# Patient Record
Sex: FEMALE | Race: WHITE | Employment: UNEMPLOYED | URBAN - METROPOLITAN AREA
[De-identification: names, ages, dates, MRNs, and addresses within clinical notes are randomized per-mention and may not be internally consistent; named-entity substitution may affect disease eponyms.]

---

## 2017-01-01 ENCOUNTER — HOSPITAL ENCOUNTER (OUTPATIENT)
Dept: LAB | Age: 57
Discharge: HOME | End: 2017-11-21

## 2017-01-01 ENCOUNTER — HOSPITAL ENCOUNTER (OUTPATIENT)
Dept: LAB | Age: 57
Discharge: HOME | End: 2017-12-28
Payer: COMMERCIAL

## 2017-01-01 ENCOUNTER — HOME CARE VISIT (OUTPATIENT)
Dept: SCHEDULING | Facility: HOME HEALTH | Age: 57
End: 2017-01-01
Payer: COMMERCIAL

## 2017-01-01 ENCOUNTER — HOSPITAL ENCOUNTER (OUTPATIENT)
Dept: LAB | Age: 57
Discharge: HOME | End: 2017-10-20
Payer: COMMERCIAL

## 2017-01-01 ENCOUNTER — HOSPITAL ENCOUNTER (OUTPATIENT)
Dept: LAB | Age: 57
Discharge: HOME | End: 2017-10-03
Payer: COMMERCIAL

## 2017-01-01 ENCOUNTER — HOSPITAL ENCOUNTER (OUTPATIENT)
Dept: INFUSION THERAPY | Age: 57
End: 2017-01-01

## 2017-01-01 ENCOUNTER — HOSPITAL ENCOUNTER (OUTPATIENT)
Dept: INFUSION THERAPY | Age: 57
Discharge: HOME | End: 2017-12-08
Payer: COMMERCIAL

## 2017-01-01 ENCOUNTER — TELEPHONE (OUTPATIENT)
Dept: ONCOLOGY | Age: 57
End: 2017-01-01

## 2017-01-01 ENCOUNTER — HOSPITAL ENCOUNTER (OUTPATIENT)
Dept: CARDIAC CATH/INVASIVE PROCEDURES | Age: 57
Discharge: OTHER INSTITUTION | End: 2017-11-06
Attending: INTERNAL MEDICINE | Admitting: INTERNAL MEDICINE
Payer: COMMERCIAL

## 2017-01-01 ENCOUNTER — TELEPHONE (OUTPATIENT)
Dept: HOME HEALTH SERVICES | Facility: HOME HEALTH | Age: 57
End: 2017-01-01

## 2017-01-01 ENCOUNTER — APPOINTMENT (OUTPATIENT)
Dept: INFUSION THERAPY | Age: 57
End: 2017-01-01

## 2017-01-01 ENCOUNTER — HOSPITAL ENCOUNTER (OUTPATIENT)
Dept: CT IMAGING | Age: 57
Discharge: HOME | End: 2017-12-27
Attending: INTERNAL MEDICINE
Payer: COMMERCIAL

## 2017-01-01 ENCOUNTER — HOSPITAL ENCOUNTER (OUTPATIENT)
Dept: LAB | Age: 57
Discharge: HOME | End: 2017-11-13
Attending: INTERNAL MEDICINE
Payer: COMMERCIAL

## 2017-01-01 ENCOUNTER — HOSPITAL ENCOUNTER (OUTPATIENT)
Dept: LAB | Age: 57
Discharge: HOME | End: 2017-12-11
Payer: COMMERCIAL

## 2017-01-01 ENCOUNTER — APPOINTMENT (OUTPATIENT)
Dept: GENERAL RADIOLOGY | Age: 57
DRG: 872 | End: 2017-01-01
Attending: EMERGENCY MEDICINE
Payer: COMMERCIAL

## 2017-01-01 ENCOUNTER — HOSPITAL ENCOUNTER (OUTPATIENT)
Dept: LAB | Age: 57
Discharge: HOME | End: 2017-11-20
Payer: COMMERCIAL

## 2017-01-01 ENCOUNTER — HOSPITAL ENCOUNTER (OUTPATIENT)
Dept: INFUSION THERAPY | Age: 57
End: 2017-01-01
Payer: COMMERCIAL

## 2017-01-01 ENCOUNTER — HOSPITAL ENCOUNTER (OUTPATIENT)
Dept: INFUSION THERAPY | Age: 57
Discharge: HOME | End: 2017-10-27
Payer: COMMERCIAL

## 2017-01-01 ENCOUNTER — HOME CARE VISIT (OUTPATIENT)
Dept: HOME HEALTH SERVICES | Facility: HOME HEALTH | Age: 57
End: 2017-01-01
Payer: COMMERCIAL

## 2017-01-01 ENCOUNTER — HOSPITAL ENCOUNTER (OUTPATIENT)
Dept: INFUSION THERAPY | Age: 57
Discharge: HOME | End: 2017-09-20
Payer: COMMERCIAL

## 2017-01-01 ENCOUNTER — HOSPITAL ENCOUNTER (OUTPATIENT)
Dept: INFUSION THERAPY | Age: 57
Discharge: HOME | End: 2017-10-06
Payer: COMMERCIAL

## 2017-01-01 ENCOUNTER — HOSPITAL ENCOUNTER (OUTPATIENT)
Dept: MRI IMAGING | Age: 57
Discharge: HOME | End: 2017-12-18
Attending: RADIOLOGY
Payer: COMMERCIAL

## 2017-01-01 ENCOUNTER — HOSPITAL ENCOUNTER (OUTPATIENT)
Dept: INFUSION THERAPY | Age: 57
Discharge: HOME | End: 2017-10-08
Payer: COMMERCIAL

## 2017-01-01 ENCOUNTER — HOSPITAL ENCOUNTER (OUTPATIENT)
Dept: INFUSION THERAPY | Age: 57
Discharge: HOME | End: 2017-10-20
Payer: COMMERCIAL

## 2017-01-01 ENCOUNTER — HOSPITAL ENCOUNTER (OUTPATIENT)
Dept: CT IMAGING | Age: 57
Discharge: HOME | End: 2017-10-27
Attending: NURSE PRACTITIONER
Payer: COMMERCIAL

## 2017-01-01 ENCOUNTER — HOME HEALTH ADMISSION (OUTPATIENT)
Dept: HOME HEALTH SERVICES | Facility: HOME HEALTH | Age: 57
End: 2017-01-01
Payer: COMMERCIAL

## 2017-01-01 ENCOUNTER — HOSPITAL ENCOUNTER (OUTPATIENT)
Dept: LAB | Age: 57
Discharge: HOME | End: 2017-12-04
Attending: INTERNAL MEDICINE
Payer: COMMERCIAL

## 2017-01-01 ENCOUNTER — HOSPITAL ENCOUNTER (OUTPATIENT)
Dept: RADIATION ONCOLOGY | Age: 57
Discharge: HOME | End: 2017-12-22
Payer: COMMERCIAL

## 2017-01-01 ENCOUNTER — HOSPITAL ENCOUNTER (OUTPATIENT)
Dept: LAB | Age: 57
Discharge: HOME | End: 2017-10-27
Payer: COMMERCIAL

## 2017-01-01 ENCOUNTER — HOSPITAL ENCOUNTER (INPATIENT)
Age: 57
LOS: 7 days | Discharge: HOME HEALTH | DRG: 872 | End: 2017-11-08
Attending: EMERGENCY MEDICINE | Admitting: FAMILY MEDICINE
Payer: COMMERCIAL

## 2017-01-01 ENCOUNTER — APPOINTMENT (OUTPATIENT)
Dept: INFUSION THERAPY | Age: 57
End: 2017-01-01
Payer: COMMERCIAL

## 2017-01-01 ENCOUNTER — HOSPITAL ENCOUNTER (OUTPATIENT)
Dept: INFUSION THERAPY | Age: 57
Discharge: HOME | End: 2017-09-22
Payer: COMMERCIAL

## 2017-01-01 ENCOUNTER — HOSPITAL ENCOUNTER (OUTPATIENT)
Dept: LAB | Age: 57
Discharge: HOME | End: 2017-11-27
Attending: INTERNAL MEDICINE
Payer: COMMERCIAL

## 2017-01-01 VITALS
SYSTOLIC BLOOD PRESSURE: 132 MMHG | RESPIRATION RATE: 18 BRPM | TEMPERATURE: 97.4 F | HEART RATE: 98 BPM | OXYGEN SATURATION: 95 % | DIASTOLIC BLOOD PRESSURE: 98 MMHG

## 2017-01-01 VITALS
TEMPERATURE: 98 F | DIASTOLIC BLOOD PRESSURE: 68 MMHG | DIASTOLIC BLOOD PRESSURE: 70 MMHG | HEART RATE: 70 BPM | SYSTOLIC BLOOD PRESSURE: 118 MMHG | HEART RATE: 80 BPM | DIASTOLIC BLOOD PRESSURE: 70 MMHG | SYSTOLIC BLOOD PRESSURE: 112 MMHG | OXYGEN SATURATION: 98 % | OXYGEN SATURATION: 99 % | RESPIRATION RATE: 17 BRPM | TEMPERATURE: 98 F | OXYGEN SATURATION: 98 % | OXYGEN SATURATION: 98 % | TEMPERATURE: 98 F | SYSTOLIC BLOOD PRESSURE: 114 MMHG | TEMPERATURE: 98 F | DIASTOLIC BLOOD PRESSURE: 68 MMHG | HEART RATE: 70 BPM | RESPIRATION RATE: 17 BRPM | HEART RATE: 70 BPM | RESPIRATION RATE: 16 BRPM | RESPIRATION RATE: 17 BRPM | SYSTOLIC BLOOD PRESSURE: 118 MMHG

## 2017-01-01 VITALS
RESPIRATION RATE: 16 BRPM | OXYGEN SATURATION: 98 % | HEART RATE: 92 BPM | SYSTOLIC BLOOD PRESSURE: 140 MMHG | TEMPERATURE: 97 F | DIASTOLIC BLOOD PRESSURE: 76 MMHG

## 2017-01-01 VITALS — BODY MASS INDEX: 26.43 KG/M2 | WEIGHT: 154 LBS

## 2017-01-01 VITALS
SYSTOLIC BLOOD PRESSURE: 149 MMHG | HEART RATE: 72 BPM | RESPIRATION RATE: 16 BRPM | TEMPERATURE: 98.9 F | WEIGHT: 151 LBS | DIASTOLIC BLOOD PRESSURE: 86 MMHG | BODY MASS INDEX: 25.92 KG/M2 | OXYGEN SATURATION: 91 %

## 2017-01-01 VITALS
RESPIRATION RATE: 18 BRPM | DIASTOLIC BLOOD PRESSURE: 98 MMHG | TEMPERATURE: 96.9 F | OXYGEN SATURATION: 93 % | SYSTOLIC BLOOD PRESSURE: 132 MMHG | HEART RATE: 87 BPM

## 2017-01-01 VITALS
RESPIRATION RATE: 17 BRPM | TEMPERATURE: 98 F | SYSTOLIC BLOOD PRESSURE: 130 MMHG | HEART RATE: 100 BPM | OXYGEN SATURATION: 98 % | DIASTOLIC BLOOD PRESSURE: 70 MMHG

## 2017-01-01 VITALS
SYSTOLIC BLOOD PRESSURE: 142 MMHG | HEART RATE: 111 BPM | TEMPERATURE: 98.1 F | DIASTOLIC BLOOD PRESSURE: 90 MMHG | RESPIRATION RATE: 18 BRPM | WEIGHT: 153 LBS | BODY MASS INDEX: 26.26 KG/M2

## 2017-01-01 VITALS
RESPIRATION RATE: 18 BRPM | SYSTOLIC BLOOD PRESSURE: 140 MMHG | BODY MASS INDEX: 27.01 KG/M2 | OXYGEN SATURATION: 96 % | DIASTOLIC BLOOD PRESSURE: 75 MMHG | WEIGHT: 158.2 LBS | HEART RATE: 98 BPM | TEMPERATURE: 98.4 F | HEIGHT: 64 IN

## 2017-01-01 VITALS
HEART RATE: 84 BPM | TEMPERATURE: 98.5 F | BODY MASS INDEX: 26.57 KG/M2 | SYSTOLIC BLOOD PRESSURE: 138 MMHG | DIASTOLIC BLOOD PRESSURE: 68 MMHG | WEIGHT: 154.8 LBS | RESPIRATION RATE: 18 BRPM

## 2017-01-01 VITALS
SYSTOLIC BLOOD PRESSURE: 146 MMHG | RESPIRATION RATE: 20 BRPM | OXYGEN SATURATION: 98 % | HEART RATE: 80 BPM | DIASTOLIC BLOOD PRESSURE: 78 MMHG

## 2017-01-01 VITALS — HEIGHT: 64 IN | BODY MASS INDEX: 26.29 KG/M2 | WEIGHT: 154 LBS

## 2017-01-01 VITALS
BODY MASS INDEX: 25.75 KG/M2 | TEMPERATURE: 98.2 F | SYSTOLIC BLOOD PRESSURE: 147 MMHG | RESPIRATION RATE: 16 BRPM | WEIGHT: 150 LBS | DIASTOLIC BLOOD PRESSURE: 83 MMHG | HEART RATE: 89 BPM | OXYGEN SATURATION: 96 %

## 2017-01-01 VITALS
SYSTOLIC BLOOD PRESSURE: 138 MMHG | TEMPERATURE: 96.6 F | RESPIRATION RATE: 18 BRPM | DIASTOLIC BLOOD PRESSURE: 84 MMHG | HEART RATE: 93 BPM | OXYGEN SATURATION: 99 %

## 2017-01-01 VITALS
SYSTOLIC BLOOD PRESSURE: 154 MMHG | BODY MASS INDEX: 26.09 KG/M2 | HEART RATE: 80 BPM | TEMPERATURE: 98.7 F | DIASTOLIC BLOOD PRESSURE: 75 MMHG | WEIGHT: 152 LBS | OXYGEN SATURATION: 97 % | RESPIRATION RATE: 16 BRPM

## 2017-01-01 VITALS
SYSTOLIC BLOOD PRESSURE: 150 MMHG | HEART RATE: 102 BPM | OXYGEN SATURATION: 98 % | TEMPERATURE: 98.1 F | DIASTOLIC BLOOD PRESSURE: 93 MMHG | RESPIRATION RATE: 18 BRPM

## 2017-01-01 VITALS
TEMPERATURE: 95.1 F | RESPIRATION RATE: 18 BRPM | DIASTOLIC BLOOD PRESSURE: 92 MMHG | OXYGEN SATURATION: 98 % | HEART RATE: 84 BPM | SYSTOLIC BLOOD PRESSURE: 138 MMHG

## 2017-01-01 VITALS
HEART RATE: 89 BPM | OXYGEN SATURATION: 97 % | SYSTOLIC BLOOD PRESSURE: 130 MMHG | DIASTOLIC BLOOD PRESSURE: 100 MMHG | RESPIRATION RATE: 14 BRPM | TEMPERATURE: 98 F

## 2017-01-01 VITALS
BODY MASS INDEX: 26.09 KG/M2 | OXYGEN SATURATION: 95 % | WEIGHT: 152 LBS | RESPIRATION RATE: 18 BRPM | TEMPERATURE: 98.6 F | HEART RATE: 110 BPM | DIASTOLIC BLOOD PRESSURE: 104 MMHG | SYSTOLIC BLOOD PRESSURE: 145 MMHG

## 2017-01-01 DIAGNOSIS — C79.31 COLON CANCER METASTASIZED TO BRAIN (HCC): ICD-10-CM

## 2017-01-01 DIAGNOSIS — C18.6 MALIGNANT NEOPLASM OF DESCENDING COLON (HCC): ICD-10-CM

## 2017-01-01 DIAGNOSIS — C78.00 MALIGNANT NEOPLASM METASTATIC TO LUNG, UNSPECIFIED LATERALITY (HCC): ICD-10-CM

## 2017-01-01 DIAGNOSIS — C18.9 COLON CANCER METASTASIZED TO BRAIN (HCC): ICD-10-CM

## 2017-01-01 DIAGNOSIS — C79.31 METASTASIS TO BRAIN (HCC): ICD-10-CM

## 2017-01-01 DIAGNOSIS — K63.89 MASS OF COLON: ICD-10-CM

## 2017-01-01 DIAGNOSIS — A41.9 SEPSIS, DUE TO UNSPECIFIED ORGANISM: Primary | ICD-10-CM

## 2017-01-01 LAB
ALBUMIN SERPL-MCNC: 1.7 G/DL (ref 3.5–5)
ALBUMIN SERPL-MCNC: 1.9 G/DL (ref 3.5–5)
ALBUMIN SERPL-MCNC: 2.2 G/DL (ref 3.5–5)
ALBUMIN SERPL-MCNC: 2.4 G/DL (ref 3.5–5)
ALBUMIN SERPL-MCNC: 2.5 G/DL (ref 3.5–5)
ALBUMIN SERPL-MCNC: 3 G/DL (ref 3.5–5)
ALBUMIN SERPL-MCNC: 3.1 G/DL (ref 3.5–5)
ALBUMIN/GLOB SERPL: 0.5 {RATIO} (ref 1.2–3.5)
ALBUMIN/GLOB SERPL: 0.6 {RATIO} (ref 1.2–3.5)
ALBUMIN/GLOB SERPL: 0.6 {RATIO} (ref 1.2–3.5)
ALBUMIN/GLOB SERPL: 0.8 {RATIO}
ALBUMIN/GLOB SERPL: 0.8 {RATIO} (ref 1.2–3.5)
ALP SERPL-CCNC: 109 U/L (ref 50–136)
ALP SERPL-CCNC: 120 U/L (ref 50–136)
ALP SERPL-CCNC: 128 U/L (ref 50–136)
ALP SERPL-CCNC: 129 U/L (ref 50–136)
ALP SERPL-CCNC: 133 U/L (ref 50–136)
ALP SERPL-CCNC: 134 U/L (ref 50–136)
ALP SERPL-CCNC: 141 U/L (ref 50–136)
ALP SERPL-CCNC: 141 U/L (ref 50–136)
ALP SERPL-CCNC: 95 U/L (ref 50–136)
ALT SERPL-CCNC: 12 U/L (ref 12–65)
ALT SERPL-CCNC: 12 U/L (ref 12–65)
ALT SERPL-CCNC: 45 U/L (ref 12–65)
ALT SERPL-CCNC: 50 U/L (ref 12–65)
ALT SERPL-CCNC: 51 U/L (ref 12–65)
ALT SERPL-CCNC: 54 U/L (ref 12–65)
ALT SERPL-CCNC: 56 U/L (ref 12–65)
ALT SERPL-CCNC: 70 U/L (ref 12–65)
ALT SERPL-CCNC: 80 U/L (ref 12–65)
ANION GAP SERPL CALC-SCNC: 10 MMOL/L (ref 7–16)
ANION GAP SERPL CALC-SCNC: 10 MMOL/L (ref 7–16)
ANION GAP SERPL CALC-SCNC: 11 MMOL/L (ref 7–16)
ANION GAP SERPL CALC-SCNC: 14 MMOL/L (ref 7–16)
ANION GAP SERPL CALC-SCNC: 5 MMOL/L (ref 7–16)
ANION GAP SERPL CALC-SCNC: 6 MMOL/L (ref 7–16)
ANION GAP SERPL CALC-SCNC: 7 MMOL/L (ref 7–16)
ANION GAP SERPL CALC-SCNC: 8 MMOL/L
ANION GAP SERPL CALC-SCNC: 8 MMOL/L (ref 7–16)
ANION GAP SERPL CALC-SCNC: 9 MMOL/L (ref 7–16)
ANION GAP SERPL CALC-SCNC: 9 MMOL/L (ref 7–16)
APPEARANCE UR: ABNORMAL
AST SERPL-CCNC: 13 U/L (ref 15–37)
AST SERPL-CCNC: 13 U/L (ref 15–37)
AST SERPL-CCNC: 15 U/L (ref 15–37)
AST SERPL-CCNC: 17 U/L (ref 15–37)
AST SERPL-CCNC: 18 U/L (ref 15–37)
AST SERPL-CCNC: 29 U/L (ref 15–37)
AST SERPL-CCNC: 36 U/L (ref 15–37)
AST SERPL-CCNC: 54 U/L (ref 15–37)
AST SERPL-CCNC: 59 U/L (ref 15–37)
ATRIAL RATE: 117 BPM
BACTERIA SPEC CULT: ABNORMAL
BACTERIA SPEC CULT: NORMAL
BACTERIA URNS QL MICRO: ABNORMAL /HPF
BASOPHILS # BLD: 0 K/UL (ref 0–0.2)
BASOPHILS NFR BLD: 0 % (ref 0–2)
BILIRUB DIRECT SERPL-MCNC: 0.1 MG/DL
BILIRUB SERPL-MCNC: 0.3 MG/DL (ref 0.2–1.1)
BILIRUB SERPL-MCNC: 0.4 MG/DL (ref 0.2–1.1)
BILIRUB SERPL-MCNC: 0.5 MG/DL (ref 0.2–1.1)
BILIRUB SERPL-MCNC: 0.5 MG/DL (ref 0.2–1.1)
BILIRUB SERPL-MCNC: 0.8 MG/DL (ref 0.2–1.1)
BILIRUB SERPL-MCNC: 0.9 MG/DL (ref 0.2–1.1)
BILIRUB SERPL-MCNC: 0.9 MG/DL (ref 0.2–1.1)
BILIRUB SERPL-MCNC: 1.1 MG/DL (ref 0.2–1.1)
BILIRUB SERPL-MCNC: 1.2 MG/DL (ref 0.2–1.1)
BILIRUB UR QL: ABNORMAL
BUN SERPL-MCNC: 10 MG/DL (ref 6–23)
BUN SERPL-MCNC: 10 MG/DL (ref 6–23)
BUN SERPL-MCNC: 12 MG/DL (ref 6–23)
BUN SERPL-MCNC: 14 MG/DL (ref 6–23)
BUN SERPL-MCNC: 15 MG/DL (ref 6–23)
BUN SERPL-MCNC: 4 MG/DL (ref 6–23)
BUN SERPL-MCNC: 4 MG/DL (ref 6–23)
BUN SERPL-MCNC: 5 MG/DL (ref 6–23)
BUN SERPL-MCNC: 5 MG/DL (ref 6–23)
BUN SERPL-MCNC: 6 MG/DL (ref 6–23)
BUN SERPL-MCNC: 7 MG/DL (ref 6–23)
BUN SERPL-MCNC: 7 MG/DL (ref 6–23)
BUN SERPL-MCNC: 9 MG/DL (ref 6–23)
CALCIUM SERPL-MCNC: 7.5 MG/DL (ref 8.3–10.4)
CALCIUM SERPL-MCNC: 7.6 MG/DL (ref 8.3–10.4)
CALCIUM SERPL-MCNC: 7.8 MG/DL (ref 8.3–10.4)
CALCIUM SERPL-MCNC: 8.4 MG/DL (ref 8.3–10.4)
CALCIUM SERPL-MCNC: 8.5 MG/DL (ref 8.3–10.4)
CALCIUM SERPL-MCNC: 8.6 MG/DL (ref 8.3–10.4)
CALCIUM SERPL-MCNC: 8.7 MG/DL (ref 8.3–10.4)
CALCIUM SERPL-MCNC: 8.7 MG/DL (ref 8.3–10.4)
CALCIUM SERPL-MCNC: 8.8 MG/DL (ref 8.3–10.4)
CALCIUM SERPL-MCNC: 8.9 MG/DL (ref 8.3–10.4)
CALCIUM SERPL-MCNC: 9 MG/DL (ref 8.3–10.4)
CALCULATED P AXIS, ECG09: 47 DEGREES
CALCULATED R AXIS, ECG10: 58 DEGREES
CALCULATED T AXIS, ECG11: 41 DEGREES
CASTS URNS QL MICRO: ABNORMAL /LPF
CHLORIDE SERPL-SCNC: 101 MMOL/L (ref 98–107)
CHLORIDE SERPL-SCNC: 102 MMOL/L (ref 98–107)
CHLORIDE SERPL-SCNC: 104 MMOL/L (ref 98–107)
CHLORIDE SERPL-SCNC: 105 MMOL/L (ref 98–107)
CHLORIDE SERPL-SCNC: 105 MMOL/L (ref 98–107)
CHLORIDE SERPL-SCNC: 106 MMOL/L (ref 98–107)
CHLORIDE SERPL-SCNC: 106 MMOL/L (ref 98–107)
CHLORIDE SERPL-SCNC: 107 MMOL/L (ref 98–107)
CHLORIDE SERPL-SCNC: 108 MMOL/L (ref 98–107)
CHLORIDE SERPL-SCNC: 109 MMOL/L (ref 98–107)
CHLORIDE SERPL-SCNC: 94 MMOL/L (ref 98–107)
CK SERPL-CCNC: 40 U/L (ref 21–215)
CO2 SERPL-SCNC: 22 MMOL/L (ref 21–32)
CO2 SERPL-SCNC: 24 MMOL/L (ref 21–32)
CO2 SERPL-SCNC: 25 MMOL/L (ref 21–32)
CO2 SERPL-SCNC: 26 MMOL/L (ref 21–32)
CO2 SERPL-SCNC: 26 MMOL/L (ref 21–32)
CO2 SERPL-SCNC: 27 MMOL/L (ref 21–32)
CO2 SERPL-SCNC: 28 MMOL/L (ref 21–32)
CO2 SERPL-SCNC: 29 MMOL/L (ref 21–32)
COLOR UR: ABNORMAL
CREAT SERPL-MCNC: 0.35 MG/DL (ref 0.6–1)
CREAT SERPL-MCNC: 0.38 MG/DL (ref 0.6–1)
CREAT SERPL-MCNC: 0.5 MG/DL (ref 0.6–1)
CREAT SERPL-MCNC: 0.5 MG/DL (ref 0.6–1)
CREAT SERPL-MCNC: 0.54 MG/DL (ref 0.6–1)
CREAT SERPL-MCNC: 0.59 MG/DL (ref 0.6–1)
CREAT SERPL-MCNC: 0.61 MG/DL (ref 0.6–1)
CREAT SERPL-MCNC: 0.61 MG/DL (ref 0.6–1)
CREAT SERPL-MCNC: 0.63 MG/DL (ref 0.6–1)
CREAT SERPL-MCNC: 0.65 MG/DL (ref 0.6–1)
CREAT SERPL-MCNC: 0.65 MG/DL (ref 0.6–1)
CREAT SERPL-MCNC: 0.68 MG/DL (ref 0.6–1)
CREAT SERPL-MCNC: 0.84 MG/DL (ref 0.6–1)
DIAGNOSIS, 93000: NORMAL
DIFFERENTIAL METHOD BLD: ABNORMAL
DIFFERENTIAL METHOD BLD: NORMAL
EOSINOPHIL # BLD: 0 K/UL (ref 0–0.8)
EOSINOPHIL # BLD: 0.1 K/UL (ref 0–0.8)
EOSINOPHIL # BLD: 0.2 K/UL (ref 0–0.8)
EOSINOPHIL NFR BLD: 0 % (ref 0.5–7.8)
EOSINOPHIL NFR BLD: 1 % (ref 0.5–7.8)
EOSINOPHIL NFR BLD: 2 % (ref 0.5–7.8)
EOSINOPHIL NFR BLD: 3 % (ref 0.5–7.8)
EPI CELLS #/AREA URNS HPF: ABNORMAL /HPF
ERYTHROCYTE [DISTWIDTH] IN BLOOD BY AUTOMATED COUNT: 12.1 % (ref 11.9–14.6)
ERYTHROCYTE [DISTWIDTH] IN BLOOD BY AUTOMATED COUNT: 13.1 % (ref 11.9–14.6)
ERYTHROCYTE [DISTWIDTH] IN BLOOD BY AUTOMATED COUNT: 13.2 % (ref 11.9–14.6)
ERYTHROCYTE [DISTWIDTH] IN BLOOD BY AUTOMATED COUNT: 13.3 % (ref 11.9–14.6)
ERYTHROCYTE [DISTWIDTH] IN BLOOD BY AUTOMATED COUNT: 13.7 % (ref 11.9–14.6)
ERYTHROCYTE [DISTWIDTH] IN BLOOD BY AUTOMATED COUNT: 14.8 % (ref 11.9–14.6)
ERYTHROCYTE [DISTWIDTH] IN BLOOD BY AUTOMATED COUNT: 15.3 % (ref 11.9–14.6)
ERYTHROCYTE [DISTWIDTH] IN BLOOD BY AUTOMATED COUNT: 16.1 % (ref 11.9–14.6)
ERYTHROCYTE [DISTWIDTH] IN BLOOD BY AUTOMATED COUNT: 16.8 % (ref 11.9–14.6)
ERYTHROCYTE [DISTWIDTH] IN BLOOD BY AUTOMATED COUNT: 17.7 % (ref 11.9–14.6)
ERYTHROCYTE [DISTWIDTH] IN BLOOD BY AUTOMATED COUNT: 17.9 % (ref 11.9–14.6)
ERYTHROCYTE [DISTWIDTH] IN BLOOD BY AUTOMATED COUNT: 17.9 % (ref 11.9–14.6)
ERYTHROCYTE [DISTWIDTH] IN BLOOD BY AUTOMATED COUNT: 18.5 % (ref 11.9–14.6)
ERYTHROCYTE [DISTWIDTH] IN BLOOD BY AUTOMATED COUNT: 19.3 % (ref 11.9–14.6)
FAX TO INFO,FAXT: NORMAL
FAX TO NUMBER,FAXN: NORMAL
FLUAV AG NPH QL IA: NEGATIVE
FLUBV AG NPH QL IA: NEGATIVE
GLOBULIN SER CALC-MCNC: 3.4 G/DL (ref 2.3–3.5)
GLOBULIN SER CALC-MCNC: 3.7 G/DL (ref 2.3–3.5)
GLOBULIN SER CALC-MCNC: 3.7 G/DL (ref 2.3–3.5)
GLOBULIN SER CALC-MCNC: 3.8 G/DL
GLOBULIN SER CALC-MCNC: 3.8 G/DL (ref 2.3–3.5)
GLOBULIN SER CALC-MCNC: 3.9 G/DL (ref 2.3–3.5)
GLOBULIN SER CALC-MCNC: 4 G/DL (ref 2.3–3.5)
GLOBULIN SER CALC-MCNC: 4.2 G/DL (ref 2.3–3.5)
GLOBULIN SER CALC-MCNC: 4.3 G/DL (ref 2.3–3.5)
GLUCOSE SERPL-MCNC: 103 MG/DL (ref 65–100)
GLUCOSE SERPL-MCNC: 104 MG/DL (ref 65–100)
GLUCOSE SERPL-MCNC: 104 MG/DL (ref 65–100)
GLUCOSE SERPL-MCNC: 119 MG/DL (ref 65–100)
GLUCOSE SERPL-MCNC: 122 MG/DL (ref 65–100)
GLUCOSE SERPL-MCNC: 129 MG/DL (ref 65–100)
GLUCOSE SERPL-MCNC: 147 MG/DL (ref 65–100)
GLUCOSE SERPL-MCNC: 198 MG/DL (ref 65–100)
GLUCOSE SERPL-MCNC: 81 MG/DL (ref 65–100)
GLUCOSE SERPL-MCNC: 85 MG/DL (ref 65–100)
GLUCOSE SERPL-MCNC: 93 MG/DL (ref 65–100)
GLUCOSE SERPL-MCNC: 97 MG/DL (ref 65–100)
GLUCOSE SERPL-MCNC: 98 MG/DL (ref 65–100)
GLUCOSE UR STRIP.AUTO-MCNC: 100 MG/DL
GRAM STN SPEC: ABNORMAL
HCT VFR BLD AUTO: 25.8 % (ref 35.8–46.3)
HCT VFR BLD AUTO: 26 % (ref 35.8–46.3)
HCT VFR BLD AUTO: 26.1 % (ref 35.8–46.3)
HCT VFR BLD AUTO: 27.1 % (ref 35.8–46.3)
HCT VFR BLD AUTO: 29.6 % (ref 35.8–46.3)
HCT VFR BLD AUTO: 32.1 % (ref 35.8–46.3)
HCT VFR BLD AUTO: 36.4 % (ref 35.8–46.3)
HCT VFR BLD AUTO: 36.6 % (ref 35.8–46.3)
HCT VFR BLD AUTO: 37.3 % (ref 35.8–46.3)
HCT VFR BLD AUTO: 37.8 % (ref 35.8–46.3)
HCT VFR BLD AUTO: 38 % (ref 35.8–46.3)
HCT VFR BLD AUTO: 38.8 % (ref 35.8–46.3)
HCT VFR BLD AUTO: 39.4 % (ref 35.8–46.3)
HCT VFR BLD AUTO: 39.7 % (ref 35.8–46.3)
HCT VFR BLD AUTO: 41 % (ref 35.8–46.3)
HCT VFR BLD AUTO: 41.6 % (ref 35.8–46.3)
HGB BLD-MCNC: 11.1 G/DL (ref 11.7–15.4)
HGB BLD-MCNC: 11.7 G/DL (ref 11.7–15.4)
HGB BLD-MCNC: 11.7 G/DL (ref 11.7–15.4)
HGB BLD-MCNC: 12.4 G/DL (ref 11.7–15.4)
HGB BLD-MCNC: 12.6 G/DL (ref 11.7–15.4)
HGB BLD-MCNC: 13.2 G/DL (ref 11.7–15.4)
HGB BLD-MCNC: 13.2 G/DL (ref 11.7–15.4)
HGB BLD-MCNC: 13.4 G/DL (ref 11.7–15.4)
HGB BLD-MCNC: 13.5 G/DL (ref 11.7–15.4)
HGB BLD-MCNC: 13.8 G/DL (ref 11.7–15.4)
HGB BLD-MCNC: 13.8 G/DL (ref 11.7–15.4)
HGB BLD-MCNC: 8.6 G/DL (ref 11.7–15.4)
HGB BLD-MCNC: 8.8 G/DL (ref 11.7–15.4)
HGB BLD-MCNC: 8.9 G/DL (ref 11.7–15.4)
HGB BLD-MCNC: 9.1 G/DL (ref 11.7–15.4)
HGB BLD-MCNC: 9.9 G/DL (ref 11.7–15.4)
HGB UR QL STRIP: ABNORMAL
IMM GRANULOCYTES # BLD: 0 K/UL (ref 0–0.5)
IMM GRANULOCYTES # BLD: 0.1 K/UL (ref 0–0.5)
IMM GRANULOCYTES # BLD: 0.1 K/UL (ref 0–0.5)
IMM GRANULOCYTES NFR BLD AUTO: 0 % (ref 0–5)
IMM GRANULOCYTES NFR BLD AUTO: 0 % (ref 0–5)
IMM GRANULOCYTES NFR BLD: 0 % (ref 0–5)
IMM GRANULOCYTES NFR BLD: 1 % (ref 0–5)
KETONES UR QL STRIP.AUTO: ABNORMAL MG/DL
LACTATE BLD-SCNC: 2.2 MMOL/L (ref 0.5–1.9)
LACTATE SERPL-SCNC: <0.4 MMOL/L (ref 0.4–2)
LEUKOCYTE ESTERASE UR QL STRIP.AUTO: ABNORMAL
LIPASE SERPL-CCNC: 58 U/L (ref 73–393)
LYMPHOCYTES # BLD: 0.8 K/UL (ref 0.5–4.6)
LYMPHOCYTES # BLD: 0.8 K/UL (ref 0.5–4.6)
LYMPHOCYTES # BLD: 1 K/UL (ref 0.5–4.6)
LYMPHOCYTES # BLD: 1.1 K/UL (ref 0.5–4.6)
LYMPHOCYTES # BLD: 1.2 K/UL (ref 0.5–4.6)
LYMPHOCYTES # BLD: 1.2 K/UL (ref 0.5–4.6)
LYMPHOCYTES # BLD: 1.4 K/UL (ref 0.5–4.6)
LYMPHOCYTES # BLD: 1.8 K/UL (ref 0.5–4.6)
LYMPHOCYTES # BLD: 1.9 K/UL (ref 0.5–4.6)
LYMPHOCYTES # BLD: 2.1 K/UL (ref 0.5–4.6)
LYMPHOCYTES # BLD: 3.1 K/UL (ref 0.5–4.6)
LYMPHOCYTES NFR BLD MANUAL: 28 % (ref 16–44)
LYMPHOCYTES NFR BLD: 16 % (ref 13–44)
LYMPHOCYTES NFR BLD: 19 % (ref 13–44)
LYMPHOCYTES NFR BLD: 19 % (ref 13–44)
LYMPHOCYTES NFR BLD: 25 % (ref 13–44)
LYMPHOCYTES NFR BLD: 25 % (ref 13–44)
LYMPHOCYTES NFR BLD: 31 % (ref 13–44)
LYMPHOCYTES NFR BLD: 32 % (ref 13–44)
LYMPHOCYTES NFR BLD: 50 % (ref 13–44)
LYMPHOCYTES NFR BLD: 8 % (ref 13–44)
LYMPHOCYTES NFR BLD: 9 % (ref 13–44)
MAGNESIUM SERPL-MCNC: 1.8 MG/DL (ref 1.8–2.4)
MCH RBC QN AUTO: 31.3 PG (ref 26.1–32.9)
MCH RBC QN AUTO: 31.3 PG (ref 26.1–32.9)
MCH RBC QN AUTO: 31.4 PG (ref 26.1–32.9)
MCH RBC QN AUTO: 31.7 PG (ref 26.1–32.9)
MCH RBC QN AUTO: 31.7 PG (ref 26.1–32.9)
MCH RBC QN AUTO: 31.9 PG (ref 26.1–32.9)
MCH RBC QN AUTO: 32.1 PG (ref 26.1–32.9)
MCH RBC QN AUTO: 32.2 PG (ref 26.1–32.9)
MCH RBC QN AUTO: 32.4 PG (ref 26.1–32.9)
MCH RBC QN AUTO: 32.5 PG (ref 26.1–32.9)
MCH RBC QN AUTO: 32.6 PG (ref 26.1–32.9)
MCH RBC QN AUTO: 32.7 PG (ref 26.1–32.9)
MCHC RBC AUTO-ENTMCNC: 31.2 G/DL (ref 31.4–35)
MCHC RBC AUTO-ENTMCNC: 32 G/DL (ref 31.4–35)
MCHC RBC AUTO-ENTMCNC: 32.1 G/DL (ref 31.4–35)
MCHC RBC AUTO-ENTMCNC: 32.2 G/DL (ref 31.4–35)
MCHC RBC AUTO-ENTMCNC: 32.2 G/DL (ref 31.4–35)
MCHC RBC AUTO-ENTMCNC: 33.3 G/DL (ref 31.4–35)
MCHC RBC AUTO-ENTMCNC: 33.3 G/DL (ref 31.4–35)
MCHC RBC AUTO-ENTMCNC: 33.4 G/DL (ref 31.4–35)
MCHC RBC AUTO-ENTMCNC: 33.8 G/DL (ref 31.4–35)
MCHC RBC AUTO-ENTMCNC: 34.6 G/DL (ref 31.4–35)
MCHC RBC AUTO-ENTMCNC: 34.7 G/DL (ref 31.4–35)
MCHC RBC AUTO-ENTMCNC: 35 G/DL (ref 31.4–35)
MCHC RBC AUTO-ENTMCNC: 35.6 G/DL (ref 31.4–35)
MCHC RBC AUTO-ENTMCNC: 36.2 G/DL (ref 31.4–35)
MCV RBC AUTO: 100.3 FL (ref 79.6–97.8)
MCV RBC AUTO: 100.8 FL (ref 79.6–97.8)
MCV RBC AUTO: 101.1 FL (ref 79.6–97.8)
MCV RBC AUTO: 89.6 FL (ref 79.6–97.8)
MCV RBC AUTO: 90.3 FL (ref 79.6–97.8)
MCV RBC AUTO: 92.7 FL (ref 79.6–97.8)
MCV RBC AUTO: 93.4 FL (ref 79.6–97.8)
MCV RBC AUTO: 93.8 FL (ref 79.6–97.8)
MCV RBC AUTO: 93.9 FL (ref 79.6–97.8)
MCV RBC AUTO: 94.9 FL (ref 79.6–97.8)
MCV RBC AUTO: 95.2 FL (ref 79.6–97.8)
MCV RBC AUTO: 96.3 FL (ref 79.6–97.8)
MCV RBC AUTO: 97.4 FL (ref 79.6–97.8)
MCV RBC AUTO: 99.8 FL (ref 79.6–97.8)
METAMYELOCYTES NFR BLD MANUAL: 2 %
MONOCYTES # BLD: 0.5 K/UL (ref 0.1–1.3)
MONOCYTES # BLD: 0.6 K/UL (ref 0.1–1.3)
MONOCYTES # BLD: 0.8 K/UL (ref 0.1–1.3)
MONOCYTES # BLD: 1 K/UL (ref 0.1–1.3)
MONOCYTES NFR BLD MANUAL: 9 % (ref 3–9)
MONOCYTES NFR BLD: 10 % (ref 4–12)
MONOCYTES NFR BLD: 22 % (ref 4–12)
MONOCYTES NFR BLD: 6 % (ref 4–12)
MONOCYTES NFR BLD: 7 % (ref 4–12)
MONOCYTES NFR BLD: 7 % (ref 4–12)
MONOCYTES NFR BLD: 8 % (ref 4–12)
MONOCYTES NFR BLD: 8 % (ref 4–12)
MONOCYTES NFR BLD: 9 % (ref 4–12)
MUCOUS THREADS URNS QL MICRO: ABNORMAL /LPF
MYELOCYTES NFR BLD MANUAL: 3 %
NEUTS BAND NFR BLD MANUAL: 5 % (ref 0–6)
NEUTS SEG # BLD: 0.6 K/UL (ref 1.7–8.2)
NEUTS SEG # BLD: 11.2 K/UL (ref 1.7–8.2)
NEUTS SEG # BLD: 3.2 K/UL (ref 1.7–8.2)
NEUTS SEG # BLD: 3.5 K/UL (ref 1.7–8.2)
NEUTS SEG # BLD: 3.6 K/UL (ref 1.7–8.2)
NEUTS SEG # BLD: 3.9 K/UL (ref 1.7–8.2)
NEUTS SEG # BLD: 4.4 K/UL (ref 1.7–8.2)
NEUTS SEG # BLD: 5 K/UL (ref 1.7–8.2)
NEUTS SEG # BLD: 5.3 K/UL (ref 1.7–8.2)
NEUTS SEG # BLD: 7 K/UL (ref 1.7–8.2)
NEUTS SEG # BLD: 7.3 K/UL (ref 1.7–8.2)
NEUTS SEG NFR BLD MANUAL: 53 % (ref 47–75)
NEUTS SEG NFR BLD: 27 % (ref 43–78)
NEUTS SEG NFR BLD: 58 % (ref 43–78)
NEUTS SEG NFR BLD: 59 % (ref 43–78)
NEUTS SEG NFR BLD: 65 % (ref 43–78)
NEUTS SEG NFR BLD: 66 % (ref 43–78)
NEUTS SEG NFR BLD: 71 % (ref 43–78)
NEUTS SEG NFR BLD: 71 % (ref 43–78)
NEUTS SEG NFR BLD: 75 % (ref 43–78)
NEUTS SEG NFR BLD: 83 % (ref 43–78)
NEUTS SEG NFR BLD: 86 % (ref 43–78)
NITRITE UR QL STRIP.AUTO: NEGATIVE
NRBC # BLD: 0 K/UL (ref 0–0.2)
NRBC # BLD: 0 K/UL (ref 0–0.2)
NRBC # BLD: 0.01 K/UL (ref 0–0.2)
NRBC # BLD: 0.05 K/UL (ref 0–0.2)
NRBC # BLD: 0.1 K/UL (ref 0–0.2)
NRBC BLD-RTO: 0 PER 100 WBC (ref 0–2)
P-R INTERVAL, ECG05: 118 MS
PH UR STRIP: 5.5 [PH] (ref 5–9)
PLATELET # BLD AUTO: 109 K/UL (ref 150–450)
PLATELET # BLD AUTO: 121 K/UL (ref 150–450)
PLATELET # BLD AUTO: 149 K/UL (ref 150–450)
PLATELET # BLD AUTO: 151 K/UL (ref 150–450)
PLATELET # BLD AUTO: 189 K/UL (ref 150–450)
PLATELET # BLD AUTO: 213 K/UL (ref 150–450)
PLATELET # BLD AUTO: 295 K/UL (ref 150–450)
PLATELET # BLD AUTO: 308 K/UL (ref 150–450)
PLATELET # BLD AUTO: 327 K/UL (ref 150–450)
PLATELET # BLD AUTO: 335 K/UL (ref 150–450)
PLATELET # BLD AUTO: 351 K/UL (ref 150–450)
PLATELET # BLD AUTO: 476 K/UL (ref 150–450)
PLATELET # BLD AUTO: 89 K/UL (ref 150–450)
PLATELET # BLD AUTO: 92 K/UL (ref 150–450)
PLATELET COMMENTS,PCOM: ADEQUATE
PMV BLD AUTO: 10.4 FL (ref 10.8–14.1)
PMV BLD AUTO: 10.5 FL (ref 10.8–14.1)
PMV BLD AUTO: 10.6 FL (ref 10.8–14.1)
PMV BLD AUTO: 10.7 FL (ref 10.8–14.1)
PMV BLD AUTO: 10.8 FL (ref 10.8–14.1)
PMV BLD AUTO: 11 FL (ref 10.8–14.1)
PMV BLD AUTO: 11.2 FL (ref 10.8–14.1)
PMV BLD AUTO: 11.3 FL (ref 10.8–14.1)
PMV BLD AUTO: 11.7 FL (ref 10.8–14.1)
PMV BLD AUTO: 8.6 FL (ref 10.8–14.1)
PMV BLD AUTO: 9 FL (ref 10.8–14.1)
PMV BLD AUTO: 9 FL (ref 10.8–14.1)
PMV BLD AUTO: 9.5 FL (ref 10.8–14.1)
PMV BLD AUTO: 9.8 FL (ref 10.8–14.1)
POTASSIUM SERPL-SCNC: 3 MMOL/L (ref 3.5–5.1)
POTASSIUM SERPL-SCNC: 3.1 MMOL/L (ref 3.5–5.1)
POTASSIUM SERPL-SCNC: 3.2 MMOL/L (ref 3.5–5.1)
POTASSIUM SERPL-SCNC: 3.3 MMOL/L (ref 3.5–5.1)
POTASSIUM SERPL-SCNC: 3.4 MMOL/L (ref 3.5–5.1)
POTASSIUM SERPL-SCNC: 3.4 MMOL/L (ref 3.5–5.1)
POTASSIUM SERPL-SCNC: 3.5 MMOL/L (ref 3.5–5.1)
POTASSIUM SERPL-SCNC: 3.7 MMOL/L (ref 3.5–5.1)
POTASSIUM SERPL-SCNC: 3.8 MMOL/L (ref 3.5–5.1)
POTASSIUM SERPL-SCNC: 3.9 MMOL/L (ref 3.5–5.1)
POTASSIUM SERPL-SCNC: 3.9 MMOL/L (ref 3.5–5.1)
PROCALCITONIN SERPL-MCNC: 8.5 NG/ML
PROT SERPL-MCNC: 5.1 G/DL (ref 6.3–8.2)
PROT SERPL-MCNC: 5.8 G/DL (ref 6.3–8.2)
PROT SERPL-MCNC: 6.5 G/DL (ref 6.3–8.2)
PROT SERPL-MCNC: 6.5 G/DL (ref 6.3–8.2)
PROT SERPL-MCNC: 6.6 G/DL (ref 6.3–8.2)
PROT SERPL-MCNC: 6.8 G/DL (ref 6.3–8.2)
PROT SERPL-MCNC: 6.9 G/DL (ref 6.3–8.2)
PROT UR STRIP-MCNC: ABNORMAL MG/DL
PROT UR-MCNC: 10 MG/DL
PROT UR-MCNC: 10 MG/DL
PROT UR-MCNC: 12 MG/DL
PROT UR-MCNC: 17 MG/DL
PROT UR-MCNC: 9 MG/DL
Q-T INTERVAL, ECG07: 288 MS
QRS DURATION, ECG06: 72 MS
QTC CALCULATION (BEZET), ECG08: 401 MS
RBC # BLD AUTO: 2.7 M/UL (ref 4.05–5.25)
RBC # BLD AUTO: 2.71 M/UL (ref 4.05–5.25)
RBC # BLD AUTO: 3.12 M/UL (ref 4.05–5.25)
RBC # BLD AUTO: 3.42 M/UL (ref 4.05–5.25)
RBC # BLD AUTO: 3.6 M/UL (ref 4.05–5.25)
RBC # BLD AUTO: 3.63 M/UL (ref 4.05–5.25)
RBC # BLD AUTO: 3.96 M/UL (ref 4.05–5.25)
RBC # BLD AUTO: 4.03 M/UL (ref 4.05–5.25)
RBC # BLD AUTO: 4.07 M/UL (ref 4.05–5.25)
RBC # BLD AUTO: 4.11 M/UL (ref 4.05–5.25)
RBC # BLD AUTO: 4.13 M/UL (ref 4.05–5.25)
RBC # BLD AUTO: 4.25 M/UL (ref 4.05–5.25)
RBC # BLD AUTO: 4.27 M/UL (ref 4.05–5.25)
RBC # BLD AUTO: 4.33 M/UL (ref 4.05–5.25)
RBC #/AREA URNS HPF: ABNORMAL /HPF
RBC MORPH BLD: ABNORMAL
RBC MORPH BLD: ABNORMAL
SERVICE CMNT-IMP: ABNORMAL
SERVICE CMNT-IMP: NORMAL
SODIUM SERPL-SCNC: 132 MMOL/L (ref 136–145)
SODIUM SERPL-SCNC: 135 MMOL/L (ref 136–145)
SODIUM SERPL-SCNC: 135 MMOL/L (ref 136–145)
SODIUM SERPL-SCNC: 137 MMOL/L (ref 136–145)
SODIUM SERPL-SCNC: 138 MMOL/L (ref 136–145)
SODIUM SERPL-SCNC: 139 MMOL/L (ref 136–145)
SODIUM SERPL-SCNC: 140 MMOL/L (ref 136–145)
SODIUM SERPL-SCNC: 140 MMOL/L (ref 136–145)
SODIUM SERPL-SCNC: 141 MMOL/L (ref 136–145)
SODIUM SERPL-SCNC: 141 MMOL/L (ref 136–145)
SODIUM SERPL-SCNC: 144 MMOL/L (ref 136–145)
SP GR UR REFRACTOMETRY: 1.03 (ref 1–1.02)
UROBILINOGEN UR QL STRIP.AUTO: 1 EU/DL (ref 0.2–1)
VANCOMYCIN TROUGH SERPL-MCNC: 5.8 UG/ML (ref 5–20)
VENTRICULAR RATE, ECG03: 117 BPM
WBC # BLD AUTO: 11.1 K/UL (ref 4.3–11.1)
WBC # BLD AUTO: 13 K/UL (ref 4.3–11.1)
WBC # BLD AUTO: 2.2 K/UL (ref 4.3–11.1)
WBC # BLD AUTO: 4.4 K/UL (ref 4.3–11.1)
WBC # BLD AUTO: 5.4 K/UL (ref 4.3–11.1)
WBC # BLD AUTO: 6.1 K/UL (ref 4.3–11.1)
WBC # BLD AUTO: 6.2 K/UL (ref 4.3–11.1)
WBC # BLD AUTO: 6.3 K/UL (ref 4.3–11.1)
WBC # BLD AUTO: 6.7 K/UL (ref 4.3–11.1)
WBC # BLD AUTO: 6.7 K/UL (ref 4.3–11.1)
WBC # BLD AUTO: 6.9 K/UL (ref 4.3–11.1)
WBC # BLD AUTO: 7.1 K/UL (ref 4.3–11.1)
WBC # BLD AUTO: 7.4 K/UL (ref 4.3–11.1)
WBC # BLD AUTO: 8.7 K/UL (ref 4.3–11.1)
WBC MORPH BLD: ABNORMAL
WBC URNS QL MICRO: ABNORMAL /HPF

## 2017-01-01 PROCEDURE — 87186 SC STD MICRODIL/AGAR DIL: CPT | Performed by: SURGERY

## 2017-01-01 PROCEDURE — 74011250636 HC RX REV CODE- 250/636: Performed by: INTERNAL MEDICINE

## 2017-01-01 PROCEDURE — 74011000258 HC RX REV CODE- 258: Performed by: INTERNAL MEDICINE

## 2017-01-01 PROCEDURE — 74011250636 HC RX REV CODE- 250/636: Performed by: NURSE PRACTITIONER

## 2017-01-01 PROCEDURE — 99157 MOD SED OTHER PHYS/QHP EA: CPT

## 2017-01-01 PROCEDURE — 36569 INSJ PICC 5 YR+ W/O IMAGING: CPT | Performed by: INTERNAL MEDICINE

## 2017-01-01 PROCEDURE — 96368 THER/DIAG CONCURRENT INF: CPT

## 2017-01-01 PROCEDURE — 96523 IRRIG DRUG DELIVERY DEVICE: CPT

## 2017-01-01 PROCEDURE — 74011250637 HC RX REV CODE- 250/637: Performed by: FAMILY MEDICINE

## 2017-01-01 PROCEDURE — 80048 BASIC METABOLIC PNL TOTAL CA: CPT | Performed by: INTERNAL MEDICINE

## 2017-01-01 PROCEDURE — 74011250636 HC RX REV CODE- 250/636

## 2017-01-01 PROCEDURE — 85025 COMPLETE CBC W/AUTO DIFF WBC: CPT | Performed by: INTERNAL MEDICINE

## 2017-01-01 PROCEDURE — 93306 TTE W/DOPPLER COMPLETE: CPT

## 2017-01-01 PROCEDURE — 74011000250 HC RX REV CODE- 250: Performed by: NURSE PRACTITIONER

## 2017-01-01 PROCEDURE — 96375 TX/PRO/DX INJ NEW DRUG ADDON: CPT

## 2017-01-01 PROCEDURE — 74011250636 HC RX REV CODE- 250/636: Performed by: FAMILY MEDICINE

## 2017-01-01 PROCEDURE — G0299 HHS/HOSPICE OF RN EA 15 MIN: HCPCS

## 2017-01-01 PROCEDURE — 99285 EMERGENCY DEPT VISIT HI MDM: CPT | Performed by: EMERGENCY MEDICINE

## 2017-01-01 PROCEDURE — 87186 SC STD MICRODIL/AGAR DIL: CPT | Performed by: EMERGENCY MEDICINE

## 2017-01-01 PROCEDURE — 74011250636 HC RX REV CODE- 250/636: Performed by: EMERGENCY MEDICINE

## 2017-01-01 PROCEDURE — 74177 CT ABD & PELVIS W/CONTRAST: CPT

## 2017-01-01 PROCEDURE — 77030003560 HC NDL HUBR BARD -A

## 2017-01-01 PROCEDURE — 36415 COLL VENOUS BLD VENIPUNCTURE: CPT | Performed by: INTERNAL MEDICINE

## 2017-01-01 PROCEDURE — 85025 COMPLETE CBC W/AUTO DIFF WBC: CPT

## 2017-01-01 PROCEDURE — 85025 COMPLETE CBC W/AUTO DIFF WBC: CPT | Performed by: FAMILY MEDICINE

## 2017-01-01 PROCEDURE — 84145 PROCALCITONIN (PCT): CPT | Performed by: FAMILY MEDICINE

## 2017-01-01 PROCEDURE — 96415 CHEMO IV INFUSION ADDL HR: CPT

## 2017-01-01 PROCEDURE — 77010033678 HC OXYGEN DAILY

## 2017-01-01 PROCEDURE — 74011250637 HC RX REV CODE- 250/637: Performed by: INTERNAL MEDICINE

## 2017-01-01 PROCEDURE — 76937 US GUIDE VASCULAR ACCESS: CPT

## 2017-01-01 PROCEDURE — 74011250637 HC RX REV CODE- 250/637: Performed by: PHYSICIAN ASSISTANT

## 2017-01-01 PROCEDURE — 94760 N-INVAS EAR/PLS OXIMETRY 1: CPT

## 2017-01-01 PROCEDURE — 400013 HH SOC

## 2017-01-01 PROCEDURE — 96413 CHEMO IV INFUSION 1 HR: CPT

## 2017-01-01 PROCEDURE — 93005 ELECTROCARDIOGRAM TRACING: CPT | Performed by: EMERGENCY MEDICINE

## 2017-01-01 PROCEDURE — 83605 ASSAY OF LACTIC ACID: CPT | Performed by: FAMILY MEDICINE

## 2017-01-01 PROCEDURE — 65270000029 HC RM PRIVATE

## 2017-01-01 PROCEDURE — 80053 COMPREHEN METABOLIC PANEL: CPT | Performed by: INTERNAL MEDICINE

## 2017-01-01 PROCEDURE — 97165 OT EVAL LOW COMPLEX 30 MIN: CPT

## 2017-01-01 PROCEDURE — 96417 CHEMO IV INFUS EACH ADDL SEQ: CPT

## 2017-01-01 PROCEDURE — 96361 HYDRATE IV INFUSION ADD-ON: CPT

## 2017-01-01 PROCEDURE — 74011636320 HC RX REV CODE- 636/320: Performed by: NURSE PRACTITIONER

## 2017-01-01 PROCEDURE — 85027 COMPLETE CBC AUTOMATED: CPT | Performed by: INTERNAL MEDICINE

## 2017-01-01 PROCEDURE — 80053 COMPREHEN METABOLIC PANEL: CPT | Performed by: EMERGENCY MEDICINE

## 2017-01-01 PROCEDURE — 65660000000 HC RM CCU STEPDOWN

## 2017-01-01 PROCEDURE — 99153 MOD SED SAME PHYS/QHP EA: CPT

## 2017-01-01 PROCEDURE — 97530 THERAPEUTIC ACTIVITIES: CPT

## 2017-01-01 PROCEDURE — 84156 ASSAY OF PROTEIN URINE: CPT | Performed by: INTERNAL MEDICINE

## 2017-01-01 PROCEDURE — 87040 BLOOD CULTURE FOR BACTERIA: CPT | Performed by: INTERNAL MEDICINE

## 2017-01-01 PROCEDURE — 96411 CHEMO IV PUSH ADDL DRUG: CPT

## 2017-01-01 PROCEDURE — 74011250636 HC RX REV CODE- 250/636: Performed by: RADIOLOGY

## 2017-01-01 PROCEDURE — 87077 CULTURE AEROBIC IDENTIFY: CPT | Performed by: SURGERY

## 2017-01-01 PROCEDURE — 74011000258 HC RX REV CODE- 258: Performed by: EMERGENCY MEDICINE

## 2017-01-01 PROCEDURE — 96374 THER/PROPH/DIAG INJ IV PUSH: CPT

## 2017-01-01 PROCEDURE — 97161 PT EVAL LOW COMPLEX 20 MIN: CPT

## 2017-01-01 PROCEDURE — 87205 SMEAR GRAM STAIN: CPT | Performed by: EMERGENCY MEDICINE

## 2017-01-01 PROCEDURE — 85018 HEMOGLOBIN: CPT | Performed by: INTERNAL MEDICINE

## 2017-01-01 PROCEDURE — 87040 BLOOD CULTURE FOR BACTERIA: CPT | Performed by: EMERGENCY MEDICINE

## 2017-01-01 PROCEDURE — 80076 HEPATIC FUNCTION PANEL: CPT | Performed by: INTERNAL MEDICINE

## 2017-01-01 PROCEDURE — 70553 MRI BRAIN STEM W/O & W/DYE: CPT

## 2017-01-01 PROCEDURE — 85025 COMPLETE CBC W/AUTO DIFF WBC: CPT | Performed by: EMERGENCY MEDICINE

## 2017-01-01 PROCEDURE — 36415 COLL VENOUS BLD VENIPUNCTURE: CPT | Performed by: FAMILY MEDICINE

## 2017-01-01 PROCEDURE — 83735 ASSAY OF MAGNESIUM: CPT | Performed by: FAMILY MEDICINE

## 2017-01-01 PROCEDURE — 74011636320 HC RX REV CODE- 636/320: Performed by: INTERNAL MEDICINE

## 2017-01-01 PROCEDURE — 77030019605

## 2017-01-01 PROCEDURE — 99223 1ST HOSP IP/OBS HIGH 75: CPT | Performed by: INTERNAL MEDICINE

## 2017-01-01 PROCEDURE — 99152 MOD SED SAME PHYS/QHP 5/>YRS: CPT

## 2017-01-01 PROCEDURE — 74011000258 HC RX REV CODE- 258: Performed by: NURSE PRACTITIONER

## 2017-01-01 PROCEDURE — 87804 INFLUENZA ASSAY W/OPTIC: CPT | Performed by: EMERGENCY MEDICINE

## 2017-01-01 PROCEDURE — 87077 CULTURE AEROBIC IDENTIFY: CPT | Performed by: EMERGENCY MEDICINE

## 2017-01-01 PROCEDURE — 83690 ASSAY OF LIPASE: CPT | Performed by: FAMILY MEDICINE

## 2017-01-01 PROCEDURE — 96374 THER/PROPH/DIAG INJ IV PUSH: CPT | Performed by: EMERGENCY MEDICINE

## 2017-01-01 PROCEDURE — 99211 OFF/OP EST MAY X REQ PHY/QHP: CPT

## 2017-01-01 PROCEDURE — 74011000250 HC RX REV CODE- 250: Performed by: INTERNAL MEDICINE

## 2017-01-01 PROCEDURE — 80202 ASSAY OF VANCOMYCIN: CPT | Performed by: INTERNAL MEDICINE

## 2017-01-01 PROCEDURE — 93312 ECHO TRANSESOPHAGEAL: CPT

## 2017-01-01 PROCEDURE — 82550 ASSAY OF CK (CPK): CPT | Performed by: INTERNAL MEDICINE

## 2017-01-01 PROCEDURE — A9577 INJ MULTIHANCE: HCPCS | Performed by: RADIOLOGY

## 2017-01-01 PROCEDURE — 80053 COMPREHEN METABOLIC PANEL: CPT | Performed by: FAMILY MEDICINE

## 2017-01-01 PROCEDURE — 74011000258 HC RX REV CODE- 258: Performed by: FAMILY MEDICINE

## 2017-01-01 PROCEDURE — 81003 URINALYSIS AUTO W/O SCOPE: CPT | Performed by: EMERGENCY MEDICINE

## 2017-01-01 PROCEDURE — 71010 XR CHEST PORT: CPT

## 2017-01-01 PROCEDURE — 77030018719 HC DRSG PTCH ANTIMIC J&J -A

## 2017-01-01 PROCEDURE — 74011250637 HC RX REV CODE- 250/637: Performed by: EMERGENCY MEDICINE

## 2017-01-01 PROCEDURE — 83605 ASSAY OF LACTIC ACID: CPT

## 2017-01-01 PROCEDURE — 87070 CULTURE OTHR SPECIMN AEROBIC: CPT | Performed by: SURGERY

## 2017-01-01 PROCEDURE — C1751 CATH, INF, PER/CENT/MIDLINE: HCPCS

## 2017-01-01 PROCEDURE — 77030018786 HC NDL GD F/USND BARD -B

## 2017-01-01 PROCEDURE — 87641 MR-STAPH DNA AMP PROBE: CPT | Performed by: EMERGENCY MEDICINE

## 2017-01-01 RX ORDER — HEPARIN 100 UNIT/ML
600 SYRINGE INTRAVENOUS EVERY 8 HOURS
Status: DISCONTINUED | OUTPATIENT
Start: 2017-01-01 | End: 2017-01-01 | Stop reason: HOSPADM

## 2017-01-01 RX ORDER — ONDANSETRON 2 MG/ML
8 INJECTION INTRAMUSCULAR; INTRAVENOUS ONCE
Status: COMPLETED | OUTPATIENT
Start: 2017-01-01 | End: 2017-01-01

## 2017-01-01 RX ORDER — SODIUM CHLORIDE 0.9 % (FLUSH) 0.9 %
20 SYRINGE (ML) INJECTION AS NEEDED
Status: DISCONTINUED | OUTPATIENT
Start: 2017-01-01 | End: 2017-01-01 | Stop reason: HOSPADM

## 2017-01-01 RX ORDER — FLUOROURACIL 50 MG/ML
400 INJECTION, SOLUTION INTRAVENOUS ONCE
Status: COMPLETED | OUTPATIENT
Start: 2017-01-01 | End: 2017-01-01

## 2017-01-01 RX ORDER — SODIUM CHLORIDE 0.9 % (FLUSH) 0.9 %
10 SYRINGE (ML) INJECTION
Status: COMPLETED | OUTPATIENT
Start: 2017-01-01 | End: 2017-01-01

## 2017-01-01 RX ORDER — SODIUM CHLORIDE 0.9 % (FLUSH) 0.9 %
5-10 SYRINGE (ML) INJECTION AS NEEDED
Status: DISCONTINUED | OUTPATIENT
Start: 2017-01-01 | End: 2017-01-01 | Stop reason: HOSPADM

## 2017-01-01 RX ORDER — SODIUM CHLORIDE 0.9 % (FLUSH) 0.9 %
5 SYRINGE (ML) INJECTION EVERY 8 HOURS
Status: DISCONTINUED | OUTPATIENT
Start: 2017-01-01 | End: 2017-01-01 | Stop reason: HOSPADM

## 2017-01-01 RX ORDER — SODIUM CHLORIDE 0.9 % (FLUSH) 0.9 %
10 SYRINGE (ML) INJECTION AS NEEDED
Status: ACTIVE | OUTPATIENT
Start: 2017-01-01 | End: 2017-01-01

## 2017-01-01 RX ORDER — PANTOPRAZOLE SODIUM 40 MG/1
40 TABLET, DELAYED RELEASE ORAL
Status: DISCONTINUED | OUTPATIENT
Start: 2017-01-01 | End: 2017-01-01

## 2017-01-01 RX ORDER — VANCOMYCIN/0.9 % SOD CHLORIDE 1.5G/250ML
1500 PLASTIC BAG, INJECTION (ML) INTRAVENOUS EVERY 8 HOURS
Status: DISCONTINUED | OUTPATIENT
Start: 2017-01-01 | End: 2017-01-01

## 2017-01-01 RX ORDER — ACETAMINOPHEN 500 MG
1000 TABLET ORAL ONCE
Status: COMPLETED | OUTPATIENT
Start: 2017-01-01 | End: 2017-01-01

## 2017-01-01 RX ORDER — ACETAMINOPHEN 325 MG/1
650 TABLET ORAL
Status: DISCONTINUED | OUTPATIENT
Start: 2017-01-01 | End: 2017-01-01 | Stop reason: HOSPADM

## 2017-01-01 RX ORDER — POTASSIUM CHLORIDE 20MEQ/15ML
40 LIQUID (ML) ORAL 2 TIMES DAILY
Status: DISCONTINUED | OUTPATIENT
Start: 2017-01-01 | End: 2017-01-01

## 2017-01-01 RX ORDER — HEPARIN 100 UNIT/ML
300-500 SYRINGE INTRAVENOUS AS NEEDED
Status: ACTIVE | OUTPATIENT
Start: 2017-01-01 | End: 2017-01-01

## 2017-01-01 RX ORDER — DEXAMETHASONE SODIUM PHOSPHATE 100 MG/10ML
10 INJECTION INTRAMUSCULAR; INTRAVENOUS ONCE
Status: COMPLETED | OUTPATIENT
Start: 2017-01-01 | End: 2017-01-01

## 2017-01-01 RX ORDER — HEPARIN 100 UNIT/ML
600 SYRINGE INTRAVENOUS AS NEEDED
Status: DISCONTINUED | OUTPATIENT
Start: 2017-01-01 | End: 2017-01-01 | Stop reason: HOSPADM

## 2017-01-01 RX ORDER — SODIUM CHLORIDE 0.9 % (FLUSH) 0.9 %
20 SYRINGE (ML) INJECTION EVERY 8 HOURS
Status: DISCONTINUED | OUTPATIENT
Start: 2017-01-01 | End: 2017-01-01 | Stop reason: HOSPADM

## 2017-01-01 RX ORDER — HYDROCODONE BITARTRATE AND ACETAMINOPHEN 10; 325 MG/1; MG/1
1 TABLET ORAL
Status: DISCONTINUED | OUTPATIENT
Start: 2017-01-01 | End: 2017-01-01 | Stop reason: HOSPADM

## 2017-01-01 RX ORDER — POTASSIUM CHLORIDE 20 MEQ/1
40 TABLET, EXTENDED RELEASE ORAL
Status: COMPLETED | OUTPATIENT
Start: 2017-01-01 | End: 2017-01-01

## 2017-01-01 RX ORDER — SODIUM CHLORIDE 0.9 % (FLUSH) 0.9 %
10-30 SYRINGE (ML) INJECTION AS NEEDED
Status: DISCONTINUED | OUTPATIENT
Start: 2017-01-01 | End: 2017-01-01 | Stop reason: HOSPADM

## 2017-01-01 RX ORDER — HEPARIN 100 UNIT/ML
500 SYRINGE INTRAVENOUS AS NEEDED
Status: DISCONTINUED | OUTPATIENT
Start: 2017-01-01 | End: 2017-01-01 | Stop reason: HOSPADM

## 2017-01-01 RX ORDER — FENTANYL CITRATE 50 UG/ML
25-50 INJECTION, SOLUTION INTRAMUSCULAR; INTRAVENOUS
Status: DISCONTINUED | OUTPATIENT
Start: 2017-01-01 | End: 2017-01-01 | Stop reason: HOSPADM

## 2017-01-01 RX ORDER — CARVEDILOL 3.12 MG/1
3.12 TABLET ORAL 2 TIMES DAILY WITH MEALS
Qty: 60 TAB | Refills: 0 | Status: SHIPPED | OUTPATIENT
Start: 2017-01-01 | End: 2017-01-01

## 2017-01-01 RX ORDER — MAGNESIUM SULFATE HEPTAHYDRATE 40 MG/ML
2 INJECTION, SOLUTION INTRAVENOUS ONCE
Status: COMPLETED | OUTPATIENT
Start: 2017-01-01 | End: 2017-01-01

## 2017-01-01 RX ORDER — DEXTROSE MONOHYDRATE 50 MG/ML
25 INJECTION, SOLUTION INTRAVENOUS CONTINUOUS
Status: ACTIVE | OUTPATIENT
Start: 2017-01-01 | End: 2017-01-01

## 2017-01-01 RX ORDER — PANTOPRAZOLE SODIUM 40 MG/1
40 TABLET, DELAYED RELEASE ORAL
Status: DISCONTINUED | OUTPATIENT
Start: 2017-01-01 | End: 2017-01-01 | Stop reason: HOSPADM

## 2017-01-01 RX ORDER — ALPRAZOLAM 0.5 MG/1
1 TABLET ORAL
Status: DISCONTINUED | OUTPATIENT
Start: 2017-01-01 | End: 2017-01-01 | Stop reason: HOSPADM

## 2017-01-01 RX ORDER — SODIUM CHLORIDE 0.9 % (FLUSH) 0.9 %
10-40 SYRINGE (ML) INJECTION AS NEEDED
Status: DISCONTINUED | OUTPATIENT
Start: 2017-01-01 | End: 2017-01-01 | Stop reason: HOSPADM

## 2017-01-01 RX ORDER — CEFAZOLIN SODIUM IN 0.9 % NACL 2 G/50 ML
2 INTRAVENOUS SOLUTION, PIGGYBACK (ML) INTRAVENOUS EVERY 8 HOURS
Status: DISCONTINUED | OUTPATIENT
Start: 2017-01-01 | End: 2017-01-01 | Stop reason: SDUPTHER

## 2017-01-01 RX ORDER — SODIUM CHLORIDE 9 MG/ML
75 INJECTION, SOLUTION INTRAVENOUS CONTINUOUS
Status: DISCONTINUED | OUTPATIENT
Start: 2017-01-01 | End: 2017-01-01

## 2017-01-01 RX ORDER — ACETAMINOPHEN 325 MG/1
650 TABLET ORAL ONCE
Status: COMPLETED | OUTPATIENT
Start: 2017-01-01 | End: 2017-01-01

## 2017-01-01 RX ORDER — SODIUM CHLORIDE 0.9 % (FLUSH) 0.9 %
10 SYRINGE (ML) INJECTION EVERY 8 HOURS
Status: DISCONTINUED | OUTPATIENT
Start: 2017-01-01 | End: 2017-01-01 | Stop reason: HOSPADM

## 2017-01-01 RX ORDER — DIFLUNISAL 500 MG/1
500 TABLET, FILM COATED ORAL 2 TIMES DAILY WITH MEALS
Status: DISCONTINUED | OUTPATIENT
Start: 2017-01-01 | End: 2017-01-01

## 2017-01-01 RX ORDER — CEFAZOLIN SODIUM IN 0.9 % NACL 2 G/50 ML
2 INTRAVENOUS SOLUTION, PIGGYBACK (ML) INTRAVENOUS EVERY 8 HOURS
Status: DISCONTINUED | OUTPATIENT
Start: 2017-01-01 | End: 2017-01-01 | Stop reason: HOSPADM

## 2017-01-01 RX ORDER — DIPHENHYDRAMINE HYDROCHLORIDE 50 MG/ML
25 INJECTION, SOLUTION INTRAMUSCULAR; INTRAVENOUS ONCE
Status: COMPLETED | OUTPATIENT
Start: 2017-01-01 | End: 2017-01-01

## 2017-01-01 RX ORDER — MIDAZOLAM HYDROCHLORIDE 1 MG/ML
1-2 INJECTION, SOLUTION INTRAMUSCULAR; INTRAVENOUS
Status: DISCONTINUED | OUTPATIENT
Start: 2017-01-01 | End: 2017-01-01 | Stop reason: HOSPADM

## 2017-01-01 RX ORDER — CARVEDILOL 3.12 MG/1
3.12 TABLET ORAL 2 TIMES DAILY WITH MEALS
Status: DISCONTINUED | OUTPATIENT
Start: 2017-01-01 | End: 2017-01-01

## 2017-01-01 RX ADMIN — FLUOROURACIL 700 MG: 50 INJECTION, SOLUTION INTRAVENOUS at 15:15

## 2017-01-01 RX ADMIN — SODIUM CHLORIDE 500 ML: 900 INJECTION, SOLUTION INTRAVENOUS at 10:18

## 2017-01-01 RX ADMIN — Medication 10 ML: at 13:45

## 2017-01-01 RX ADMIN — HYDROCODONE BITARTRATE AND ACETAMINOPHEN 1 TABLET: 325; 10 TABLET ORAL at 22:06

## 2017-01-01 RX ADMIN — Medication 5 ML: at 14:19

## 2017-01-01 RX ADMIN — CARVEDILOL 3.12 MG: 3.12 TABLET, FILM COATED ORAL at 17:13

## 2017-01-01 RX ADMIN — Medication 5 ML: at 05:16

## 2017-01-01 RX ADMIN — Medication 10 ML: at 13:37

## 2017-01-01 RX ADMIN — ACETAMINOPHEN 650 MG: 325 TABLET, FILM COATED ORAL at 14:37

## 2017-01-01 RX ADMIN — Medication 5 ML: at 06:20

## 2017-01-01 RX ADMIN — PANTOPRAZOLE SODIUM 40 MG: 40 TABLET, DELAYED RELEASE ORAL at 08:31

## 2017-01-01 RX ADMIN — WATER 2 G: 1 INJECTION INTRAMUSCULAR; INTRAVENOUS; SUBCUTANEOUS at 10:41

## 2017-01-01 RX ADMIN — PANTOPRAZOLE SODIUM 40 MG: 40 TABLET, DELAYED RELEASE ORAL at 08:17

## 2017-01-01 RX ADMIN — VANCOMYCIN HYDROCHLORIDE 1500 MG: 10 INJECTION, POWDER, LYOPHILIZED, FOR SOLUTION INTRAVENOUS at 06:24

## 2017-01-01 RX ADMIN — HYDROCODONE BITARTRATE AND ACETAMINOPHEN 1 TABLET: 10; 325 TABLET ORAL at 18:03

## 2017-01-01 RX ADMIN — DIPHENHYDRAMINE HYDROCHLORIDE 25 MG: 50 INJECTION, SOLUTION INTRAMUSCULAR; INTRAVENOUS at 14:41

## 2017-01-01 RX ADMIN — SODIUM CHLORIDE 125 ML/HR: 900 INJECTION, SOLUTION INTRAVENOUS at 08:43

## 2017-01-01 RX ADMIN — WATER 2 G: 1 INJECTION INTRAMUSCULAR; INTRAVENOUS; SUBCUTANEOUS at 18:03

## 2017-01-01 RX ADMIN — Medication 5 ML: at 05:43

## 2017-01-01 RX ADMIN — HYDROCODONE BITARTRATE AND ACETAMINOPHEN 1 TABLET: 325; 10 TABLET ORAL at 10:23

## 2017-01-01 RX ADMIN — HYDROCODONE BITARTRATE AND ACETAMINOPHEN 1 TABLET: 325; 10 TABLET ORAL at 05:22

## 2017-01-01 RX ADMIN — HYDROCODONE BITARTRATE AND ACETAMINOPHEN 1 TABLET: 325; 10 TABLET ORAL at 14:11

## 2017-01-01 RX ADMIN — SODIUM CHLORIDE 125 ML/HR: 900 INJECTION, SOLUTION INTRAVENOUS at 09:36

## 2017-01-01 RX ADMIN — DEXAMETHASONE SODIUM PHOSPHATE 10 MG: 10 INJECTION INTRAMUSCULAR; INTRAVENOUS at 17:07

## 2017-01-01 RX ADMIN — HYDROCODONE BITARTRATE AND ACETAMINOPHEN 1 TABLET: 325; 10 TABLET ORAL at 07:59

## 2017-01-01 RX ADMIN — Medication 5 ML: at 06:00

## 2017-01-01 RX ADMIN — HYDROCODONE BITARTRATE AND ACETAMINOPHEN 1 TABLET: 325; 10 TABLET ORAL at 10:21

## 2017-01-01 RX ADMIN — HYDROCODONE BITARTRATE AND ACETAMINOPHEN 1 TABLET: 325; 10 TABLET ORAL at 17:14

## 2017-01-01 RX ADMIN — MIDAZOLAM HYDROCHLORIDE 2 MG: 1 INJECTION, SOLUTION INTRAMUSCULAR; INTRAVENOUS at 13:51

## 2017-01-01 RX ADMIN — ONDANSETRON 8 MG: 2 INJECTION INTRAMUSCULAR; INTRAVENOUS at 12:17

## 2017-01-01 RX ADMIN — Medication 5 ML: at 05:58

## 2017-01-01 RX ADMIN — LEUCOVORIN CALCIUM 700 MG: 350 INJECTION, POWDER, LYOPHILIZED, FOR SOLUTION INTRAMUSCULAR; INTRAVENOUS at 17:25

## 2017-01-01 RX ADMIN — SODIUM CHLORIDE, PRESERVATIVE FREE 500 UNITS: 5 INJECTION INTRAVENOUS at 13:47

## 2017-01-01 RX ADMIN — Medication 5 ML: at 22:20

## 2017-01-01 RX ADMIN — ALPRAZOLAM 1 MG: 0.5 TABLET ORAL at 21:40

## 2017-01-01 RX ADMIN — IOPAMIDOL 100 ML: 755 INJECTION, SOLUTION INTRAVENOUS at 13:37

## 2017-01-01 RX ADMIN — Medication 600 UNITS: at 06:00

## 2017-01-01 RX ADMIN — HYDROCODONE BITARTRATE AND ACETAMINOPHEN 1 TABLET: 325; 10 TABLET ORAL at 03:38

## 2017-01-01 RX ADMIN — HYDROCODONE BITARTRATE AND ACETAMINOPHEN 1 TABLET: 325; 10 TABLET ORAL at 01:46

## 2017-01-01 RX ADMIN — SODIUM CHLORIDE, PRESERVATIVE FREE 300 UNITS: 5 INJECTION INTRAVENOUS at 16:31

## 2017-01-01 RX ADMIN — ALPRAZOLAM 1 MG: 0.5 TABLET ORAL at 08:53

## 2017-01-01 RX ADMIN — VANCOMYCIN HYDROCHLORIDE 1500 MG: 10 INJECTION, POWDER, LYOPHILIZED, FOR SOLUTION INTRAVENOUS at 22:57

## 2017-01-01 RX ADMIN — CEFAZOLIN 2 G: 1 INJECTION, POWDER, FOR SOLUTION INTRAMUSCULAR; INTRAVENOUS; PARENTERAL at 06:20

## 2017-01-01 RX ADMIN — SODIUM CHLORIDE 125 ML/HR: 900 INJECTION, SOLUTION INTRAVENOUS at 01:47

## 2017-01-01 RX ADMIN — HYDROCODONE BITARTRATE AND ACETAMINOPHEN 1 TABLET: 325; 10 TABLET ORAL at 09:20

## 2017-01-01 RX ADMIN — Medication 5 ML: at 22:29

## 2017-01-01 RX ADMIN — DEXTROSE MONOHYDRATE 25 ML/HR: 5 INJECTION, SOLUTION INTRAVENOUS at 12:15

## 2017-01-01 RX ADMIN — ALPRAZOLAM 1 MG: 0.5 TABLET ORAL at 10:30

## 2017-01-01 RX ADMIN — MIDAZOLAM HYDROCHLORIDE 1 MG: 1 INJECTION, SOLUTION INTRAMUSCULAR; INTRAVENOUS at 13:53

## 2017-01-01 RX ADMIN — DIATRIZOATE MEGLUMINE AND DIATRIZOATE SODIUM 15 ML: 660; 100 LIQUID ORAL; RECTAL at 13:37

## 2017-01-01 RX ADMIN — ALPRAZOLAM 1 MG: 0.5 TABLET ORAL at 10:37

## 2017-01-01 RX ADMIN — CEFAZOLIN 2 G: 1 INJECTION, POWDER, FOR SOLUTION INTRAMUSCULAR; INTRAVENOUS; PARENTERAL at 14:48

## 2017-01-01 RX ADMIN — Medication 10 ML: at 16:26

## 2017-01-01 RX ADMIN — Medication 600 UNITS: at 05:42

## 2017-01-01 RX ADMIN — SODIUM CHLORIDE 100 ML: 900 INJECTION, SOLUTION INTRAVENOUS at 14:32

## 2017-01-01 RX ADMIN — CEFAZOLIN 2 G: 1 INJECTION, POWDER, FOR SOLUTION INTRAMUSCULAR; INTRAVENOUS; PARENTERAL at 21:41

## 2017-01-01 RX ADMIN — LEUCOVORIN CALCIUM 700 MG: 350 INJECTION, POWDER, LYOPHILIZED, FOR SOLUTION INTRAMUSCULAR; INTRAVENOUS at 12:55

## 2017-01-01 RX ADMIN — ALPRAZOLAM 1 MG: 0.5 TABLET ORAL at 09:19

## 2017-01-01 RX ADMIN — SODIUM CHLORIDE 100 ML: 900 INJECTION, SOLUTION INTRAVENOUS at 13:37

## 2017-01-01 RX ADMIN — DIATRIZOATE MEGLUMINE AND DIATRIZOATE SODIUM 15 ML: 660; 100 LIQUID ORAL; RECTAL at 14:32

## 2017-01-01 RX ADMIN — VANCOMYCIN HYDROCHLORIDE 1000 MG: 1 INJECTION, POWDER, LYOPHILIZED, FOR SOLUTION INTRAVENOUS at 10:31

## 2017-01-01 RX ADMIN — ALPRAZOLAM 1 MG: 0.5 TABLET ORAL at 14:07

## 2017-01-01 RX ADMIN — ALPRAZOLAM 1 MG: 0.5 TABLET ORAL at 19:36

## 2017-01-01 RX ADMIN — Medication 10 ML: at 16:30

## 2017-01-01 RX ADMIN — CEFEPIME HYDROCHLORIDE 2 G: 2 INJECTION, POWDER, FOR SOLUTION INTRAVENOUS at 00:27

## 2017-01-01 RX ADMIN — WATER 2 G: 1 INJECTION INTRAMUSCULAR; INTRAVENOUS; SUBCUTANEOUS at 17:13

## 2017-01-01 RX ADMIN — Medication 10 ML: at 10:18

## 2017-01-01 RX ADMIN — CEFAZOLIN 2 G: 1 INJECTION, POWDER, FOR SOLUTION INTRAMUSCULAR; INTRAVENOUS; PARENTERAL at 05:16

## 2017-01-01 RX ADMIN — CEFAZOLIN 2 G: 1 INJECTION, POWDER, FOR SOLUTION INTRAMUSCULAR; INTRAVENOUS; PARENTERAL at 22:10

## 2017-01-01 RX ADMIN — Medication 5 ML: at 23:33

## 2017-01-01 RX ADMIN — PANTOPRAZOLE SODIUM 40 MG: 40 TABLET, DELAYED RELEASE ORAL at 17:27

## 2017-01-01 RX ADMIN — WATER 2 G: 1 INJECTION INTRAMUSCULAR; INTRAVENOUS; SUBCUTANEOUS at 02:26

## 2017-01-01 RX ADMIN — ALPRAZOLAM 1 MG: 0.5 TABLET ORAL at 15:05

## 2017-01-01 RX ADMIN — PANTOPRAZOLE SODIUM 40 MG: 40 TABLET, DELAYED RELEASE ORAL at 07:52

## 2017-01-01 RX ADMIN — ALPRAZOLAM 1 MG: 0.5 TABLET ORAL at 09:31

## 2017-01-01 RX ADMIN — SODIUM CHLORIDE 500 ML: 900 INJECTION, SOLUTION INTRAVENOUS at 14:45

## 2017-01-01 RX ADMIN — WATER 2 G: 1 INJECTION INTRAMUSCULAR; INTRAVENOUS; SUBCUTANEOUS at 02:20

## 2017-01-01 RX ADMIN — HYDROCODONE BITARTRATE AND ACETAMINOPHEN 1 TABLET: 10; 325 TABLET ORAL at 22:27

## 2017-01-01 RX ADMIN — Medication 10 ML: at 19:35

## 2017-01-01 RX ADMIN — PANTOPRAZOLE SODIUM 40 MG: 40 TABLET, DELAYED RELEASE ORAL at 18:03

## 2017-01-01 RX ADMIN — ALPRAZOLAM 1 MG: 0.5 TABLET ORAL at 05:22

## 2017-01-01 RX ADMIN — ACETAMINOPHEN 1000 MG: 500 TABLET, FILM COATED ORAL at 22:31

## 2017-01-01 RX ADMIN — ACETAMINOPHEN 650 MG: 325 TABLET ORAL at 01:53

## 2017-01-01 RX ADMIN — ALPRAZOLAM 1 MG: 0.5 TABLET ORAL at 19:31

## 2017-01-01 RX ADMIN — CEFAZOLIN 2 G: 1 INJECTION, POWDER, FOR SOLUTION INTRAMUSCULAR; INTRAVENOUS; PARENTERAL at 14:00

## 2017-01-01 RX ADMIN — Medication 5 ML: at 05:29

## 2017-01-01 RX ADMIN — FLUOROURACIL 700 MG: 50 INJECTION, SOLUTION INTRAVENOUS at 19:30

## 2017-01-01 RX ADMIN — POTASSIUM CHLORIDE 40 MEQ: 20 SOLUTION ORAL at 04:30

## 2017-01-01 RX ADMIN — HYDROCODONE BITARTRATE AND ACETAMINOPHEN 1 TABLET: 325; 10 TABLET ORAL at 12:48

## 2017-01-01 RX ADMIN — PANTOPRAZOLE SODIUM 40 MG: 40 TABLET, DELAYED RELEASE ORAL at 08:50

## 2017-01-01 RX ADMIN — HYDROCODONE BITARTRATE AND ACETAMINOPHEN 1 TABLET: 10; 325 TABLET ORAL at 08:31

## 2017-01-01 RX ADMIN — VANCOMYCIN HYDROCHLORIDE 1000 MG: 1 INJECTION, POWDER, LYOPHILIZED, FOR SOLUTION INTRAVENOUS at 00:13

## 2017-01-01 RX ADMIN — BEVACIZUMAB 338 MG: 400 INJECTION, SOLUTION INTRAVENOUS at 11:05

## 2017-01-01 RX ADMIN — HYDROCODONE BITARTRATE AND ACETAMINOPHEN 1 TABLET: 325; 10 TABLET ORAL at 14:07

## 2017-01-01 RX ADMIN — VANCOMYCIN HYDROCHLORIDE 1000 MG: 1 INJECTION, POWDER, LYOPHILIZED, FOR SOLUTION INTRAVENOUS at 11:12

## 2017-01-01 RX ADMIN — Medication 5 ML: at 01:46

## 2017-01-01 RX ADMIN — VANCOMYCIN HYDROCHLORIDE 1000 MG: 1 INJECTION, POWDER, LYOPHILIZED, FOR SOLUTION INTRAVENOUS at 23:05

## 2017-01-01 RX ADMIN — ONDANSETRON 8 MG: 2 INJECTION INTRAMUSCULAR; INTRAVENOUS at 17:05

## 2017-01-01 RX ADMIN — MAGNESIUM SULFATE HEPTAHYDRATE 2 G: 40 INJECTION, SOLUTION INTRAVENOUS at 12:49

## 2017-01-01 RX ADMIN — Medication 600 UNITS: at 14:19

## 2017-01-01 RX ADMIN — CEFAZOLIN 2 G: 1 INJECTION, POWDER, FOR SOLUTION INTRAMUSCULAR; INTRAVENOUS; PARENTERAL at 13:57

## 2017-01-01 RX ADMIN — IOPAMIDOL 100 ML: 755 INJECTION, SOLUTION INTRAVENOUS at 14:32

## 2017-01-01 RX ADMIN — Medication 600 UNITS: at 22:28

## 2017-01-01 RX ADMIN — WATER 2 G: 1 INJECTION INTRAMUSCULAR; INTRAVENOUS; SUBCUTANEOUS at 10:51

## 2017-01-01 RX ADMIN — ALPRAZOLAM 1 MG: 0.5 TABLET ORAL at 03:44

## 2017-01-01 RX ADMIN — CEFAZOLIN 2 G: 1 INJECTION, POWDER, FOR SOLUTION INTRAMUSCULAR; INTRAVENOUS; PARENTERAL at 23:26

## 2017-01-01 RX ADMIN — CARVEDILOL 3.12 MG: 3.12 TABLET, FILM COATED ORAL at 08:31

## 2017-01-01 RX ADMIN — SODIUM CHLORIDE 125 ML/HR: 900 INJECTION, SOLUTION INTRAVENOUS at 23:26

## 2017-01-01 RX ADMIN — DAPTOMYCIN 500 MG: 500 INJECTION, POWDER, LYOPHILIZED, FOR SOLUTION INTRAVENOUS at 16:27

## 2017-01-01 RX ADMIN — POTASSIUM CHLORIDE 40 MEQ: 20 TABLET, EXTENDED RELEASE ORAL at 09:31

## 2017-01-01 RX ADMIN — PANTOPRAZOLE SODIUM 40 MG: 40 TABLET, DELAYED RELEASE ORAL at 08:41

## 2017-01-01 RX ADMIN — Medication 10 ML: at 14:32

## 2017-01-01 RX ADMIN — HYDROCODONE BITARTRATE AND ACETAMINOPHEN 1 TABLET: 325; 10 TABLET ORAL at 06:24

## 2017-01-01 RX ADMIN — SODIUM CHLORIDE 75 ML/HR: 900 INJECTION, SOLUTION INTRAVENOUS at 03:42

## 2017-01-01 RX ADMIN — BEVACIZUMAB 338 MG: 400 INJECTION, SOLUTION INTRAVENOUS at 15:20

## 2017-01-01 RX ADMIN — MIDAZOLAM HYDROCHLORIDE 1 MG: 1 INJECTION, SOLUTION INTRAMUSCULAR; INTRAVENOUS at 13:54

## 2017-01-01 RX ADMIN — PANTOPRAZOLE SODIUM 40 MG: 40 TABLET, DELAYED RELEASE ORAL at 17:13

## 2017-01-01 RX ADMIN — HYDROCODONE BITARTRATE AND ACETAMINOPHEN 1 TABLET: 325; 10 TABLET ORAL at 20:00

## 2017-01-01 RX ADMIN — GADOBENATE DIMEGLUMINE 15 ML: 529 INJECTION, SOLUTION INTRAVENOUS at 10:22

## 2017-01-01 RX ADMIN — CEFAZOLIN 2 G: 1 INJECTION, POWDER, FOR SOLUTION INTRAMUSCULAR; INTRAVENOUS; PARENTERAL at 05:28

## 2017-01-01 RX ADMIN — Medication 20 ML: at 16:50

## 2017-01-01 RX ADMIN — OXALIPLATIN 149 MG: 5 INJECTION, SOLUTION, CONCENTRATE INTRAVENOUS at 12:55

## 2017-01-01 RX ADMIN — Medication 5 ML: at 22:17

## 2017-01-01 RX ADMIN — HYDROCODONE BITARTRATE AND ACETAMINOPHEN 1 TABLET: 325; 10 TABLET ORAL at 03:37

## 2017-01-01 RX ADMIN — CEFEPIME HYDROCHLORIDE 2 G: 2 INJECTION, POWDER, FOR SOLUTION INTRAVENOUS at 12:48

## 2017-01-01 RX ADMIN — PANTOPRAZOLE SODIUM 40 MG: 40 TABLET, DELAYED RELEASE ORAL at 16:11

## 2017-01-01 RX ADMIN — HYDROCODONE BITARTRATE AND ACETAMINOPHEN 1 TABLET: 10; 325 TABLET ORAL at 12:36

## 2017-01-01 RX ADMIN — FENTANYL CITRATE 25 MCG: 50 INJECTION, SOLUTION INTRAMUSCULAR; INTRAVENOUS at 13:53

## 2017-01-01 RX ADMIN — DIPHENHYDRAMINE HYDROCHLORIDE 25 MG: 50 INJECTION, SOLUTION INTRAMUSCULAR; INTRAVENOUS at 10:30

## 2017-01-01 RX ADMIN — HYDROCODONE BITARTRATE AND ACETAMINOPHEN 1 TABLET: 325; 10 TABLET ORAL at 10:00

## 2017-01-01 RX ADMIN — CARVEDILOL 3.12 MG: 3.12 TABLET, FILM COATED ORAL at 08:17

## 2017-01-01 RX ADMIN — FENTANYL CITRATE 25 MCG: 50 INJECTION, SOLUTION INTRAMUSCULAR; INTRAVENOUS at 13:51

## 2017-01-01 RX ADMIN — CEFAZOLIN 2 G: 1 INJECTION, POWDER, FOR SOLUTION INTRAMUSCULAR; INTRAVENOUS; PARENTERAL at 22:00

## 2017-01-01 RX ADMIN — Medication 10 ML: at 15:24

## 2017-01-01 RX ADMIN — CARVEDILOL 3.12 MG: 3.12 TABLET, FILM COATED ORAL at 18:03

## 2017-01-01 RX ADMIN — DEXTROSE MONOHYDRATE 25 ML/HR: 5 INJECTION, SOLUTION INTRAVENOUS at 17:00

## 2017-01-01 RX ADMIN — OXALIPLATIN 149 MG: 5 INJECTION, SOLUTION, CONCENTRATE INTRAVENOUS at 17:25

## 2017-01-01 RX ADMIN — VANCOMYCIN HYDROCHLORIDE 1500 MG: 10 INJECTION, POWDER, LYOPHILIZED, FOR SOLUTION INTRAVENOUS at 14:00

## 2017-01-01 RX ADMIN — HYDROCODONE BITARTRATE AND ACETAMINOPHEN 1 TABLET: 325; 10 TABLET ORAL at 19:31

## 2017-01-01 RX ADMIN — SODIUM CHLORIDE 500 ML: 900 INJECTION, SOLUTION INTRAVENOUS at 22:40

## 2017-01-01 RX ADMIN — Medication 5 ML: at 22:00

## 2017-01-01 RX ADMIN — Medication 10 ML: at 10:22

## 2017-01-01 RX ADMIN — ACETAMINOPHEN 650 MG: 325 TABLET, FILM COATED ORAL at 10:27

## 2017-01-01 RX ADMIN — CEFAZOLIN 2 G: 1 INJECTION, POWDER, FOR SOLUTION INTRAMUSCULAR; INTRAVENOUS; PARENTERAL at 05:57

## 2017-01-01 RX ADMIN — Medication 10 ML: at 14:40

## 2017-01-01 RX ADMIN — Medication 600 UNITS: at 22:20

## 2017-01-01 RX ADMIN — ALPRAZOLAM 1 MG: 0.5 TABLET ORAL at 14:12

## 2017-01-01 RX ADMIN — DEXAMETHASONE SODIUM PHOSPHATE 10 MG: 10 INJECTION INTRAMUSCULAR; INTRAVENOUS at 12:19

## 2017-01-01 RX ADMIN — SODIUM CHLORIDE, PRESERVATIVE FREE 300 UNITS: 5 INJECTION INTRAVENOUS at 16:50

## 2017-01-01 RX ADMIN — PANTOPRAZOLE SODIUM 40 MG: 40 TABLET, DELAYED RELEASE ORAL at 08:22

## 2017-08-15 ENCOUNTER — HOSPITAL ENCOUNTER (EMERGENCY)
Age: 57
Discharge: HOME | End: 2017-08-15
Attending: EMERGENCY MEDICINE
Payer: COMMERCIAL

## 2017-08-15 ENCOUNTER — APPOINTMENT (OUTPATIENT)
Dept: MRI IMAGING | Age: 57
End: 2017-08-15
Attending: EMERGENCY MEDICINE
Payer: COMMERCIAL

## 2017-08-15 ENCOUNTER — APPOINTMENT (OUTPATIENT)
Dept: CT IMAGING | Age: 57
End: 2017-08-15
Attending: EMERGENCY MEDICINE
Payer: COMMERCIAL

## 2017-08-15 VITALS
BODY MASS INDEX: 25.95 KG/M2 | DIASTOLIC BLOOD PRESSURE: 66 MMHG | SYSTOLIC BLOOD PRESSURE: 144 MMHG | WEIGHT: 152 LBS | HEART RATE: 66 BPM | RESPIRATION RATE: 18 BRPM | HEIGHT: 64 IN | OXYGEN SATURATION: 95 % | TEMPERATURE: 98.2 F

## 2017-08-15 DIAGNOSIS — G93.89 MASS OF TEMPOROPARIETAL REGION OF BRAIN: ICD-10-CM

## 2017-08-15 DIAGNOSIS — R20.2 PARESTHESIA OF RIGHT ARM: Primary | ICD-10-CM

## 2017-08-15 LAB
ANION GAP BLD CALC-SCNC: 9 MMOL/L (ref 7–16)
APTT PPP: 29.3 SEC (ref 23.5–31.7)
BASOPHILS # BLD: 0 K/UL (ref 0–0.2)
BASOPHILS NFR BLD: 0 % (ref 0–2)
BUN SERPL-MCNC: 8 MG/DL (ref 6–23)
CALCIUM SERPL-MCNC: 9.1 MG/DL (ref 8.3–10.4)
CHLORIDE SERPL-SCNC: 105 MMOL/L (ref 98–107)
CO2 SERPL-SCNC: 28 MMOL/L (ref 21–32)
CREAT SERPL-MCNC: 0.69 MG/DL (ref 0.6–1)
DIFFERENTIAL METHOD BLD: NORMAL
EOSINOPHIL # BLD: 0.1 K/UL (ref 0–0.8)
EOSINOPHIL NFR BLD: 1 % (ref 0.5–7.8)
ERYTHROCYTE [DISTWIDTH] IN BLOOD BY AUTOMATED COUNT: 12.7 % (ref 11.9–14.6)
GLUCOSE SERPL-MCNC: 111 MG/DL (ref 65–100)
HCT VFR BLD AUTO: 41.9 % (ref 35.8–46.3)
HGB BLD-MCNC: 14.1 G/DL (ref 11.7–15.4)
IMM GRANULOCYTES # BLD: 0 K/UL (ref 0–0.5)
IMM GRANULOCYTES NFR BLD: 0 % (ref 0–5)
INR PPP: 1.1 (ref 0.9–1.2)
LYMPHOCYTES # BLD: 1.3 K/UL (ref 0.5–4.6)
LYMPHOCYTES NFR BLD: 18 % (ref 13–44)
MCH RBC QN AUTO: 30.9 PG (ref 26.1–32.9)
MCHC RBC AUTO-ENTMCNC: 33.7 G/DL (ref 31.4–35)
MCV RBC AUTO: 91.7 FL (ref 79.6–97.8)
MONOCYTES # BLD: 0.6 K/UL (ref 0.1–1.3)
MONOCYTES NFR BLD: 8 % (ref 4–12)
NEUTS SEG # BLD: 5.3 K/UL (ref 1.7–8.2)
NEUTS SEG NFR BLD: 73 % (ref 43–78)
PLATELET # BLD AUTO: 274 K/UL (ref 150–450)
PMV BLD AUTO: 10.9 FL (ref 10.8–14.1)
POTASSIUM SERPL-SCNC: 3.9 MMOL/L (ref 3.5–5.1)
PROTHROMBIN TIME: 11.5 SEC (ref 9.6–12)
RBC # BLD AUTO: 4.57 M/UL (ref 4.05–5.25)
SODIUM SERPL-SCNC: 142 MMOL/L (ref 136–145)
TROPONIN I SERPL-MCNC: <0.04 NG/ML (ref 0.02–0.05)
WBC # BLD AUTO: 7.3 K/UL (ref 4.3–11.1)

## 2017-08-15 PROCEDURE — 85610 PROTHROMBIN TIME: CPT | Performed by: EMERGENCY MEDICINE

## 2017-08-15 PROCEDURE — 93005 ELECTROCARDIOGRAM TRACING: CPT | Performed by: EMERGENCY MEDICINE

## 2017-08-15 PROCEDURE — 85730 THROMBOPLASTIN TIME PARTIAL: CPT | Performed by: EMERGENCY MEDICINE

## 2017-08-15 PROCEDURE — 70450 CT HEAD/BRAIN W/O DYE: CPT

## 2017-08-15 PROCEDURE — 74011250636 HC RX REV CODE- 250/636: Performed by: EMERGENCY MEDICINE

## 2017-08-15 PROCEDURE — 74011250636 HC RX REV CODE- 250/636

## 2017-08-15 PROCEDURE — 80048 BASIC METABOLIC PNL TOTAL CA: CPT | Performed by: EMERGENCY MEDICINE

## 2017-08-15 PROCEDURE — A9577 INJ MULTIHANCE: HCPCS | Performed by: EMERGENCY MEDICINE

## 2017-08-15 PROCEDURE — 96374 THER/PROPH/DIAG INJ IV PUSH: CPT | Performed by: EMERGENCY MEDICINE

## 2017-08-15 PROCEDURE — 84484 ASSAY OF TROPONIN QUANT: CPT | Performed by: EMERGENCY MEDICINE

## 2017-08-15 PROCEDURE — 99285 EMERGENCY DEPT VISIT HI MDM: CPT | Performed by: EMERGENCY MEDICINE

## 2017-08-15 PROCEDURE — 96375 TX/PRO/DX INJ NEW DRUG ADDON: CPT | Performed by: EMERGENCY MEDICINE

## 2017-08-15 PROCEDURE — 85025 COMPLETE CBC W/AUTO DIFF WBC: CPT | Performed by: EMERGENCY MEDICINE

## 2017-08-15 PROCEDURE — 70553 MRI BRAIN STEM W/O & W/DYE: CPT

## 2017-08-15 RX ORDER — LORAZEPAM 2 MG/ML
INJECTION INTRAMUSCULAR
Status: COMPLETED
Start: 2017-08-15 | End: 2017-08-15

## 2017-08-15 RX ORDER — DEXAMETHASONE 2 MG/1
2 TABLET ORAL
Qty: 20 TAB | Refills: 0 | Status: SHIPPED | OUTPATIENT
Start: 2017-08-15 | End: 2017-01-01 | Stop reason: CLARIF

## 2017-08-15 RX ORDER — DEXAMETHASONE SODIUM PHOSPHATE 100 MG/10ML
10 INJECTION INTRAMUSCULAR; INTRAVENOUS
Status: COMPLETED | OUTPATIENT
Start: 2017-08-15 | End: 2017-08-15

## 2017-08-15 RX ORDER — SODIUM CHLORIDE 0.9 % (FLUSH) 0.9 %
10 SYRINGE (ML) INJECTION
Status: COMPLETED | OUTPATIENT
Start: 2017-08-15 | End: 2017-08-15

## 2017-08-15 RX ORDER — LORAZEPAM 2 MG/ML
1 INJECTION INTRAMUSCULAR
Status: COMPLETED | OUTPATIENT
Start: 2017-08-15 | End: 2017-08-15

## 2017-08-15 RX ADMIN — GADOBENATE DIMEGLUMINE 14 ML: 529 INJECTION, SOLUTION INTRAVENOUS at 18:11

## 2017-08-15 RX ADMIN — Medication 10 ML: at 18:11

## 2017-08-15 RX ADMIN — DEXAMETHASONE SODIUM PHOSPHATE 10 MG: 10 INJECTION INTRAMUSCULAR; INTRAVENOUS at 16:57

## 2017-08-15 RX ADMIN — LORAZEPAM 1 MG: 2 INJECTION INTRAMUSCULAR at 19:05

## 2017-08-15 RX ADMIN — LORAZEPAM 1 MG: 2 INJECTION INTRAMUSCULAR; INTRAVENOUS at 19:05

## 2017-08-15 NOTE — ED TRIAGE NOTES
Ems states pt.was on the way home from a  and her right arm began tingling and her right arm began tingling and fingers cramping up. Initially pt. Was tachycardic and blood pressure 150/90 and tingling is starting to subside. Pt.does have a hx of anxiety according to EMS. Pt.states \"I feel better than I did and denies chest pain and or shortness of breath and states she does not take anything for her anxiety. \"

## 2017-08-15 NOTE — ED PROVIDER NOTES
HPI Comments: 51-year-old female noted onset of tingling in her right arm associated with some slight cramping tight feeling better weak  as well. This occurred at 2 PM last about 10 minutes and then went away. She also has some numbness in her right aspect of the upper lip. No symptoms like this before. No change in vision speech or swallowing. No difficulty walking. No headache. Patient is a 64 y.o. female presenting with numbness. The history is provided by the patient. Numbness   This is a new problem. The current episode started 1 to 2 hours ago. The problem has been resolved. There was right upper extremity focality noted. Primary symptoms include focal weakness. Pertinent negatives include no loss of sensation, no loss of balance, no slurred speech, no speech difficulty, no movement disorder, no agitation, no visual change, no mental status change, no unresponsiveness and no disorientation. There has been no fever. Pertinent negatives include no shortness of breath, no chest pain, no vomiting, no altered mental status, no confusion, no headaches and no nausea. Past Medical History:   Diagnosis Date    Arthritis     Gastrointestinal disorder     Reflux    GERD (gastroesophageal reflux disease)     Other ill-defined conditions     Herniated disk  lumbar and cervical       Past Surgical History:   Procedure Laterality Date    HX ADENOIDECTOMY      HX GYN      Hysterectomy    HX LAP CHOLECYSTECTOMY      HX TONSILLECTOMY      LYN BIOPSY BREAST STEREOTACTIC Left 10-8-2014         Family History:   Problem Relation Age of Onset    Stroke Mother     Hypertension Mother     Diabetes Mother     Heart Disease Father     Hypertension Father     Cancer Brother     Breast Cancer Neg Hx        Social History     Social History    Marital status:      Spouse name: N/A    Number of children: N/A    Years of education: N/A     Occupational History    Not on file.      Social History Main Topics    Smoking status: Current Every Day Smoker     Packs/day: 0.25     Years: 10.00    Smokeless tobacco: Never Used    Alcohol use No    Drug use: No    Sexual activity: Yes     Birth control/ protection: Surgical     Other Topics Concern    Not on file     Social History Narrative         ALLERGIES: Adhesive tape-silicones; Bactrim [sulfamethoprim ds]; Pcn [penicillins]; and Pyridium [phenazopyridine]    Review of Systems   Constitutional: Negative for chills and fever. Respiratory: Negative for cough and shortness of breath. Cardiovascular: Negative for chest pain and palpitations. Gastrointestinal: Negative for abdominal pain, diarrhea, nausea and vomiting. Genitourinary: Negative for dysuria and flank pain. Musculoskeletal: Negative for back pain and neck pain. Skin: Negative for color change and rash. Neurological: Positive for focal weakness and numbness. Negative for syncope, speech difficulty, headaches and loss of balance. Psychiatric/Behavioral: Negative for agitation and confusion. All other systems reviewed and are negative. Vitals:    08/15/17 1531   BP: 164/77   Pulse: 91   Resp: 18   Temp: 98.2 °F (36.8 °C)   SpO2: 95%   Weight: 68.9 kg (152 lb)   Height: 5' 4\" (1.626 m)            Physical Exam   Constitutional: She is oriented to person, place, and time. She appears well-developed and well-nourished. No distress. HENT:   Head: Normocephalic and atraumatic. Mouth/Throat: Oropharynx is clear and moist. No oropharyngeal exudate. Eyes: Conjunctivae and EOM are normal. Pupils are equal, round, and reactive to light. Neck: Normal range of motion. Neck supple. Cardiovascular: Normal rate, regular rhythm and intact distal pulses. No murmur heard. Pulmonary/Chest: Breath sounds normal. No respiratory distress. Abdominal: No hernia. Neurological: She is alert and oriented to person, place, and time. Gait normal. GCS eye subscore is 4.  GCS verbal subscore is 5. GCS motor subscore is 6. Reflex Scores:       Bicep reflexes are 2+ on the right side and 2+ on the left side. Patellar reflexes are 2+ on the right side and 2+ on the left side. Nl speech  No drift. Normal finger-nose testing. No obvious weakness or numbness at this point. Skin: Skin is warm and dry. Psychiatric: She has a normal mood and affect. Her speech is normal.   Nursing note and vitals reviewed. MDM  Number of Diagnoses or Management Options  Diagnosis management comments: Gout, located migraine. Possibly anxiety. Concern for TIA. Head CT and screening labs. Check EKG. Amount and/or Complexity of Data Reviewed  Clinical lab tests: ordered and reviewed  Tests in the radiology section of CPT®: ordered and reviewed  Tests in the medicine section of CPT®: ordered and reviewed  Independent visualization of images, tracings, or specimens: yes    Risk of Complications, Morbidity, and/or Mortality  Presenting problems: moderate  Diagnostic procedures: low  Management options: moderate    Patient Progress  Patient progress: stable    ED Course       Procedures      Results Include:    Recent Results (from the past 24 hour(s))   CBC WITH AUTOMATED DIFF    Collection Time: 08/15/17  4:36 PM   Result Value Ref Range    WBC 7.3 4.3 - 11.1 K/uL    RBC 4.57 4.05 - 5.25 M/uL    HGB 14.1 11.7 - 15.4 g/dL    HCT 41.9 35.8 - 46.3 %    MCV 91.7 79.6 - 97.8 FL    MCH 30.9 26.1 - 32.9 PG    MCHC 33.7 31.4 - 35.0 g/dL    RDW 12.7 11.9 - 14.6 %    PLATELET 223 367 - 725 K/uL    MPV 10.9 10.8 - 14.1 FL    DF AUTOMATED      NEUTROPHILS 73 43 - 78 %    LYMPHOCYTES 18 13 - 44 %    MONOCYTES 8 4.0 - 12.0 %    EOSINOPHILS 1 0.5 - 7.8 %    BASOPHILS 0 0.0 - 2.0 %    IMMATURE GRANULOCYTES 0.0 0.0 - 5.0 %    ABS. NEUTROPHILS 5.3 1.7 - 8.2 K/UL    ABS. LYMPHOCYTES 1.3 0.5 - 4.6 K/UL    ABS. MONOCYTES 0.6 0.1 - 1.3 K/UL    ABS. EOSINOPHILS 0.1 0.0 - 0.8 K/UL    ABS.  BASOPHILS 0.0 0.0 - 0.2 K/UL    ABS. IMM. GRANS. 0.0 0.0 - 0.5 K/UL   METABOLIC PANEL, BASIC    Collection Time: 08/15/17  4:36 PM   Result Value Ref Range    Sodium 142 136 - 145 mmol/L    Potassium 3.9 3.5 - 5.1 mmol/L    Chloride 105 98 - 107 mmol/L    CO2 28 21 - 32 mmol/L    Anion gap 9 7 - 16 mmol/L    Glucose 111 (H) 65 - 100 mg/dL    BUN 8 6 - 23 MG/DL    Creatinine 0.69 0.6 - 1.0 MG/DL    GFR est AA >60 >60 ml/min/1.73m2    GFR est non-AA >60 >60 ml/min/1.73m2    Calcium 9.1 8.3 - 10.4 MG/DL   TROPONIN I    Collection Time: 08/15/17  4:36 PM   Result Value Ref Range    Troponin-I, Qt. <0.04 0.02 - 0.05 NG/ML   PROTHROMBIN TIME + INR    Collection Time: 08/15/17  4:36 PM   Result Value Ref Range    Prothrombin time 11.5 9.6 - 12.0 sec    INR 1.1 0.9 - 1.2     PTT    Collection Time: 08/15/17  4:36 PM   Result Value Ref Range    aPTT 29.3 23.5 - 31.7 SEC     Mri Brain W Wo Cont    Result Date: 8/15/2017  MRI BRAIN WITHOUT AND WITH CONTRAST 8/15/2017 HISTORY: Numbness in right arm for several hours. TECHNIQUE: Sagittal and axial T1-weighted, axial T2-weighted, axial FLAIR, axial T2-weighted gradient-echo, axial diffusion weighted images with ADC maps and postcontrast axial and coronal T1-weighted images of the brain. 14cc of MultiHance gadolinium contrast was administered intravenously without adverse event to evaluate for pathological leptomeningeal or parenchymal enhancement. COMPARISON: Head CT from the same day FINDINGS: As demonstrated on the head CT, there are enhancing lesions in the superior parietal lobes, measuring 2.3 cm in diameter on the left side and measuring 1.2 cm in diameter on the right side. There is a small amount of surrounding edema and mild local mass effect. There is no acute intracranial hemorrhage, acute infarction, hydrocephalus, or abnormal extra-axial fluid collection. On the T2-weighted and FLAIR sequences, there are a few scattered punctate hyperintense supratentorial white matter lesions.  This is not an uncommon finding which may be present with asymptomatic patients, with migraine headaches or with mild chronic small vessel ischemic disease. IMPRESSION: Enhancing intra-axial lesions in the superior bilateral parietal lobes measuring up to 2.3 cm on the left side. Findings are suggestive of intracranial metastases. Ct Head Wo Cont    Result Date: 8/15/2017  CT Brain dated 8/15/2017  Comparison: None Clinical Information:  Right upper extremity weakness  5 mm axial images were obtained from skull base to vertex without contrast. Radiation dose reduction techniques were used for this study. Our scanners use one or all of the following:  Automated exposure control, adjustment of the mA and/or kV according to patient size, iterative reconstruction. Findings: Ventricles are normal in size. No midline shift. There are 2 hyperdense mass is noted. These measure 2.3 x 1.4 cm and the left parietal lobe and 1.4 x 1.1 cm medial right parietal lobe at the vertex. Both of these are associated with edema, greater on the left. This is suspicious for metastatic disease. Small foci of hemorrhage could have a similar appearance. Follow-up MRI is suggested. No extra-axial abnormality. No skull fracture. No destructive lesion in the skull. Mastoid air cells and visualized paranasal sinuses are aerated and unremarkable. Impression: Hyperdense masses in the parietal lobes bilaterally. This is suspicious for metastatic disease. Preliminary results were called to Dr. Bridget Hooper in the emergency department 8/15/2017 at 1620 hours          spoke with neurosurgery. They suggested continuing Decadron and follow-up in the office as needed. Spoke with the internist on call for the patient.   Patient has an appointment on September 17 but they will expedite movement at the appointment in the next day or 2 for follow-up and initiating workup

## 2017-08-15 NOTE — DISCHARGE INSTRUCTIONS
Number for neurosurgeon is given. [facility] to be seen as a walk-in patient. Tell them we spoke with Dr. Wally Martinez who is on call for Dr. Estefania See. Numbness and Tingling: Care Instructions  Your Care Instructions  Many things can cause numbness or tingling. Swelling may put pressure on a nerve. This could cause you to lose feeling or have a pins-and-needles sensation on part of your body. Nerves may be damaged from trauma, toxins, or diseases, such as diabetes or multiple sclerosis (MS). Sometimes, though, the cause is not clear. If there is no clear reason for your symptoms, and you are not having any other symptoms, your doctor may suggest watching and waiting for a while to see if the numbness or tingling goes away on its own. Your doctor may want you to have blood or nerve tests to find the cause of your symptoms. Follow-up care is a key part of your treatment and safety. Be sure to make and go to all appointments, and call your doctor if you are having problems. It's also a good idea to know your test results and keep a list of the medicines you take. How can you care for yourself at home? · If your doctor prescribes medicine, take it exactly as directed. Call your doctor if you think you are having a problem with your medicine. · If you have any swelling, put ice or a cold pack on the area for 10 to 20 minutes at a time. Put a thin cloth between the ice and your skin. When should you call for help? Call 911 anytime you think you may need emergency care. For example, call if:  · You have weakness, numbness, or tingling in both legs. · You lose bowel or bladder control. · You have symptoms of a stroke. These may include:  ¨ Sudden numbness, tingling, weakness, or loss of movement in your face, arm, or leg, especially on only one side of your body. ¨ Sudden vision changes. ¨ Sudden trouble speaking.   ¨ Sudden confusion or trouble understanding simple statements. ¨ Sudden problems with walking or balance. ¨ A sudden, severe headache that is different from past headaches. Watch closely for changes in your health, and be sure to contact your doctor if you have any problems, or if:  · You do not get better as expected. Where can you learn more? Go to http://marta-aishwarya.info/. Enter V899 in the search box to learn more about \"Numbness and Tingling: Care Instructions. \"  Current as of: October 14, 2016  Content Version: 11.3  © 7749-7385 [employer]. Care instructions adapted under license by [employer] (which disclaims liability or warranty for this information). If you have questions about a medical condition or this instruction, always ask your healthcare professional. [address] any warranty or liability for your use of this information.

## 2017-08-16 ENCOUNTER — HOSPITAL ENCOUNTER (OUTPATIENT)
Dept: CT IMAGING | Age: 57
Discharge: HOME | End: 2017-08-16
Attending: NEUROLOGICAL SURGERY
Payer: COMMERCIAL

## 2017-08-16 VITALS — BODY MASS INDEX: 25.78 KG/M2 | HEIGHT: 64 IN | WEIGHT: 151 LBS

## 2017-08-16 DIAGNOSIS — D49.6 BRAIN TUMOR (HCC): ICD-10-CM

## 2017-08-16 LAB
ATRIAL RATE: 82 BPM
CALCULATED P AXIS, ECG09: 58 DEGREES
CALCULATED R AXIS, ECG10: 35 DEGREES
CALCULATED T AXIS, ECG11: 31 DEGREES
DIAGNOSIS, 93000: NORMAL
P-R INTERVAL, ECG05: 150 MS
Q-T INTERVAL, ECG07: 362 MS
QRS DURATION, ECG06: 68 MS
QTC CALCULATION (BEZET), ECG08: 422 MS
VENTRICULAR RATE, ECG03: 82 BPM

## 2017-08-16 PROCEDURE — 74011636320 HC RX REV CODE- 636/320: Performed by: NEUROLOGICAL SURGERY

## 2017-08-16 PROCEDURE — 74177 CT ABD & PELVIS W/CONTRAST: CPT

## 2017-08-16 PROCEDURE — 74011000258 HC RX REV CODE- 258: Performed by: NEUROLOGICAL SURGERY

## 2017-08-16 RX ORDER — SODIUM CHLORIDE 0.9 % (FLUSH) 0.9 %
10 SYRINGE (ML) INJECTION
Status: COMPLETED | OUTPATIENT
Start: 2017-08-16 | End: 2017-08-16

## 2017-08-16 RX ADMIN — IOPAMIDOL 100 ML: 755 INJECTION, SOLUTION INTRAVENOUS at 14:26

## 2017-08-16 RX ADMIN — SODIUM CHLORIDE 100 ML: 900 INJECTION, SOLUTION INTRAVENOUS at 14:26

## 2017-08-16 RX ADMIN — DIATRIZOATE MEGLUMINE AND DIATRIZOATE SODIUM 15 ML: 660; 100 LIQUID ORAL; RECTAL at 14:25

## 2017-08-16 RX ADMIN — Medication 10 ML: at 14:26

## 2017-08-16 NOTE — ED NOTES
I have reviewed discharge instructions with the patient. The patient verbalized understanding. Prescriptions have been given to patient.

## 2017-08-18 ENCOUNTER — HOSPITAL ENCOUNTER (OUTPATIENT)
Dept: LAB | Age: 57
Discharge: HOME | End: 2017-08-18
Payer: COMMERCIAL

## 2017-08-18 DIAGNOSIS — K63.89 COLONIC MASS: ICD-10-CM

## 2017-08-18 DIAGNOSIS — G93.9 BRAIN LESION: ICD-10-CM

## 2017-08-18 PROBLEM — Z12.39 BREAST CANCER SCREENING: Status: ACTIVE | Noted: 2017-08-17

## 2017-08-18 PROBLEM — C78.00 PULMONARY METASTASES (HCC): Status: ACTIVE | Noted: 2017-08-17

## 2017-08-18 PROBLEM — C79.31 SECONDARY MALIGNANT NEOPLASM OF BRAIN (HCC): Status: ACTIVE | Noted: 2017-08-17

## 2017-08-18 LAB
ALBUMIN SERPL-MCNC: 3.8 G/DL (ref 3.5–5)
ALBUMIN/GLOB SERPL: 0.8 {RATIO} (ref 1.2–3.5)
ALP SERPL-CCNC: 173 U/L (ref 50–136)
ALT SERPL-CCNC: 27 U/L (ref 12–65)
ANION GAP SERPL CALC-SCNC: 7 MMOL/L (ref 7–16)
APTT PPP: 24.9 SEC (ref 23.5–31.7)
AST SERPL-CCNC: 24 U/L (ref 15–37)
BASOPHILS # BLD: 0 K/UL (ref 0–0.2)
BASOPHILS NFR BLD: 0 % (ref 0–2)
BILIRUB SERPL-MCNC: 0.8 MG/DL (ref 0.2–1.1)
BUN SERPL-MCNC: 11 MG/DL (ref 6–23)
CALCIUM SERPL-MCNC: 9.1 MG/DL (ref 8.3–10.4)
CEA SERPL-MCNC: 59.1 NG/ML (ref 0–3)
CHLORIDE SERPL-SCNC: 105 MMOL/L (ref 98–107)
CO2 SERPL-SCNC: 24 MMOL/L (ref 21–32)
CREAT SERPL-MCNC: 0.71 MG/DL (ref 0.6–1)
DIFFERENTIAL METHOD BLD: ABNORMAL
EOSINOPHIL # BLD: 0 K/UL (ref 0–0.8)
EOSINOPHIL NFR BLD: 0 % (ref 0.5–7.8)
ERYTHROCYTE [DISTWIDTH] IN BLOOD BY AUTOMATED COUNT: 12.1 % (ref 11.9–14.6)
GLOBULIN SER CALC-MCNC: 4.6 G/DL (ref 2.3–3.5)
GLUCOSE SERPL-MCNC: 125 MG/DL (ref 65–100)
HCT VFR BLD AUTO: 42 % (ref 35.8–46.3)
HGB BLD-MCNC: 14.6 G/DL (ref 11.7–15.4)
INR PPP: 1.1 (ref 0.9–1.2)
LYMPHOCYTES # BLD: 1.4 K/UL (ref 0.5–4.6)
LYMPHOCYTES NFR BLD: 14 % (ref 13–44)
MCH RBC QN AUTO: 31.5 PG (ref 26.1–32.9)
MCHC RBC AUTO-ENTMCNC: 34.8 G/DL (ref 31.4–35)
MCV RBC AUTO: 90.5 FL (ref 79.6–97.8)
MONOCYTES # BLD: 0.5 K/UL (ref 0.1–1.3)
MONOCYTES NFR BLD: 5 % (ref 4–12)
NEUTS SEG # BLD: 8.4 K/UL (ref 1.7–8.2)
NEUTS SEG NFR BLD: 81 % (ref 43–78)
NRBC # BLD: 0 K/UL (ref 0–0.2)
PLATELET # BLD AUTO: 319 K/UL (ref 150–450)
PMV BLD AUTO: 10.6 FL (ref 10.8–14.1)
POTASSIUM SERPL-SCNC: 4 MMOL/L (ref 3.5–5.1)
PROT SERPL-MCNC: 8.4 G/DL (ref 6.3–8.2)
PROTHROMBIN TIME: 11.2 SEC (ref 9.6–12)
RBC # BLD AUTO: 4.64 M/UL (ref 4.05–5.25)
SODIUM SERPL-SCNC: 136 MMOL/L (ref 136–145)
WBC # BLD AUTO: 10.3 K/UL (ref 4.3–11.1)

## 2017-08-18 PROCEDURE — 80053 COMPREHEN METABOLIC PANEL: CPT | Performed by: INTERNAL MEDICINE

## 2017-08-18 PROCEDURE — 85610 PROTHROMBIN TIME: CPT | Performed by: INTERNAL MEDICINE

## 2017-08-18 PROCEDURE — 82378 CARCINOEMBRYONIC ANTIGEN: CPT | Performed by: INTERNAL MEDICINE

## 2017-08-18 PROCEDURE — 36415 COLL VENOUS BLD VENIPUNCTURE: CPT | Performed by: INTERNAL MEDICINE

## 2017-08-18 PROCEDURE — 85025 COMPLETE CBC W/AUTO DIFF WBC: CPT | Performed by: INTERNAL MEDICINE

## 2017-08-18 PROCEDURE — 85730 THROMBOPLASTIN TIME PARTIAL: CPT | Performed by: INTERNAL MEDICINE

## 2017-08-23 ENCOUNTER — HOSPITAL ENCOUNTER (OUTPATIENT)
Dept: LAB | Age: 57
Discharge: HOME | End: 2017-08-23

## 2017-08-23 PROCEDURE — 88305 TISSUE EXAM BY PATHOLOGIST: CPT | Performed by: INTERNAL MEDICINE

## 2017-08-24 ENCOUNTER — HOSPITAL ENCOUNTER (OUTPATIENT)
Dept: PET IMAGING | Age: 57
Discharge: HOME | End: 2017-08-24
Attending: INTERNAL MEDICINE
Payer: COMMERCIAL

## 2017-08-24 DIAGNOSIS — K63.89 COLONIC MASS: ICD-10-CM

## 2017-08-24 DIAGNOSIS — G93.9 BRAIN LESION: ICD-10-CM

## 2017-08-24 PROCEDURE — 78815 PET IMAGE W/CT SKULL-THIGH: CPT

## 2017-08-24 PROCEDURE — 74011636320 HC RX REV CODE- 636/320: Performed by: INTERNAL MEDICINE

## 2017-08-24 RX ADMIN — DIATRIZOATE MEGLUMINE AND DIATRIZOATE SODIUM 10 ML: 660; 100 LIQUID ORAL; RECTAL at 08:56

## 2017-08-29 ENCOUNTER — HOSPITAL ENCOUNTER (OUTPATIENT)
Dept: RADIATION ONCOLOGY | Age: 57
Discharge: HOME | End: 2017-08-29
Payer: COMMERCIAL

## 2017-08-29 VITALS
DIASTOLIC BLOOD PRESSURE: 70 MMHG | HEART RATE: 62 BPM | SYSTOLIC BLOOD PRESSURE: 134 MMHG | BODY MASS INDEX: 26.39 KG/M2 | TEMPERATURE: 98.2 F | OXYGEN SATURATION: 100 % | WEIGHT: 149 LBS

## 2017-08-29 PROCEDURE — 99211 OFF/OP EST MAY X REQ PHY/QHP: CPT

## 2017-08-29 NOTE — PROGRESS NOTES
Pt here for consult for brain mets. Pet scan 8-24-17. MRI brain 8-15-17. F/u Dr. Desiree Munoz 8-30-17. Colonoscopy  8-23-17 with Dr. Stacy Simon. Pt c/o right hand/finger weakness. Has had headaches but were attributed to other things. Hx RA. Takes pain meds. VERA CONSENTS SIGNED FOR CYBERKNIFE BRAIN. CT [facility] SCHEDULED Thursday 8-31-17 AT 9:30.   NEW PT PACKET AND APT GIVEN TO PT.  RECORDS SENT TO [facility] AT [facility], RN

## 2017-08-29 NOTE — CONSULTS
Patient: Kilo Whitaker MRN: [id number]  SSN: [id number]    YOB: 1960  Age: 64 y.o. Sex: female      Other Providers:  Zaid Garcia MD    CHIEF COMPLAINT: Brain metastases     DIAGNOSIS: Presumed metastatic colorectal adenocarcinoma    HISTORY OF PRESENT ILLNESS:  Kilo Whitaker is a 64 y.o. female who I am seeing at the request of Dr. Stephenie Vasquez regarding the possible radiation therapy and treatment of her brain metastases. On 08/15/17 she was noted to have numbness in her right hand and around her lips on the right side of her face. She also noted diminished  strength on the right. CT scan of the head on 08/15/17 showed 2 hyperdense masses in the parietal lobes bilaterally. These measured 2.3 x 1.4 cm in the left parietal lobe and 1.4 x 1.1 cm in the right parietal lobe. Both were associated with edema, greater on the left. These were felt to be suspicious for metastatic disease. MRI of the brain on 08/15/17 demonstrated intra-axial lesions in the superior bilateral parietal lobes measuring up to 2.3 cm on the left side and 1.2 cm on the right side. CT scan of the chest, abdomen and pelvis on 08/16/17 a suspected sigmoid carcinoma with adjacent metastatic adenopathy. Numerous pulmonary metastatic nodules were seen. In addition, left adrenal mass measuring 3.2 cm was felt to possibly represent further metastatic disease. CEA was found to be elevated at a level of 59.1. She was evaluated by neurosurgery and felt not to be a good surgical candidate based on the location of the larger metastasis near the motor strip on the left. PET/CT scan on 08/24/17 showed metastatic disease as evidenced by bilateral pulmonary nodules, left adrenal metastatic focus and small bowel mesenteric FDG avid lymph node. A primary site was seen at the rectosigmoid junction with adjacent regional metastatic adenopathy.   The pattern of the disease was felt to be somewhat atypical for a metastatic colon cancer considering the involvement of the left adrenal and mediastinal lymph nodes. At this time, Ms. Jessa Mendez is feeling reasonably well. Her only complaint of pain is related to arthritis in her back. She has had an approximately 20 pound weight loss over the prior 6 months that she attributed to being the primary caregiver for her mother who has had a stroke. She denies rectal bleeding or melena. She has had no change in bowel habits. She is a former smoker of one quarter pack per day for approximately 10 years. She has had no cough or shortness of breath. She has had no fevers, chills or night sweats. She has had no neurologic symptoms as noted above including numbness in the right hand and on the right side of her face, as well as diminished  strength on the right. She has noted no change in cognitive function. She has no vision changes. She denies gait instability or falls. PAST MEDICAL HISTORY:    Past Medical History:   Diagnosis Date    Arthritis     Gastrointestinal disorder     Reflux    GERD (gastroesophageal reflux disease)     Other ill-defined conditions     Herniated disk  lumbar and cervical       The patient denies history of collagen vascular diseases, pacemaker insertion, prior radiation or prior chemotherapy. PAST SURGICAL HISTORY:   Past Surgical History:   Procedure Laterality Date    HX ADENOIDECTOMY      HX GYN      Hysterectomy-partial    HX KNEE ARTHROSCOPY Left 2014    HX LAP CHOLECYSTECTOMY      HX TONSILLECTOMY      LYN BIOPSY BREAST STEREOTACTIC Left 10-8-2014       MEDICATIONS:     Current Outpatient Prescriptions:     ALPRAZolam (XANAX XR) 1 mg XR tablet, 1 to 2 po bid, prn, Disp: , Rfl:     HYDROcodone-acetaminophen (NORCO)  mg tablet, Take 1 Tab by mouth every six (6) hours as needed. , Disp: , Rfl:     LORazepam (ATIVAN) 1 mg tablet, Take 1 Tab by mouth every eight (8) hours as needed for Anxiety.  Max Daily Amount: 3 mg., Disp: 20 Tab, Rfl: 0   dexamethasone (DECADRON) 2 mg tablet, Take 1 Tab by mouth three (3) times daily (after meals). (Patient taking differently: Take 2 mg by mouth two (2) times daily (with meals). ), Disp: 20 Tab, Rfl: 0    esomeprazole (NEXIUM) 40 mg capsule, Take 40 mg by mouth daily. , Disp: , Rfl:     DIFLUNISAL (DOLOBID PO), Take 500 mg by mouth two (2) times a day., Disp: , Rfl:     ALLERGIES:   Allergies   Allergen Reactions    Adhesive Tape-Silicones Rash     Bad red rash.  Bactrim [Sulfamethoprim Ds] Hives     Swelling and itching      Macrobid [Nitrofurantoin Monohyd/M-Cryst] Other (comments)     Felt  \"out of it\"    Pcn [Penicillins] Hives     Swelling and itching also.  Pyridium [Phenazopyridine] Hives     Also itching and swelling. SOCIAL HISTORY:   Social History     Social History    Marital status:      Spouse name: N/A    Number of children: N/A    Years of education: N/A     Occupational History    Not on file. Social History Main Topics    Smoking status: Former Smoker     Packs/day: 0.25     Years: 10.00     Quit date: 8/13/2017    Smokeless tobacco: Never Used    Alcohol use No    Drug use: No    Sexual activity: Yes     Birth control/ protection: Surgical     Other Topics Concern    Not on file     Social History Narrative       FAMILY HISTORY:   Family History   Problem Relation Age of Onset   24 [facility] Aam[facility] Stroke Mother     Hypertension Mother     Diabetes Mother     Heart Disease Father     Hypertension Father     Cancer Brother     Breast Cancer Neg Hx        REVIEW OF SYSTEMS: Please see the completed review of systems sheet in the chart that I have reviewed today. PHYSICAL EXAMINATION:   ECOG Performance status 0-1.   VITAL SIGNS:   Visit Vitals    /70 (BP 1 Location: Left arm, BP Patient Position: Sitting)    Pulse 62    Temp 98.2 °F (36.8 °C)    Wt 67.6 kg (149 lb)    SpO2 100%    BMI 26.39 kg/m2        GENERAL: The patient is well-developed, ambulatory, alert and in no acute distress. HEENT: Head is normocephalic, atraumatic. Pupils are equal, round and reactive to light and accommodation. Extraocular movement intact. Hearing is intact bilaterally to finger rub. Oral cavity reveals no lesions. Mucous membranes are moist. NECK: Neck is supple with no masses. CARDIOVASCULAR: Heart has regular rate and rhythm. There are no murmurs, rubs or gallops. Radial pulses are 2+ RESPIRATORY: Lungs are clear to auscultation and percussion. There is normal respiratory effort. GASTROINTESTINAL: The abdomen is soft, non-tender, nondistended with no hepatospelnomagaly. Digital rectal examination: deferred. LYMPHATIC: There is no cervical or supraclavicular lymphadenopathy bilaterally. MUSCULOSKELETAL: Extremities reveal no cyanosis, clubbing or edema.  is 5+/5. NEURO:  Cranial nerves II-XII grossly intact. Muscular strength and sensation are intact throughout all four extremities. She is able to perform finger to nose test without difficulty. She has minimal difficulty with tandem gait. PATHOLOGY:    08/23/17:    DIAGNOSIS   RECTOSIGMOID MASS BIOPSIES: AT LEAST ADENOCARCINOMA IN SITU.      LABORATORY:   Lab Results   Component Value Date/Time    Sodium 136 08/18/2017 04:46 PM    Potassium 4.0 08/18/2017 04:46 PM    Chloride 105 08/18/2017 04:46 PM    CO2 24 08/18/2017 04:46 PM    Anion gap 7 08/18/2017 04:46 PM    Glucose 125 08/18/2017 04:46 PM    BUN 11 08/18/2017 04:46 PM    Creatinine 0.71 08/18/2017 04:46 PM    GFR est AA >60 08/18/2017 04:46 PM    GFR est non-AA >60 08/18/2017 04:46 PM    Calcium 9.1 08/18/2017 04:46 PM    Albumin 3.8 08/18/2017 04:46 PM    Protein, total 8.4 08/18/2017 04:46 PM    Globulin 4.6 08/18/2017 04:46 PM    A-G Ratio 0.8 08/18/2017 04:46 PM    AST (SGOT) 24 08/18/2017 04:46 PM    ALT (SGPT) 27 08/18/2017 04:46 PM     Lab Results   Component Value Date/Time    WBC 10.3 08/18/2017 04:46 PM    HGB 14.6 08/18/2017 04:46 PM    HCT 42.0 08/18/2017 04:46 PM    PLATELET 985 36/67/1587 04:46 PM       RADIOLOGY:    Mri Brain W Wo Cont    Result Date: 8/16/2017  MRI BRAIN WITHOUT AND WITH CONTRAST 8/15/2017 HISTORY: Numbness in right arm for several hours. TECHNIQUE: Sagittal and axial T1-weighted, axial T2-weighted, axial FLAIR, axial T2-weighted gradient-echo, axial diffusion weighted images with ADC maps and postcontrast axial and coronal T1-weighted images of the brain. 14cc of MultiHance gadolinium contrast was administered intravenously without adverse event to evaluate for pathological leptomeningeal or parenchymal enhancement. COMPARISON: Head CT from the same day FINDINGS: As demonstrated on the head CT, there are enhancing lesions in the superior parietal lobes, measuring 2.3 cm in diameter on the left side and measuring 1.2 cm in diameter on the right side. There is a small amount of surrounding edema and mild local mass effect. There is no acute intracranial hemorrhage, acute infarction, hydrocephalus, or abnormal extra-axial fluid collection. On the T2-weighted and FLAIR sequences, there are a few scattered punctate hyperintense supratentorial white matter lesions. This is not an uncommon finding which may be present with asymptomatic patients, with migraine headaches or with mild chronic small vessel ischemic disease. IMPRESSION: Enhancing intra-axial lesions in the superior bilateral parietal lobes measuring up to 2.3 cm on the left side. Findings are suggestive of intracranial metastases. Ct Head Wo Cont    Result Date: 8/15/2017  CT Brain dated 8/15/2017  Comparison: None Clinical Information:  Right upper extremity weakness  5 mm axial images were obtained from skull base to vertex without contrast. Radiation dose reduction techniques were used for this study. Our scanners use one or all of the following:  Automated exposure control, adjustment of the mA and/or kV according to patient size, iterative reconstruction. Findings: Ventricles are normal in size. No midline shift. There are 2 hyperdense mass is noted. These measure 2.3 x 1.4 cm and the left parietal lobe and 1.4 x 1.1 cm medial right parietal lobe at the vertex. Both of these are associated with edema, greater on the left. This is suspicious for metastatic disease. Small foci of hemorrhage could have a similar appearance. Follow-up MRI is suggested. No extra-axial abnormality. No skull fracture. No destructive lesion in the skull. Mastoid air cells and visualized paranasal sinuses are aerated and unremarkable. Impression: Hyperdense masses in the parietal lobes bilaterally. This is suspicious for metastatic disease. Preliminary results were called to Dr. Ciaran Kline in the emergency department 8/15/2017 at 1620 hours     Ct Chest Abd Pelv W Cont    Result Date: 8/16/2017  CT of the chest, abdomen, and pelvis with contrast Comparison:  Noncontrast CT abdomen and pelvis 1/23/2010, MR brain 8/15/2015. Indication:  Metastatic brain lesions, evaluate for primary neoplasm. Technique:  CT imaging was performed of the chest, abdomen, and pelvis following the uncomplicated administration of intravenous contrast (Isovue 370, 100 mL). Oral contrast was given. Iodinated contrast was used due to the indications for the examination. If IV contrast material had not been administered, the likelihood of detecting abnormalities relevant to the patients condition would have been substantially decreased. Radiation dose reduction techniques were used for this study:  Our CT scanners use one or all of the following: Automated exposure control, adjustment of the mA and/or kVp according to patient's size, iterative reconstruction. Findings: CHEST CT: The heart size is normal. Right paratracheal lymph node 1.1 cm image 20; right hilar node 1.2 cm image 26. No pleural or pericardial effusion. The lung parenchyma is unremarkable.  Diffuse scattered pulmonary metastatic nodules measuring up to 1.3 cm medial right upper lobe image 21. ABDOMEN CT: The liver is normal in appearance, with no focal abnormalities. Status post cholecystectomy. The right adrenal gland, pancreas, spleen, kidneys unremarkable. Left adrenal indeterminate nodule 3.2 cm series 2 image 54. There is no intra or extrahepatic biliary ductal dilatation. PELVIS CT: No bowel obstruction. There is short segment circumference wall thickening the sigmoid colon series 2 image 105 with adjacent matted adenopathy measuring up to 2.3 x 1.8 cm. There is no free air or free fluid in the abdomen or pelvis. The bladder is unremarkable. There are no aggressive appearing osseous lesions. IMPRESSION: 1. Suspect sigmoid carcinoma with adjacent metastatic adenopathy. 2. Numerous pulmonary metastatic nodules. 3. Left adrenal mass is indeterminate and additional site of metastasis is possible. Pet/ct Tumor Image Skull Thigh W (ini)    Result Date: 8/24/2017  PET/CT  Indication: Brain lesion, pulmonary nodules, colonic mass initial evaluation Radiopharmaceutical: 19.6 mCi F18-FDG, intravenously. Technique: Imaging was performed from the skull through the proximal thighs using routine PET/CT acquisition protocol. Imaging was performed approximately 60 minutes post injection. Oral contrast was administered. Radiation dose reduction techniques were used for this study:  Our CT scanners use one or all of the following: Automated exposure control, adjustment of the mA and/or kVp according to patient's size, iterative reconstruction. Serum glucose: 84 mg/dL prior to injection. Comparison studies: CT chest abdomen and pelvis 8/16/2017, MR brain 8/15/2017 Findings: Head and Neck: No lymphadenopathy or abnormal FDG uptake. Chest: Subcentimeter right hilar and right paratracheal lymph node with elevated FDG activity, image 60. Bilateral upper lobe pulmonary nodules some with central punctate focus of cavitation showed FDG uptake, Max SUV image 65 3.2. Abdomen/Pelvis: Small bowel mesenteric 9 mm lymph node image 176 with elevated FDG activity. Slim conglomerate adjacent to the rectosigmoid junction similar in size to previous examination with marked elevated FDG activity, max SUV 5.3 image 200. Adjacent rectosigmoid mass with Max SUV 9.9 image 208. No bowel obstruction. Left adrenal mass with elevated FDG activity, max SUV 7.2 image 113. IMPRESSION: 1. Metastatic disease as evidenced by bilateral pulmonary nodules, left adrenal metastatic focus, small bowel mesenteric FDG avid lymph node. 2. Primary adenocarcinoma rectosigmoid junction with adjacent regional metastatic adenopathy. 3. Pattern of disease could all represent metastatic colon carcinoma although left adrenal gland and mediastinal lymph node involvement is somewhat atypical.       IMPRESSION:  Lauren Zavala is a 64 y.o. female with presumed metastatic colorectal cancer with brain metastases. COUNSELING AND COORDINATION OF CARE: I have had an 85 minute consultation with Ms. Renu Siegel and her  of which greater than half is been spent counseling them about management options for her brain metastases. The natural history of brain metastases was discussed with the patient and her family. Prognostic factors including stage, performance status, and number of intracranial metastases were discussed. Various treatment options including whole brain radiation therapy, surgery, radiosurgery and supportive care alone were compared and contrasted with regard to outcome and quality of life. The indications, benefits, side effects, and complications of potential stereotactic radiosurgery were reviewed. Fatigue, alopecia, headache, nausea and vomiting, as well as neurocognitive decline were among the complications highlighted. We have discussed that [address] does not treat subclinical disease whereas whole brain radiation therapy does.  For this reason, we will have to be vigilant in obtaining follow-up imaging of the brain. All of the patient's questions were answered to her satisfaction and she has signed a written informed consent. She will undergo CT simulation at [facility] tomorrow with the intent of delivering a single fraction CyberKnife SRS to each of the 2 brain metastases next week. I appreciate the opportunity to participate in Ms. Sesay's care.         Pankaj Perez MD   August 29, 2017

## 2017-08-30 ENCOUNTER — DOCUMENTATION ONLY (OUTPATIENT)
Dept: ONCOLOGY | Age: 57
End: 2017-08-30

## 2017-08-30 PROBLEM — C18.6 MALIGNANT NEOPLASM OF DESCENDING COLON (HCC): Status: ACTIVE | Noted: 2017-08-30

## 2017-08-30 PROBLEM — C18.9 COLON CANCER METASTASIZED TO BRAIN (HCC): Status: ACTIVE | Noted: 2017-08-30

## 2017-08-30 PROBLEM — C79.31 COLON CANCER METASTASIZED TO BRAIN (HCC): Status: ACTIVE | Noted: 2017-08-30

## 2017-08-30 NOTE — PROGRESS NOTES
SW received referral from ELLY Ann to assess pt psychosocial needs. SW completed visit with pt, pt's , ELLY Ann and MD. Pt to have biopsy and then begin treatment for presumed colon cancer. SW then met with pt and her  to introduce self and services. Pt became tearful and SW provided psychosocial support and normalized reaction to diagnosis and treatment. Pt stated her mother was diagnosed in Feb 2017 with colon cancer and has since been treated. Pt stated she has a support network of her , siblings, mother, Scientologist friends, nieces and nephews. SW offered ongoing support and education about supportive services. Pt stated she would not be interested in a therapy group but may be interested in therapy \"in the future. \" SW respected pt autonomy. SW offered referral to [facility] for additional resources and pt verbalized agreement. SW completed and faxed referral to [phone]. No other needs identified. SW provided SW contact information and encouraged pt to call should any needs arise. Pt verbalized understanding. DANIKA intends to follow up PRN. This note will not be viewable in [address]

## 2017-09-07 ENCOUNTER — HOSPITAL ENCOUNTER (OUTPATIENT)
Dept: CT IMAGING | Age: 57
Discharge: HOME | End: 2017-09-07
Attending: INTERNAL MEDICINE
Payer: COMMERCIAL

## 2017-09-07 VITALS
HEIGHT: 64 IN | DIASTOLIC BLOOD PRESSURE: 58 MMHG | BODY MASS INDEX: 25.1 KG/M2 | SYSTOLIC BLOOD PRESSURE: 111 MMHG | HEART RATE: 55 BPM | WEIGHT: 147 LBS | RESPIRATION RATE: 19 BRPM | TEMPERATURE: 98.1 F | OXYGEN SATURATION: 98 %

## 2017-09-07 DIAGNOSIS — C79.31 SECONDARY MALIGNANT NEOPLASM OF BRAIN (HCC): ICD-10-CM

## 2017-09-07 DIAGNOSIS — K63.89 MASS OF COLON: ICD-10-CM

## 2017-09-07 DIAGNOSIS — C78.00 MALIGNANT NEOPLASM METASTATIC TO LUNG, UNSPECIFIED LATERALITY (HCC): ICD-10-CM

## 2017-09-07 PROCEDURE — 74011000250 HC RX REV CODE- 250: Performed by: RADIOLOGY

## 2017-09-07 PROCEDURE — 74011250636 HC RX REV CODE- 250/636

## 2017-09-07 PROCEDURE — 99152 MOD SED SAME PHYS/QHP 5/>YRS: CPT

## 2017-09-07 PROCEDURE — 88342 IMHCHEM/IMCYTCHM 1ST ANTB: CPT | Performed by: INTERNAL MEDICINE

## 2017-09-07 PROCEDURE — 77012 CT SCAN FOR NEEDLE BIOPSY: CPT

## 2017-09-07 PROCEDURE — 88305 TISSUE EXAM BY PATHOLOGIST: CPT | Performed by: INTERNAL MEDICINE

## 2017-09-07 PROCEDURE — 74011250636 HC RX REV CODE- 250/636: Performed by: RADIOLOGY

## 2017-09-07 PROCEDURE — 88341 IMHCHEM/IMCYTCHM EA ADD ANTB: CPT | Performed by: INTERNAL MEDICINE

## 2017-09-07 RX ORDER — LIDOCAINE HYDROCHLORIDE 20 MG/ML
40-120 INJECTION, SOLUTION INFILTRATION; PERINEURAL ONCE
Status: COMPLETED | OUTPATIENT
Start: 2017-09-07 | End: 2017-09-07

## 2017-09-07 RX ORDER — MIDAZOLAM HYDROCHLORIDE 1 MG/ML
.25-2 INJECTION, SOLUTION INTRAMUSCULAR; INTRAVENOUS
Status: DISCONTINUED | OUTPATIENT
Start: 2017-09-07 | End: 2017-09-11 | Stop reason: HOSPADM

## 2017-09-07 RX ORDER — DIPHENHYDRAMINE HYDROCHLORIDE 50 MG/ML
12.5-5 INJECTION, SOLUTION INTRAMUSCULAR; INTRAVENOUS ONCE
Status: COMPLETED | OUTPATIENT
Start: 2017-09-07 | End: 2017-09-07

## 2017-09-07 RX ORDER — SODIUM CHLORIDE 9 MG/ML
25 INJECTION, SOLUTION INTRAVENOUS CONTINUOUS
Status: DISCONTINUED | OUTPATIENT
Start: 2017-09-07 | End: 2017-09-11 | Stop reason: HOSPADM

## 2017-09-07 RX ORDER — FENTANYL CITRATE 50 UG/ML
12.5-1 INJECTION, SOLUTION INTRAMUSCULAR; INTRAVENOUS
Status: DISCONTINUED | OUTPATIENT
Start: 2017-09-07 | End: 2017-09-11 | Stop reason: HOSPADM

## 2017-09-07 RX ADMIN — MIDAZOLAM HYDROCHLORIDE 1 MG: 1 INJECTION, SOLUTION INTRAMUSCULAR; INTRAVENOUS at 12:21

## 2017-09-07 RX ADMIN — MIDAZOLAM HYDROCHLORIDE 1 MG: 1 INJECTION, SOLUTION INTRAMUSCULAR; INTRAVENOUS at 12:24

## 2017-09-07 RX ADMIN — FENTANYL CITRATE 25 MCG: 50 INJECTION, SOLUTION INTRAMUSCULAR; INTRAVENOUS at 12:23

## 2017-09-07 RX ADMIN — LIDOCAINE HYDROCHLORIDE 80 MG: 20 INJECTION, SOLUTION INFILTRATION; PERINEURAL at 12:25

## 2017-09-07 RX ADMIN — DIPHENHYDRAMINE HYDROCHLORIDE 50 MG: 50 INJECTION, SOLUTION INTRAMUSCULAR; INTRAVENOUS at 12:10

## 2017-09-07 RX ADMIN — FENTANYL CITRATE 25 MCG: 50 INJECTION, SOLUTION INTRAMUSCULAR; INTRAVENOUS at 12:21

## 2017-09-07 NOTE — PROGRESS NOTES
TRANSFER - IN REPORT:    Verbal report received from Rebeca Gillespie RN (name) on Doyle Sprain  being received from CT (unit) for routine progression of care      Report consisted of patients Situation, Background, Assessment and   Recommendations(SBAR). Information from the following report(s) Procedure Summary and MAR was reviewed with the receiving nurse. Opportunity for questions and clarification was provided. Assessment completed upon patients arrival to unit and care assumed.

## 2017-09-07 NOTE — PROGRESS NOTES
Recovery period without difficulty. Pt alert and oriented and denies pain. Dressing is clean, dry, and intact. Reviewed discharge instructions with patient and , both verbalized understanding. Pt escorted to lobby discharge area via wheelchair. Vital signs and Chuy score completed.

## 2017-09-07 NOTE — IP AVS SNAPSHOT
Hai Billingsley 
 
 
 [address] 
[phone] Patient: Dawood Martinez MRN: [id number] :1960 You are allergic to the following Allergen Reactions Adhesive Tape-Silicones Rash Bad red rash. Bactrim (Sulfamethoprim Ds) Hives Swelling and itching Macrobid (Nitrofurantoin Monohyd/M-Cryst) Other (comments) Felt  \"out of it\" Pcn (Penicillins) Hives Swelling and itching also. Pyridium (Phenazopyridine) Hives Also itching and swelling. Recent Documentation Height Weight Breastfeeding? BMI OB Status Smoking Status 1.626 m 66.7 kg No 25.23 kg/m2 Hysterectomy Former Smoker Emergency Contacts Name Discharge Info Relation Home Work Mobile Wale Sesay  Spouse [3] [phone] About your hospitalization You were admitted on:  2017 You last received care in the:  [facility] RADIOLOGY CT SCAN You were discharged on:  2017 Unit phone number:  [phone] Why you were hospitalized Your primary diagnosis was:  Not on File Providers Seen During Your Hospitalizations Provider Role Specialty Primary office phone Rudi Tony MD Attending Provider Hematology [phone] Your Primary Care Physician (PCP) Primary Care Physician Office Phone Office Fax 163 [facility], [address] [place] [address] [phone] Follow-up Information None Your Appointments 2017  2:00 PM EDT Chemo Education with [facility] NP2 Celena Kruse Hematology and Oncology [facility]) C/ Inder King [address] [place] [phone]  
[phone] 2017  3:00 PM EDT COUNSELING with [facility] [address] [place] [address] Celena Kruse Hematology and Oncology [facility]) [facility]/ Inder King [address] [place] [id number] [phone] Tuesday September 12, 2017 10:30 AM EDT  
IR INS TNL CVC W PT OVER 5 YRS with [facility] IR UNIT 1, [facility] IR RADIOLOGIST RESOURCE, [facility] IR ANES NOT REQUIRED  
[facility] Radiology Specials ([facility]) [address] [facility]  
[phone] Interventional Radiology Procedure Referring Physicians: 1) Fax H&P/recent office notes and lab work, no older than 30 days (CBC, BMP, PT/PTT) to Interventional Radiology to facilitate prompt scheduling. 2)Patients with contrast dye allergies must be pre medicated prior to arrival. 3) Obtain clearance to hold blood thinners from prescribing Physician and give Patient instructions prior to arrival. 4)Hold oral diabetic medications the day of the procedure. If Insulin is required, take 1/2 dose the day of the procedure. 5) Pt should not eat or drink anything past midnight 6) Pt to arrive 1 hour to 1.5 hours early depending upon sedation method. 7) Responsible adult  required to drive Patient home after recovery period. Recovery period can vary depending on sedation and patient condition. 8) Requires approval from Radiologist prior to scheduling 9) Interventional Radiology Scheduling can be contacted at [phone] Friday September 15, 2017  2:45 PM EDT Follow Up with MD Gil Reynolds Hematology and Oncology [facility]) [facility]/ Inder King [address] [place] [phone]  
[phone] Current Discharge Medication List  
  
ASK your doctor about these medications Dose & Instructions Dispensing Information Comments Morning Noon Evening Bedtime ALPRAZolam 1 mg XR tablet Commonly known as:  XANAX XR Your last dose was: Your next dose is:    
   
   
 1 to 2 po bid, prn Refills:  0  
     
   
   
   
  
 dexamethasone 2 mg tablet Commonly known as:  DECADRON Your last dose was: Your next dose is:    
   
   
 Dose:  2 mg Take 1 Tab by mouth three (3) times daily (after meals). Quantity:  20 Tab Refills:  0  
     
   
   
   
  
 DOLOBID PO Your last dose was: Your next dose is:    
   
   
 Dose:  500 mg Take 500 mg by mouth two (2) times a day. Refills:  0  
     
   
   
   
  
 fentaNYL 25 mcg/hr PATCH Commonly known as:  Eva Curly Your last dose was: Your next dose is:    
   
   
 Dose:  1 Patch 1 Patch by TransDERmal route every seventy-two (72) hours. Max Daily Amount: 1 Patch. Quantity:  10 Patch Refills:  0 HYDROcodone-acetaminophen  mg tablet Commonly known as:  Chad Isi Your last dose was: Your next dose is:    
   
   
 Dose:  1 Tab Take 1 Tab by mouth every six (6) hours as needed. Max Daily Amount: 4 Tabs. Quantity:  120 Tab Refills:  0 LORazepam 1 mg tablet Commonly known as:  ATIVAN Your last dose was: Your next dose is:    
   
   
 Dose:  1 mg Take 1 Tab by mouth every eight (8) hours as needed for Anxiety. Max Daily Amount: 3 mg. Quantity:  20 Tab Refills:  0 NexIUM 40 mg capsule Generic drug:  esomeprazole Your last dose was: Your next dose is:    
   
   
 Dose:  40 mg Take 40 mg by mouth daily. Refills:  0 Discharge Instructions [facility] [address] Department of Interventional Radiology [address] [facility] 
[phone] Office 
[phone] Fax BIOPSY DISCHARGE INSTRUCTIONS General Instructions: A biopsy is the removal of a small piece of tissue for microscopic examination or testing. Healthy tissue can be obtained for the purpose of tissue-type matching for transplants. Unhealthy tissues are more commonly biopsied to diagnose disease. Lung Biopsy: A needle lung biopsy is performed when there is a mass discovered in the lung area. The most serious risk is infection, bleeding, and pneumothorax (a collapsed lung). Signs of pneumothorax include shortness of breath, rapid heart rate, and blueness of the skin. If any of these occur, call 911. Liver Biopsy: This test helps detect cancer, infections, and the cause of an enlargement of the liver or elevated liver enzymes. It also helps to diagnose a number of liver diseases. The pain associated with the procedure may be felt in the shoulder. The risks include internal bleeding, pneumothorax, and injury to the surrounding organs. Renal Biopsy: This procedure is sometimes done to evaluate a transplanted kidney. It is also used to evaluate unexplained decrease in kidney function, blood, or protein in the urine. You may see bright red blood in the urine the first 24 hours after the test. If the bleeding lasts longer, you need to call your doctor. There is a risk of infection and bleeding into the muscle, which may cause soreness. Spinal Biopsy: This test is sometimes done in conjunction with other procedures. Your back will be sore, as we are taking a small sample of bone, which is slightly more difficult to sample than tissue. General Biopsy: 
   A mass can grow in any area of the body, and we are taking a specimen as ordered by your doctor. The risks are the same. They include bleeding, pain, and infection. Home Care Instructions: You may resume your regular diet and medication regimen. Do not drink alcohol, drive, or make any important legal decisions in the next 24 hours. Do not lift anything heavier than a gallon of milk until the soreness goes away. You may use over the counter acetaminophen or ibuprofen for the soreness. You may apply an ice pack to the affected area for 20-30 minutes at time for the first 24 hours. After that, you may apply a heat pack. Call If: You should call your Physician and/or the Radiology Nurse if you have any questions or concerns about the biopsy site. Call if you should have increased pain, fever, redness, drainage, or bleeding more than a small spot on the bandage. Follow-Up Instructions: Please see your ordering doctor as he/she has requested. To Reach Us: If you have any questions about your procedure, please call the Interventional Radiology department at [phone]. After business hours (5pm) and weekends, call the answering service at [phone] and ask for the Radiologist on call to be paged. Patient Signature: 
Date: 9/7/2017 Discharging Nurse: Jen Salmeron RN Discharge Orders None [facility] SERVICES! Dear Magaly Galdamez: 
Thank you for requesting a Tweegee account. Our records indicate that you already have an active Tweegee account. You can access your account anytime at [email or web address] Did you know that you can access your hospital and ER discharge instructions at any time in Tweegee? You can also review all of your test results from your hospital stay or ER visit. Additional Information If you have questions, please visit the Frequently Asked Questions section of the Tweegee website at [email or web address] Remember, Tweegee is NOT to be used for urgent needs. For medical emergencies, dial 911. Now available from your iPhone and Android! General Information Please provide this summary of care documentation to your next provider. Patient Signature:  ____________________________________________________________ Date:  ____________________________________________________________  
  
Guzmán Livings Provider Signature:  ____________________________________________________________ Date:  ____________________________________________________________

## 2017-09-07 NOTE — PROCEDURES
Interventional Radiology Brief Procedure Note    Patient: Michelle Wright MRN: [id number]  SSN: [id number]    YOB: 1960  Age: 64 y.o. Sex: female      Date of Procedure: 9/7/2017    Pre-Procedure Diagnosis: left adrenal mass    Post-Procedure Diagnosis: SAME    Procedure(s): Image Guided Biopsy, l adrenal mass. Brief Description of Procedure: prone, transpleural     Performed By: Nayely Gallardo MD     Assistants: None    Anesthesia: Moderate Sedation    Estimated Blood Loss: Less than 10ml    Specimens: v good. Implants: None    Findings: mass. No ptx or bleeding. Complications: None    Recommendations: home; follow up with oncology     Follow Up: oncology.      Signed By: Nayely Gallardo MD     September 7, 2017

## 2017-09-07 NOTE — H&P
Department of Interventional Radiology  [phone]    History and Physical    Patient:  Karen Flores MRN:  [id number]  SSN:  [id number]    YOB: 1960  Age:  64 y.o. Sex:  female      Primary Care Provider:  Afshan Buck MD  Referring Physician:  Katelynn Lisa MD    Subjective:     Chief Complaint: biopsy    History of the Present Illness: The patient is a 64 y.o. female who presents for adrenal mass biopsy. Recent diagnosis of colon cancer, bone mets. Possible second primary. NPO X meds. Past Medical History:   Diagnosis Date    Arthritis     Gastrointestinal disorder     Reflux    GERD (gastroesophageal reflux disease)     Other ill-defined conditions     Herniated disk  lumbar and cervical     Past Surgical History:   Procedure Laterality Date    HX ADENOIDECTOMY      HX GYN      Hysterectomy-partial    HX KNEE ARTHROSCOPY Left 2014    HX LAP CHOLECYSTECTOMY      HX TONSILLECTOMY      LYN BIOPSY BREAST STEREOTACTIC Left 10-8-2014        Review of Systems:    Pertinent items are noted in the History of Present Illness. Current Outpatient Prescriptions   Medication Sig    HYDROcodone-acetaminophen (NORCO)  mg tablet Take 1 Tab by mouth every six (6) hours as needed. Max Daily Amount: 4 Tabs.  ALPRAZolam (XANAX XR) 1 mg XR tablet 1 to 2 po bid, prn    dexamethasone (DECADRON) 2 mg tablet Take 1 Tab by mouth three (3) times daily (after meals). (Patient taking differently: Take 2 mg by mouth two (2) times daily (with meals). )    esomeprazole (NEXIUM) 40 mg capsule Take 40 mg by mouth daily.  DIFLUNISAL (DOLOBID PO) Take 500 mg by mouth two (2) times a day.  fentaNYL (DURAGESIC) 25 mcg/hr PATCH 1 Patch by TransDERmal route every seventy-two (72) hours. Max Daily Amount: 1 Patch.  LORazepam (ATIVAN) 1 mg tablet Take 1 Tab by mouth every eight (8) hours as needed for Anxiety. Max Daily Amount: 3 mg.      Current Facility-Administered Medications   Medication Dose Route Frequency    lidocaine (XYLOCAINE) 20 mg/mL (2 %) injection  mg   mg IntraDERMal ONCE    0.9% sodium chloride infusion  25 mL/hr IntraVENous CONTINUOUS    fentaNYL citrate (PF) injection 12.5-100 mcg  12.5-100 mcg IntraVENous Multiple    midazolam (VERSED) injection 0.25-2 mg  0.25-2 mg IntraVENous Multiple    diphenhydrAMINE (BENADRYL) injection 12.5-50 mg  12.5-50 mg IntraVENous ONCE        Allergies   Allergen Reactions    Adhesive Tape-Silicones Rash     Bad red rash.  Bactrim [Sulfamethoprim Ds] Hives     Swelling and itching      Macrobid [Nitrofurantoin Monohyd/M-Cryst] Other (comments)     Felt  \"out of it\"    Pcn [Penicillins] Hives     Swelling and itching also.  Pyridium [Phenazopyridine] Hives     Also itching and swelling. Family History   Problem Relation Age of Onset   [facility] Stroke Mother     Hypertension Mother     Diabetes Mother     Heart Disease Father     Hypertension Father     Cancer Brother     Breast Cancer Neg Hx      Social History   Substance Use Topics    Smoking status: Former Smoker     Packs/day: 0.25     Years: 10.00     Quit date: 8/13/2017    Smokeless tobacco: Never Used    Alcohol use No        Objective:       Physical Examination:    Vitals:    09/07/17 1018 09/07/17 1021   BP: 132/61    Pulse: 63    Resp: 23    Temp: 98.1 °F (36.7 °C)    SpO2: 98%    Weight:  66.7 kg (147 lb)   Height:  5' 4\" (1.626 m)     Blood pressure 132/61, pulse 63, temperature 98.1 °F (36.7 °C), resp. rate 23, height 5' 4\" (1.626 m), weight 66.7 kg (147 lb), SpO2 98 %, not currently breastfeeding.   HEART: regular rate and rhythm  LUNG: clear to auscultation bilaterally  ABDOMEN: normal findings: soft, non-tender  EXTREMITIES: normal strength, tone, and muscle mass, no deformities    Laboratory:     Lab Results   Component Value Date/Time    Sodium 136 08/18/2017 04:46 PM    Sodium 142 08/15/2017 04:36 PM    Potassium 4.0 08/18/2017 04:46 PM    Potassium 3.9 08/15/2017 04:36 PM    Chloride 105 08/18/2017 04:46 PM    Chloride 105 08/15/2017 04:36 PM    CO2 24 08/18/2017 04:46 PM    CO2 28 08/15/2017 04:36 PM    Anion gap 7 08/18/2017 04:46 PM    Anion gap 9 08/15/2017 04:36 PM    Glucose 125 08/18/2017 04:46 PM    Glucose 111 08/15/2017 04:36 PM    BUN 11 08/18/2017 04:46 PM    BUN 8 08/15/2017 04:36 PM    Creatinine 0.71 08/18/2017 04:46 PM    Creatinine 0.69 08/15/2017 04:36 PM    GFR est AA >60 08/18/2017 04:46 PM    GFR est AA >60 08/15/2017 04:36 PM    GFR est non-AA >60 08/18/2017 04:46 PM    GFR est non-AA >60 08/15/2017 04:36 PM    Calcium 9.1 08/18/2017 04:46 PM    Calcium 9.1 08/15/2017 04:36 PM    Albumin 3.8 08/18/2017 04:46 PM    Protein, total 8.4 08/18/2017 04:46 PM    Globulin 4.6 08/18/2017 04:46 PM    A-G Ratio 0.8 08/18/2017 04:46 PM    AST (SGOT) 24 08/18/2017 04:46 PM    ALT (SGPT) 27 08/18/2017 04:46 PM     Lab Results   Component Value Date/Time    WBC 10.3 08/18/2017 04:46 PM    WBC 7.3 08/15/2017 04:36 PM    HGB 14.6 08/18/2017 04:46 PM    HGB 14.1 08/15/2017 04:36 PM    HCT 42.0 08/18/2017 04:46 PM    HCT 41.9 08/15/2017 04:36 PM    PLATELET 441 20/62/0864 04:46 PM    PLATELET 821 01/07/7429 04:36 PM     Lab Results   Component Value Date/Time    aPTT 24.9 08/18/2017 04:46 PM    aPTT 29.3 08/15/2017 04:36 PM    Prothrombin time 11.2 08/18/2017 04:46 PM    Prothrombin time 11.5 08/15/2017 04:36 PM    INR 1.1 08/18/2017 04:46 PM    INR 1.1 08/15/2017 04:36 PM       Assessment:     Adrenal mass, colon cancer    Plan:     Planned Procedure:  CT guided biopsy    Risks, benefits, and alternatives reviewed with patient and she agrees to proceed with the procedure.       Signed By: Patsy Angelo PA-C     September 7, 2017

## 2017-09-07 NOTE — DISCHARGE INSTRUCTIONS
[place] [address]  Department of Interventional Radiology  [facility]  [phone] Office  [phone] Fax    BIOPSY DISCHARGE INSTRUCTIONS    General Instructions:     A biopsy is the removal of a small piece of tissue for microscopic examination or testing. Healthy tissue can be obtained for the purpose of tissue-type matching for transplants. Unhealthy tissues are more commonly biopsied to diagnose disease. Lung Biopsy:     A needle lung biopsy is performed when there is a mass discovered in the lung area. The most serious risk is infection, bleeding, and pneumothorax (a collapsed lung). Signs of pneumothorax include shortness of breath, rapid heart rate, and blueness of the skin. If any of these occur, call 911. Liver Biopsy: This test helps detect cancer, infections, and the cause of an enlargement of the liver or elevated liver enzymes. It also helps to diagnose a number of liver diseases. The pain associated with the procedure may be felt in the shoulder. The risks include internal bleeding, pneumothorax, and injury to the surrounding organs. Renal Biopsy: This procedure is sometimes done to evaluate a transplanted kidney. It is also used to evaluate unexplained decrease in kidney function, blood, or protein in the urine. You may see bright red blood in the urine the first 24 hours after the test. If the bleeding lasts longer, you need to call your doctor. There is a risk of infection and bleeding into the muscle, which may cause soreness. Spinal Biopsy: This test is sometimes done in conjunction with other procedures. Your back will be sore, as we are taking a small sample of bone, which is slightly more difficult to sample than tissue. General Biopsy:     A mass can grow in any area of the body, and we are taking a specimen as ordered by your doctor. The risks are the same.  They include bleeding, pain, and infection. Home Care Instructions: You may resume your regular diet and medication regimen. Do not drink alcohol, drive, or make any important legal decisions in the next 24 hours. Do not lift anything heavier than a gallon of milk until the soreness goes away. You may use over the counter acetaminophen or ibuprofen for the soreness. You may apply an ice pack to the affected area for 20-30 minutes at time for the first 24 hours. After that, you may apply a heat pack. Call If: You should call your Physician and/or the Radiology Nurse if you have any questions or concerns about the biopsy site. Call if you should have increased pain, fever, redness, drainage, or bleeding more than a small spot on the bandage. Follow-Up Instructions: Please see your ordering doctor as he/she has requested. To Reach Us: If you have any questions about your procedure, please call the Interventional Radiology department at [phone]. After business hours (5pm) and weekends, call the answering service at [phone] and ask for the Radiologist on call to be paged.        Patient Signature:  Date: 9/7/2017  Discharging Nurse: Cooper Fletcher RN

## 2017-09-08 ENCOUNTER — DOCUMENTATION ONLY (OUTPATIENT)
Dept: HEMATOLOGY | Age: 57
End: 2017-09-08

## 2017-09-08 NOTE — PROGRESS NOTES
I spoke with Garrett Gallardo regarding her [employer]. Patient has a $ 700 Ded with $400 remaining and $ 1,900 OOP Max  with $1,790 remaining. The [facility] associate with her diagnosis is fully allocated as of 9/6/17. I encouraged Mrs. Sesay to apply for the financial  assistance program through the hospital system. Next, I spoke with Mrs. Sesay regarding potential oral medication authorizations. I told her that if she ever had any problems getting her oral   medications filled to give the dedicated [address] Nathan chrissy  Ava Null a call. Most of the time, it is simply an authorization that needs   to be done with the insurance company. Next, I spoke with Mrs. Zahra Cruz regarding enrolling with [facility]. I went over some of the services that [employer] and [facility] offers and the  enrollment process. Lastly, I gave Mrs. Zahra Cruz a form with various resource organizations that could assist with specific needs (example:  transportation, lodging,   preparing meals, home cleaning)                Faxed Patient Referral form to the [employer] at [phone]. Phone [phone]. Form scanned into chart. Faxed Physician's Statement to the [address] [place] [address] at [phone]. Phone [phone]. Form scanned into chart.

## 2017-09-12 ENCOUNTER — HOSPITAL ENCOUNTER (OUTPATIENT)
Dept: INTERVENTIONAL RADIOLOGY/VASCULAR | Age: 57
Discharge: HOME | End: 2017-09-12
Attending: INTERNAL MEDICINE
Payer: COMMERCIAL

## 2017-09-12 VITALS
SYSTOLIC BLOOD PRESSURE: 120 MMHG | DIASTOLIC BLOOD PRESSURE: 62 MMHG | HEIGHT: 64 IN | TEMPERATURE: 97.9 F | WEIGHT: 149 LBS | HEART RATE: 61 BPM | OXYGEN SATURATION: 96 % | RESPIRATION RATE: 19 BRPM | BODY MASS INDEX: 25.44 KG/M2

## 2017-09-12 DIAGNOSIS — C78.00 MALIGNANT NEOPLASM METASTATIC TO LUNG, UNSPECIFIED LATERALITY (HCC): ICD-10-CM

## 2017-09-12 DIAGNOSIS — C79.31 SECONDARY MALIGNANT NEOPLASM OF BRAIN (HCC): ICD-10-CM

## 2017-09-12 DIAGNOSIS — K63.89 MASS OF COLON: ICD-10-CM

## 2017-09-12 PROCEDURE — 74011000250 HC RX REV CODE- 250: Performed by: RADIOLOGY

## 2017-09-12 PROCEDURE — 99152 MOD SED SAME PHYS/QHP 5/>YRS: CPT

## 2017-09-12 PROCEDURE — C1788 PORT, INDWELLING, IMP: HCPCS

## 2017-09-12 PROCEDURE — 74011250636 HC RX REV CODE- 250/636: Performed by: RADIOLOGY

## 2017-09-12 PROCEDURE — C1894 INTRO/SHEATH, NON-LASER: HCPCS

## 2017-09-12 PROCEDURE — 77030031139 HC SUT VCRL2 J&J -A

## 2017-09-12 PROCEDURE — 77030031131 HC SUT MXN P COVD -B

## 2017-09-12 PROCEDURE — 99156 MOD SED OTH PHYS/QHP 5/>YRS: CPT

## 2017-09-12 PROCEDURE — 36561 INSERT TUNNELED CV CATH: CPT

## 2017-09-12 PROCEDURE — 99157 MOD SED OTHER PHYS/QHP EA: CPT

## 2017-09-12 PROCEDURE — 99153 MOD SED SAME PHYS/QHP EA: CPT

## 2017-09-12 PROCEDURE — 74011250636 HC RX REV CODE- 250/636

## 2017-09-12 RX ORDER — SODIUM CHLORIDE 9 MG/ML
25 INJECTION, SOLUTION INTRAVENOUS ONCE
Status: COMPLETED | OUTPATIENT
Start: 2017-09-12 | End: 2017-09-12

## 2017-09-12 RX ORDER — CEFAZOLIN SODIUM IN 0.9 % NACL 2 G/50 ML
2 INTRAVENOUS SOLUTION, PIGGYBACK (ML) INTRAVENOUS ONCE
Status: COMPLETED | OUTPATIENT
Start: 2017-09-12 | End: 2017-09-12

## 2017-09-12 RX ORDER — MIDAZOLAM HYDROCHLORIDE 1 MG/ML
.5-2 INJECTION, SOLUTION INTRAMUSCULAR; INTRAVENOUS
Status: DISCONTINUED | OUTPATIENT
Start: 2017-09-12 | End: 2017-09-16 | Stop reason: HOSPADM

## 2017-09-12 RX ORDER — HEPARIN SODIUM 200 [USP'U]/100ML
1000 INJECTION, SOLUTION INTRAVENOUS CONTINUOUS
Status: DISCONTINUED | OUTPATIENT
Start: 2017-09-12 | End: 2017-09-16 | Stop reason: HOSPADM

## 2017-09-12 RX ORDER — HEPARIN SODIUM (PORCINE) LOCK FLUSH IV SOLN 100 UNIT/ML 100 UNIT/ML
300 SOLUTION INTRAVENOUS AS NEEDED
Status: DISCONTINUED | OUTPATIENT
Start: 2017-09-12 | End: 2017-09-16 | Stop reason: HOSPADM

## 2017-09-12 RX ORDER — LIDOCAINE HYDROCHLORIDE AND EPINEPHRINE 20; 5 MG/ML; UG/ML
2-10 INJECTION, SOLUTION EPIDURAL; INFILTRATION; INTRACAUDAL; PERINEURAL ONCE
Status: COMPLETED | OUTPATIENT
Start: 2017-09-12 | End: 2017-09-12

## 2017-09-12 RX ORDER — HEPARIN SODIUM (PORCINE) LOCK FLUSH IV SOLN 100 UNIT/ML 100 UNIT/ML
500 SOLUTION INTRAVENOUS ONCE
Status: COMPLETED | OUTPATIENT
Start: 2017-09-12 | End: 2017-09-12

## 2017-09-12 RX ORDER — LIDOCAINE HYDROCHLORIDE 20 MG/ML
1-10 INJECTION, SOLUTION INFILTRATION; PERINEURAL ONCE
Status: COMPLETED | OUTPATIENT
Start: 2017-09-12 | End: 2017-09-12

## 2017-09-12 RX ORDER — DIPHENHYDRAMINE HYDROCHLORIDE 50 MG/ML
25-50 INJECTION, SOLUTION INTRAMUSCULAR; INTRAVENOUS ONCE
Status: COMPLETED | OUTPATIENT
Start: 2017-09-12 | End: 2017-09-12

## 2017-09-12 RX ORDER — FENTANYL CITRATE 50 UG/ML
25-50 INJECTION, SOLUTION INTRAMUSCULAR; INTRAVENOUS
Status: DISCONTINUED | OUTPATIENT
Start: 2017-09-12 | End: 2017-09-16 | Stop reason: HOSPADM

## 2017-09-12 RX ADMIN — MIDAZOLAM HYDROCHLORIDE 0.5 MG: 1 INJECTION, SOLUTION INTRAMUSCULAR; INTRAVENOUS at 11:16

## 2017-09-12 RX ADMIN — FENTANYL CITRATE 25 MCG: 50 INJECTION, SOLUTION INTRAMUSCULAR; INTRAVENOUS at 11:17

## 2017-09-12 RX ADMIN — MIDAZOLAM HYDROCHLORIDE 1 MG: 1 INJECTION, SOLUTION INTRAMUSCULAR; INTRAVENOUS at 11:10

## 2017-09-12 RX ADMIN — DIPHENHYDRAMINE HYDROCHLORIDE 25 MG: 50 INJECTION, SOLUTION INTRAMUSCULAR; INTRAVENOUS at 11:07

## 2017-09-12 RX ADMIN — CEFAZOLIN 2 G: 1 INJECTION, POWDER, FOR SOLUTION INTRAMUSCULAR; INTRAVENOUS; PARENTERAL at 11:06

## 2017-09-12 RX ADMIN — HEPARIN SODIUM (PORCINE) LOCK FLUSH IV SOLN 100 UNIT/ML 500 UNITS: 100 SOLUTION at 11:35

## 2017-09-12 RX ADMIN — FENTANYL CITRATE 50 MCG: 50 INJECTION, SOLUTION INTRAMUSCULAR; INTRAVENOUS at 11:10

## 2017-09-12 RX ADMIN — LIDOCAINE HYDROCHLORIDE,EPINEPHRINE BITARTRATE 140 MG: 20; .005 INJECTION, SOLUTION EPIDURAL; INFILTRATION; INTRACAUDAL; PERINEURAL at 11:26

## 2017-09-12 RX ADMIN — HEPARIN SODIUM 500 UNITS: 200 INJECTION, SOLUTION INTRAVENOUS at 11:20

## 2017-09-12 RX ADMIN — LIDOCAINE HYDROCHLORIDE 200 MG: 20 INJECTION, SOLUTION INFILTRATION; PERINEURAL at 11:25

## 2017-09-12 RX ADMIN — SODIUM CHLORIDE 25 ML/HR: 900 INJECTION, SOLUTION INTRAVENOUS at 11:06

## 2017-09-12 NOTE — IP AVS SNAPSHOT
[address] 
[phone] Patient: Kilo Whitaker MRN: [id number] :1960 Current Discharge Medication List  
  
ASK your doctor about these medications Dose & Instructions Dispensing Information Comments Morning Noon Evening Bedtime ALPRAZolam 1 mg XR tablet Commonly known as:  XANAX XR Your last dose was: Your next dose is:    
   
   
 1 to 2 po bid, prn Refills:  0  
     
   
   
   
  
 dexamethasone 2 mg tablet Commonly known as:  DECADRON Your last dose was: Your next dose is:    
   
   
 Dose:  2 mg Take 1 Tab by mouth three (3) times daily (after meals). Quantity:  20 Tab Refills:  0  
     
   
   
   
  
 DOLOBID PO Your last dose was: Your next dose is:    
   
   
 Dose:  500 mg Take 500 mg by mouth two (2) times a day. Refills:  0  
     
   
   
   
  
 fentaNYL 25 mcg/hr PATCH Commonly known as:  Flor  Your last dose was: Your next dose is:    
   
   
 Dose:  1 Patch 1 Patch by TransDERmal route every seventy-two (72) hours. Max Daily Amount: 1 Patch. Quantity:  10 Patch Refills:  0 HYDROcodone-acetaminophen  mg tablet Commonly known as:  Oliver Paulist Your last dose was: Your next dose is:    
   
   
 Dose:  1 Tab Take 1 Tab by mouth every six (6) hours as needed. Max Daily Amount: 4 Tabs. Quantity:  120 Tab Refills:  0  
     
   
   
   
  
 lidocaine-prilocaine topical cream  
Commonly known as:  EMLA Your last dose was: Your next dose is:    
   
   
 Apply  to affected area as needed for Pain. Quantity:  30 g Refills:  0 LORazepam 1 mg tablet Commonly known as:  ATIVAN Your last dose was: Your next dose is:    
   
   
 Dose:  1 mg Take 1 Tab by mouth every eight (8) hours as needed for Anxiety. Max Daily Amount: 3 mg. Quantity:  20 Tab Refills:  0 NexIUM 40 mg capsule Generic drug:  esomeprazole Your last dose was: Your next dose is:    
   
   
 Dose:  40 mg Take 40 mg by mouth daily. Refills:  0  
     
   
   
   
  
 ondansetron 8 mg disintegrating tablet Commonly known as:  ZOFRAN ODT Your last dose was: Your next dose is:    
   
   
 Dose:  8 mg Take 1 Tab by mouth every eight (8) hours as needed for Nausea. Indications: PREVENTION OF POST-OPERATIVE NAUSEA AND VOMITING Quantity:  60 Tab Refills:  2  
     
   
   
   
  
 prochlorperazine 10 mg tablet Commonly known as:  COMPAZINE Your last dose was: Your next dose is:    
   
   
 Dose:  10 mg Take 1 Tab by mouth every six (6) hours as needed for up to 7 days. Indications: CANCER CHEMOTHERAPY-INDUCED NAUSEA AND VOMITING Quantity:  60 Tab Refills:  2

## 2017-09-12 NOTE — IP AVS SNAPSHOT
[address] 
[phone] Patient: Braxton Watts MRN: [id number] :1960 You are allergic to the following Allergen Reactions Adhesive Tape-Silicones Rash Bad red rash. Bactrim (Sulfamethoprim Ds) Hives Swelling and itching Macrobid (Nitrofurantoin Monohyd/M-Cryst) Other (comments) Felt  \"out of it\" Pcn (Penicillins) Hives Swelling and itching also. Pyridium (Phenazopyridine) Hives Also itching and swelling. Recent Documentation Height Weight Breastfeeding? BMI OB Status Smoking Status 1.626 m 67.6 kg No 25.58 kg/m2 Hysterectomy Former Smoker Emergency Contacts Name Discharge Info Relation Home Work Mobile Wale Sesay  Spouse [3] [phone] About your hospitalization You were admitted on:  2017 You last received care in the:  [facility] Radiology Specials You were discharged on:  2017 Unit phone number:  [phone] Why you were hospitalized Your primary diagnosis was:  Not on File Providers Seen During Your Hospitalizations Provider Role Specialty Primary office phone Latrice Collazo MD Attending Provider Hematology [phone] Your Primary Care Physician (PCP) Primary Care Physician Office Phone Office Fax [address] [facility], [address] [place] [address] [phone] Follow-up Information None Your Appointments Friday September 15, 2017  2:45 PM EDT Follow Up with Latrice Collazo MD  
[facility] Hematology and Oncology [facility] [address] [place] [id number] [phone] Current Discharge Medication List  
  
ASK your doctor about these medications Dose & Instructions Dispensing Information Comments Morning Noon Evening Bedtime ALPRAZolam 1 mg XR tablet Commonly known as:  XANAX XR Your last dose was: Your next dose is:    
   
   
 1 to 2 po bid, prn Refills:  0  
     
   
   
   
  
 dexamethasone 2 mg tablet Commonly known as:  DECADRON Your last dose was: Your next dose is:    
   
   
 Dose:  2 mg Take 1 Tab by mouth three (3) times daily (after meals). Quantity:  20 Tab Refills:  0  
     
   
   
   
  
 DOLOBID PO Your last dose was: Your next dose is:    
   
   
 Dose:  500 mg Take 500 mg by mouth two (2) times a day. Refills:  0  
     
   
   
   
  
 fentaNYL 25 mcg/hr PATCH Commonly known as:  Altamese Bathe Your last dose was: Your next dose is:    
   
   
 Dose:  1 Patch 1 Patch by TransDERmal route every seventy-two (72) hours. Max Daily Amount: 1 Patch. Quantity:  10 Patch Refills:  0 HYDROcodone-acetaminophen  mg tablet Commonly known as:  Guilherme Abrahan Your last dose was: Your next dose is:    
   
   
 Dose:  1 Tab Take 1 Tab by mouth every six (6) hours as needed. Max Daily Amount: 4 Tabs. Quantity:  120 Tab Refills:  0  
     
   
   
   
  
 lidocaine-prilocaine topical cream  
Commonly known as:  EMLA Your last dose was: Your next dose is:    
   
   
 Apply  to affected area as needed for Pain. Quantity:  30 g Refills:  0 LORazepam 1 mg tablet Commonly known as:  ATIVAN Your last dose was: Your next dose is:    
   
   
 Dose:  1 mg Take 1 Tab by mouth every eight (8) hours as needed for Anxiety. Max Daily Amount: 3 mg. Quantity:  20 Tab Refills:  0 NexIUM 40 mg capsule Generic drug:  esomeprazole Your last dose was: Your next dose is:    
   
   
 Dose:  40 mg Take 40 mg by mouth daily. Refills:  0  
     
   
   
   
  
 ondansetron 8 mg disintegrating tablet Commonly known as:  ZOFRAN ODT  
   
 Your last dose was: Your next dose is:    
   
   
 Dose:  8 mg Take 1 Tab by mouth every eight (8) hours as needed for Nausea. Indications: PREVENTION OF POST-OPERATIVE NAUSEA AND VOMITING Quantity:  60 Tab Refills:  2  
     
   
   
   
  
 prochlorperazine 10 mg tablet Commonly known as:  COMPAZINE Your last dose was: Your next dose is:    
   
   
 Dose:  10 mg Take 1 Tab by mouth every six (6) hours as needed for up to 7 days. Indications: CANCER CHEMOTHERAPY-INDUCED NAUSEA AND VOMITING Quantity:  60 Tab Refills:  2 Discharge Instructions Deborah Lopez April Department of Interventional Radiology [facility] 
[phone] Office 
[phone] Fax Implanted Corinne  Discharge Instructions General Instructions: 
 A port is like an implanted IV. They are usually ordered for patients who will be getting chemotherapy, but can also be used as an IV for long term antibiotics, large amounts of fluids, and/or blood products. Your blood can be drawn from your port for labs also. Those patients who do not have good veins find the ports convenient as they can get the IV they need with one stick. The port can be used long term, and the care is easy. The device is under the skin, and once the skin heals, care is minimal. All that is required is the nurse who accesses the port will need to flush it with heparinized saline after each use. Ports are usually placed in the chest wall, usually on the right side. But they can be place in the arms and in the abdomen. Home Care Instructions: If your port is in your arm, do not allow blood pressure or other IVs to be place in that arm. Do not allow bra straps or any clothing to rub the skin over the port. Do not bathe or swim until the skin has healed and if the port is accessed.  Once it is healed, and when the port is not accessed, it is okay to bathe and swim. Restrict yourself to light activity for the first 5 days after getting the port put in, after that, resume normal activity slowly. You may resume your normal diet and medications. Follow-Up Instructions: Please see your oncologist, or whatever physician ordered the port as he/she has requested of you. Call If: You should call your Physician and/or the Radiology Nurse if you notice redness, pus, swelling, or pain from the area of your incision. Call if you should develop a fever. The nurses who access your port will know to call your doctor if the port does not seem to be working properly. You need to tell the nurses who use the port if you should have any pain or swelling at the site during an infusion. To Reach Us: If you have any questions about your procedure, please call the Interventional Radiology department at [phone]. After business hours (5pm) and weekends, call the answering service at [phone] and ask for the Radiologist on call to be paged. Interventional Radiology General Nurse Discharge After general anesthesia or intravenous sedation, for 24 hours or while taking prescription Narcotics: · Limit your activities · Do not drive and operate hazardous machinery · Do not make important personal or business decisions · Do  not drink alcoholic beverages · If you have not urinated within 8 hours after discharge, please contact your surgeon on call. * Please give a list of your current medications to your Primary Care Provider. * Please update this list whenever your medications are discontinued, doses are 
   changed, or new medications (including over-the-counter products) are added. * Please carry medication information at all times in case of emergency situations. These are general instructions for a healthy lifestyle: No smoking/ No tobacco products/ Avoid exposure to second hand smoke Surgeon General's Warning:  Quitting smoking now greatly reduces serious risk to your health. Obesity, smoking, and sedentary lifestyle greatly increases your risk for illness A healthy diet, regular physical exercise & weight monitoring are important for maintaining a healthy lifestyle You may be retaining fluid if you have a history of heart failure or if you experience any of the following symptoms:  Weight gain of 3 pounds or more overnight or 5 pounds in a week, increased swelling in our hands or feet or shortness of breath while lying flat in bed. Please call your doctor as soon as you notice any of these symptoms; do not wait until your next office visit. Recognize signs and symptoms of STROKE: 
F-face looks uneven A-arms unable to move or move unevenly S-speech slurred or non-existent T-time-call 911 as soon as signs and symptoms begin-DO NOT go Back to bed or wait to see if you get better-TIME IS BRAIN. Patient Signature: 
Date: 9/12/2017 Discharging Nurse: Sabrina Epperson Discharge Orders None Introducing [facility]! Dear Willie Auguste: 
Thank you for requesting a [employer] account. Our records indicate that you already have an active [employer] account. You can access your account anytime at [email or web address] Did you know that you can access your hospital and ER discharge instructions at any time in [employer]? You can also review all of your test results from your hospital stay or ER visit. Additional Information If you have questions, please visit the Frequently Asked Questions section of the [employer] website at [email or web address] Remember, [employer] is NOT to be used for urgent needs. For medical emergencies, dial 911. Now available from your iPhone and Android! General Information Please provide this summary of care documentation to your next provider. Patient Signature:  ____________________________________________________________ Date:  ____________________________________________________________  
  
Hampton Mince Provider Signature:  ____________________________________________________________ Date:  ____________________________________________________________

## 2017-09-12 NOTE — H&P
Department of Interventional Radiology  [phone]    History and Physical    Patient:  Karel Bowers MRN:  [id number]  SSN:  [id number]    YOB: 1960  Age:  64 y.o. Sex:  female      Primary Care Provider:  Kody Malin MD  Referring Physician:  Emily Khoury MD    Subjective:     Chief Complaint: colon cancer    History of the Present Illness: The patient is a 64 y.o. female who presents for venous chest port placement. Metastatic colon cancer. Brain mets-radiation this morning. NPO. Past Medical History:   Diagnosis Date    Arthritis     Gastrointestinal disorder     Reflux    GERD (gastroesophageal reflux disease)     Other ill-defined conditions     Herniated disk  lumbar and cervical     Past Surgical History:   Procedure Laterality Date    HX ADENOIDECTOMY      HX GYN      Hysterectomy-partial    HX KNEE ARTHROSCOPY Left 2014    HX LAP CHOLECYSTECTOMY      HX TONSILLECTOMY      LYN BIOPSY BREAST STEREOTACTIC Left 10-8-2014        Review of Systems:    Pertinent items are noted in the History of Present Illness. Current Outpatient Prescriptions   Medication Sig    HYDROcodone-acetaminophen (NORCO)  mg tablet Take 1 Tab by mouth every six (6) hours as needed. Max Daily Amount: 4 Tabs.  ALPRAZolam (XANAX XR) 1 mg XR tablet 1 to 2 po bid, prn    dexamethasone (DECADRON) 2 mg tablet Take 1 Tab by mouth three (3) times daily (after meals). (Patient taking differently: Take 2 mg by mouth two (2) times daily (with meals). )    esomeprazole (NEXIUM) 40 mg capsule Take 40 mg by mouth daily.  ondansetron (ZOFRAN ODT) 8 mg disintegrating tablet Take 1 Tab by mouth every eight (8) hours as needed for Nausea. Indications: PREVENTION OF POST-OPERATIVE NAUSEA AND VOMITING    prochlorperazine (COMPAZINE) 10 mg tablet Take 1 Tab by mouth every six (6) hours as needed for up to 7 days.  Indications: CANCER CHEMOTHERAPY-INDUCED NAUSEA AND VOMITING    lidocaine-prilocaine (EMLA) topical cream Apply  to affected area as needed for Pain.  fentaNYL (DURAGESIC) 25 mcg/hr PATCH 1 Patch by TransDERmal route every seventy-two (72) hours. Max Daily Amount: 1 Patch.  LORazepam (ATIVAN) 1 mg tablet Take 1 Tab by mouth every eight (8) hours as needed for Anxiety. Max Daily Amount: 3 mg.  DIFLUNISAL (DOLOBID PO) Take 500 mg by mouth two (2) times a day. Current Facility-Administered Medications   Medication Dose Route Frequency    lidocaine (XYLOCAINE) 20 mg/mL (2 %) injection  mg  1-10 mL SubCUTAneous ONCE    lidocaine-EPINEPHrine (PF) (XYLOCAINE) 2 %-1:200,000 injection  mg  2-10 mL SubCUTAneous ONCE    0.9% sodium chloride infusion  25 mL/hr IntraVENous ONCE    diphenhydrAMINE (BENADRYL) injection 25-50 mg  25-50 mg IntraVENous ONCE    fentaNYL citrate (PF) injection 25-50 mcg  25-50 mcg IntraVENous Multiple    midazolam (VERSED) injection 0.5-2 mg  0.5-2 mg IntraVENous Multiple    heparin (PF) 2 units/ml in NS infusion 2,000 Units  1,000 mL Irrigation CONTINUOUS    heparin (porcine) 100 unit/mL injection 500 Units  500 Units InterCATHeter ONCE    ceFAZolin in 0.9% NS (ANCEF) IVPB soln 2 g  2 g IntraVENous ONCE        Allergies   Allergen Reactions    Adhesive Tape-Silicones Rash     Bad red rash.  Bactrim [Sulfamethoprim Ds] Hives     Swelling and itching      Macrobid [Nitrofurantoin Monohyd/M-Cryst] Other (comments)     Felt  \"out of it\"    Pcn [Penicillins] Hives     Swelling and itching also.  Pyridium [Phenazopyridine] Hives     Also itching and swelling.        Family History   Problem Relation Age of Onset   [employer] Stroke Mother     Hypertension Mother     Diabetes Mother     Heart Disease Father     Hypertension Father     Cancer Brother     Breast Cancer Neg Hx      Social History   Substance Use Topics    Smoking status: Former Smoker     Packs/day: 0.25     Years: 10.00     Quit date: 8/13/2017    Smokeless tobacco: Never Used    Alcohol use No        Objective:       Physical Examination:    Vitals:    09/12/17 1007   BP: 139/74   Pulse: (!) 54   Resp: 16   Temp: 97.9 °F (36.6 °C)   SpO2: 93%   Weight: 67.6 kg (149 lb)   Height: 5' 4\" (1.626 m)     Blood pressure 139/74, pulse (!) 54, temperature 97.9 °F (36.6 °C), resp. rate 16, height 5' 4\" (1.626 m), weight 67.6 kg (149 lb), SpO2 93 %, not currently breastfeeding.   HEART: regular rate and rhythm  LUNG: clear to auscultation bilaterally  ABDOMEN: normal findings: soft, non-tender  EXTREMITIES: warm, no edema    Laboratory:     Lab Results   Component Value Date/Time    Sodium 136 08/18/2017 04:46 PM    Sodium 142 08/15/2017 04:36 PM    Potassium 4.0 08/18/2017 04:46 PM    Potassium 3.9 08/15/2017 04:36 PM    Chloride 105 08/18/2017 04:46 PM    Chloride 105 08/15/2017 04:36 PM    CO2 24 08/18/2017 04:46 PM    CO2 28 08/15/2017 04:36 PM    Anion gap 7 08/18/2017 04:46 PM    Anion gap 9 08/15/2017 04:36 PM    Glucose 125 08/18/2017 04:46 PM    Glucose 111 08/15/2017 04:36 PM    BUN 11 08/18/2017 04:46 PM    BUN 8 08/15/2017 04:36 PM    Creatinine 0.71 08/18/2017 04:46 PM    Creatinine 0.69 08/15/2017 04:36 PM    GFR est AA >60 08/18/2017 04:46 PM    GFR est AA >60 08/15/2017 04:36 PM    GFR est non-AA >60 08/18/2017 04:46 PM    GFR est non-AA >60 08/15/2017 04:36 PM    Calcium 9.1 08/18/2017 04:46 PM    Calcium 9.1 08/15/2017 04:36 PM    Albumin 3.8 08/18/2017 04:46 PM    Protein, total 8.4 08/18/2017 04:46 PM    Globulin 4.6 08/18/2017 04:46 PM    A-G Ratio 0.8 08/18/2017 04:46 PM    AST (SGOT) 24 08/18/2017 04:46 PM    ALT (SGPT) 27 08/18/2017 04:46 PM     Lab Results   Component Value Date/Time    WBC 10.3 08/18/2017 04:46 PM    WBC 7.3 08/15/2017 04:36 PM    HGB 14.6 08/18/2017 04:46 PM    HGB 14.1 08/15/2017 04:36 PM    HCT 42.0 08/18/2017 04:46 PM    HCT 41.9 08/15/2017 04:36 PM    PLATELET 915 32/71/9930 04:46 PM    PLATELET 962 34/02/8935 04:36 PM     Lab Results   Component Value Date/Time    aPTT 24.9 08/18/2017 04:46 PM    aPTT 29.3 08/15/2017 04:36 PM    Prothrombin time 11.2 08/18/2017 04:46 PM    Prothrombin time 11.5 08/15/2017 04:36 PM    INR 1.1 08/18/2017 04:46 PM    INR 1.1 08/15/2017 04:36 PM       Assessment:     Metastatic colon cancer    Plan:     Planned Procedure:  Port placement    Risks, benefits, and alternatives reviewed with patient and she agrees to proceed with the procedure.       Signed By: Ronnie Mccall PA-C     September 12, 2017

## 2017-09-12 NOTE — PROGRESS NOTES
TRANSFER - OUT REPORT:    Verbal report given to Vasile Ramirez on Rosa Lunsford  being transferred to IR PACU for routine post - op       Report consisted of patients Situation, Background, Assessment and   Recommendations(SBAR). Information from the following report(s) SBAR, Kardex, Procedure Summary, Intake/Output and MAR was reviewed with the receiving nurse. Lines:   Venous Access Device 09/12/17 Upper chest (subclavicular area, right (Active)       Peripheral IV 11/16/12 Left Hand (Active)       Peripheral IV 09/12/17 Left Antecubital (Active)   Site Assessment Clean, dry, & intact 9/12/2017 10:08 AM   Phlebitis Assessment 0 9/12/2017 10:08 AM   Infiltration Assessment 0 9/12/2017 10:08 AM   Dressing Status Clean, dry, & intact 9/12/2017 10:08 AM        Opportunity for questions and clarification was provided.

## 2017-09-12 NOTE — PROCEDURES
Department of Interventional Radiology  [phone]        Interventional Radiology Brief Procedure Note    Patient: Adeline Stephenson MRN: [id number]  SSN: [id number]    YOB: 1960  Age: 64 y.o. Sex: female      Date of Procedure: 9/12/2017    Pre-Procedure Diagnosis: colon cancer    Post-Procedure Diagnosis: SAME    Procedure(s): Venous Chest Port Placement    Brief Description of Procedure: US, fluoro guided right IJ venous chest port placed    Performed By: Boris Sarabia PA-C     Assistants: None    Anesthesia:moderate sedation under the direction of LAURA Ortiz MD    Estimated Blood Loss: Less than 10ml    Specimens: None    Implants: Subcutaneous Port    Findings: catheter tip in right atrium     Complications: None    Recommendations: ok to use port     Follow Up: referring MD    Signed By: Boris Sarabia PA-C     September 12, 2017

## 2017-09-12 NOTE — DISCHARGE INSTRUCTIONS
[address]  Department of Interventional Radiology  [facility]  [phone] Office  [phone] Fax  Implanted Port Discharge Instructions      General Instructions:   A port is like an implanted IV. They are usually ordered for patients who will be getting chemotherapy, but can also be used as an IV for long term antibiotics, large amounts of fluids, and/or blood products. Your blood can be drawn from your port for labs also. Those patients who do not have good veins find the ports convenient as they can get the IV they need with one stick. The port can be used long term, and the care is easy. The device is under the skin, and once the skin heals, care is minimal. All that is required is the nurse who accesses the port will need to flush it with heparinized saline after each use. Ports are usually placed in the chest wall, usually on the right side. But they can be place in the arms and in the abdomen. Home Care Instructions: If your port is in your arm, do not allow blood pressure or other IVs to be place in that arm. Do not allow bra straps or any clothing to rub the skin over the port. Do not bathe or swim until the skin has healed and if the port is accessed. Once it is healed, and when the port is not accessed, it is okay to bathe and swim. Restrict yourself to light activity for the first 5 days after getting the port put in, after that, resume normal activity slowly. You may resume your normal diet and medications. Follow-Up Instructions: Please see your oncologist, or whatever physician ordered the port as he/she has requested of you. Call If: You should call your Physician and/or the Radiology Nurse if you notice redness, pus, swelling, or pain from the area of your incision. Call if you should develop a fever. The nurses who access your port will know to call your doctor if the port does not seem to be working properly. You need to tell the nurses who use the port if you should have any pain or swelling at the site during an infusion. To Reach Us: If you have any questions about your procedure, please call the Interventional Radiology department at [phone]. After business hours (5pm) and weekends, call the answering service at [phone] and ask for the Radiologist on call to be paged. Interventional Radiology General Nurse Discharge    After general anesthesia or intravenous sedation, for 24 hours or while taking prescription Narcotics:  · Limit your activities  · Do not drive and operate hazardous machinery  · Do not make important personal or business decisions  · Do  not drink alcoholic beverages  · If you have not urinated within 8 hours after discharge, please contact your surgeon on call. * Please give a list of your current medications to your Primary Care Provider. * Please update this list whenever your medications are discontinued, doses are     changed, or new medications (including over-the-counter products) are added. * Please carry medication information at all times in case of emergency situations. These are general instructions for a healthy lifestyle:    No smoking/ No tobacco products/ Avoid exposure to second hand smoke  Surgeon General's Warning:  Quitting smoking now greatly reduces serious risk to your health. Obesity, smoking, and sedentary lifestyle greatly increases your risk for illness  A healthy diet, regular physical exercise & weight monitoring are important for maintaining a healthy lifestyle    You may be retaining fluid if you have a history of heart failure or if you experience any of the following symptoms:  Weight gain of 3 pounds or more overnight or 5 pounds in a week, increased swelling in our hands or feet or shortness of breath while lying flat in bed.   Please call your doctor as soon as you notice any of these symptoms; do not wait until your next office visit. Recognize signs and symptoms of STROKE:  F-face looks uneven    A-arms unable to move or move unevenly    S-speech slurred or non-existent    T-time-call 911 as soon as signs and symptoms begin-DO NOT go       Back to bed or wait to see if you get better-TIME IS BRAIN.           Patient Signature:  Date: 9/12/2017  Discharging Nurse: Raymond Reinoso

## 2017-09-20 NOTE — PROGRESS NOTES
Patient tolerated chemotherapy well. No reactions. Fluorouracil pump connected by Infusion RN. Initial education and pump instructions given by [facility] RN. Pump function reviewed with patient prior to discharge. Patient/family instructed to notify [facility] of any problems, questions or concerns with pump. Patient/family reminded to use  chemotherapy precautions at home. Allowed opportunity for patient/family to ask questions. Patient discharged via ambulatory accompanied by spouse. Instructed to notify physician of any problems, questions or concerns after discharge. Next appointment is 09/22/17 at 1700 with Esperanza Muir.

## 2017-09-20 NOTE — PROGRESS NOTES
DANIKA followed up with pt and her , Estephania Rodriguez, at pt's first chemotherapy. Pt previously known to SW from MD visit. Pt verbalized anxiety about her first treatment and stated she wishes to continue with POC. SW provided psychosocial support and therapeutic presence. Pt requested individual psychotherapy for herself, her , and then them together as a couple. SW facilitated 's completion of PHQ9/GAD7 as pt had already completed it at her radiation appointment. Pt's PHQ9 score and GAD7 score were both 14. Pt's 's PHQ9 score was 11 and GAD7 score was 15. Neither scores indicated suicidality. DANIKA consulted with PsyD Dr. Alice Connor on psychotherapy scheduling. DANIKA followed up with pt's  in infusion (pt was asleep) and offered individual psychotherapy for pt and  each, one with Dr. Freida Valadez and another with Dr. Freida Valadez and DANIKA as well as couples counseling with LORENA Uriarte. Pt's  stating that he would discuss with pt and follow up with SW. DANIKA provided SW contact information and encouraged them to call when they would like to scheduling counseling. He verbalized understanding. SW intends to follow up. This note will not be viewable in [address]

## 2017-09-22 NOTE — PROGRESS NOTES
Arrived to the [facility]. 5 FU pump d/c'ed. Patient tolerated treatment well  Any issues or concerns during appointment: No  Patient aware of next infusion appointment on Friday,October 6th @ 1000  Discharged home ambulatory with

## 2017-09-25 NOTE — TELEPHONE ENCOUNTER
SW called pt to follow up on counseling referral and spoke with pt's . He stated he has not been able to speak with pt yet about counseling as \"we had a rough weekend. \" SW offered support and he verbalized appreciation. He stated he intends to call SW when he has spoken with pt. SW provided SW contact information and encouraged him or pt to call should any needs arise. He verbalized understanding. SW intends to remain available for assistance PRN.

## 2017-10-06 NOTE — PROGRESS NOTES
Arrived to the [facility]. Assessment completed. Labs reviewed. Patient tolerated chemotherapy well today. Patient 's fluorouracil pump was connected to her port today, before discharge. All clamps on the tubing are open. Any issues or concerns during appointment: none. Patient aware of next infusion appointment on 10/8/17 (date) at 1400 (time) with 5th floor infusion center. Discharged ambulatory, with spouse. Patient instructed to call Dr. Vickey Smith office immediately for any problems or concerns. She verbalizes understanding.

## 2017-11-01 PROBLEM — R65.20 SEVERE SEPSIS (HCC): Status: ACTIVE | Noted: 2017-01-01

## 2017-11-01 PROBLEM — A41.9 SEVERE SEPSIS (HCC): Status: ACTIVE | Noted: 2017-01-01

## 2017-11-01 NOTE — IP AVS SNAPSHOT
[address] 
[phone] Patient: Phi Streeter MRN: [id number] :1960 About your hospitalization You were admitted on:  2017 You last received care in the:  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  @ 11:45 Phone:  [phone] Where:  [address], [address] [place] [address] Way [address] Follow up with [facility] in 3 week(s)  
follow up of. MSSA bacteremia () from infected port, removed 11/3/17 ans PATTI ZHANG,APPT.  @ 2:00,ALSO DEC. 15th @ 9:40 Phone:  [phone] Where:  [address] [place] [address] Way [address]  MR BRAIN W WO CON with [facility] UNIT 1 at  2:00 PM  
IMPLANTS - Bring information (ID card) for any medically implanted devices. - Patients with a history of metal in eyes will require orbit x-rays for clearance prior to MRI  PATIENT ARRIVAL - Please arrive 30 minutes early to check in. Where:  [facility] ([address] [place] [address]) Discharge Orders None A check kenia indicates which time of day the medication should be taken. My Medications TAKE these medications as instructed Instructions Each Dose to Equal  
 Morning Noon Evening Bedtime  
 albuterol 90 mcg/actuation inhaler Commonly known as:  PROVENTIL HFA, VENTOLIN HFA, PROAIR HFA Take 2 Puffs by inhalation every six (6) hours as needed for Wheezing. 2 Puff ALPRAZolam 1 mg XR tablet Commonly known as:  XANAX XR  
   
 1 to 2 po bid, prn  
     
   
   
   
  
 dexamethasone 2 mg tablet Commonly known as:  DECADRON Your next dose is: Today Take 1 Tab by mouth three (3) times daily (after meals). 2 mg DOLOBID PO Your next dose is: Today Take 500 mg by mouth two (2) times a day. 500 mg HYDROcodone-acetaminophen  mg tablet Commonly known as:  Tyler Minor Take 1 Tab by mouth every four (4) hours as needed. Max Daily Amount: 6 Tabs. 1 Tab  
    
   
   
   
  
 lidocaine-prilocaine topical cream  
Commonly known as:  EMLA Apply  to affected area as needed for Pain. NexIUM 40 mg capsule Generic drug:  esomeprazole Your next dose is:  Tomorrow Take 40 mg by mouth daily. 40 mg  
    
   
   
   
  
 ondansetron 8 mg disintegrating tablet Commonly known as:  ZOFRAN ODT Take 1 Tab by mouth every eight (8) hours as needed for Nausea. Indications: PREVENTION OF POST-OPERATIVE NAUSEA AND VOMITING  
 8 mg  
    
   
   
   
  
 sterile water (preservative free) soln 5 mL with ceFAZolin 1 gram solr 2 g  
   
 2 g by IntraVENous route every eight (8) hours for 38 days. Actual script being prescribed by Dr. Lee Ann Keller (ID)  
 2 g Where to Get Your Medications Information on where to get these meds will be given to you by the nurse or doctor. ! Ask your nurse or doctor about these medications  
  sterile water (preservative free) soln 5 mL with ceFAZolin 1 gram solr 2 g Discharge Instructions DISCHARGE SUMMARY from Nurse The following personal items are in your possession at time of discharge: 
 
Dental Appliances: None Home Medications: None Jewelry: None Clothing: None Other Valuables: None PATIENT INSTRUCTIONS: 
 
After general anesthesia or intravenous sedation, for 24 hours or while taking prescription Narcotics: · Limit your activities · Do not drive and operate hazardous machinery · Do not make important personal or business decisions · Do  not drink alcoholic beverages · If you have not urinated within 8 hours after discharge, please contact your surgeon on call. Report the following to your surgeon: 
· Excessive pain, swelling, redness or odor of or around the surgical area · Temperature over 100.5 · Nausea and vomiting lasting longer than 4 hours or if unable to take medications · Any signs of decreased circulation or nerve impairment to extremity: change in color, persistent  numbness, tingling, coldness or increase pain · Any questions What to do at Home: 
Recommended activity: Activity as tolerated, per MD 
 
If you experience any of the following symptoms fever>101, pain unrelieved with medication, nausea/vomiting, shortness of breath, dizziness/fainting, chest pain. , please follow up with your doctor. *  Please give a list of your current medications to your Primary Care Provider. *  Please update this list whenever your medications are discontinued, doses are 
    changed, or new medications (including over-the-counter products) are added. *  Please carry medication information at all times in case of emergency situations. These are general instructions for a healthy lifestyle: No smoking/ No tobacco products/ Avoid exposure to second hand smoke Surgeon General's Warning:  Quitting smoking now greatly reduces serious risk to your health. Obesity, smoking, and sedentary lifestyle greatly increases your risk for illness A healthy diet, regular physical exercise & weight monitoring are important for maintaining a healthy lifestyle You may be retaining fluid if you have a history of heart failure or if you experience any of the following symptoms:  Weight gain of 3 pounds or more overnight or 5 pounds in a week, increased swelling in our hands or feet or shortness of breath while lying flat in bed. Please call your doctor as soon as you notice any of these symptoms; do not wait until your next office visit. Recognize signs and symptoms of STROKE: 
 
F-face looks uneven A-arms unable to move or move unevenly S-speech slurred or non-existent T-time-call 911 as soon as signs and symptoms begin-DO NOT go Back to bed or wait to see if you get better-TIME IS BRAIN. Warning Signs of HEART ATTACK Call 911 if you have these symptoms: 
? Chest discomfort. Most heart attacks involve discomfort in the center of the chest that lasts more than a few minutes, or that goes away and comes back. It can feel like uncomfortable pressure, squeezing, fullness, or pain. ? Discomfort in other areas of the upper body. Symptoms can include pain or discomfort in one or both arms, the back, neck, jaw, or stomach. ? Shortness of breath with or without chest discomfort. ? Other signs may include breaking out in a cold sweat, nausea, or lightheadedness. Don't wait more than five minutes to call [address]! Fast action can save your life. Calling 911 is almost always the fastest way to get lifesaving treatment. Emergency Medical Services staff can begin treatment when they arrive  up to an hour sooner than if someone gets to the hospital by car. The discharge information has been reviewed with the patient. The patient verbalized understanding. Discharge medications reviewed with the patient and appropriate educational materials and side effects teaching were provided. Sepsis: Care Instructions Your Care Instructions Sepsis is an infection that has spread throughout your body. It is a life-threatening condition and often causes extremely low blood pressure. This can lead to problems with many different organs.  
The cause of sepsis is not always clear, but it can happen as part of a long-term or sudden illness. Sometimes even a mild illness can lead to sepsis. Follow-up care is a key part of your treatment and safety. Be sure to make and go to all appointments, and call your doctor if you are having problems. It's also a good idea to know your test results and keep a list of the medicines you take. How can you care for yourself at home? · If your doctor prescribed antibiotics, take them as directed. Do not stop taking them just because you feel better. You need to take the full course of antibiotics. · Drink plenty of fluids, enough so that your urine is light yellow or clear like water. Choose water or caffeine-free clear liquids until you feel better. If you have kidney, heart, or liver disease and have to limit fluids, talk with your doctor before you increase your fluid intake. You can try rehydration drinks, such as Gatorade or Powerade. · Do not drink alcohol. · Eat a healthy diet. Include fruits, vegetables, and whole grains in your diet every day. · Walking is an easy way to get exercise. Gradually increase the amount you walk every day. Make sure your doctor knows that you are starting an exercise program. 
· Do not smoke or use other tobacco products. If you need help quitting, talk to your doctor about stop-smoking programs and medicines. These can increase your chances of quitting for good. When should you call for help? Call 911 anytime you think you may need emergency care. For example, call if: 
? · You passed out (lost consciousness). ?Call your doctor now or seek immediate medical care if: 
? · You have a fever or chills. ? · You have cool, pale, or clammy skin. ? · You are dizzy or lightheaded, or you feel like you may faint. ? · You have any new symptoms, such as a cough, pain in one part of your body, or urinary problems. ? Watch closely for changes in your health, and be sure to contact your doctor if: 
? · You do not get better as expected. Where can you learn more? Go to http://marta-aishwarya.info/. Enter E056 in the search box to learn more about \"Sepsis: Care Instructions. \" Current as of: March 20, 2017 Content Version: 11.4 © 9434-3358 [employer]. Care instructions adapted under license by [employer] (which disclaims liability or warranty for this information). If you have questions about a medical condition or this instruction, always ask your healthcare professional. [address] any warranty or liability for your use of this information. [employer] Announcement We are excited to announce that we are making your provider's discharge notes available to you in [employer]. You will see these notes when they are completed and signed by the physician that discharged you from your recent hospital stay. If you have any questions or concerns about any information you see in [employer], please call the Health Information Department where you were seen or reach out to your Primary Care Provider for more information about your plan of care. Introducing [facility]! Dear Keely Paredes: 
Thank you for requesting a [employer] account. Our records indicate that you already have an active [employer] account. You can access your account anytime at [email or web address] Did you know that you can access your hospital and ER discharge instructions at any time in [employer]? You can also review all of your test results from your hospital stay or ER visit. Additional Information If you have questions, please visit the Frequently Asked Questions section of the [employer] website at [email or web address] Remember, [employer] is NOT to be used for urgent needs. For medical emergencies, dial 911. Now available from your iPhone and Android! Unresulted Labs-Please follow up with your PCP about these lab tests Order Current Status CULTURE, BLOOD Preliminary result CULTURE, BLOOD Preliminary result Providers Seen During Your Hospitalization Provider Specialty Primary office phone Meliza Joshi MD Emergency Medicine [phone] Aysha Mancini MD Family Practice [phone] Shweta Dixon MD Internal Medicine [phone] Your Primary Care Physician (PCP) Primary Care Physician Office Phone Office Fax [address] [facility], [address] [place] [address] [phone] You are allergic to the following Allergen Reactions Adhesive Tape-Silicones Rash Bad red rash. Bactrim (Sulfamethoprim Ds) Hives Swelling and itching Macrobid (Nitrofurantoin Monohyd/M-Cryst) Other (comments) Felt  \"out of it\" Pcn (Penicillins) Hives Swelling and itching also. Pt has tolerated cephalosporins in the past.  
    
 Pyridium (Phenazopyridine) Hives Also itching and swelling. Recent Documentation Height Weight BMI OB Status Smoking Status 1.626 m 71.8 kg 27.15 kg/m2 Hysterectomy Former Smoker Emergency Contacts Name Discharge Info Relation Home Work Mobile Wale Sesay DISCHARGE CAREGIVER [3] Spouse [3] [phone] Patient Belongings The following personal items are in your possession at time of discharge: 
  Dental Appliances: None         Home Medications: None   Jewelry: None  Clothing: None    Other Valuables: None Please provide this summary of care documentation to your next provider. Signatures-by signing, you are acknowledging that this After Visit Summary has been reviewed with you and you have received a copy. Patient Signature:  ____________________________________________________________ Date:  ____________________________________________________________  
  
Steve Silva Provider Signature:  ____________________________________________________________ Date:  ____________________________________________________________

## 2017-11-02 NOTE — ED NOTES
RN asked that I wait a few minutes before bringing up pt as he is moving a pt out to the floor. Pt is resting in nad.   at bedside

## 2017-11-02 NOTE — PROGRESS NOTES
Problem: Pain  Goal: *Control of Pain  Outcome: Progressing Towards Goal  Pt. Has chronic back pain. Oral medication ordered. Uses lidocaine patches at home-does not want them here. Pt. Enc. To turn side to side to relieve pressure on her back.

## 2017-11-02 NOTE — ED NOTES
TRANSFER - OUT REPORT:    Verbal report given to ELLY Bob on Umer Win  being transferred to [id number] for routine progression of care       Report consisted of patients Situation, Background, Assessment and   Recommendations(SBAR). Information from the following report(s) ED Summary was reviewed with the receiving nurse. Lines:   Venous Access Device Upper chest (subclavicular area, right (Active)   Central Line Being Utilized Yes 11/1/2017 10:25 PM   Date of Last Dressing Change 11/01/17 11/1/2017 10:25 PM   Dressing Status Clean, dry, & intact 11/1/2017 10:25 PM   Dressing Type Transparent 11/1/2017 10:25 PM   Date Accessed (Medial Site) 11/01/17 11/1/2017 10:25 PM   Access Time (Medial Site) 2158 11/1/2017 10:25 PM        Opportunity for questions and clarification was provided.       Patient transported with:   Registered Nurse

## 2017-11-02 NOTE — PROGRESS NOTES
Pharmacokinetic Consult to Pharmacist    Charles Tristan is a 64 y.o. female being treated for sepsis with Vancomycin and Cefepime. @MACIEL00)@  @QRJW(95)@  Lab Results   Component Value Date/Time    BUN 15 11/02/2017 01:54 AM    Creatinine 0.61 11/02/2017 01:54 AM    WBC 7.1 11/02/2017 04:36 AM    Procalcitonin 8.5 11/02/2017 01:54 AM    Lactic acid <0.4 11/02/2017 01:59 AM    Lactic Acid (POC) 2.2 11/01/2017 10:31 PM      Estimated Creatinine Clearance: 100 mL/min (based on Cr of 0.61). CULTURES:  All Micro Results       Procedure Component Value Units Date/Time      CULTURE, BLOOD [[id number]] Collected:  11/01/17 2220    Order Status:  Completed Specimen:  Blood from Blood Updated:  11/02/17 0754     Special Requests: --        NO SPECIAL REQUESTS  BLUE PORT       GRAM STAIN GRAM POSITIVE COCCI                 AEROBIC AND ANAEROBIC BOTTLES              RESULTS VERIFIED, PHONED TO AND READ BACK BY [address] [id number]. SBRAZEL     Culture result:         CULTURE IN PROGRESS,FURTHER UPDATES TO FOLLOW    CULTURE, BLOOD [[id number]]  (Abnormal) Collected:  11/01/17 2220    Order Status:  Completed Specimen:  Blood from Blood Updated:  11/02/17 0708     Special Requests: --        NO SPECIAL REQUESTS  BLUE PORT       GRAM STAIN         GRAM POSITIVE COCCI AEROBIC AND ANAEROBIC BOTTLES              RESULTS VERIFIED, PHONED TO AND READ BACK BY [address] ICU 0330 [id number]. SBRAZEL     Culture result:         CULTURE IN PROGRESS,FURTHER UPDATES TO FOLLOW              MRSA target DNA sequences not detected: SA target DNA sequence detected within the sample.  Test performed by PCR  Culture/Sensitivity to follow (A)    INFLUENZA A & B AG (RAPID TEST) [[id number]] Collected:  11/01/17 6032    Order Status:  Completed Specimen:  Nasopharyngeal from Nasal washing Updated:  11/01/17 2309     Influenza A Ag NEGATIVE          NEGATIVE FOR THE PRESENCE OF INFLUENZA A ANTIGEN  INFECTION DUE TO INFLUENZA A CANNOT BE RULED OUT.  BECAUSE THE ANTIGEN PRESENT IN THE SAMPLE MAY BE BELOW  THE DETECTION LIMIT OF THE TEST. A NEGATIVE TEST IS PRESUMPTIVE AND IT IS RECOMMENDED THAT THESE RESULTS BE CONFIRMED BY VIRAL CULTURE OR AN FDA-CLEARED INFLUENZA A AND B MOLECULAR ASSAY. Influenza B Ag NEGATIVE          NEGATIVE FOR THE PRESENCE OF INFLUENZA B ANTIGEN  INFECTION DUE TO INFLUENZA B CANNOT BE RULED OUT. BECAUSE THE ANTIGEN PRESENT IN THE SAMPLE MAY BE BELOW  THE DETECTION LIMIT OF THE TEST. A NEGATIVE TEST IS PRESUMPTIVE AND IT IS RECOMMENDED THAT THESE RESULTS BE CONFIRMED BY VIRAL CULTURE OR AN FDA-CLEARED INFLUENZA A AND B MOLECULAR ASSAY. Day 1 of vancomycin. Goal trough is 15 - 20. Vancomycin dose continued at 1 gram IV every 12 hours. Will continue to follow patient.       Thank you,  Lulu Sherwood PharmD, BCPS

## 2017-11-02 NOTE — PROGRESS NOTES
Pt arrived to unit and a dual skin assessment preformed. Pt has ecchymosis on rt breast and abdomen from a fall on 11/1. Rt knee and Lt elbow are scrapped. Pt is very flushed on her face and upper chest and back. No sign of breakdown noted.  allyven applied to sacrum

## 2017-11-02 NOTE — PROGRESS NOTES
TRANSFER - OUT REPORT:    Verbal report given to [facility] RN on Estrada Redfield  being transferred to [address] [place] [address] for routine progression of care       Report consisted of patients Situation, Background, Assessment and   Recommendations(SBAR). Information from the following report(s) SBAR, Kardex, Intake/Output, MAR, Recent Results, Med Rec Status and Cardiac Rhythm NSR was reviewed with the receiving nurse. Lines:   Venous Access Device Upper chest (subclavicular area, right (Active)   Central Line Being Utilized Yes 11/2/2017  8:00 AM   Criteria for Appropriate Use Irritant/vesicant medication 11/2/2017  8:00 AM   Site Assessment Clean;Dry 11/2/2017  8:00 AM   Date of Last Dressing Change 11/01/17 11/2/2017  8:00 AM   Dressing Status Clean, dry, & intact 11/2/2017  8:00 AM   Dressing Type Disk with Chlorhexadine gluconate (CHG); Transparent 11/2/2017  8:00 AM   Date Accessed (Medial Site) 11/01/17 11/2/2017  8:00 AM   Access Time (Medial Site) 2158 11/2/2017  8:00 AM   Access Needle Length (Medial Site) 0.75 inches 11/2/2017  8:00 AM   Positive Blood Return (Medial Site) Yes 11/2/2017  8:00 AM        Opportunity for questions and clarification was provided.       Patient transported with:  [email or web address]

## 2017-11-02 NOTE — H&P
HOSPITALIST H&P/CONSULT  NAME:  Ena Horn   Age:  64 y.o.  :   1960   MRN:   [id number]  PCP: Merced Jeffers MD  Treatment Team: Attending Provider: Piotr Cheek MD    Full Code     CC: Reason for admission is: sepsis of unkn source    HPI:   Patient history was obtained from the ER provider prior to seeing the patient. Patient is a 64 y.o. female who presents to the ER due to fevers and mild confusion. Pt has recently been diagnosed with colon cancer with mets to brain and lung. She started chemo, but it has been on hold for a couple weeks because her counts were too low. Her spouse provides most of history. He reports that when he came home from work, she was asleep on couch. When she awoke a couple hours later, she had a fever and some confusion. He brought her to the ER. 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 Spouse and pt deny any particularly new/changed symptoms that might indicate an infection. She has had a cough, but it is actually improving. Denies urinary symptoms. ER w/u is neg for a source so far. Pt is feeling mildly better with fluids and resolution of fever. She was also recently on decadron and that was weaned off. Spouse states that she was feeling better with it. ROS:  All systems have been reviewed and are negative except as stated in HPI or elsewhere.       Past Medical History:   Diagnosis Date    Arthritis     Colon cancer ([facility] [address]     Metastatic to Lungs and Brain    Gastrointestinal disorder     Reflux    GERD (gastroesophageal reflux disease)     Other ill-defined conditions(799.89)     Herniated disk  lumbar and cervical      Past Surgical History:   Procedure Laterality Date    HX ADENOIDECTOMY      HX GYN      Hysterectomy-partial    HX KNEE ARTHROSCOPY Left     HX LAP CHOLECYSTECTOMY      HX TONSILLECTOMY      HX VASCULAR ACCESS      LYN BIOPSY BREAST STEREOTACTIC Left 10-8-2014      Social History   Substance Use Topics    Smoking status: Former Smoker     Packs/day: 0.25     Years: 10.00     Quit date: 2017    Smokeless tobacco: Never Used    Alcohol use No      Family History   Problem Relation Age of Onset    Stroke Mother     Hypertension Mother     Diabetes Mother     Heart Disease Father     Hypertension Father     Cancer Brother     Breast Cancer Neg Hx        FH Reviewed and non-contributory to admitting diagnosis    Allergies   Allergen Reactions    Adhesive Tape-Silicones Rash     Bad red rash.  Bactrim [Sulfamethoprim Ds] Hives     Swelling and itching      Macrobid [Nitrofurantoin Monohyd/M-Cryst] Other (comments)     Felt  \"out of it\"    Pcn [Penicillins] Hives     Swelling and itching also. Pt has tolerated cephalosporins in the past.    Pyridium [Phenazopyridine] Hives     Also itching and swelling. Prior to Admission Medications   Prescriptions Last Dose Informant Patient Reported? Taking? ALPRAZolam (XANAX XR) 1 mg XR tablet   Yes No   Si to 2 po bid, prn   DIFLUNISAL (DOLOBID PO)   Yes No   Sig: Take 500 mg by mouth two (2) times a day. HYDROcodone-acetaminophen (NORCO)  mg tablet   No No   Sig: Take 1 Tab by mouth every four (4) hours as needed. Max Daily Amount: 6 Tabs. albuterol (PROVENTIL HFA, VENTOLIN HFA, PROAIR HFA) 90 mcg/actuation inhaler   No No   Sig: Take 2 Puffs by inhalation every six (6) hours as needed for Wheezing. dexamethasone (DECADRON) 2 mg tablet   No No   Sig: Take 1 Tab by mouth three (3) times daily (after meals). Patient taking differently: Take 2 mg by mouth two (2) times daily (with meals). esomeprazole (NEXIUM) 40 mg capsule   Yes No   Sig: Take 40 mg by mouth daily. lidocaine-prilocaine (EMLA) topical cream   No No   Sig: Apply  to affected area as needed for Pain.    ondansetron (ZOFRAN ODT) 8 mg disintegrating tablet   No No   Sig: Take 1 Tab by mouth every eight (8) hours as needed for Nausea. Indications: PREVENTION OF POST-OPERATIVE NAUSEA AND VOMITING      Facility-Administered Medications: None         Objective:     Patient Vitals for the past 24 hrs:   Temp Pulse Resp BP SpO2   17 0533 - 81 29 - 93 %   17 0532 - 79 19 110/59 -   17 0455 - 80 15 99/64 97 %   17 0425 - 74 19 (!) 85/59 98 %   17 0355 - 76 21 96/57 96 %   17 0325 97.3 °F (36.3 °C) 80 17 92/60 98 %   17 0254 - 77 19 99/46 96 %   17 0224 - 80 17 98/53 97 %   17 0154 - 81 18 98/56 97 %   17 0124 - 83 17 97/54 96 %   17 0054 99.5 °F (37.5 °C) 90 19 90/55 91 %   17 0007 99.7 °F (37.6 °C) 99 22 101/57 92 %   17 0000 - 98 - 97/55 (!) 88 %   17 2345 - (!) 103 23 94/53 (!) 88 %   17 2330 - (!) 105 19 104/55 90 %   17 2319 99.9 °F (37.7 °C) - - - -   17 2315 - (!) 106 22 106/52 90 %   17 2300 - (!) 112 22 110/57 92 %   17 2254 - (!) 115 26 119/59 96 %   17 2246 - (!) 121 17 124/59 94 %   17 2158 (!) 102.8 °F (39.3 °C) (!) 132 18 117/65 93 %     No intake or output data in the 24 hours ending 17 0549   Temp (24hrs), Av.8 °F (37.7 °C), Min:97.3 °F (36.3 °C), Max:102.8 °F (39.3 °C)    Oxygen Therapy  O2 Sat (%): 93 % (17)  Pulse via Oximetry: 82 beats per minute (17)  O2 Device: Nasal cannula (17)  O2 Flow Rate (L/min): 2 l/min (17)   Body mass index is 27.15 kg/(m^2). Physical Exam:    General:    WD and WN, No apparent distress but ill appearing  Head:   Normocephalic, without obvious abnormality, atraumatic. Eyes:  PERRL; EOMI; sclera normal/non-icteric  ENT:  Hearing is normal.  Oropharynx is clear with tacky mucous membranes   Resp:    Clear to auscultation bilaterally. No Wheezing or Rhonchi. Resp are even and unlabored  Heart[de-identified]  Regular rate and rhythm,  no murmur,   No LE edema  Abdomen:   Soft, non-tender. Not distended.   Bowel sounds normal.  hepato-splenomegaly-none  Musc/SK: Muscle strength is fair and tone normal; No cyanosis. No clubbing  Skin:     Texture, turgor normal. No significant rashes or lesions. Neurologic: CN II - XII are grossly intact - other than Eye exam as noted above  Psych:  Drowsy and oriented x 4;  Judgement and insight are normal     Data Review:   Recent Results (from the past 24 hour(s))   CBC WITH AUTOMATED DIFF    Collection Time: 11/01/17 10:20 PM   Result Value Ref Range    WBC 8.7 4.3 - 11.1 K/uL    RBC 3.42 (L) 4.05 - 5.25 M/uL    HGB 11.1 (L) 11.7 - 15.4 g/dL    HCT 32.1 (L) 35.8 - 46.3 %    MCV 93.9 79.6 - 97.8 FL    MCH 32.5 26.1 - 32.9 PG    MCHC 34.6 31.4 - 35.0 g/dL    RDW 17.7 (H) 11.9 - 14.6 %    PLATELET 92 (L) 029 - 450 K/uL    MPV 10.8 10.8 - 14.1 FL    DF AUTOMATED      NEUTROPHILS 83 (H) 43 - 78 %    LYMPHOCYTES 9 (L) 13 - 44 %    MONOCYTES 7 4.0 - 12.0 %    EOSINOPHILS 0 (L) 0.5 - 7.8 %    BASOPHILS 0 0.0 - 2.0 %    IMMATURE GRANULOCYTES 1 0.0 - 5.0 %    ABS. NEUTROPHILS 7.3 1.7 - 8.2 K/UL    ABS. LYMPHOCYTES 0.8 0.5 - 4.6 K/UL    ABS. MONOCYTES 0.6 0.1 - 1.3 K/UL    ABS. EOSINOPHILS 0.0 0.0 - 0.8 K/UL    ABS. BASOPHILS 0.0 0.0 - 0.2 K/UL    ABS. IMM. GRANS. 0.0 0.0 - 0.5 K/UL   METABOLIC PANEL, COMPREHENSIVE    Collection Time: 11/01/17 10:20 PM   Result Value Ref Range    Sodium 132 (L) 136 - 145 mmol/L    Potassium 3.4 (L) 3.5 - 5.1 mmol/L    Chloride 94 (L) 98 - 107 mmol/L    CO2 24 21 - 32 mmol/L    Anion gap 14 7 - 16 mmol/L    Glucose 198 (H) 65 - 100 mg/dL    BUN 14 6 - 23 MG/DL    Creatinine 0.84 0.6 - 1.0 MG/DL    GFR est AA >60 >60 ml/min/1.73m2    GFR est non-AA >60 >60 ml/min/1.73m2    Calcium 7.8 (L) 8.3 - 10.4 MG/DL    Bilirubin, total 1.2 (H) 0.2 - 1.1 MG/DL    ALT (SGPT) 80 (H) 12 - 65 U/L    AST (SGOT) 59 (H) 15 - 37 U/L    Alk.  phosphatase 128 50 - 136 U/L    Protein, total 6.5 6.3 - 8.2 g/dL    Albumin 2.2 (L) 3.5 - 5.0 g/dL    Globulin 4.3 (H) 2.3 - 3.5 g/dL    A-G Ratio 0.5 (L) 1.2 - 3.5     CULTURE, BLOOD    Collection Time: 11/01/17 10:20 PM   Result Value Ref Range    Special Requests: NO SPECIAL REQUESTS  BLUE PORT        GRAM STAIN AEROBIC BOTTLE POSITIVE GRAM POSITIVE COCCI      GRAM STAIN        RESULTS VERIFIED, PHONED TO AND READ BACK BY [place] [id number]. SBRAZEL    Culture result: CULTURE IN PROGRESS,FURTHER UPDATES TO FOLLOW      Culture result: (A)       MRSA target DNA sequences not detected: SA target DNA sequence detected within the sample. Test performed by PCR  Culture/Sensitivity to follow   CULTURE, BLOOD    Collection Time: 11/01/17 10:20 PM   Result Value Ref Range    Special Requests: NO SPECIAL REQUESTS  BLUE PORT        GRAM STAIN ANAEROBIC BOTTLE POSITIVE GRAM POSITIVE COCCI      GRAM STAIN        CRITICAL RESULT NOT CALLED DUE TO PREVIOUS NOTIFICATION OF CRITICAL RESULT WITHIN THE LAST 24 HOURS.     Culture result: CULTURE IN PROGRESS,FURTHER UPDATES TO FOLLOW     POC LACTIC ACID    Collection Time: 11/01/17 10:31 PM   Result Value Ref Range    Lactic Acid (POC) 2.2 (H) 0.5 - 1.9 mmol/L   URINALYSIS W/ RFLX MICROSCOPIC    Collection Time: 11/01/17 10:45 PM   Result Value Ref Range    Color ORANGE      Appearance TURBID      Specific gravity 1.029 (H) 1.001 - 1.023      pH (UA) 5.5 5.0 - 9.0      Protein GREATER THAN/EQUAL  (A) NEG mg/dL    Glucose 100 (A) NEG mg/dL    Ketone TRACE (A) NEG mg/dL    Bilirubin SMALL (A) NEG      Blood LARGE (A) NEG      Urobilinogen 1.0 0.2 - 1.0 EU/dL    Nitrites NEGATIVE  NEG      Leukocyte Esterase SMALL (A) NEG      WBC 0-3 0 /hpf    RBC 0-3 0 /hpf    Epithelial cells 0-3 0 /hpf    Bacteria 4+ (H) 0 /hpf    Casts 3-5 0 /lpf    Mucus 1+ (H) 0 /lpf   INFLUENZA A & B AG (RAPID TEST)    Collection Time: 11/01/17 10:45 PM   Result Value Ref Range    Influenza A Ag NEGATIVE  NEG      Influenza B Ag NEGATIVE  NEG     EKG, 12 LEAD, INITIAL    Collection Time: 11/01/17 10:52 PM   Result Value Ref Range    Ventricular Rate 117 BPM    Atrial Rate 117 BPM    P-R Interval 118 ms    QRS Duration 72 ms    Q-T Interval 288 ms    QTC Calculation (Bezet) 401 ms    Calculated P Axis 47 degrees    Calculated R Axis 58 degrees    Calculated T Axis 41 degrees    Diagnosis       Sinus tachycardia  Low voltage QRS  Borderline ECG  When compared with ECG of 15-AUG-2017 16:53,  No significant change was found     METABOLIC PANEL, COMPREHENSIVE    Collection Time: 11/02/17  1:54 AM   Result Value Ref Range    Sodium 135 (L) 136 - 145 mmol/L    Potassium 3.0 (L) 3.5 - 5.1 mmol/L    Chloride 101 98 - 107 mmol/L    CO2 25 21 - 32 mmol/L    Anion gap 9 7 - 16 mmol/L    Glucose 119 (H) 65 - 100 mg/dL    BUN 15 6 - 23 MG/DL    Creatinine 0.61 0.6 - 1.0 MG/DL    GFR est AA >60 >60 ml/min/1.73m2    GFR est non-AA >60 >60 ml/min/1.73m2    Calcium 7.6 (L) 8.3 - 10.4 MG/DL    Bilirubin, total 1.1 0.2 - 1.1 MG/DL    ALT (SGPT) 70 (H) 12 - 65 U/L    AST (SGOT) 54 (H) 15 - 37 U/L    Alk.  phosphatase 109 50 - 136 U/L    Protein, total 5.8 (L) 6.3 - 8.2 g/dL    Albumin 1.9 (L) 3.5 - 5.0 g/dL    Globulin 3.9 (H) 2.3 - 3.5 g/dL    A-G Ratio 0.5 (L) 1.2 - 3.5     LIPASE    Collection Time: 11/02/17  1:54 AM   Result Value Ref Range    Lipase 58 (L) 73 - 393 U/L   MAGNESIUM    Collection Time: 11/02/17  1:54 AM   Result Value Ref Range    Magnesium 1.8 1.8 - 2.4 mg/dL   PROCALCITONIN    Collection Time: 11/02/17  1:54 AM   Result Value Ref Range    Procalcitonin 8.5 ng/mL   LACTIC ACID    Collection Time: 11/02/17  1:59 AM   Result Value Ref Range    Lactic acid <0.4 (L) 0.4 - 2.0 MMOL/L   CBC WITH AUTOMATED DIFF    Collection Time: 11/02/17  4:36 AM   Result Value Ref Range    WBC 7.1 4.3 - 11.1 K/uL    RBC 3.12 (L) 4.05 - 5.25 M/uL    HGB 9.9 (L) 11.7 - 15.4 g/dL    HCT 29.6 (L) 35.8 - 46.3 %    MCV 94.9 79.6 - 97.8 FL    MCH 31.7 26.1 - 32.9 PG    MCHC 33.4 31.4 - 35.0 g/dL    RDW 17.9 (H) 11.9 - 14.6 %    PLATELET 89 (L) 139 - 450 K/uL    MPV 10.5 (L) 10.8 - 14.1 FL    DF AUTOMATED      NEUTROPHILS 75 43 - 78 %    LYMPHOCYTES 16 13 - 44 %    MONOCYTES 9 4.0 - 12.0 %    EOSINOPHILS 0 (L) 0.5 - 7.8 %    BASOPHILS 0 0.0 - 2.0 %    IMMATURE GRANULOCYTES 0 0.0 - 5.0 %    ABS. NEUTROPHILS 5.3 1.7 - 8.2 K/UL    ABS. LYMPHOCYTES 1.2 0.5 - 4.6 K/UL    ABS. MONOCYTES 0.6 0.1 - 1.3 K/UL    ABS. EOSINOPHILS 0.0 0.0 - 0.8 K/UL    ABS. BASOPHILS 0.0 0.0 - 0.2 K/UL    ABS. IMM. GRANS. 0.0 0.0 - 0.5 K/UL     CXR Results  (Last 48 hours)               11/01/17 2239  XR CHEST PORT Final result    Impression:  IMPRESSION:  Negative for pneumonia. Pulmonary metastases demonstrated. Narrative:  CHEST X-RAY, one view. HISTORY:  Shortness of breath and fever. TECHNIQUE:  AP upright portable view. COMPARISON: None. Recent chest CT is reviewed. FINDINGS:   There are several pulmonary nodules. Lungs are otherwise clear. No   airspace disease. Costophrenic angles are sharp. Heart and pulmonary vasculature   is unremarkable. CT Results  (Last 48 hours)    None              Assessment and Plan: Active Hospital Problems    Diagnosis Date Noted    Severe sepsis ([facility] [address] 11/01/2017    Colon cancer metastasized to brain [facility]) 08/30/2017    Pulmonary metastases ([facility] [address] 08/17/2017         · PLAN   · Sepsis protocol for unkn source: IVF, IV abx, labs  · Consult Oncol in am  · Cultures pending  · Consider transfer downtown for oncology or if condition worsens  · Cont appropriate home meds (see MAR)  · Control symptoms (pain, n/v, fever, etc)  · Monitor appropriate labs   · DVT prophylaxis: SCDs only, due to low platelets  · Code status: Full  · Risk: high- sepsis; IV vanc needing monitoring  · Anticipated DC needs:  · Estimated LOS:  Greater than 2 midnights  · Plans discussed with patient and/or caregiver; questions answered.       Med records reviewed if applicable; findings:     Critical care time if applicable:      Signed By: Shaun Vyas Jey Abel MD     November 2, 2017

## 2017-11-02 NOTE — PROGRESS NOTES
Hospitalist Progress Note     Admit Date:  2017 10:01 PM   Name:  Segundo Zuniga   Age:  64 y.o.  :  1960   MRN:  [id number]   PCP:  Merlin Hamburger, MD  Treatment Team: Attending Provider: Paula Eagle MD; Consulting Provider: Marbella Aponte MD; Consulting Provider: Natalie Sprague MD; Utilization Review: Claudeen Curl, RN    Subjective:     Ms. Christoph Bocanegra is a 63 yo female with PMH of metastatic colon cancer followed by Dr. 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 BC 2/2 GPC from port, UA mildly dirty, CXR shows known mets. Day 1 vancomycin/maxipime.     17 sister at bedside, diet tolerant, no dyspnea, no pain     Objective:   Patient Vitals for the past 24 hrs:   Temp Pulse Resp BP SpO2   17 1030 - - - - 98 %   17 0602 - 77 14 104/49 97 %   17 0533 - 81 29 - 93 %   17 0532 - 79 19 110/59 -   17 0455 - 80 15 99/64 97 %   17 0425 - 74 19 (!) 85/59 98 %   17 0355 - 76 21 96/57 96 %   17 0325 97.3 °F (36.3 °C) 80 17 92/60 98 %   17 0254 - 77 19 99/46 96 %   17 0224 - 80 17 98/53 97 %   17 0154 - 81 18 98/56 97 %   17 0124 - 83 17 97/54 96 %   17 0054 99.5 °F (37.5 °C) 90 19 90/55 91 %   17 0007 99.7 °F (37.6 °C) 99 22 101/57 92 %   17 0000 - 98 - 97/55 (!) 88 %   17 2345 - (!) 103 23 94/53 (!) 88 %   17 2330 - (!) 105 19 104/55 90 %   17 2319 99.9 °F (37.7 °C) - - - -   17 2315 - (!) 106 22 106/52 90 %   17 2300 - (!) 112 22 110/57 92 %   17 2254 - (!) 115 26 119/59 96 %   17 2246 - (!) 121 17 124/59 94 %   17 2158 (!) 102.8 °F (39.3 °C) (!) 132 18 117/65 93 %     Oxygen Therapy  O2 Sat (%): 98 % (17 1030)  Pulse via Oximetry: 86 beats per minute (17 1030)  O2 Device: Nasal cannula (17 1030)  O2 Flow Rate (L/min): 2 l/min (17 1030)  No intake or output data in the 24 hours ending 11/02/17 1211      General:    Well nourished. Alert. CV:   RRR. No murmur, rub, or gallop. Lungs:   CTAB. No wheezing, rhonchi, or rales. Abdomen:   Soft, nontender, nondistended. Normal BS  Extremities: Warm and dry. No cyanosis or edema. Skin:     No rashes or jaundice. Data Review:  I have reviewed all labs, meds, telemetry events, and studies from the last 24 hours. Recent Results (from the past 24 hour(s))   CBC WITH AUTOMATED DIFF    Collection Time: 11/01/17 10:20 PM   Result Value Ref Range    WBC 8.7 4.3 - 11.1 K/uL    RBC 3.42 (L) 4.05 - 5.25 M/uL    HGB 11.1 (L) 11.7 - 15.4 g/dL    HCT 32.1 (L) 35.8 - 46.3 %    MCV 93.9 79.6 - 97.8 FL    MCH 32.5 26.1 - 32.9 PG    MCHC 34.6 31.4 - 35.0 g/dL    RDW 17.7 (H) 11.9 - 14.6 %    PLATELET 92 (L) 051 - 450 K/uL    MPV 10.8 10.8 - 14.1 FL    DF AUTOMATED      NEUTROPHILS 83 (H) 43 - 78 %    LYMPHOCYTES 9 (L) 13 - 44 %    MONOCYTES 7 4.0 - 12.0 %    EOSINOPHILS 0 (L) 0.5 - 7.8 %    BASOPHILS 0 0.0 - 2.0 %    IMMATURE GRANULOCYTES 1 0.0 - 5.0 %    ABS. NEUTROPHILS 7.3 1.7 - 8.2 K/UL    ABS. LYMPHOCYTES 0.8 0.5 - 4.6 K/UL    ABS. MONOCYTES 0.6 0.1 - 1.3 K/UL    ABS. EOSINOPHILS 0.0 0.0 - 0.8 K/UL    ABS. BASOPHILS 0.0 0.0 - 0.2 K/UL    ABS. IMM. GRANS. 0.0 0.0 - 0.5 K/UL   METABOLIC PANEL, COMPREHENSIVE    Collection Time: 11/01/17 10:20 PM   Result Value Ref Range    Sodium 132 (L) 136 - 145 mmol/L    Potassium 3.4 (L) 3.5 - 5.1 mmol/L    Chloride 94 (L) 98 - 107 mmol/L    CO2 24 21 - 32 mmol/L    Anion gap 14 7 - 16 mmol/L    Glucose 198 (H) 65 - 100 mg/dL    BUN 14 6 - 23 MG/DL    Creatinine 0.84 0.6 - 1.0 MG/DL    GFR est AA >60 >60 ml/min/1.73m2    GFR est non-AA >60 >60 ml/min/1.73m2    Calcium 7.8 (L) 8.3 - 10.4 MG/DL    Bilirubin, total 1.2 (H) 0.2 - 1.1 MG/DL    ALT (SGPT) 80 (H) 12 - 65 U/L    AST (SGOT) 59 (H) 15 - 37 U/L    Alk.  phosphatase 128 50 - 136 U/L    Protein, total 6.5 6.3 - 8.2 g/dL    Albumin 2.2 (L) 3.5 - 5.0 g/dL    Globulin 4.3 (H) 2.3 - 3.5 g/dL    A-G Ratio 0.5 (L) 1.2 - 3.5     CULTURE, BLOOD    Collection Time: 11/01/17 10:20 PM   Result Value Ref Range    Special Requests: NO SPECIAL REQUESTS  BLUE PORT        GRAM STAIN GRAM POSITIVE COCCI AEROBIC AND ANAEROBIC BOTTLES      GRAM STAIN        RESULTS VERIFIED, PHONED TO AND READ BACK BY [address] ICU 0330 [id number]. SBRAZEL    Culture result: CULTURE IN PROGRESS,FURTHER UPDATES TO FOLLOW      Culture result: (A)       MRSA target DNA sequences not detected: SA target DNA sequence detected within the sample. Test performed by PCR  Culture/Sensitivity to follow   CULTURE, BLOOD    Collection Time: 11/01/17 10:20 PM   Result Value Ref Range    Special Requests: NO SPECIAL REQUESTS  BLUE PORT        GRAM STAIN GRAM POSITIVE COCCI      GRAM STAIN AEROBIC AND ANAEROBIC BOTTLES      GRAM STAIN        RESULTS VERIFIED, PHONED TO AND READ BACK BY [address] ICU [id number]. SBRAZEL    Culture result: CULTURE IN PROGRESS,FURTHER UPDATES TO FOLLOW     POC LACTIC ACID    Collection Time: 11/01/17 10:31 PM   Result Value Ref Range    Lactic Acid (POC) 2.2 (H) 0.5 - 1.9 mmol/L   URINALYSIS W/ RFLX MICROSCOPIC    Collection Time: 11/01/17 10:45 PM   Result Value Ref Range    Color ORANGE      Appearance TURBID      Specific gravity 1.029 (H) 1.001 - 1.023      pH (UA) 5.5 5.0 - 9.0      Protein GREATER THAN/EQUAL  (A) NEG mg/dL    Glucose 100 (A) NEG mg/dL    Ketone TRACE (A) NEG mg/dL    Bilirubin SMALL (A) NEG      Blood LARGE (A) NEG      Urobilinogen 1.0 0.2 - 1.0 EU/dL    Nitrites NEGATIVE  NEG      Leukocyte Esterase SMALL (A) NEG      WBC 0-3 0 /hpf    RBC 0-3 0 /hpf    Epithelial cells 0-3 0 /hpf    Bacteria 4+ (H) 0 /hpf    Casts 3-5 0 /lpf    Mucus 1+ (H) 0 /lpf   INFLUENZA A & B AG (RAPID TEST)    Collection Time: 11/01/17 10:45 PM   Result Value Ref Range    Influenza A Ag NEGATIVE  NEG      Influenza B Ag NEGATIVE  NEG     EKG, 12 LEAD, INITIAL    Collection Time: 11/01/17 10:52 PM   Result Value Ref Range    Ventricular Rate 117 BPM    Atrial Rate 117 BPM    P-R Interval 118 ms    QRS Duration 72 ms    Q-T Interval 288 ms    QTC Calculation (Bezet) 401 ms    Calculated P Axis 47 degrees    Calculated R Axis 58 degrees    Calculated T Axis 41 degrees    Diagnosis       Sinus tachycardia  Low voltage QRS  Borderline ECG  When compared with ECG of 15-AUG-2017 16:53,  No significant change was found  Confirmed by JASBIR MOMIN (), NEDA TILLEY ([id number]) on 11/2/2017 9:67:22 AM     METABOLIC PANEL, COMPREHENSIVE    Collection Time: 11/02/17  1:54 AM   Result Value Ref Range    Sodium 135 (L) 136 - 145 mmol/L    Potassium 3.0 (L) 3.5 - 5.1 mmol/L    Chloride 101 98 - 107 mmol/L    CO2 25 21 - 32 mmol/L    Anion gap 9 7 - 16 mmol/L    Glucose 119 (H) 65 - 100 mg/dL    BUN 15 6 - 23 MG/DL    Creatinine 0.61 0.6 - 1.0 MG/DL    GFR est AA >60 >60 ml/min/1.73m2    GFR est non-AA >60 >60 ml/min/1.73m2    Calcium 7.6 (L) 8.3 - 10.4 MG/DL    Bilirubin, total 1.1 0.2 - 1.1 MG/DL    ALT (SGPT) 70 (H) 12 - 65 U/L    AST (SGOT) 54 (H) 15 - 37 U/L    Alk.  phosphatase 109 50 - 136 U/L    Protein, total 5.8 (L) 6.3 - 8.2 g/dL    Albumin 1.9 (L) 3.5 - 5.0 g/dL    Globulin 3.9 (H) 2.3 - 3.5 g/dL    A-G Ratio 0.5 (L) 1.2 - 3.5     LIPASE    Collection Time: 11/02/17  1:54 AM   Result Value Ref Range    Lipase 58 (L) 73 - 393 U/L   MAGNESIUM    Collection Time: 11/02/17  1:54 AM   Result Value Ref Range    Magnesium 1.8 1.8 - 2.4 mg/dL   PROCALCITONIN    Collection Time: 11/02/17  1:54 AM   Result Value Ref Range    Procalcitonin 8.5 ng/mL   LACTIC ACID    Collection Time: 11/02/17  1:59 AM   Result Value Ref Range    Lactic acid <0.4 (L) 0.4 - 2.0 MMOL/L   CBC WITH AUTOMATED DIFF    Collection Time: 11/02/17  4:36 AM   Result Value Ref Range    WBC 7.1 4.3 - 11.1 K/uL    RBC 3.12 (L) 4.05 - 5.25 M/uL    HGB 9.9 (L) 11.7 - 15.4 g/dL    HCT 29.6 (L) 35.8 - 46.3 %    MCV 94.9 79.6 - 97.8 FL    MCH 31.7 26.1 - 32.9 PG    MCHC 33.4 31.4 - 35.0 g/dL    RDW 17.9 (H) 11.9 - 14.6 %    PLATELET 89 (L) 770 - 450 K/uL    MPV 10.5 (L) 10.8 - 14.1 FL    DF AUTOMATED      NEUTROPHILS 75 43 - 78 %    LYMPHOCYTES 16 13 - 44 %    MONOCYTES 9 4.0 - 12.0 %    EOSINOPHILS 0 (L) 0.5 - 7.8 %    BASOPHILS 0 0.0 - 2.0 %    IMMATURE GRANULOCYTES 0 0.0 - 5.0 %    ABS. NEUTROPHILS 5.3 1.7 - 8.2 K/UL    ABS. LYMPHOCYTES 1.2 0.5 - 4.6 K/UL    ABS. MONOCYTES 0.6 0.1 - 1.3 K/UL    ABS. EOSINOPHILS 0.0 0.0 - 0.8 K/UL    ABS. BASOPHILS 0.0 0.0 - 0.2 K/UL    ABS. IMM. GRANS. 0.0 0.0 - 0.5 K/UL        All Micro Results     Procedure Component Value Units Date/Time    CULTURE, BLOOD [[id number]] Collected:  11/01/17 2220    Order Status:  Completed Specimen:  Blood from Blood Updated:  11/02/17 0754     Special Requests: --        NO SPECIAL REQUESTS  BLUE PORT       GRAM STAIN GRAM POSITIVE COCCI                 AEROBIC AND ANAEROBIC BOTTLES              RESULTS VERIFIED, PHONED TO AND READ BACK BY [address] ICU [id number]. SBRAZEL     Culture result:         CULTURE IN PROGRESS,FURTHER UPDATES TO FOLLOW    CULTURE, BLOOD [[id number]]  (Abnormal) Collected:  11/01/17 2220    Order Status:  Completed Specimen:  Blood from Blood Updated:  11/02/17 0708     Special Requests: --        NO SPECIAL REQUESTS  BLUE PORT       GRAM STAIN         GRAM POSITIVE COCCI AEROBIC AND ANAEROBIC BOTTLES              RESULTS VERIFIED, PHONED TO AND READ BACK BY [address]  [id number]. SBRAZEL     Culture result:         CULTURE IN PROGRESS,FURTHER UPDATES TO FOLLOW              MRSA target DNA sequences not detected: SA target DNA sequence detected within the sample.  Test performed by PCR  Culture/Sensitivity to follow (A)    INFLUENZA A & B AG (RAPID TEST) [[id number]] Collected:  11/01/17 2245    Order Status:  Completed Specimen:  Nasopharyngeal from Nasal washing Updated:  11/01/17 8800     Influenza A Ag NEGATIVE          NEGATIVE FOR THE PRESENCE OF INFLUENZA A ANTIGEN  INFECTION DUE TO INFLUENZA A CANNOT BE RULED OUT. BECAUSE THE ANTIGEN PRESENT IN THE SAMPLE MAY BE BELOW  THE DETECTION LIMIT OF THE TEST. A NEGATIVE TEST IS PRESUMPTIVE AND IT IS RECOMMENDED THAT THESE RESULTS BE CONFIRMED BY VIRAL CULTURE OR AN FDA-CLEARED INFLUENZA A AND B MOLECULAR ASSAY. Influenza B Ag NEGATIVE          NEGATIVE FOR THE PRESENCE OF INFLUENZA B ANTIGEN  INFECTION DUE TO INFLUENZA B CANNOT BE RULED OUT. BECAUSE THE ANTIGEN PRESENT IN THE SAMPLE MAY BE BELOW  THE DETECTION LIMIT OF THE TEST. A NEGATIVE TEST IS PRESUMPTIVE AND IT IS RECOMMENDED THAT THESE RESULTS BE CONFIRMED BY VIRAL CULTURE OR AN FDA-CLEARED INFLUENZA A AND B MOLECULAR ASSAY. Current Meds:  Current Facility-Administered Medications   Medication Dose Route Frequency    ALPRAZolam (XANAX) tablet 1 mg  1 mg Oral QID PRN    diflunisal (DOLOBID) tablet 500 mg (Patient Supplied)  500 mg Oral BID WITH MEALS    pantoprazole (PROTONIX) tablet 40 mg  40 mg Oral ACB    HYDROcodone-acetaminophen (NORCO)  mg tablet 1 Tab  1 Tab Oral Q4H PRN    sodium chloride (NS) flush 5 mL  5 mL InterCATHeter Q8H    sodium chloride (NS) flush 5-10 mL  5-10 mL InterCATHeter PRN    0.9% sodium chloride infusion  125 mL/hr IntraVENous CONTINUOUS    acetaminophen (TYLENOL) tablet 650 mg  650 mg Oral Q4H PRN    cefepime (MAXIPIME) 2 g in 0.9% sodium chloride (MBP/ADV) 100 mL  2 g IntraVENous Q12H    NUTRITIONAL SUPPORT ELECTROLYTE PRN ORDERS   Does Not Apply PRN    vancomycin (VANCOCIN) 1,000 mg in 0.9% sodium chloride (MBP/ADV) 250 mL  1 g IntraVENous Q12H       Other Studies (last 24 hours):  Xr Chest Port    Result Date: 11/1/2017  CHEST X-RAY, one view. HISTORY:  Shortness of breath and fever. TECHNIQUE:  AP upright portable view. COMPARISON: None. Recent chest CT is reviewed. FINDINGS:   There are several pulmonary nodules. Lungs are otherwise clear. No airspace disease.  Costophrenic angles are sharp. Heart and pulmonary vasculature is unremarkable. IMPRESSION:  Negative for pneumonia. Pulmonary metastases demonstrated.        Assessment and Plan:     Hospital Problems as of 11/2/2017  Date Reviewed: 11/1/2017          Codes Class Noted - Resolved POA    * (Principal)Severe sepsis ([facility] ICD-10-CM: A41.9, R65.20  ICD-9-CM: 038.9, 995.92  11/1/2017 - Present Yes        Colon cancer metastasized to [facility]) ICD-10-CM: C18.9, C79.31  ICD-9-CM: 153.9, 198.3  8/30/2017 - Present Yes        Pulmonary metastases ([facility] ICD-10-CM: C78.00  ICD-9-CM: 197.0  8/17/2017 - Present Yes        Secondary malignant neoplasm of [facility]) ICD-10-CM: C79.31  ICD-9-CM: 198.3  8/17/2017 - Present               PLAN:    · Continue vancomycin/maxipime, followup micro and repeat BC tomorrow   · ID consult, has port, check ECHO  · Discussed surgery consult regarding known recto-vaginal fistula, discussed with Jo EISENBERG for Dr. Cesar Jennings who was to evaluate outpatient today and she will discuss with surgery if patient needs consult while admitted   · followup anemia, likely some dilutional component   · Replace potassium/magnesium  · followup LFTS tomorrow  · Oncology to evaluate, will need ongoing goals of care discussions    DC planning/Dispo:    DVT ppx:  SCD    Signed:  Lilian Callahan MD

## 2017-11-02 NOTE — PROGRESS NOTES
TRANSFER - IN REPORT:    Verbal report received from [facility]) on Deep Valentin  being received from ER(unit) for routine progression of care      Report consisted of patients Situation, Background, Assessment and   Recommendations(SBAR). Information from the following report(s) SBAR, Kardex, ED Summary, Procedure Summary, Intake/Output, MAR, Recent Results and Med Rec Status was reviewed with the receiving nurse. Opportunity for questions and clarification was provided. Assessment completed upon patients arrival to unit and care assumed.

## 2017-11-02 NOTE — ED NOTES
Pt was dx with metastatic colon / brain cancer. Has not had chemo for about a month due to \"low numbers\". Developed an anal fissure and was to see surgeon tomorrow. Woke from nap today with fever. All sepsis protocols in process.

## 2017-11-02 NOTE — PROGRESS NOTES
TRANSFER - OUT REPORT:    Verbal report given to [address] on Ena Horn  being transferred to [address] [place] [address] for routine progression of care       Report consisted of patients Situation, Background, Assessment and   Recommendations(SBAR). Information from the following report(s) SBAR, Kardex, Intake/Output, MAR, Med Rec Status and Cardiac Rhythm NSR was reviewed with the receiving nurse. Lines:   Venous Access Device Upper chest (subclavicular area, right (Active)   Central Line Being Utilized Yes 11/2/2017 12:54 AM   Date of Last Dressing Change 11/01/17 11/1/2017 10:25 PM   Dressing Status Clean, dry, & intact 11/2/2017 12:54 AM   Dressing Type Transparent 11/2/2017 12:54 AM   Date Accessed (Medial Site) 11/01/17 11/1/2017 10:25 PM   Access Time (Medial Site) 2158 11/1/2017 10:25 PM   Access Needle Length (Medial Site) 0.75 inches 11/2/2017 12:54 AM   Positive Blood Return (Medial Site) Yes 11/2/2017 12:54 AM        Opportunity for questions and clarification was provided.       Patient transported with:   [employer]

## 2017-11-02 NOTE — CONSULTS
Infectious Disease Consult    Today's Date: 11/2/2017   Admit Date: 11/1/2017    Impression:   · Staph aureus bacteremia (11/1) - presumed MSSA  · Metastatic colon cancer (brain / lung / left adrenal)  · Active chemotherapy with FOLFOX/bevicizumab  · Fever  · SIRS/Sepsis  · Rectovaginal fistula  · PCN allergy    Plan:   · Continue IV vancomycin and change cefepime to cefazolin. · Check peripheral stick blood cultures tomorrow morning - both admission blood cultures were from port. · I think her port will need removal assuming the admission blood culture is confirmed as MSSA. · Check TTE. She may need BENTLEY if TTE negative. · I doubt the rectovaginal fistula is related to her current presentation. Anti-infectives:     Inpat Anti-Infectives     Start     Ordered Stop    11/02/17 1200  cefepime (MAXIPIME) 2 g in 0.9% sodium chloride (MBP/ADV) 100 mL  2 g,   IntraVENous,   EVERY 12 HOURS      11/02/17 0137 --    11/02/17 1100  vancomycin (VANCOCIN) 1,000 mg in 0.9% sodium chloride (MBP/ADV) 250 mL  1 g,   IntraVENous,   EVERY 12 HOURS      11/02/17 0956 --          Subjective:   Date of Consultation:  November 2, 2017  Referring Physician: Dr. Susana Fernandez    Patient is a 64 y.o. female who is treated by Dr. Dayanna Lyn for metastatic colon cancer and is currently treated with FOLFOX and bevacizumab with her last dose being 10/20. She has been having problems with vaginal drainage for several weeks that was confirmed as a rectovaginal fistula on CT from 10/27. Yesterday she had acute worsening of mental status and fever. She was found to be febrile with elevated lactate and she was admitted to the ICU with severe sepsis. Admission blood cultures drawn from her port have Staph aureus. She is much improved on vanc/cefepime. ID is consulted for further recommendations.     Patient Active Problem List   Diagnosis Code    Pulmonary metastases ([facility] [address] C78.00    Secondary malignant neoplasm of brain ([facility] [address] C79.31    Mass of colon K63.9    Breast cancer screening Z12.31    Colon cancer metastasized to brain ([facility] [address] C18.9, C79.31    Malignant neoplasm of descending colon ([facility] [address] C18.6    Severe sepsis (HCC) A41.9, R65.20     Past Medical History:   Diagnosis Date    Arthritis     Colon cancer ([facility] [address]     Metastatic to Lungs and Brain    Gastrointestinal disorder     Reflux    GERD (gastroesophageal reflux disease)     Other ill-defined conditions(799.89)     Herniated disk  lumbar and cervical      Family History   Problem Relation Age of Onset    Stroke Mother     Hypertension Mother     Diabetes Mother     Heart Disease Father     Hypertension Father     Cancer Brother     Breast Cancer Neg Hx       Social History   Substance Use Topics    Smoking status: Former Smoker     Packs/day: 0.25     Years: 10.00     Quit date: 8/13/2017    Smokeless tobacco: Never Used    Alcohol use No     Past Surgical History:   Procedure Laterality Date    HX ADENOIDECTOMY      HX GYN      Hysterectomy-partial    HX KNEE ARTHROSCOPY Left 2014    HX LAP CHOLECYSTECTOMY      HX TONSILLECTOMY      HX VASCULAR ACCESS      LYN BIOPSY BREAST STEREOTACTIC Left 10-8-2014      Prior to Admission medications    Medication Sig Start Date End Date Taking? Authorizing Provider   albuterol (PROVENTIL HFA, VENTOLIN HFA, PROAIR HFA) 90 mcg/actuation inhaler Take 2 Puffs by inhalation every six (6) hours as needed for Wheezing. 10/20/17   Beth MEGHAN [place]   ALPRAZolam (XANAX XR) 1 mg XR tablet 1 to 2 po bid, prn 10/2/17   Historical Provider   HYDROcodone-acetaminophen (NORCO)  mg tablet Take 1 Tab by mouth every four (4) hours as needed. Max Daily Amount: 6 Tabs. 9/28/17   Jaleel Lemon MD   ondansetron (ZOFRAN ODT) 8 mg disintegrating tablet Take 1 Tab by mouth every eight (8) hours as needed for Nausea.  Indications: PREVENTION OF POST-OPERATIVE NAUSEA AND VOMITING 9/8/17   Adriana Amaya NP   lidocaine-prilocaine (EMLA) topical cream Apply  to affected area as needed for Pain. 17   Selina Cabrales NP   dexamethasone (DECADRON) 2 mg tablet Take 1 Tab by mouth three (3) times daily (after meals). Patient taking differently: Take 2 mg by mouth two (2) times daily (with meals). 8/15/17   Zain Gonzáles MD   esomeprazole (NEXIUM) 40 mg capsule Take 40 mg by mouth daily. Historical Provider   DIFLUNISAL (DOLOBID PO) Take 500 mg by mouth two (2) times a day. Johnathon Cleary MD       Allergies   Allergen Reactions    Adhesive Tape-Silicones Rash     Bad red rash.  Bactrim [Sulfamethoprim Ds] Hives     Swelling and itching      Macrobid [Nitrofurantoin Monohyd/M-Cryst] Other (comments)     Felt  \"out of it\"    Pcn [Penicillins] Hives     Swelling and itching also. Pt has tolerated cephalosporins in the past.    Pyridium [Phenazopyridine] Hives     Also itching and swelling. Review of Systems:  A comprehensive review of systems was negative except for that written in the History of Present Illness. Objective:     Visit Vitals    /49    Pulse 77    Temp 97.3 °F (36.3 °C)    Resp 14    Ht 5' 4\" (1.626 m)    Wt 71.8 kg (158 lb 3.2 oz)    SpO2 98%    BMI 27.15 kg/m2     Temp (24hrs), Av.8 °F (37.7 °C), Min:97.3 °F (36.3 °C), Max:102.8 °F (39.3 °C)       Lines:  Central Venous Catheter:  R chest port          Physical Exam:    General:  Alert, cooperative, in no distress   Eyes:  Sclera anicteric. Pupils equally round and reactive to light. Mouth/Throat: Mucous membranes normal, oral pharynx clear   Neck: Supple   Lungs:   Clear to auscultation anteriorly; nasal cannula oxygen in place;   CV:  Regular rate and rhythm,no murmur, click, rub or gallop   Abdomen:   Soft, non-tender.  bowel sounds normal. non-distended   Extremities: No cyanosis or edema   Skin: Skin color, texture, turgor normal. no acute rash or lesions   Lymph nodes: Cervical and supraclavicular normal   Musculoskeletal: No swelling or deformity   Lines/Devices:  Intact, no erythema, drainage or tenderness   Psych: Alert and oriented, normal mood affect given the setting         Data Review:     CBC:Recent Labs      11/02/17   0436  11/01/17 2220   WBC  7.1  8.7   GRANS  75  83*   MONOS  9  7   EOS  0*  0*   ANEU  5.3  7.3   ABL  1.2  0.8   HGB  9.9*  11.1*   HCT  29.6*  32.1*   PLT  89*  92*       BMP:  Recent Labs      11/02/17   0154  11/01/17 2220   CREA  0.61  0.84   BUN  15  14   NA  135*  132*   K  3.0*  3.4*   CL  101  94*   CO2  25  24   AGAP  9  14   GLU  119*  198*       LFTS:  Recent Labs      11/02/17 0154 11/01/17 2220   TBILI  1.1  1.2*   ALT  70*  80*   SGOT  54*  59*   AP  109  128   TP  5.8*  6.5   ALB  1.9*  2.2*       Microbiology:     All Micro Results     Procedure Component Value Units Date/Time    CULTURE, BLOOD [[id number]] Collected:  11/01/17 2220    Order Status:  Completed Specimen:  Blood from Blood Updated:  11/02/17 0754     Special Requests: --        NO SPECIAL REQUESTS  BLUE PORT       GRAM STAIN GRAM POSITIVE COCCI                 AEROBIC AND ANAEROBIC BOTTLES              RESULTS VERIFIED, PHONED TO AND READ BACK BY [address] ICU [id number]. SBRAZEL     Culture result:         CULTURE IN PROGRESS,FURTHER UPDATES TO FOLLOW    CULTURE, BLOOD [[id number]]  (Abnormal) Collected:  11/01/17 2220    Order Status:  Completed Specimen:  Blood from Blood Updated:  11/02/17 0708     Special Requests: --        NO SPECIAL REQUESTS  BLUE PORT       GRAM STAIN         GRAM POSITIVE COCCI AEROBIC AND ANAEROBIC BOTTLES              RESULTS VERIFIED, PHONED TO AND READ BACK BY [address] ICU [id number]. SBRAZEL     Culture result:         CULTURE IN PROGRESS,FURTHER UPDATES TO FOLLOW              MRSA target DNA sequences not detected: SA target DNA sequence detected within the sample.  Test performed by PCR  Culture/Sensitivity to follow (A)    INFLUENZA A & B AG (RAPID TEST) [[id number]] Collected:  11/01/17 7921 Order Status:  Completed Specimen:  Nasopharyngeal from Nasal washing Updated:  17 2309     Influenza A Ag NEGATIVE          NEGATIVE FOR THE PRESENCE OF INFLUENZA A ANTIGEN  INFECTION DUE TO INFLUENZA A CANNOT BE RULED OUT. BECAUSE THE ANTIGEN PRESENT IN THE SAMPLE MAY BE BELOW  THE DETECTION LIMIT OF THE TEST. A NEGATIVE TEST IS PRESUMPTIVE AND IT IS RECOMMENDED THAT THESE RESULTS BE CONFIRMED BY VIRAL CULTURE OR AN FDA-CLEARED INFLUENZA A AND B MOLECULAR ASSAY. Influenza B Ag NEGATIVE          NEGATIVE FOR THE PRESENCE OF INFLUENZA B ANTIGEN  INFECTION DUE TO INFLUENZA B CANNOT BE RULED OUT. BECAUSE THE ANTIGEN PRESENT IN THE SAMPLE MAY BE BELOW  THE DETECTION LIMIT OF THE TEST. A NEGATIVE TEST IS PRESUMPTIVE AND IT IS RECOMMENDED THAT THESE RESULTS BE CONFIRMED BY VIRAL CULTURE OR AN FDA-CLEARED INFLUENZA A AND B MOLECULAR ASSAY. Imagin/2/17 CXR: FINDINGS:   There are several pulmonary nodules. Lungs are otherwise clear. No  airspace disease. Costophrenic angles are sharp. Heart and pulmonary vasculature  is unremarkable. 10/27/17 CT c/a/p: FINDINGS:  Chest CT: Again noted are bilateral pulmonary nodules consistent with metastatic  disease. Nodules are stable to slightly smaller. There is a stable 10 mm  nodule in the anterior right upper lobe. 8 mm nodule in the posterior left  upper lobe measured 10 mm on the prior exam.  No new nodules are seen. There  are no developing infiltrates. There is no effusion. There is no significant  adenopathy. There are no bony lesions.     Abdomen CT:  There are no significant lesions in the liver or spleen. Gallbladder is absent. The left adrenal mass is stable in size, 3.2 cm. It  does show less enhancement than on the prior study. Right adrenal gland and  pancreas are unremarkable. There is normal enhancement of the kidneys. There is  no hydronephrosis. There is no adenopathy.   There are no inflammatory changes  or fluid collections in the abdomen.     Pelvis CT: Patient is post hysterectomy. While the sigmoid colon mass is mostly  resolved, there is now a rectovaginal fistula. There is a tract between the mid  sigmoid colon and vagina. There is extensive air in the vagina. The pericolic  lymph node mass is smaller, 18 x 16 mm. There are no bony lesions.     Signed By: Alonzo Dalton MD     November 2, 2017

## 2017-11-02 NOTE — PROGRESS NOTES
Pt. Made aware of the port being removed tomorrow. We are not sure if it will be done [place] or at our [place] facility. Told pt. That she will be NPO after midnight except meds.

## 2017-11-02 NOTE — ED PROVIDER NOTES
HPI Comments: Patient is a 30-year-old female with recently diagnosed metastatic colon cancer. She has not had chemotherapy a few weeks because her \"counts\" have been low according to her .  reports that he came home from work this afternoon when she was asleep on the couch. States that she slept for several hours and then awoke with a fever and confusion. He states he has had a cough for a few days. Last week she was seen in the oncology office with a rectovaginal fistula and is supposed to have follow-up with a surgeon tomorrow. Patient is a 64 y.o. female presenting with fever. The history is provided by the patient and the spouse. Fever    This is a new problem. The current episode started 6 to 12 hours ago. The maximum temperature noted was 102 - 102.9 F. The temperature was taken using an oral thermometer. Associated symptoms include sleepiness, cough and mental status change. Pertinent negatives include no chest pain, no rash and no urinary symptoms. She has tried nothing for the symptoms.         Past Medical History:   Diagnosis Date    Arthritis     Colon cancer ([facility] [address]     Metastatic to Lungs and Brain    Gastrointestinal disorder     Reflux    GERD (gastroesophageal reflux disease)     Other ill-defined conditions(799.89)     Herniated disk  lumbar and cervical       Past Surgical History:   Procedure Laterality Date    HX ADENOIDECTOMY      HX GYN      Hysterectomy-partial    HX KNEE ARTHROSCOPY Left 2014    HX LAP CHOLECYSTECTOMY      HX TONSILLECTOMY      HX VASCULAR ACCESS      LYN BIOPSY BREAST STEREOTACTIC Left 10-8-2014         Family History:   Problem Relation Age of Onset    Stroke Mother     Hypertension Mother     Diabetes Mother     Heart Disease Father     Hypertension Father     Cancer Brother     Breast Cancer Neg Hx        Social History     Social History    Marital status:      Spouse name: N/A    Number of children: N/A    Years of education: N/A     Occupational History    Not on file. Social History Main Topics    Smoking status: Former Smoker     Packs/day: 0.25     Years: 10.00     Quit date: 8/13/2017    Smokeless tobacco: Never Used    Alcohol use No    Drug use: No    Sexual activity: Yes     Birth control/ protection: Surgical     Other Topics Concern    Not on file     Social History Narrative         ALLERGIES: Adhesive tape-silicones; Bactrim [sulfamethoprim ds]; Macrobid [nitrofurantoin monohyd/m-cryst]; Pcn [penicillins]; and Pyridium [phenazopyridine]    Review of Systems   Constitutional: Positive for chills, fatigue and fever. HENT: Negative. Eyes: Negative. Respiratory: Positive for cough. Cardiovascular: Negative for chest pain. Gastrointestinal: Negative. Endocrine: Negative. Genitourinary: Negative. Musculoskeletal: Negative. Skin: Negative. Negative for rash. Neurological: Negative for seizures. Vitals:    11/01/17 2158   BP: 117/65   Pulse: (!) 132   Resp: 18   Temp: (!) 102.8 °F (39.3 °C)   SpO2: 93%   Weight: 69.9 kg (154 lb)   Height: 5' 4\" (1.626 m)            Physical Exam   Constitutional: She is oriented to person, place, and time. She appears well-developed and well-nourished. Ill appearing   HENT:   Head: Normocephalic and atraumatic. Very dry mucous membranes   Neck: Normal range of motion. Neck supple. Cardiovascular: Intact distal pulses. Tachycardia present. Pulmonary/Chest: Effort normal and breath sounds normal. No respiratory distress. Port in the right upper chest   Abdominal: Soft. There is no tenderness. Musculoskeletal: Normal range of motion. She exhibits no tenderness or deformity. Lymphadenopathy:     She has no cervical adenopathy. Neurological: She is alert and oriented to person, place, and time. No cranial nerve deficit. Skin: Skin is warm and dry. Nursing note and vitals reviewed.        MDM  Number of Diagnoses or Management Options  Diagnosis management comments: Differential diagnosis includes fever, URI, influenza, pneumonia, UTI, bacteremia, sepsis, neutropenic fever    EKG shows sinus tachycardia at a rate of 117       Amount and/or Complexity of Data Reviewed  Clinical lab tests: ordered and reviewed  Tests in the radiology section of CPT®: ordered and reviewed  Review and summarize past medical records: yes  Independent visualization of images, tracings, or specimens: yes    Risk of Complications, Morbidity, and/or Mortality  Presenting problems: high  Diagnostic procedures: moderate  Management options: moderate    Critical Care  Total time providing critical care: 30-74 minutes    Patient Progress  Patient progress: improved    ED Course   11:10 PM  Chest x-ray is negative, heart rate is down to 110 after the IV fluids. Broad-spectrum antibiotics have been started. With the height of her fever I feel she needs admission for IV antibiotics even though we have not identified a source yet. Urinalysis and flu are pending. I discussed the case with the hospitalist and they will arrange for admission and further management.    ===================================================================  This patient is critically ill and there is a high probability of of imminent or life threatening deterioration in the patient's condition without immediate management. The nature of the patient's clinical problem is: fever and possible sepsis    I have spent 40 minutes in direct patient care, documentation, review of labs/xrays/old records, discussion with patient, , and Hospitalist    The time involved in the performance of separately reportable procedures was not counted toward critical care time. Rosalba Carrera MD; 11/1/2017 @11:10 PM  ===================================================================    Voice dictation software was used during the making of this note.   This software is not perfect and grammatical and other typographical errors may be present. This note has been proofread, but may still contain errors.   Bianca Wing MD; 11/1/2017 @11:11 PM   ===================================================================            Procedures

## 2017-11-02 NOTE — PROGRESS NOTES
Spiritual Care visit. Follow up visit. No response;  left a card, and prayed for patient.     Visit by Rodney Lew M.Ed., Th.B. ,Staff

## 2017-11-03 NOTE — PROGRESS NOTES
Alert and oriented x2 periods of confusion.  at bedside. Pt is very anxious. Skin warm and dry. Verbalizes needs well. Up with assist to bedside commode. Waiting on time and place for removal of port today. Has been NPO since MN.  upset because a surgeon came by briefly said another surgeon would see them at another time then left.

## 2017-11-03 NOTE — PROGRESS NOTES
Shift assessment complete. Patient is alert and oriented times 3 with periods of forgetfulness. Respirations even and regular. Lung sounds clear. Abdomen soft and tender. Bowel sounds active. Denies pain at this time. Spouse at bedside. Bed locked,low and call light within reach. Will monitor.

## 2017-11-03 NOTE — PROGRESS NOTES
Pharmacokinetic Consult to Pharmacist    Jamir Mcarthur is a 64 y.o. female being treated for sepsis of unknown etiology with cefazolin and vancomycin. She is being followed by ID    Height: 5' 4\" (162.6 cm)  Weight: 71.8 kg (158 lb 3.2 oz)  Lab Results   Component Value Date/Time    BUN 5 11/03/2017 06:52 AM    Creatinine 0.54 11/03/2017 06:52 AM    WBC 6.2 11/03/2017 06:52 AM    Procalcitonin 8.5 11/02/2017 01:54 AM    Lactic acid <0.4 11/02/2017 01:59 AM    Lactic Acid (POC) 2.2 11/01/2017 10:31 PM      Estimated Creatinine Clearance: 112.9 mL/min (based on Cr of 0.54). CULTURES:  11/1 BC x 2 : STAPHYLOCOCCUS AUREUS  11/2 BC x 2 : In Process    Lab Results   Component Value Date/Time    Vancomycin,trough 5.8 11/03/2017 10:20 AM       Day 3 of vancomycin. Goal trough is 15-20. We will increase the dose to Vancomycin 1500mg q8h . We will continue to follow the patient and adjust the dose as necessary per guidelines.     Thank you,  Marita Parra, PharmD

## 2017-11-03 NOTE — CONSULTS
Inpatient Hematology / Oncology Consult Note    Reason for Consult:  Severe sepsis [facility])  Referring Physician:  Lei Jessica MD    History of Present Illness:  Ms. Patsy Faria is a 64 y.o. female admitted on 11/1/2017 with a primary diagnosis of The encounter diagnosis was Sepsis, due to unspecified organism ([address] .      She is well known to our service for metastatic colon cancer, now admitted for MSSA sepsis. Review of Systems:  Constitutional c/o fatigue. HEENT Denies trauma, blurry vision, hearing loss, ear pain, nosebleeds, sore throat, neck pain and ear discharge. Skin Denies lesions or rashes. Lungs Denies dyspnea, cough, sputum production or hemoptysis. Cardiovascular Denies chest pain, palpitations, or lower extremity edema. Gastrointestinal Denies nausea, vomiting, changes in bowel habits, bloody or black stools, abdominal pain.  Denies dysuria, frequency or hesitancy of urination. Neuro c/o dizziness. Hematology Denies easy bruising or bleeding, denies gingival bleeding or epistaxis. Endo Denies heat/cold intolerance, denies diabetes or thyroid abnormalities. MSK Denies back pain, arthralgias, myalgias or frequent falls. Psychiatric/Behavioral Denies depression and substance abuse. The patient is not nervous/anxious. Allergies   Allergen Reactions    Adhesive Tape-Silicones Rash     Bad red rash.  Bactrim [Sulfamethoprim Ds] Hives     Swelling and itching      Macrobid [Nitrofurantoin Monohyd/M-Cryst] Other (comments)     Felt  \"out of it\"    Pcn [Penicillins] Hives     Swelling and itching also. Pt has tolerated cephalosporins in the past.    Pyridium [Phenazopyridine] Hives     Also itching and swelling.      Past Medical History:   Diagnosis Date    Arthritis     Colon cancer ([address] [address]     Metastatic to Lungs and Brain    Gastrointestinal disorder     Reflux    GERD (gastroesophageal reflux disease)     Other ill-defined conditions(799.89)     Herniated disk  lumbar and cervical     Past Surgical History:   Procedure Laterality Date    HX ADENOIDECTOMY      HX GYN      Hysterectomy-partial    HX KNEE ARTHROSCOPY Left 2014    HX LAP CHOLECYSTECTOMY      HX TONSILLECTOMY      HX VASCULAR ACCESS      LYN BIOPSY BREAST STEREOTACTIC Left 10-8-2014     Family History   Problem Relation Age of Onset    Stroke Mother     Hypertension Mother     Diabetes Mother     Heart Disease Father     Hypertension Father     Cancer Brother     Breast Cancer Neg Hx      Social History     Social History    Marital status:      Spouse name: N/A    Number of children: N/A    Years of education: N/A     Occupational History    Not on file.      Social History Main Topics    Smoking status: Former Smoker     Packs/day: 0.25     Years: 10.00     Quit date: 8/13/2017    Smokeless tobacco: Never Used    Alcohol use No    Drug use: No    Sexual activity: Yes     Birth control/ protection: Surgical     Other Topics Concern    Not on file     Social History Narrative     Current Facility-Administered Medications   Medication Dose Route Frequency Provider Last Rate Last Dose    ALPRAZolam (XANAX) tablet 1 mg  1 mg Oral QID PRN Jordan Rajput MD   1 mg at 11/02/17 1505    diflunisal (DOLOBID) tablet 500 mg (Patient Supplied)  500 mg Oral BID WITH MEALS Jordan Rajput MD   Stopped at 11/02/17 0800    pantoprazole (PROTONIX) tablet 40 mg  40 mg Oral ACB Jordan Rajput MD   40 mg at 11/02/17 0752    HYDROcodone-acetaminophen (NORCO)  mg tablet 1 Tab  1 Tab Oral Q4H PRN Jordan Rajput MD   1 Tab at 11/02/17 2206    sodium chloride (NS) flush 5 mL  5 mL InterCATHeter Fausto Murguia MD   Stopped at 11/02/17 1400    sodium chloride (NS) flush 5-10 mL  5-10 mL InterCATHeter PRN Jordan Rajput MD        0.9% sodium chloride infusion  125 mL/hr IntraVENous CONTINUOUS Jordan Rajput  mL/hr at 17 0936 125 mL/hr at 17 0936    acetaminophen (TYLENOL) tablet 650 mg  650 mg Oral Q4H PRN Dat Browne MD        NUTRITIONAL SUPPORT ELECTROLYTE PRN ORDERS   Does Not Apply PRN Dat Browne MD        vancomycin [facility]) 1,000 mg in 0.9% sodium chloride (MBP/ADV) 250 mL  1 g IntraVENous Q12H Dat Browne  mL/hr at 17 1112 1,000 mg at 17 1112    ceFAZolin in 0.9% NS (ANCEF) IVPB soln 2 g  2 g IntraVENous Q8H Esmond Jeans, MD           OBJECTIVE:  Patient Vitals for the past 8 hrs:   BP Temp Pulse Resp SpO2   17 115/75 98.3 °F (36.8 °C) 89 18 98 %   17 1832 107/57 - 84 18 96 %   17 1732 92/52 - 78 19 97 %   17 1632 101/51 98.8 °F (37.1 °C) 83 17 96 %   17 1532 107/57 - 83 (!) 37 97 %     Temp (24hrs), Av.9 °F (37.2 °C), Min:97.3 °F (36.3 °C), Max:99.9 °F (37.7 °C)     1901 -  0700  In: -   Out: 300 [Urine:300]    Physical Exam:  Constitutional: Ill looking female  sitting in the hospital bed. HEENT: Normocephalic and atraumatic. Oropharynx is clear, mucous membranes are moist.  Pupils are equal, round, and reactive to light. Extraocular muscles are intact. Sclerae anicteric. Neck supple without JVD. No thyromegaly present. Lymph node   No palpable submandibular, cervical, supraclavicular, axillary or inguinal lymph nodes. Skin Warm and dry. No bruising and no rash noted. No erythema. No pallor. Respiratory Lungs are clear to auscultation bilaterally without wheezes, rales or rhonchi, normal air exchange without accessory muscle use. CVS Normal rate, regular rhythm and normal S1 and S2. No murmurs, gallops, or rubs. Abdomen Soft, nontender and nondistended, normoactive bowel sounds. No palpable mass. No hepatosplenomegaly. Neuro Grossly nonfocal with no obvious sensory or motor deficits. MSK Normal range of motion in general.  No edema and no tenderness.    Psych Appropriate mood and affect.         Labs:    Recent Results (from the past 24 hour(s))   POC LACTIC ACID    Collection Time: 11/01/17 10:31 PM   Result Value Ref Range    Lactic Acid (POC) 2.2 (H) 0.5 - 1.9 mmol/L   URINALYSIS W/ RFLX MICROSCOPIC    Collection Time: 11/01/17 10:45 PM   Result Value Ref Range    Color ORANGE      Appearance TURBID      Specific gravity 1.029 (H) 1.001 - 1.023      pH (UA) 5.5 5.0 - 9.0      Protein GREATER THAN/EQUAL  (A) NEG mg/dL    Glucose 100 (A) NEG mg/dL    Ketone TRACE (A) NEG mg/dL    Bilirubin SMALL (A) NEG      Blood LARGE (A) NEG      Urobilinogen 1.0 0.2 - 1.0 EU/dL    Nitrites NEGATIVE  NEG      Leukocyte Esterase SMALL (A) NEG      WBC 0-3 0 /hpf    RBC 0-3 0 /hpf    Epithelial cells 0-3 0 /hpf    Bacteria 4+ (H) 0 /hpf    Casts 3-5 0 /lpf    Mucus 1+ (H) 0 /lpf   INFLUENZA A & B AG (RAPID TEST)    Collection Time: 11/01/17 10:45 PM   Result Value Ref Range    Influenza A Ag NEGATIVE  NEG      Influenza B Ag NEGATIVE  NEG     EKG, 12 LEAD, INITIAL    Collection Time: 11/01/17 10:52 PM   Result Value Ref Range    Ventricular Rate 117 BPM    Atrial Rate 117 BPM    P-R Interval 118 ms    QRS Duration 72 ms    Q-T Interval 288 ms    QTC Calculation (Bezet) 401 ms    Calculated P Axis 47 degrees    Calculated R Axis 58 degrees    Calculated T Axis 41 degrees    Diagnosis       Sinus tachycardia  Low voltage QRS  Borderline ECG  When compared with ECG of 15-AUG-2017 16:53,  No significant change was found  Confirmed by JASBIR MOMIN (), NEDA TILLEY ([id number]) on 11/2/2017 1:47:04 AM     METABOLIC PANEL, COMPREHENSIVE    Collection Time: 11/02/17  1:54 AM   Result Value Ref Range    Sodium 135 (L) 136 - 145 mmol/L    Potassium 3.0 (L) 3.5 - 5.1 mmol/L    Chloride 101 98 - 107 mmol/L    CO2 25 21 - 32 mmol/L    Anion gap 9 7 - 16 mmol/L    Glucose 119 (H) 65 - 100 mg/dL    BUN 15 6 - 23 MG/DL    Creatinine 0.61 0.6 - 1.0 MG/DL    GFR est AA >60 >60 ml/min/1.73m2    GFR est non-AA >60 >60 ml/min/1.73m2    Calcium 7.6 (L) 8.3 - 10.4 MG/DL    Bilirubin, total 1.1 0.2 - 1.1 MG/DL    ALT (SGPT) 70 (H) 12 - 65 U/L    AST (SGOT) 54 (H) 15 - 37 U/L    Alk. phosphatase 109 50 - 136 U/L    Protein, total 5.8 (L) 6.3 - 8.2 g/dL    Albumin 1.9 (L) 3.5 - 5.0 g/dL    Globulin 3.9 (H) 2.3 - 3.5 g/dL    A-G Ratio 0.5 (L) 1.2 - 3.5     LIPASE    Collection Time: 11/02/17  1:54 AM   Result Value Ref Range    Lipase 58 (L) 73 - 393 U/L   MAGNESIUM    Collection Time: 11/02/17  1:54 AM   Result Value Ref Range    Magnesium 1.8 1.8 - 2.4 mg/dL   PROCALCITONIN    Collection Time: 11/02/17  1:54 AM   Result Value Ref Range    Procalcitonin 8.5 ng/mL   LACTIC ACID    Collection Time: 11/02/17  1:59 AM   Result Value Ref Range    Lactic acid <0.4 (L) 0.4 - 2.0 MMOL/L   CBC WITH AUTOMATED DIFF    Collection Time: 11/02/17  4:36 AM   Result Value Ref Range    WBC 7.1 4.3 - 11.1 K/uL    RBC 3.12 (L) 4.05 - 5.25 M/uL    HGB 9.9 (L) 11.7 - 15.4 g/dL    HCT 29.6 (L) 35.8 - 46.3 %    MCV 94.9 79.6 - 97.8 FL    MCH 31.7 26.1 - 32.9 PG    MCHC 33.4 31.4 - 35.0 g/dL    RDW 17.9 (H) 11.9 - 14.6 %    PLATELET 89 (L) 263 - 450 K/uL    MPV 10.5 (L) 10.8 - 14.1 FL    DF AUTOMATED      NEUTROPHILS 75 43 - 78 %    LYMPHOCYTES 16 13 - 44 %    MONOCYTES 9 4.0 - 12.0 %    EOSINOPHILS 0 (L) 0.5 - 7.8 %    BASOPHILS 0 0.0 - 2.0 %    IMMATURE GRANULOCYTES 0 0.0 - 5.0 %    ABS. NEUTROPHILS 5.3 1.7 - 8.2 K/UL    ABS. LYMPHOCYTES 1.2 0.5 - 4.6 K/UL    ABS. MONOCYTES 0.6 0.1 - 1.3 K/UL    ABS. EOSINOPHILS 0.0 0.0 - 0.8 K/UL    ABS. BASOPHILS 0.0 0.0 - 0.2 K/UL    ABS. IMM.  GRANS. 0.0 0.0 - 0.5 K/UL       Imaging:        ASSESSMENT:  Problem List  Date Reviewed: 11/1/2017          Codes Class Noted    * (Principal)Severe sepsis [facility]) ICD-10-CM: A41.9, R65.20  ICD-9-CM: 038.9, 995.92  11/1/2017        Colon cancer metastasized to brain [facility]) ICD-10-CM: C18.9, C79.31  ICD-9-CM: 153.9, 198.3  8/30/2017        Malignant neoplasm of descending colon [facility]) ICD-10-CM: C18.6  ICD-9-CM: 153.2  8/30/2017        Pulmonary metastases ([address] ICD-10-CM: C78.00  ICD-9-CM: 197.0  8/17/2017        Secondary malignant neoplasm of brain [facility]) ICD-10-CM: C79.31  ICD-9-CM: 198.3  8/17/2017        Mass of colon ICD-10-CM: K63.9  ICD-9-CM: 569.89  8/17/2017        Breast cancer screening ICD-10-CM: Z12.31  ICD-9-CM: V76.10  8/17/2017                RECOMMENDATIONS:  · She needs a BENTLEY  · Continue ABX and follow-up repeat blood cultures  · Mediport may need to be removed. Thank you for allowing us to participate in the care of Ms. Sesay.               Francesco Sutherland MD  [address] [place] [address]  Office : [phone]  Fax : [phone]

## 2017-11-03 NOTE — PROGRESS NOTES
Procedure Note    Date of Procedure: 11/3/2017      Indications: This is a 64 y.o. female who presents with a a lifeport that needs to be removed  I have discussed the risks, benefits and alternatives of surgery. Pt understands and wishes to proceed. Consent was given. Procedure in Detail:   After patient was anesthetized locally with 1% Lidocaine. The mass was prepped and draped in a sterile fashion. Incision was made directly over the port. This was then completely dissected free in its entirety. Hemostasis was assured. Monocryl in a subcuticular fashion were used to close the incision. The patient tolerated the procedure well and left the office in good condition. Pt will follow up on an as needed basis.     Estimated Blood Loss:   2cc           Signed By: Jonelle Unger MD     54/7/2426

## 2017-11-03 NOTE — PROGRESS NOTES
Hospitalist Progress Note     Admit Date:  2017 10:01 PM   Name:  Bg Nick   Age:  64 y.o.  :  1960   MRN:  [id number]   PCP:  Timo House MD  Treatment Team: Attending Provider: Lilian Callahan MD; Consulting Provider: Karlos Deluca MD; Utilization Review: Martina Kerr RN; Consulting Provider: Eva Espino MD; Consulting Provider: Emily Schwab MD    Subjective:       Ms. Nik Minaya is a 65 yo female with PMH of metastatic colon cancer followed by Dr. 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 BC 2/2 MSSA from port, UA mildly dirty, CXR shows known mets. Day 2 vancomycin/ancef. TTE no vegetation/ EF 40-45%, seen by ID, needs BENTLEY. Seen by surgery and port removed at bedside, no intervention needed for fistula at present. Plans for home at discharge with home antibiotic infusions pending course. 11-3-17  at bedside, NPO for port removal, groggy         Objective:     Patient Vitals for the past 24 hrs:   Temp Pulse Resp BP SpO2   17 1130 99.1 °F (37.3 °C) 88 18 113/64 95 %   17 0805 97.9 °F (36.6 °C) 76 18 100/69 93 %   17 0524 98.7 °F (37.1 °C) 90 18 101/63 95 %   17 0052 (!) 101.4 °F (38.6 °C) (!) 103 16 132/82 94 %   17 98.3 °F (36.8 °C) 89 18 115/75 98 %   17 1832 - 84 18 107/57 96 %   17 1732 - 78 19 92/52 97 %   17 1632 98.8 °F (37.1 °C) 83 17 101/51 96 %   17 1532 - 83 (!) 37 107/57 97 %     Oxygen Therapy  O2 Sat (%): 95 % (17 1130)  Pulse via Oximetry: 85 beats per minute (17 1832)  O2 Device: Nasal cannula (17 163)  O2 Flow Rate (L/min): 2 l/min (17 163)    Intake/Output Summary (Last 24 hours) at 17 1438  Last data filed at 17 0549   Gross per 24 hour   Intake             1045 ml   Output              700 ml   Net              345 ml         General:    Well nourished. Groggy . CV:   RRR.   No murmur, rub, or gallop. Lungs:   CTAB. No wheezing, rhonchi, or rales. Abdomen:   Soft, nontender, nondistended. Normal BS  Extremities: Warm and dry. No cyanosis or edema. Skin:     No rashes or jaundice. Data Review:  I have reviewed all labs, meds, telemetry events, and studies from the last 24 hours. Recent Results (from the past 24 hour(s))   CBC WITH AUTOMATED DIFF    Collection Time: 11/03/17  6:52 AM   Result Value Ref Range    WBC 6.2 4.3 - 11.1 K/uL    RBC 2.70 (L) 4.05 - 5.25 M/uL    HGB 8.8 (L) 11.7 - 15.4 g/dL    HCT 26.0 (L) 35.8 - 46.3 %    MCV 96.3 79.6 - 97.8 FL    MCH 32.6 26.1 - 32.9 PG    MCHC 33.8 31.4 - 35.0 g/dL    RDW 18.5 (H) 11.9 - 14.6 %    PLATELET 182 (L) 976 - 450 K/uL    MPV 11.2 10.8 - 14.1 FL    DF AUTOMATED      NEUTROPHILS 71 43 - 78 %    LYMPHOCYTES 19 13 - 44 %    MONOCYTES 9 4.0 - 12.0 %    EOSINOPHILS 0 (L) 0.5 - 7.8 %    BASOPHILS 0 0.0 - 2.0 %    IMMATURE GRANULOCYTES 1 0.0 - 5.0 %    ABS. NEUTROPHILS 4.4 1.7 - 8.2 K/UL    ABS. LYMPHOCYTES 1.2 0.5 - 4.6 K/UL    ABS. MONOCYTES 0.6 0.1 - 1.3 K/UL    ABS. EOSINOPHILS 0.0 0.0 - 0.8 K/UL    ABS. BASOPHILS 0.0 0.0 - 0.2 K/UL    ABS. IMM. GRANS. 0.1 0.0 - 0.5 K/UL   METABOLIC PANEL, COMPREHENSIVE    Collection Time: 11/03/17  6:52 AM   Result Value Ref Range    Sodium 139 136 - 145 mmol/L    Potassium 3.5 3.5 - 5.1 mmol/L    Chloride 106 98 - 107 mmol/L    CO2 22 21 - 32 mmol/L    Anion gap 11 7 - 16 mmol/L    Glucose 85 65 - 100 mg/dL    BUN 5 (L) 6 - 23 MG/DL    Creatinine 0.54 (L) 0.6 - 1.0 MG/DL    GFR est AA >60 >60 ml/min/1.73m2    GFR est non-AA >60 >60 ml/min/1.73m2    Calcium 7.5 (L) 8.3 - 10.4 MG/DL    Bilirubin, total 0.8 0.2 - 1.1 MG/DL    ALT (SGPT) 51 12 - 65 U/L    AST (SGOT) 36 15 - 37 U/L    Alk.  phosphatase 95 50 - 136 U/L    Protein, total 5.1 (L) 6.3 - 8.2 g/dL    Albumin 1.7 (L) 3.5 - 5.0 g/dL    Globulin 3.4 2.3 - 3.5 g/dL    A-G Ratio 0.5 (L) 1.2 - 3.5     VANCOMYCIN, TROUGH    Collection Time: 11/03/17 10:20 AM   Result Value Ref Range    Vancomycin,trough 5.8 5 - 20 ug/mL        All Micro Results     Procedure Component Value Units Date/Time    CULTURE, BLOOD [[id number]]  (Abnormal) Collected:  11/01/17 2220    Order Status:  Completed Specimen:  Blood from Blood Updated:  11/03/17 0819     Special Requests: --        NO SPECIAL REQUESTS  BLUE PORT       GRAM STAIN GRAM POSITIVE COCCI                 AEROBIC AND ANAEROBIC BOTTLES              RESULTS VERIFIED, PHONED TO AND READ BACK BY [address] [id number]. SBRAZEL     Culture result: STAPHYLOCOCCUS AUREUS (A)                 CULTURE IN PROGRESS,FURTHER UPDATES TO FOLLOW    CULTURE, BLOOD [[id number]]  (Abnormal) Collected:  11/01/17 2220    Order Status:  Completed Specimen:  Blood from Blood Updated:  11/03/17 0815     Special Requests: --        NO SPECIAL REQUESTS  BLUE PORT       GRAM STAIN         GRAM POSITIVE COCCI AEROBIC AND ANAEROBIC BOTTLES              RESULTS VERIFIED, PHONED TO AND READ BACK BY [address] ICU 0330 [id number]. SBRAZEL     Culture result:         STAPHYLOCOCCUS AUREUS SENSITIVITY TO FOLLOW (A)              MRSA target DNA sequences not detected: SA target DNA sequence detected within the sample. Test performed by PCR  Culture/Sensitivity to follow (A)    CULTURE, BLOOD [[id number]] Collected:  11/03/17 0656    Order Status:  Completed Specimen:  Blood from Blood Updated:  11/03/17 0710    CULTURE, BLOOD [[id number]] Collected:  11/03/17 0652    Order Status:  Completed Specimen:  Blood from Blood Updated:  11/03/17 0709    INFLUENZA A & B AG (RAPID TEST) [[id number]] Collected:  11/01/17 2245    Order Status:  Completed Specimen:  Nasopharyngeal from Nasal washing Updated:  11/01/17 2309     Influenza A Ag NEGATIVE          NEGATIVE FOR THE PRESENCE OF INFLUENZA A ANTIGEN  INFECTION DUE TO INFLUENZA A CANNOT BE RULED OUT. BECAUSE THE ANTIGEN PRESENT IN THE SAMPLE MAY BE BELOW  THE DETECTION LIMIT OF THE TEST.   A NEGATIVE TEST IS PRESUMPTIVE AND IT IS RECOMMENDED THAT THESE RESULTS BE CONFIRMED BY VIRAL CULTURE OR AN FDA-CLEARED INFLUENZA A AND B MOLECULAR ASSAY. Influenza B Ag NEGATIVE          NEGATIVE FOR THE PRESENCE OF INFLUENZA B ANTIGEN  INFECTION DUE TO INFLUENZA B CANNOT BE RULED OUT. BECAUSE THE ANTIGEN PRESENT IN THE SAMPLE MAY BE BELOW  THE DETECTION LIMIT OF THE TEST. A NEGATIVE TEST IS PRESUMPTIVE AND IT IS RECOMMENDED THAT THESE RESULTS BE CONFIRMED BY VIRAL CULTURE OR AN FDA-CLEARED INFLUENZA A AND B MOLECULAR ASSAY. Current Meds:  Current Facility-Administered Medications   Medication Dose Route Frequency    vancomycin (VANCOCIN) 1500 mg in  ml infusion  1,500 mg IntraVENous Q8H    ALPRAZolam (XANAX) tablet 1 mg  1 mg Oral QID PRN    diflunisal (DOLOBID) tablet 500 mg (Patient Supplied)  500 mg Oral BID WITH MEALS    pantoprazole (PROTONIX) tablet 40 mg  40 mg Oral ACB    HYDROcodone-acetaminophen (NORCO)  mg tablet 1 Tab  1 Tab Oral Q4H PRN    sodium chloride (NS) flush 5 mL  5 mL InterCATHeter Q8H    sodium chloride (NS) flush 5-10 mL  5-10 mL InterCATHeter PRN    0.9% sodium chloride infusion  125 mL/hr IntraVENous CONTINUOUS    acetaminophen (TYLENOL) tablet 650 mg  650 mg Oral Q4H PRN    NUTRITIONAL SUPPORT ELECTROLYTE PRN ORDERS   Does Not Apply PRN    ceFAZolin in 0.9% NS (ANCEF) IVPB soln 2 g  2 g IntraVENous Q8H       Other Studies (last 24 hours):  No results found.     Assessment and Plan:     Hospital Problems as of 11/3/2017  Date Reviewed: 11/1/2017          Codes Class Noted - Resolved POA    * (Principal)Severe sepsis [facility]) ICD-10-CM: A41.9, R65.20  ICD-9-CM: 038.9, 995.92  11/1/2017 - Present Yes        Colon cancer metastasized to brain [facility]) ICD-10-CM: C18.9, C79.31  ICD-9-CM: 153.9, 198.3  8/30/2017 - Present Yes        Pulmonary metastases ([facility]) ICD-10-CM: C78.00  ICD-9-CM: 197.0  8/17/2017 - Present Yes        Secondary malignant neoplasm of brain [facility]) ICD-10-CM: C79.31  ICD-9-CM: 198.3  8/17/2017 - Present               PLAN:    · Continue vancomycin/ancef, followup micro and repeat peripheral BC once port removed  · Appreciate ID consult  · Consult cardio for BENTLEY  · followup anemia, likely some dilutional component with known colonic malignancy   · Oncology has evaluated, will need ongoing goals of care discussions    DC planning/Dispo:    DVT ppx:  SCD    Signed:  Jen Troy MD

## 2017-11-03 NOTE — PROGRESS NOTES
Infectious Disease Progress Note    Today's Date: 11/3/2017   Admit Date: 2017    Impression:   · Staph aureus bacteremia () - presumed MSSA  · Metastatic colon cancer (brain / lung / left adrenal)  · Active chemotherapy with FOLFOX/bevicizumab  · Fever  · SIRS/Sepsis  · Rectovaginal fistula  · PCN allergy    Plan:   · Plan for port removal today. · Check repeat blood cultures tomorrow now that port removed. · Recommend cardiology consult for BENTLEY on Monday. · Continue vanc/cefazolin. Stop vanc this weekend if MSSA. Anti-infectives:   IV vancomycin  IV cefazolin    Subjective:   Date of Consultation:  November 3, 2017  Referring Physician: Dr. Irvin Espinoza; awaiting port removal;    Allergies   Allergen Reactions    Adhesive Tape-Silicones Rash     Bad red rash.  Bactrim [Sulfamethoprim Ds] Hives     Swelling and itching      Macrobid [Nitrofurantoin Monohyd/M-Cryst] Other (comments)     Felt  \"out of it\"    Pcn [Penicillins] Hives     Swelling and itching also. Pt has tolerated cephalosporins in the past.    Pyridium [Phenazopyridine] Hives     Also itching and swelling. Review of Systems:  Pertinent items are noted in the History of Present Illness. Objective:     Visit Vitals    /69 (BP 1 Location: Left arm, BP Patient Position: At rest)    Pulse 76    Temp 97.9 °F (36.6 °C)    Resp 18    Ht 5' 4\" (1.626 m)    Wt 71.8 kg (158 lb 3.2 oz)    SpO2 93%    BMI 27.15 kg/m2     Temp (24hrs), Av °F (37.2 °C), Min:97.9 °F (36.6 °C), Max:101.4 °F (38.6 °C)       Lines:  Central Venous Catheter:  R chest port          Physical Exam:    General:  Alert, cooperative, in no distress   Eyes:  Sclera anicteric.    Mouth/Throat: oral pharynx clear   Neck: Supple   Lungs:   Clear to auscultation anteriorly   CV:  Regular rate and rhythm,no murmur, click, rub or gallop   Abdomen:   non-distended   Extremities: No cyanosis or edema   Skin: no acute rash or lesions Lymph nodes:    Musculoskeletal: No swelling or deformity   Lines/Devices:  Intact, no erythema, drainage or tenderness   Psych: Alert and oriented, normal mood affect given the setting         Data Review:     CBC:  Recent Labs      11/03/17   0652  11/02/17   0436  11/01/17 2220   WBC  6.2  7.1  8.7   GRANS  71  75  83*   MONOS  9  9  7   EOS  0*  0*  0*   ANEU  4.4  5.3  7.3   ABL  1.2  1.2  0.8   HGB  8.8*  9.9*  11.1*   HCT  26.0*  29.6*  32.1*   PLT  109*  89*  92*       BMP:  Recent Labs      11/03/17   0652  11/02/17   0154  11/01/17 2220   CREA  0.54*  0.61  0.84   BUN  5*  15  14   NA  139  135*  132*   K  3.5  3.0*  3.4*   CL  106  101  94*   CO2  22  25  24   AGAP  11  9  14   GLU  85  119*  198*       LFTS:  Recent Labs      11/03/17 0652  11/02/17   0154  11/01/17 2220   TBILI  0.8  1.1  1.2*   ALT  51  70*  80*   SGOT  36  54*  59*   AP  95  109  128   TP  5.1*  5.8*  6.5   ALB  1.7*  1.9*  2.2*       Microbiology:     All Micro Results     Procedure Component Value Units Date/Time    CULTURE, BLOOD [[id number]]  (Abnormal) Collected:  11/01/17 2220    Order Status:  Completed Specimen:  Blood from Blood Updated:  11/03/17 0819     Special Requests: --        NO SPECIAL REQUESTS  BLUE PORT       GRAM STAIN GRAM POSITIVE COCCI                 AEROBIC AND ANAEROBIC BOTTLES              RESULTS VERIFIED, PHONED TO AND READ BACK BY [address] ICU [id number]. SBRAZEL     Culture result: STAPHYLOCOCCUS AUREUS (A)                 CULTURE IN PROGRESS,FURTHER UPDATES TO FOLLOW    CULTURE, BLOOD [[id number]]  (Abnormal) Collected:  11/01/17 2220    Order Status:  Completed Specimen:  Blood from Blood Updated:  11/03/17 0815     Special Requests: --        NO SPECIAL REQUESTS  BLUE PORT       GRAM STAIN         GRAM POSITIVE COCCI AEROBIC AND ANAEROBIC BOTTLES              RESULTS VERIFIED, PHONED TO AND READ BACK BY [address] ICU 0330 [id number]. AMAURY     Culture result:         STAPHYLOCOCCUS AUREUS SENSITIVITY TO FOLLOW (A)              MRSA target DNA sequences not detected: SA target DNA sequence detected within the sample. Test performed by PCR  Culture/Sensitivity to follow (A)    CULTURE, BLOOD [[id number]] Collected:  17 0656    Order Status:  Completed Specimen:  Blood from Blood Updated:  17 0710    CULTURE, BLOOD [[id number]] Collected:  17 0652    Order Status:  Completed Specimen:  Blood from Blood Updated:  17 0709    INFLUENZA A & B AG (RAPID TEST) [[id number]] Collected:  17 2245    Order Status:  Completed Specimen:  Nasopharyngeal from Nasal washing Updated:  17 2309     Influenza A Ag NEGATIVE          NEGATIVE FOR THE PRESENCE OF INFLUENZA A ANTIGEN  INFECTION DUE TO INFLUENZA A CANNOT BE RULED OUT. BECAUSE THE ANTIGEN PRESENT IN THE SAMPLE MAY BE BELOW  THE DETECTION LIMIT OF THE TEST. A NEGATIVE TEST IS PRESUMPTIVE AND IT IS RECOMMENDED THAT THESE RESULTS BE CONFIRMED BY VIRAL CULTURE OR AN FDA-CLEARED INFLUENZA A AND B MOLECULAR ASSAY. Influenza B Ag NEGATIVE          NEGATIVE FOR THE PRESENCE OF INFLUENZA B ANTIGEN  INFECTION DUE TO INFLUENZA B CANNOT BE RULED OUT. BECAUSE THE ANTIGEN PRESENT IN THE SAMPLE MAY BE BELOW  THE DETECTION LIMIT OF THE TEST. A NEGATIVE TEST IS PRESUMPTIVE AND IT IS RECOMMENDED THAT THESE RESULTS BE CONFIRMED BY VIRAL CULTURE OR AN FDA-CLEARED INFLUENZA A AND B MOLECULAR ASSAY. Imagin/2/17 TTE: SUMMARY:    -  Left ventricle: Systolic function was mildly reduced. Ejection fraction   was  estimated in the range of 40 % to 45 %. There was hypokinesis of the   basal-mid  anteroseptal wall(s). -  Aortic valve: There was no evidence for vegetation. -  Mitral valve: There was no evidence for vegetation.     Signed By: Christina Garner MD     November 3, 2017

## 2017-11-04 NOTE — PROGRESS NOTES
Chart reviewed. MSSA on BC's. I agree with danni taylor. Continue ancef. Await repeat BC results.     Will f/u Monday or sooner if called

## 2017-11-04 NOTE — PROGRESS NOTES
Am assessment completed. Pt is alert and oriented x 2, lungs diminished. Verbalizes needs well. On regular diet currently NPO after midnight Sunday for BENTLEY DTSF.  at bedside. Continue to monitor.

## 2017-11-04 NOTE — PROGRESS NOTES
Shift assessment complete. Patient alert to person with confusion. Respirations even and regular. Lung sounds clear. Bowel sounds active. Abdomen soft and non-tender. Denies pain at this time. IV fluids infusing well and without difficulty. Spouse at the bedside. Bed low, locked, and call light within reach.

## 2017-11-04 NOTE — PROGRESS NOTES
Hospitalist Progress Note     Admit Date:  2017 10:01 PM   Name:  Melissa Tobin   Age:  64 y.o.  :  1960   MRN:  [id number]   PCP:  Bryan Lindo MD  Treatment Team: Attending Provider: Lennox Aye, MD; Consulting Provider: Melonie Jacques MD; Utilization Review: Diana Sutton RN; Consulting Provider: Kasia Oneill MD; Consulting Provider: Allie Johnson MD    Subjective:       Ms. Ramakrishna Metzger is a 63 yo female with PMH of metastatic colon cancer followed by Dr. Brad Aguiar (brain/lung/left adrenal) with colo-vaginal fistula per 10-27-17 CT admitted with sepsis. BC 2/2 MSSA from port, UA mildly dirty, CXR shows known mets. Day 3 vancomycin/ancef. TTE no vegetation/ EF 40-45%, seen by ID, needs BENTLEY pending cardio consult. Seen by surgery and port removed at bedside 11-3, no intervention needed for fistula at present until current issues cleared. Plans for home at discharge with home antibiotic infusions pending course. 17  at bedside, diet tolerant, had BM, has generalized pain, some cough but is current smoker, denies melena, no longer taking NSAIDS        Objective:     Patient Vitals for the past 24 hrs:   Temp Pulse Resp BP SpO2   17 0746 98.2 °F (36.8 °C) 79 16 105/68 93 %   17 0241 98.6 °F (37 °C) 83 16 116/65 93 %   17 2218 98 °F (36.7 °C) 91 16 101/67 92 %   17 - - - - 97 %   17 1833 98.9 °F (37.2 °C) 97 18 120/84 97 %   17 1650 - - - - 93 %   17 1600 96.9 °F (36.1 °C) 97 18 124/72 97 %     Oxygen Therapy  O2 Sat (%): 93 % (17 0746)  Pulse via Oximetry: 96 beats per minute (17 1650)  O2 Device: Nasal cannula (17)  O2 Flow Rate (L/min): 2 l/min (17)    Intake/Output Summary (Last 24 hours) at 17 1141  Last data filed at 17 0241   Gross per 24 hour   Intake                0 ml   Output              200 ml   Net             -200 ml         General:    Well nourished. Alert    CV:   RRR. No murmur, rub, or gallop. Lungs:   CTAB. No wheezing, rhonchi, or rales. Abdomen:   Soft, nontender, nondistended. Normal BS  Extremities: Warm and dry. No cyanosis or edema. Skin:     No rashes or jaundice. Data Review:  I have reviewed all labs, meds, telemetry events, and studies from the last 24 hours. Recent Results (from the past 24 hour(s))   CULTURE, CATHETER TIP    Collection Time: 11/03/17  3:06 PM   Result Value Ref Range    Special Requests: NO SPECIAL REQUESTS      Culture result:        NO GROWTH AFTER SHORT PERIOD OF INCUBATION. FURTHER RESULTS TO FOLLOW AFTER OVERNIGHT INCUBATION. CBC WITH AUTOMATED DIFF    Collection Time: 11/04/17  6:22 AM   Result Value Ref Range    WBC 5.4 4.3 - 11.1 K/uL    RBC 2.71 (L) 4.05 - 5.25 M/uL    HGB 8.6 (L) 11.7 - 15.4 g/dL    HCT 25.8 (L) 35.8 - 46.3 %    MCV 95.2 79.6 - 97.8 FL    MCH 31.7 26.1 - 32.9 PG    MCHC 33.3 31.4 - 35.0 g/dL    RDW 19.3 (H) 11.9 - 14.6 %    PLATELET 252 691 - 079 K/uL    MPV 10.7 (L) 10.8 - 14.1 FL    DF AUTOMATED      NEUTROPHILS 65 43 - 78 %    LYMPHOCYTES 25 13 - 44 %    MONOCYTES 9 4.0 - 12.0 %    EOSINOPHILS 0 (L) 0.5 - 7.8 %    BASOPHILS 0 0.0 - 2.0 %    IMMATURE GRANULOCYTES 1 0.0 - 5.0 %    ABS. NEUTROPHILS 3.5 1.7 - 8.2 K/UL    ABS. LYMPHOCYTES 1.4 0.5 - 4.6 K/UL    ABS. MONOCYTES 0.5 0.1 - 1.3 K/UL    ABS. EOSINOPHILS 0.0 0.0 - 0.8 K/UL    ABS. BASOPHILS 0.0 0.0 - 0.2 K/UL    ABS. IMM. GRANS. 0.1 0.0 - 0.5 K/UL        All Micro Results     Procedure Component Value Units Date/Time    CULTURE, CATHETER TIP [[id number]] Collected:  11/03/17 1506    Order Status:  Completed Specimen:  Catheter Tip Updated:  11/04/17 1029     Special Requests: NO SPECIAL REQUESTS        Culture result:         NO GROWTH AFTER SHORT PERIOD OF INCUBATION. FURTHER RESULTS TO FOLLOW AFTER OVERNIGHT INCUBATION.     CULTURE, BLOOD [[id number]] Collected:  11/03/17 0656    Order Status:  Completed Specimen:  Blood from Blood Updated:  11/04/17 0810     Special Requests: LEFT HAND        Culture result: NO GROWTH 1 DAY       CULTURE, BLOOD [[id number]] Collected:  11/03/17 0652    Order Status:  Completed Specimen:  Blood from Blood Updated:  11/04/17 0810     Special Requests: LEFT ANTECUBITAL        Culture result: NO GROWTH 1 DAY       CULTURE, BLOOD [[id number]]  (Abnormal) Collected:  11/01/17 2220    Order Status:  Completed Specimen:  Blood from Blood Updated:  11/04/17 0723     Special Requests: --        NO SPECIAL REQUESTS  BLUE PORT       GRAM STAIN GRAM POSITIVE COCCI                 AEROBIC AND ANAEROBIC BOTTLES              RESULTS VERIFIED, PHONED TO AND READ BACK BY [address] [id number]. SBRAZEL     Culture result: STAPHYLOCOCCUS AUREUS (A)               REFER TO SPECIMEN NUMBER  [id number] FOR SUSCEPTIBILITY      CULTURE, BLOOD [[id number]]  (Abnormal)  (Susceptibility) Collected:  11/01/17 2220    Order Status:  Completed Specimen:  Blood from Blood Updated:  11/04/17 0722     Special Requests: BLUE PORT        GRAM STAIN         GRAM POSITIVE COCCI AEROBIC AND ANAEROBIC BOTTLES              RESULTS VERIFIED, PHONED TO AND READ BACK BY [address] ICU 0330 [id number]. SBRAZEL     Culture result: STAPHYLOCOCCUS AUREUS (A)                 MRSA target DNA sequences not detected: SA target DNA sequence detected within the sample.  Test performed by PCR  Culture/Sensitivity to follow (A)    CULTURE, BLOOD [[id number]] Collected:  11/04/17 0625    Order Status:  Completed Specimen:  Blood from Blood Updated:  11/04/17 0637    CULTURE, BLOOD [[id number]] Collected:  11/04/17 0621    Order Status:  Completed Specimen:  Blood from Blood Updated:  11/04/17 0637    INFLUENZA A & B AG (RAPID TEST) [[id number]] Collected:  11/01/17 2245    Order Status:  Completed Specimen:  Nasopharyngeal from Nasal washing Updated:  11/01/17 2309     Influenza A Ag NEGATIVE          NEGATIVE FOR THE PRESENCE OF INFLUENZA A ANTIGEN  INFECTION DUE TO INFLUENZA A CANNOT BE RULED OUT. BECAUSE THE ANTIGEN PRESENT IN THE SAMPLE MAY BE BELOW  THE DETECTION LIMIT OF THE TEST. A NEGATIVE TEST IS PRESUMPTIVE AND IT IS RECOMMENDED THAT THESE RESULTS BE CONFIRMED BY VIRAL CULTURE OR AN FDA-CLEARED INFLUENZA A AND B MOLECULAR ASSAY. Influenza B Ag NEGATIVE          NEGATIVE FOR THE PRESENCE OF INFLUENZA B ANTIGEN  INFECTION DUE TO INFLUENZA B CANNOT BE RULED OUT. BECAUSE THE ANTIGEN PRESENT IN THE SAMPLE MAY BE BELOW  THE DETECTION LIMIT OF THE TEST. A NEGATIVE TEST IS PRESUMPTIVE AND IT IS RECOMMENDED THAT THESE RESULTS BE CONFIRMED BY VIRAL CULTURE OR AN FDA-CLEARED INFLUENZA A AND B MOLECULAR ASSAY. Current Meds:  Current Facility-Administered Medications   Medication Dose Route Frequency    pantoprazole (PROTONIX) tablet 40 mg  40 mg Oral ACB&D    ALPRAZolam (XANAX) tablet 1 mg  1 mg Oral QID PRN    HYDROcodone-acetaminophen (NORCO)  mg tablet 1 Tab  1 Tab Oral Q4H PRN    sodium chloride (NS) flush 5 mL  5 mL InterCATHeter Q8H    sodium chloride (NS) flush 5-10 mL  5-10 mL InterCATHeter PRN    acetaminophen (TYLENOL) tablet 650 mg  650 mg Oral Q4H PRN    NUTRITIONAL SUPPORT ELECTROLYTE PRN ORDERS   Does Not Apply PRN    ceFAZolin in 0.9% NS (ANCEF) IVPB soln 2 g  2 g IntraVENous Q8H       Other Studies (last 24 hours):  No results found.     Assessment and Plan:     Hospital Problems as of 11/4/2017  Date Reviewed: 11/1/2017          Codes Class Noted - Resolved POA    * (Principal)Severe sepsis ([facility] [address] ICD-10-CM: A41.9, R65.20  ICD-9-CM: 038.9, 995.92  11/1/2017 - Present Yes        Colon cancer metastasized to [facility]) ICD-10-CM: C18.9, C79.31  ICD-9-CM: 153.9, 198.3  8/30/2017 - Present Yes        Pulmonary metastases ([facility]) ICD-10-CM: C78.00  ICD-9-CM: 197.0  8/17/2017 - Present Yes        Secondary malignant neoplasm of [facility]) ICD-10-CM: C79.31  ICD-9-CM: 198.3  8/17/2017 - Present               PLAN:    · Continue ancef, stop vancomycin,  followup repeat BC  · Appreciate ID consult, plans for PICC once BC cleared  · Consult cardio for BENTLEY  · followup anemia, likely some dilutional component with known colonic malignancy , stop NSAIDS, continue PPI  · Oncology has evaluated, will need ongoing goals of care discussions  · Reinforced tobacco use cessation   · PT/OT eval    DC planning/Dispo:    DVT ppx:  SCD    Signed:  Shashank Fischer MD

## 2017-11-04 NOTE — PROGRESS NOTES
Problem: Mobility Impaired (Adult and Pediatric)  Goal: *Acute Goals and Plan of Care (Insert Text)  DISCHARGE GOALS:  (1.)Ms. Alphonso Gamble will move from supine to sit and sit to supine  in bed with INDEPENDENT within 3 day(s). (2.)Ms. Alphonso Gamble will transfer from bed to chair and chair to bed with INDEPENDENT using the least restrictive device within 3 day(s). (3.)Ms. Alphonso Gamble will ambulate with INDEPENDENT for 300 feet with the least restrictive device within 3 day(s). ________________________________________________________________________________________________    PHYSICAL THERAPY: Initial Assessment 11/4/2017  INPATIENT: Hospital Day: 4  Payor: BLUE CROSS / [place] formerly [facility] / Product Type: PPO /      NAME/AGE/GENDER: Edmar Whitlock is a 64 y.o. female   PRIMARY DIAGNOSIS: Severe sepsis ([facility] [address] Severe sepsis ([facility] [address] Severe sepsis ([facility] [address]        ICD-10: Treatment Diagnosis:   · Generalized Muscle Weakness (M62.81)  · Difficulty in walking, Not elsewhere classified (R26.2)   Precaution/Allergies:  Adhesive tape-silicones; Bactrim [sulfamethoprim ds]; Macrobid [nitrofurantoin monohyd/m-cryst]; Pcn [penicillins]; and Pyridium [phenazopyridine]      ASSESSMENT:     Ms. Alphonso Gamble presents with above diagnosis. Patient also has a diagnosis of stage IV colon cancer (brain, lung, and L adrenal). She has completed 2 rounds of chemo but hasn't had any recently secondary to blood counts. Her port was removed from the R side yesterday, so R UE use is temporarily decreased. Patient denies any issues with neuropathy but she reports some cramping in her L LE and her legs feeling heavy. She is independent at baseline without an assistive device. Patient would benefit from therapy intervention to improve her strength and mobility and facilitate a return to baseline. This section established at most recent assessment   PROBLEM LIST (Impairments causing functional limitations):  1. Decreased Strength  2.  Decreased Transfer Abilities  3. Decreased Ambulation Ability/Technique  4. Increased Pain  5. Decreased Activity Tolerance   INTERVENTIONS PLANNED: (Benefits and precautions of physical therapy have been discussed with the patient.)  1. Family Education  2. Gait Training  3. Home Exercise Program (HEP)  4. Therapeutic Activites  5. Therapeutic Exercise/Strengthening     TREATMENT PLAN: Frequency/Duration: daily for duration of hospital stay  Rehabilitation Potential For Stated Goals: Good     RECOMMENDED REHABILITATION/EQUIPMENT: (at time of discharge pending progress): Due to the probability of continued deficits (see above) this patient will likely need continued skilled physical therapy after discharge. Equipment:    None at this time              HISTORY:   History of Present Injury/Illness (Reason for Referral):  Patient is a 64 y.o. female who presents to the ER due to fevers and mild confusion. Pt has recently been diagnosed with colon cancer with mets to brain and lung. She started chemo, but it has been on hold for a couple weeks because her counts were too low. Her spouse provides most of history. He reports that when he came home from work, she was asleep on couch. When she awoke a couple hours later, she had a fever and some confusion. He brought her to the ER. 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 Spouse and pt deny any particularly new/changed symptoms that might indicate an infection. She has had a cough, but it is actually improving. Denies urinary symptoms. ER w/u is neg for a source so far. Pt is feeling mildly better with fluids and resolution of fever. She was also recently on decadron and that was weaned off. Spouse states that she was feeling better with it. Past Medical History/Comorbidities:   Ms. Chinmay Mcrae  has a past medical history of Arthritis; Colon cancer ([facility] [address]  Gastrointestinal disorder; GERD (gastroesophageal reflux disease); and Other ill-defined conditions(799.89). Ms. Leonardo Hopkins  has a past surgical history that includes lap cholecystectomy; gyn; tonsillectomy; adenoidectomy; reggie biopsy breast stereotactic (Left, 10-8-2014); knee arthroscopy (Left, 2014); and vascular access. Social History/Living Environment:   Home Environment: Private residence  # Steps to Enter: 2  Rails to Enter: Yes  Hand Rails : Bilateral  One/Two Story Residence: One story  Living Alone: No  Support Systems: Spouse/Significant Other/Partner  Patient Expects to be Discharged to[de-identified] Private residence  Current DME Used/Available at Home: None  Tub or Shower Type: Tub/Shower combination  Prior Level of Function/Work/Activity:  Independent at baseline   Number of Personal Factors/Comorbidities that affect the Plan of Care: 1-2: MODERATE COMPLEXITY   EXAMINATION:   Most Recent Physical Functioning:   Gross Assessment:  AROM: Generally decreased, functional  Strength: Generally decreased, functional  Coordination: Within functional limits  Tone: Normal  Sensation: Intact               Posture:     Balance:  Sitting: Intact  Standing: Intact Bed Mobility:  Supine to Sit: Stand-by asssistance  Sit to Supine: Stand-by asssistance  Wheelchair Mobility:     Transfers:  Sit to Stand: Stand-by asssistance  Stand to Sit: Stand-by asssistance  Gait:     Speed/Kay: Slow  Step Length: Left shortened;Right shortened  Distance (ft): 100 Feet (ft)  Ambulation - Level of Assistance: Stand-by asssistance  Interventions: Safety awareness training;Verbal cues      Body Structures Involved:  1. Muscles Body Functions Affected:  1. Movement Related Activities and Participation Affected:  1. Mobility  2. Domestic Life  3.  Community, Social and [place]   Number of elements that affect the Plan of Care: 4+: HIGH COMPLEXITY   CLINICAL PRESENTATION:   Presentation: Stable and uncomplicated: LOW COMPLEXITY   CLINICAL DECISION MAKING:   [facility] Inpatient Short Form  How much difficulty does the patient currently have. .. Unable A Lot A Little None   1. Turning over in bed (including adjusting bedclothes, sheets and blankets)? [] 1   [] 2   [x] 3   [] 4   2. Sitting down on and standing up from a chair with arms ( e.g., wheelchair, bedside commode, etc.)   [] 1   [] 2   [x] 3   [] 4   3. Moving from lying on back to sitting on the side of the bed? [] 1   [] 2   [x] 3   [] 4   How much help from another person does the patient currently need. .. Total A Lot A Little None   4. Moving to and from a bed to a chair (including a wheelchair)? [] 1   [] 2   [x] 3   [] 4   5. Need to walk in hospital room? [] 1   [] 2   [x] 3   [] 4   6. Climbing 3-5 steps with a railing? [] 1   [] 2   [x] 3   [] 4   © 2007, Trustees of [address] [place] [address], under license to [employer]. All rights reserved      Score:  Initial: 18 Most Recent: X (Date: -- )    Interpretation of Tool:  Represents activities that are increasingly more difficult (i.e. Bed mobility, Transfers, Gait). Score 24 23 22-20 19-15 14-10 9-7 6     Modifier CH CI CJ CK CL CM CN      ? Mobility - Walking and Moving Around:     - CURRENT STATUS: CK - 40%-59% impaired, limited or restricted    - GOAL STATUS: CJ - 20%-39% impaired, limited or restricted    - D/C STATUS:  ---------------To be determined---------------  Payor: BLUE CROSS / [place] OF [address] [place] [address] Rd / Product Type: PPO /      Medical Necessity:     · Patient is expected to demonstrate progress in strength and functional technique to increase independence with all mobility. · Patient demonstrates good rehab potential due to higher previous functional level. Reason for Services/Other Comments:  · Patient continues to require skilled intervention due to generalized weakness and decreased mobility related to stage IV colon cancer.    Use of outcome tool(s) and clinical judgement create a POC that gives a: Clear prediction of patient's progress: LOW COMPLEXITY            TREATMENT:   (In addition to Assessment/Re-Assessment sessions the following treatments were rendered)   Pre-treatment Symptoms/Complaints:  Patient agreeable to working with therapy. Pain: Initial:   Pain Intensity 1: 9  Pain Location 1:  (generalized)  Pain Intervention(s) 1: Ambulation/Increased Activity, Repositioned  Post Session:  9 (RN present with pain medication prior to session)     Assessment/Reassessment only, no treatment provided today    Braces/Orthotics/Lines/Etc:   · IV  · O2 Device: Nasal cannula  Treatment/Session Assessment:    · Response to Treatment:  Patient tolerated well and would like to work with therapy while in the hospital.  · Interdisciplinary Collaboration:   o Physical Therapist  o Registered Nurse  · After treatment position/precautions:   o Supine in bed  o Bed/Chair-wheels locked  o Bed in low position  o Call light within reach  o RN notified  o Family at bedside   · Compliance with Program/Exercises: compliant all of the time. · Recommendations/Intent for next treatment session: \"Next visit will focus on advancements to more challenging activities and reduction in assistance provided\".   Total Treatment Duration:  PT Patient Time In/Time Out  Time In: 1410  Time Out: [address], PT

## 2017-11-05 NOTE — PROGRESS NOTES
Problem: Self Care Deficits Care Plan (Adult)  Goal: *Acute Goals and Plan of Care (Insert Text)    OCCUPATIONAL THERAPY: Initial Assessment and Discharge 11/5/2017  INPATIENT: Hospital Day: 5  Payor: BLUE CROSS / Plan: [place] BLUE CROSS [facility] / Product Type: PPO /      NAME/AGE/GENDER: Chino Meza is a 64 y.o. female   PRIMARY DIAGNOSIS:  Severe sepsis ([address] Severe sepsis ([address] Severe sepsis ([facility]        ICD-10: Treatment Diagnosis:    · Generalized Muscle Weakness (M62.81)   Precautions/Allergies:    Adhesive tape-silicones; Bactrim [sulfamethoprim ds]; Macrobid [nitrofurantoin monohyd/m-cryst]; Pcn [penicillins]; and Pyridium [phenazopyridine]      ASSESSMENT:     Ms. Augie Mata admitted with above diagnosis; pt is independent with self care and functional mobility. No skilled OT indicated at this time. Patient in agreement. Will discharge OT. This section established at most recent assessment   PROBLEM LIST (Impairments causing functional limitations):   INTERVENTIONS PLANNED: (Benefits and precautions of occupational therapy have been discussed with the patient.)     TREATMENT PLAN: Frequency/Duration: discharge OT  Rehabilitation Potential For Stated Goals: discharge OT     RECOMMENDED REHABILITATION/EQUIPMENT: (at time of discharge pending progress): Due to the probability of continued deficits (see above) this patient will not likely need continued skilled occupational therapy after discharge. Equipment:    None at this time              OCCUPATIONAL PROFILE AND HISTORY:   History of Present Injury/Illness (Reason for Referral):  65 yo female with PMH of metastatic colon cancer followed by Dr. Bunny Oakes (brain/lung/left adrenal) with colo-vaginal fistula per 10-27-17 CT admitted with sepsis. BC 2/2 MSSA from port, UA mildly dirty, CXR shows known mets. Day 3 vancomycin-stopped /day 4 ancef. TTE no vegetation/ EF 40-45%, seen by ID, needs BENTLEY pending cardio consult.  Seen by surgery and port removed at bedside 11-3, no intervention needed for fistula at present until current issues cleared. Plans for home at discharge with home antibiotic infusions pending course  Past Medical History/Comorbidities:   Ms. Barbara Betts  has a past medical history of Arthritis; Colon cancer ([address] Gastrointestinal disorder; GERD (gastroesophageal reflux disease); and Other ill-defined conditions(799.89). Ms. Barbara Betts  has a past surgical history that includes lap cholecystectomy; gyn; tonsillectomy; adenoidectomy; reggie biopsy breast stereotactic (Left, 10-8-2014); knee arthroscopy (Left, 2014); and vascular access.   Social History/Living Environment:   Home Environment: Private residence  # Steps to Enter: 2  Rails to Enter: Yes  Hand Rails : Bilateral  One/Two Story Residence: One story  Living Alone: No  Support Systems: Spouse/Significant Other/Partner  Patient Expects to be Discharged to[de-identified] Private residence  Current DME Used/Available at Home: None  Tub or Shower Type: Tub/Shower combination  Prior Level of Function/Work/Activity:  independent   Number of Personal Factors/Comorbidities that affect the Plan of Care: Brief history (0):  LOW COMPLEXITY   ASSESSMENT OF OCCUPATIONAL PERFORMANCE[de-identified]   Activities of Daily Living:           Basic ADLs (From Assessment) Complex ADLs (From Assessment)   Basic ADL  Feeding: Independent  Oral Facial Hygiene/Grooming: Independent  Bathing: Independent  Upper Body Dressing: Independent  Lower Body Dressing: Independent  Toileting: Independent     Grooming/Bathing/Dressing Activities of Daily Living     Cognitive Retraining  Safety/Judgement: Awareness of environment                 Functional Transfers  Toilet Transfer : Independent  Shower Transfer: Independent     Bed/Mat Mobility  Supine to Sit: Independent  Sit to Supine: Independent  Sit to Stand: Independent  Bed to Chair: Independent  Scooting: Independent       Most Recent Physical Functioning:   Gross Assessment:  AROM: Within functional limits (BUE)  Strength: Within functional limits (BUE)  Coordination: Within functional limits (BUE)  Tone: Normal  Sensation: Intact               Posture:     Balance:  Sitting: Intact  Standing: Intact Bed Mobility:  Supine to Sit: Independent  Sit to Supine: Independent  Scooting: Independent  Wheelchair Mobility:     Transfers:  Sit to Stand: Independent  Stand to Sit: Independent  Bed to Chair: Independent              Patient Vitals for the past 6 hrs:   BP BP Patient Position SpO2 Pulse   17 0717 122/67 At rest 96 % 78       Mental Status  Neurologic State: Alert  Orientation Level: Oriented X4  Cognition: Follows commands  Perception: Appears intact  Perseveration: No perseveration noted  Safety/Judgement: Awareness of environment                          Physical Skills Involved:  1. Balance  2. Strength  3. Activity Tolerance Cognitive Skills Affected (resulting in the inability to perform in a timely and safe manner): 1. none Psychosocial Skills Affected:  1. none   Number of elements that affect the Plan of Care: 1-3:  LOW COMPLEXITY   CLINICAL DECISION MAKIN[address] [place] [id number] AM-PAC 6 Clicks   Daily Activity Inpatient Short Form  How much help from another person does the patient currently need. .. Total A Lot A Little None   1. Putting on and taking off regular lower body clothing? [] 1   [] 2   [] 3   [x] 4   2. Bathing (including washing, rinsing, drying)? [] 1   [] 2   [] 3   [x] 4   3. Toileting, which includes using toilet, bedpan or urinal?   [] 1   [] 2   [] 3   [x] 4   4. Putting on and taking off regular upper body clothing? [] 1   [] 2   [] 3   [x] 4   5. Taking care of personal grooming such as brushing teeth? [] 1   [] 2   [] 3   [x] 4   6. Eating meals? [] 1   [] 2   [] 3   [x] 4   © , Trustees of [address] [place] [address] [address] [address], under license to [employer].  All rights reserved      Score:  Initial: 24 Most Recent: X (Date: -- )    Interpretation of Tool:  Represents activities that are increasingly more difficult (i.e. Bed mobility, Transfers, Gait). Score 24 23 22-20 19-15 14-10 9-7 6     Modifier CH CI CJ CK CL CM CN      ? Self Care:     - CURRENT STATUS: CH - 0% impaired, limited or restricted    - GOAL STATUS: CH - 0% impaired, limited or restricted    - D/C STATUS:  [address] [place] [address] - 0% impaired, limited or restricted  Payor: Jennifer Villavicencio / Plan: [address] / Product Type: PPO /      Medical Necessity:      · Discharge OT  Reason for Services/Other Comment: discharge OT   Use of outcome tool(s) and clinical judgement create a POC that gives a: LOW COMPLEXITY         TREATMENT:   (In addition to Assessment/Re-Assessment sessions the following treatments were rendered)     Pre-treatment Symptoms/Complaints:  No complaints  Pain: Initial:   Pain Intensity 1: 0  Post Session:  0     Assessment/Reassessment only, no treatment provided today    Braces/Orthotics/Lines/Etc:   · O2 Device: Room air (o2 on at 1lpm for hs)  Treatment/Session Assessment:    · Response to Treatment:  Tolerated well  · Interdisciplinary Collaboration:   o Physical Therapist  o Registered Nurse  · After treatment position/precautions:   o Supine in bed  o Bed/Chair-wheels locked  o Bed in low position  o Caregiver at bedside  o Call light within reach  o RN notified   · Compliance with Program/Exercises: compliant all of the time. · Recommendations/Intent for next treatment session: discharge OT.   Total Treatment Duration:  OT Patient Time In/Time Out  Time In: 0915  Time Out: [address], OT

## 2017-11-05 NOTE — PROGRESS NOTES
Hospitalist Progress Note     Admit Date:  2017 10:01 PM   Name:  Deep Valentin   Age:  64 y.o.  :  1960   MRN:  [id number]   PCP:  Ivette Guthrie MD  Treatment Team: Attending Provider: Sd Jordan MD; Consulting Provider: Wilner Patel MD; Utilization Review: Bertell Libman, RN; Consulting Provider: Lata Kovacs MD; Consulting Provider: Gabrielle Nair MD    Subjective:       Ms. Katelyn Castro is a 63 yo female with PMH of metastatic colon cancer followed by Dr. Hortencia Clark (brain/lung/left adrenal) with colo-vaginal fistula per 10-27-17 CT admitted with sepsis. BC 2/2 MSSA from port, UA mildly dirty, CXR shows known mets. Day 3 vancomycin-stopped /day 4 ancef. TTE no vegetation/ EF 40-45%, seen by ID, needs BENTLEY pending cardio consult. Seen by surgery and port removed at bedside 11-3, no intervention needed for fistula at present until current issues cleared. Plans for home at discharge with home antibiotic infusions pending course.      17  at bedside, diet tolerant, had BM, has generalized pain though manageable, some cough but is current smoker- no dyspnea, denies melena, walked with PT        Objective:     Patient Vitals for the past 24 hrs:   Temp Pulse Resp BP SpO2   17 0717 98.6 °F (37 °C) 78 16 122/67 96 %   17 0332 99.8 °F (37.7 °C) 81 16 131/74 94 %   17 2347 97.5 °F (36.4 °C) 82 16 108/67 97 %   17 1934 - - - - 96 %   17 1812 97.7 °F (36.5 °C) 72 16 133/83 96 %   17 1540 98.2 °F (36.8 °C) 78 16 130/83 97 %   17 1240 98 °F (36.7 °C) (!) 104 16 115/78 94 %     Oxygen Therapy  O2 Sat (%): 96 % (17 0717)  Pulse via Oximetry: 96 beats per minute (17 1650)  O2 Device: Room air (o2 on at 1lpm for hs) (17 193)  O2 Flow Rate (L/min): 2 l/min (17)    Intake/Output Summary (Last 24 hours) at 17 0841  Last data filed at 17 1543   Gross per 24 hour   Intake               75 ml   Output 0 ml   Net               75 ml         General:    Well nourished. Alert    CV:   RRR. No murmur, rub, or gallop. Lungs:   CTAB. No wheezing, rhonchi, or rales. Abdomen:   Soft, nontender, nondistended. Normal BS  Extremities: Warm and dry. No cyanosis or edema. Skin:     No rashes or jaundice. Data Review:  I have reviewed all labs, meds, telemetry events, and studies from the last 24 hours. Recent Results (from the past 24 hour(s))   HGB & HCT    Collection Time: 11/05/17  5:20 AM   Result Value Ref Range    HGB 9.1 (L) 11.7 - 15.4 g/dL    HCT 26.1 (L) 35.8 - 46.3 %        All Micro Results     Procedure Component Value Units Date/Time    CULTURE, CATHETER TIP [[id number]] Collected:  11/03/17 1506    Order Status:  Completed Specimen:  Catheter Tip Updated:  11/04/17 1029     Special Requests: NO SPECIAL REQUESTS        Culture result:         NO GROWTH AFTER SHORT PERIOD OF INCUBATION. FURTHER RESULTS TO FOLLOW AFTER OVERNIGHT INCUBATION. CULTURE, BLOOD [[id number]] Collected:  11/03/17 0656    Order Status:  Completed Specimen:  Blood from Blood Updated:  11/04/17 0810     Special Requests: LEFT HAND        Culture result: NO GROWTH 1 DAY       CULTURE, BLOOD [[id number]] Collected:  11/03/17 0987    Order Status:  Completed Specimen:  Blood from Blood Updated:  11/04/17 0810     Special Requests: LEFT ANTECUBITAL        Culture result: NO GROWTH 1 DAY       CULTURE, BLOOD [[id number]]  (Abnormal) Collected:  11/01/17 2220    Order Status:  Completed Specimen:  Blood from Blood Updated:  11/04/17 0723     Special Requests: --        NO SPECIAL REQUESTS  BLUE PORT       GRAM STAIN GRAM POSITIVE COCCI                 AEROBIC AND ANAEROBIC BOTTLES              RESULTS VERIFIED, PHONED TO AND READ BACK BY [address] [id number] [id number]     Culture result: STAPHYLOCOCCUS AUREUS (A)               REFER TO SPECIMEN NUMBER  [id number] FOR SUSCEPTIBILITY      CULTURE, BLOOD [[id number]] (Abnormal)  (Susceptibility) Collected:  11/01/17 2220    Order Status:  Completed Specimen:  Blood from Blood Updated:  11/04/17 0722     Special Requests: BLUE PORT        GRAM STAIN         GRAM POSITIVE COCCI AEROBIC AND ANAEROBIC BOTTLES              RESULTS VERIFIED, PHONED TO AND READ BACK BY OCTAVIA Francois [place] [address] [id number]. SBRAZEL     Culture result: STAPHYLOCOCCUS AUREUS (A)                 MRSA target DNA sequences not detected: SA target DNA sequence detected within the sample. Test performed by PCR  Culture/Sensitivity to follow (A)    CULTURE, BLOOD [[id number]] Collected:  11/04/17 0625    Order Status:  Completed Specimen:  Blood from Blood Updated:  11/04/17 0637    CULTURE, BLOOD [[id number]] Collected:  11/04/17 0621    Order Status:  Completed Specimen:  Blood from Blood Updated:  11/04/17 0637    INFLUENZA A & B AG (RAPID TEST) [[id number]] Collected:  11/01/17 2245    Order Status:  Completed Specimen:  Nasopharyngeal from Nasal washing Updated:  11/01/17 2309     Influenza A Ag NEGATIVE          NEGATIVE FOR THE PRESENCE OF INFLUENZA A ANTIGEN  INFECTION DUE TO INFLUENZA A CANNOT BE RULED OUT. BECAUSE THE ANTIGEN PRESENT IN THE SAMPLE MAY BE BELOW  THE DETECTION LIMIT OF THE TEST. A NEGATIVE TEST IS PRESUMPTIVE AND IT IS RECOMMENDED THAT THESE RESULTS BE CONFIRMED BY VIRAL CULTURE OR AN FDA-CLEARED INFLUENZA A AND B MOLECULAR ASSAY. Influenza B Ag NEGATIVE          NEGATIVE FOR THE PRESENCE OF INFLUENZA B ANTIGEN  INFECTION DUE TO INFLUENZA B CANNOT BE RULED OUT. BECAUSE THE ANTIGEN PRESENT IN THE SAMPLE MAY BE BELOW  THE DETECTION LIMIT OF THE TEST. A NEGATIVE TEST IS PRESUMPTIVE AND IT IS RECOMMENDED THAT THESE RESULTS BE CONFIRMED BY VIRAL CULTURE OR AN FDA-CLEARED INFLUENZA A AND B MOLECULAR ASSAY.                Current Meds:  Current Facility-Administered Medications   Medication Dose Route Frequency    pantoprazole (PROTONIX) tablet 40 mg  40 mg Oral ACB&D    ALPRAZolam [facility]) tablet 1 mg  1 mg Oral QID PRN    HYDROcodone-acetaminophen (NORCO)  mg tablet 1 Tab  1 Tab Oral Q4H PRN    sodium chloride (NS) flush 5 mL  5 mL InterCATHeter Q8H    sodium chloride (NS) flush 5-10 mL  5-10 mL InterCATHeter PRN    acetaminophen (TYLENOL) tablet 650 mg  650 mg Oral Q4H PRN    NUTRITIONAL SUPPORT ELECTROLYTE PRN ORDERS   Does Not Apply PRN    ceFAZolin in 0.9% NS (ANCEF) IVPB soln 2 g  2 g IntraVENous Q8H       Other Studies (last 24 hours):  No results found.     Assessment and Plan:     Hospital Problems as of 11/5/2017  Date Reviewed: 11/1/2017          Codes Class Noted - Resolved POA    * (Principal)Severe sepsis ([facility] ICD-10-CM: A41.9, R65.20  ICD-9-CM: 038.9, 995.92  11/1/2017 - Present Yes        Colon cancer metastasized to brain [facility]) ICD-10-CM: C18.9, C79.31  ICD-9-CM: 153.9, 198.3  8/30/2017 - Present Yes        Pulmonary metastases ([facility] ICD-10-CM: C78.00  ICD-9-CM: 197.0  8/17/2017 - Present Yes        Secondary malignant neoplasm of [facility]) ICD-10-CM: C79.31  ICD-9-CM: 198.3  8/17/2017 - Present               PLAN:    · Continue ancef,  followup repeat BC NGTD, plans tentative for PICC tomorrow   · Appreciate ID consult, EOT to be determined pending BENTLEY  · Consulted cardio for BENTLEY  · followup anemia, likely some dilutional component with known colonic malignancy , stopped NSAIDS, continue PPI  · Oncology has evaluated, will need ongoing goals of care discussions  · Reinforced tobacco use cessation, weaned to RA off O2   · PT/OT eval    DC planning/Dispo:    DVT ppx:  SCD    Signed:  Dariela Whitfield MD

## 2017-11-05 NOTE — PROGRESS NOTES
Lying supine right side, rectal pain pressure 8/10, prefers to hold off on any other pain medication except tablets.

## 2017-11-05 NOTE — PROGRESS NOTES
Problem: Mobility Impaired (Adult and Pediatric)  Goal: *Acute Goals and Plan of Care (Insert Text)  DISCHARGE GOALS:  (1.)Ms. Bo Johnson will move from supine to sit and sit to supine  in bed with INDEPENDENT within 3 day(s). Met 11/5/17  (2.)Ms. Bo Johnson will transfer from bed to chair and chair to bed with INDEPENDENT using the least restrictive device within 3 day(s). (3.)Ms. Bo Johnson will ambulate with INDEPENDENT for 300 feet with the least restrictive device within 3 day(s). ________________________________________________________________________________________________    PHYSICAL THERAPY: Daily Note, AM 11/5/2017  INPATIENT: Hospital Day: 5  Payor: Sis Tucker / Plan: [place] [facility] / Product Type: PPO /      NAME/AGE/GENDER: Calvin Davis is a 64 y.o. female   PRIMARY DIAGNOSIS: Severe sepsis ([address] Severe sepsis ([facility] Severe sepsis ([facility] [address] [facility]        ICD-10: Treatment Diagnosis:   · Generalized Muscle Weakness (M62.81)  · Difficulty in walking, Not elsewhere classified (R26.2)   Precaution/Allergies:  Adhesive tape-silicones; Bactrim [sulfamethoprim ds]; Macrobid [nitrofurantoin monohyd/m-cryst]; Pcn [penicillins]; and Pyridium [phenazopyridine]      ASSESSMENT:     Ms. Bo Johnson presents with above diagnosis. Patient also has a diagnosis of stage IV colon cancer (brain, lung, and L adrenal). She has completed 2 rounds of chemo but hasn't had any recently secondary to blood counts. Her port was removed from the R side yesterday, so R UE use is temporarily decreased. Patient denies any issues with neuropathy but she reports some cramping in her L LE and her legs feeling heavy. She is independent at baseline without an assistive device. Patient would benefit from therapy intervention to improve her strength and mobility and facilitate a return to baseline. Patient demonstrated independence with bed mobility. Able to ambulate full distance around 3rd floor but slow pace-may be baseline.   Patient would benefit from one additional session to establish HEP for LE strengthening and overall improvement in activity tolerance to prepare for possible future chemo treatments. Patient reports she will be getting [facility] nursing at d/c and will discuss with her  if she feels therapy would be beneficial. Have also mentioned oncology rehab but would have to wait until [facility] is d/c'd. This section established at most recent assessment   PROBLEM LIST (Impairments causing functional limitations):  1. Decreased Strength  2. Decreased Transfer Abilities  3. Decreased Ambulation Ability/Technique  4. Increased Pain  5. Decreased Activity Tolerance   INTERVENTIONS PLANNED: (Benefits and precautions of physical therapy have been discussed with the patient.)  1. Family Education  2. Gait Training  3. Home Exercise Program (HEP)  4. Therapeutic Activites  5. Therapeutic Exercise/Strengthening     TREATMENT PLAN: Frequency/Duration: daily for duration of hospital stay  Rehabilitation Potential For Stated Goals: Good     RECOMMENDED REHABILITATION/EQUIPMENT: (at time of discharge pending progress): Due to the probability of continued deficits (see above) this patient will likely need continued skilled physical therapy after discharge. Equipment:    None at this time              HISTORY:   History of Present Injury/Illness (Reason for Referral):  Patient is a 64 y.o. female who presents to the ER due to fevers and mild confusion. Pt has recently been diagnosed with colon cancer with mets to brain and lung. She started chemo, but it has been on hold for a couple weeks because her counts were too low. Her spouse provides most of history. He reports that when he came home from work, she was asleep on couch. When she awoke a couple hours later, she had a fever and some confusion. He brought her to the ER. 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    Spouse and pt deny any particularly new/changed symptoms that might indicate an infection. She has had a cough, but it is actually improving. Denies urinary symptoms. ER w/u is neg for a source so far. Pt is feeling mildly better with fluids and resolution of fever. She was also recently on decadron and that was weaned off. Spouse states that she was feeling better with it. Past Medical History/Comorbidities:   Ms. Pat Lee  has a past medical history of Arthritis; Colon cancer ([address] Gastrointestinal disorder; GERD (gastroesophageal reflux disease); and Other ill-defined conditions(799.89). Ms. Pat Lee  has a past surgical history that includes lap cholecystectomy; gyn; tonsillectomy; adenoidectomy; reggie biopsy breast stereotactic (Left, 10-8-2014); knee arthroscopy (Left, 2014); and vascular access.   Social History/Living Environment:   Home Environment: Private residence  # Steps to Enter: 2  Rails to Enter: Yes  Hand Rails : Bilateral  One/Two Story Residence: One story  Living Alone: No  Support Systems: Spouse/Significant Other/Partner  Patient Expects to be Discharged to[de-identified] Private residence  Current DME Used/Available at Home: None  Tub or Shower Type: Tub/Shower combination  Prior Level of Function/Work/Activity:  Independent at baseline   Number of Personal Factors/Comorbidities that affect the Plan of Care: 1-2: MODERATE COMPLEXITY   EXAMINATION:   Most Recent Physical Functioning:   Gross Assessment:  AROM: Generally decreased, functional  Strength: Generally decreased, functional  Coordination: Within functional limits  Tone: Normal  Sensation: Intact               Posture:     Balance:  Sitting: Intact  Standing: Intact Bed Mobility:  Supine to Sit: Independent  Sit to Supine: Independent  Scooting: Independent  Wheelchair Mobility:     Transfers:  Sit to Stand: Independent  Stand to Sit: Independent  Gait:     Speed/Kay: Slow  Step Length: Left shortened;Right shortened  Distance (ft): 650 Feet (ft)  Ambulation - Level of Assistance: Supervision  Interventions: Safety awareness training;Verbal cues      Body Structures Involved:  1. Muscles Body Functions Affected:  1. Movement Related Activities and Participation Affected:  1. Mobility  2. Domestic Life  3. Community, Social and [place]   Number of elements that affect the Plan of Care: 4+: HIGH COMPLEXITY   CLINICAL PRESENTATION:   Presentation: Stable and uncomplicated: LOW COMPLEXITY   CLINICAL DECISION MAKIN [facility] Mobility Inpatient Short Form  How much difficulty does the patient currently have. .. Unable A Lot A Little None   1. Turning over in bed (including adjusting bedclothes, sheets and blankets)? [] 1   [] 2   [x] 3   [] 4   2. Sitting down on and standing up from a chair with arms ( e.g., wheelchair, bedside commode, etc.)   [] 1   [] 2   [x] 3   [] 4   3. Moving from lying on back to sitting on the side of the bed? [] 1   [] 2   [x] 3   [] 4   How much help from another person does the patient currently need. .. Total A Lot A Little None   4. Moving to and from a bed to a chair (including a wheelchair)? [] 1   [] 2   [x] 3   [] 4   5. Need to walk in hospital room? [] 1   [] 2   [x] 3   [] 4   6. Climbing 3-5 steps with a railing? [] 1   [] 2   [x] 3   [] 4   © , Trustees of [address] [place] [address], under license to [employer]. All rights reserved      Score:  Initial: 18 Most Recent: X (Date: -- )    Interpretation of Tool:  Represents activities that are increasingly more difficult (i.e. Bed mobility, Transfers, Gait). Score 24 23 22-20 19-15 14-10 9-7 6     Modifier CH CI CJ CK CL CM CN      ?  Mobility - Walking and Moving Around:     - CURRENT STATUS: CK - 40%-59% impaired, limited or restricted    - GOAL STATUS: CJ - 20%-39% impaired, limited or restricted    - D/C STATUS:  ---------------To be determined---------------  Payor: CARLOS [employer]QUE [employer]ART / Plan: [address] / Product Type: PPO /      Medical Necessity:     · Patient is expected to demonstrate progress in strength and functional technique to increase independence with all mobility. · Patient demonstrates good rehab potential due to higher previous functional level. Reason for Services/Other Comments:  · Patient continues to require skilled intervention due to generalized weakness and decreased mobility related to stage IV colon cancer. Use of outcome tool(s) and clinical judgement create a POC that gives a: Clear prediction of patient's progress: LOW COMPLEXITY            TREATMENT:   (In addition to Assessment/Re-Assessment sessions the following treatments were rendered)   Pre-treatment Symptoms/Complaints:  Patient ready for therapy. Pain: Initial:   Pain Intensity 1: 0  Pain Location 1:  (generalized)  Pain Intervention(s) 1: Ambulation/Increased Activity, Repositioned  Post Session:  0     Therapeutic Activity: (    15 minutes): Therapeutic activities including Bed transfers and Ambulation on level ground to improve mobility, strength and coordination. Required minimal Safety awareness training;Verbal cues to promote static and dynamic balance in standing. Braces/Orthotics/Lines/Etc:   · none  Treatment/Session Assessment:    · Response to Treatment:  Patient participated well. Ambulated entire 3rd floor but at slow pace. Plan to see for one additional session to establish HEP for LE strengthening. · Interdisciplinary Collaboration:   o Physical Therapist  o Occupational Therapist  o Registered Nurse  · After treatment position/precautions:   o Supine in bed  o Bed/Chair-wheels locked  o Bed in low position  o Caregiver at bedside  o Call light within reach   · Compliance with Program/Exercises: compliant all of the time. · Recommendations/Intent for next treatment session: \"Next visit will focus on advancements to more challenging activities and reduction in assistance provided\".   Total Treatment Duration:PT Patient Time In/Time Out  Time In: 0900  Time Out: [id number] [address] [place] [address] [place]

## 2017-11-05 NOTE — PROGRESS NOTES
Pt given Xanax 1 mg, PO, per pt request.  Pt states she takes it every morning. Pt sitting up in bed finishing breakfast,  at bedside.

## 2017-11-05 NOTE — PROGRESS NOTES
Shift assessment complete. Pt alert and oriented. Family at bedside. S1S2 ausculated. Resp even and unlabored. Lung sounds clear. HOB elevated. Voids without difficulty. Active bowels sounds x4. Positive pedal pulses, cap refill<3sec. No other needs voiced aat this time. All safety measures in place. Pt instructed to call for any assistance. Will continue to monitor with hourly rounds.

## 2017-11-06 PROBLEM — I50.20 SYSTOLIC CONGESTIVE HEART FAILURE (HCC): Status: ACTIVE | Noted: 2017-01-01

## 2017-11-06 NOTE — PROGRESS NOTES
TRANSFER - OUT REPORT:    Verbal report given to [facility] RN(name) on Calvin Davis  being transferred to [address] [place] [address] ([facility]) for routine progression of care       Report consisted of patients Situation, Background, Assessment and   Recommendations(SBAR). Information from the following report(s) Procedure Summary was reviewed with the receiving nurse. Lines:   PICC Double Lumen 82/20/27 Left;Basilic (Active)   Central Line Being Utilized Yes 11/6/2017 12:48 PM   Criteria for Appropriate Use Long term IV/antibiotic administration 11/6/2017 12:48 PM   Site Assessment Clean, dry, & intact 11/6/2017 12:48 PM   Phlebitis Assessment 0 11/6/2017 12:48 PM   Infiltration Assessment 0 11/6/2017 12:48 PM   Arm Circumference (cm) 29 cm 11/6/2017 12:48 PM   Date of Last Dressing Change 11/13/17 11/6/2017 12:48 PM   Dressing Status Clean, dry, & intact 11/6/2017 12:48 PM   Dressing Type Disk with Chlorhexadine gluconate (CHG); Transparent 11/6/2017 12:48 PM   Hub Color/Line Status Purple 11/6/2017 12:48 PM   Positive Blood Return (Site #1) Yes 11/6/2017 12:48 PM   Hub Color/Line Status Red 11/6/2017 12:48 PM   Positive Blood Return (Site #2) Yes 11/6/2017 12:48 PM   Alcohol Cap Used No 11/6/2017 12:48 PM        Opportunity for questions and clarification was provided.       Patient transported with:   [employer]

## 2017-11-06 NOTE — PROGRESS NOTES
BENTLEY report ( see full report ):    No complications  4mg versed and 50mcg fentanyl, monitored from 1340 to 1420    LV regional wall motion normal  EF 55-60%  TV, PV, MV appear structurally normal  AV with early small vegetation on ventricular side of leaflet, with mild central AI    Treat with longer term ABX therapy per ID recs  Back to [facility] today    Daja Juárez MD

## 2017-11-06 NOTE — CONSULTS
[facility] Cardiology Consult                Date of  Admission: 11/6/2017 11:59 AM     Primary Cardiologist: none  Referring Physician: Dr. Jackelyn Dalton  Consulting Physician: Dr. Germán Gutierrez    CC/Reason for consult: MSSA bacteremia      Bg Nick is a 64 y.o. female who presented to [facility] ER with fevers and mild confusion. She has known history of metastatic colon cancer and is followed by oncology. Blood cultures were positive for MSSA. TTE was negative for any obvious vegetation. Her port was removed. Repeat blood cultures from 11/4 are negative to date. She denies any cardiac history. She states she had a stress test in the past that was reportedly normal.         Patient Active Problem List   Diagnosis Code    Pulmonary metastases ([address] C78.00    Secondary malignant neoplasm of brain ([address] C79.31    Mass of colon K63.9    Breast cancer screening Z12.31    Colon cancer metastasized to brain ([address] C18.9, C79.31    Malignant neoplasm of descending colon ([address] C18.6    Severe sepsis ([address] A41.9, R65.20       Past Medical History:   Diagnosis Date    Arthritis     Colon cancer ([address]     Metastatic to Lungs and Brain    Gastrointestinal disorder     Reflux    GERD (gastroesophageal reflux disease)     Other ill-defined conditions(799.89)     Herniated disk  lumbar and cervical      Past Surgical History:   Procedure Laterality Date    HX ADENOIDECTOMY      HX GYN      Hysterectomy-partial    HX KNEE ARTHROSCOPY Left 2014    HX LAP CHOLECYSTECTOMY      HX TONSILLECTOMY      HX VASCULAR ACCESS      LYN BIOPSY BREAST STEREOTACTIC Left 10-8-2014     Allergies   Allergen Reactions    Adhesive Tape-Silicones Rash     Bad red rash.  Bactrim [Sulfamethoprim Ds] Hives     Swelling and itching      Macrobid [Nitrofurantoin Monohyd/M-Cryst] Other (comments)     Felt  \"out of it\"    Pcn [Penicillins] Hives     Swelling and itching also.     Pt has tolerated cephalosporins in the past.    Pyridium [Phenazopyridine] Hives     Also itching and swelling. Family History   Problem Relation Age of Onset    Stroke Mother     Hypertension Mother     Diabetes Mother     Heart Disease Father     Hypertension Father     Cancer Brother     Breast Cancer Neg Hx         Current Facility-Administered Medications   Medication Dose Route Frequency    midazolam (VERSED) injection 1-2 mg  1-2 mg IntraVENous Multiple    fentaNYL citrate (PF) injection 25-50 mcg  25-50 mcg IntraVENous Multiple       Review of Systems   Constitution: Negative for chills, fever, weakness, malaise/fatigue, weight gain and weight loss. HENT: Negative for ear pain, hearing loss, nosebleeds, sore throat and tinnitus. Eyes: Negative for blurred vision, vision loss in left eye and vision loss in right eye. Cardiovascular: Negative for chest pain, dyspnea on exertion, leg swelling, near-syncope, orthopnea, palpitations, paroxysmal nocturnal dyspnea and syncope. Respiratory: Negative for cough, hemoptysis, shortness of breath, sputum production and wheezing. Endocrine: Negative for cold intolerance, heat intolerance and polydipsia. Hematologic/Lymphatic: Does not bruise/bleed easily. Skin: Negative for color change and rash. Musculoskeletal: Negative for back pain, joint pain, joint swelling and myalgias. Gastrointestinal: Negative for abdominal pain, constipation, diarrhea, dysphagia, heartburn, hematemesis, melena, nausea and vomiting. Genitourinary: Negative for dysuria, frequency, hematuria and urgency. Neurological: Negative for difficulty with concentration, dizziness, headaches, light-headedness, numbness, paresthesias, seizures and vertigo. Psychiatric/Behavioral: Negative for altered mental status and depression.      Physical Exam:  General: Well Developed, Well Nourished, No Acute Distress  HEENT: pupils equal and round, no abnormalities noted  Neck: supple, no JVD  Heart: S1S2 with RRR without murmurs or gallops  Lungs: Clear throughout auscultation bilaterally without adventitious sounds  Abd: soft, nontender, nondistended, with good bowel sounds  Ext: warm, no edema  Skin: warm and dry  Psychiatric: Normal mood and affect  Neurologic: Alert and oriented X 3      Cardiographics    ECG: sinus tachycardia  Echocardiogram:   -  Left ventricle: Systolic function was mildly reduced. Ejection fraction was estimated in the range of 40 % to 45 %. There was hypokinesis of the basal-mid anteroseptal wall(s). -  Aortic valve: There was no evidence for vegetation. -  Mitral valve: There was no evidence for vegetation. Labs:   Recent Labs      11/06/17   0616  11/05/17   0520  11/04/17   0622   WBC   --    --   5.4   HGB  8.9*  9.1*  8.6*   HCT  27.1*  26.1*  25.8*   PLT   --    --   151       Assessment/Plan:      Active Problems:    * MSSA bacteremia  On IV antibiotics, ID following, will proceed with BENTLEY today    Systolic congestive heart failure  EF noted to be low on echo with WMA, no prior echo available in records, she has noted ankle/pedal edema but stated this improved when she cut back on sodium intake, will start Coreg if BP tolerates, she denies any chest pain, no prior cardiac history, will need to follow up in our office for consideration of further ischemic evaluation but this can be deferred for now given acute illness from MSSA bacteremia and lack of anginal symptoms     Metastatic colon cancer  Followed by oncology       Thank you very much for this referral. We appreciate the opportunity to participate in this patient's care. Estrellita Malagon PA-C  Consulting MD: Dr. Conchetta Mohs:    Patient seen and examined by me. Agree with above note by physician extender. Key findings are:  No CP or DE OLIVEIRA, BENTLEY today shows normal LV function and echo reviewed from 11/2, with normal LV regional wall motion and EF as well.    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 CV- RRR without murmur  Lungs- Clear bilaterally  Abd- soft, nontender, nondistended  Ext- no edema     Plan: As above. She will need longer term ABX and echo surveillance in 10-14 days. EF and wall motion are not abnormal on the prior echo or on today's BENTLEY in my opinion.      Halima Rosenbaum MD  [facility] Cardiology  Pager [phone]

## 2017-11-06 NOTE — PROGRESS NOTES
AM Assessment. Pt. A/O X4. Respirations even and unlabored. Lungs clear. Abd. Soft and non tender. Pt. Has no complaints at this time. Pt. Notified she would be transferred DT for BENTLEY and PICC insertion. Awaiting transport for 11:15 am. Call light within reach.

## 2017-11-06 NOTE — PROGRESS NOTES
BENTLEY completed. Prior to procedure, consent signed and allergies reviewed. Patient's throat numbed at 1349. Patient given 4mg versed and 50mcg of fentanyl. Procedure completed at [id number]. Will continue to monitor patient until ready for transfer back to [facility].

## 2017-11-06 NOTE — PROGRESS NOTES
Care Management Interventions  Mode of Transport at Discharge: Other (see comment)  Transition of Care Consult (CM Consult): Discharge Planning  Current Support Network: Lives with Spouse  Confirm Follow Up Transport: Family  Plan discussed with Pt/Family/Caregiver: Yes  Freedom of Choice Offered: Yes  Discharge Location  Discharge Placement: Home      Referral per MD.  Pt will need IV ABX. Dr. Katerina Milligan spoke with SW stating pt wants to d/c home today. Per Dr. Katerina Milligan pt is dwtn for a BENTLEY and will have PICC placed. Dr. Katerina Milligan is contacting I&D physician for final ABX orders. DANIKA spoke with pt and spouse to arrange d/c. Pt states she is current with [facility] ( receives her chemo). Pt agreeable to using [facility] as they are in network with her insurance. DANIKA spoke with Pascual Chang at [facility] [employer] and also Sha Tate with [facility]. SW awaiting final ABX orders to fax to [facility].

## 2017-11-06 NOTE — PROGRESS NOTES
Shift assessment complete. Pt alert and oriented x4, respirations present, even and unlabored, HOB elevated, pt denies any SOB at this time, S1&S2 auscultated, HR regular, abd soft, non- tender, Active BS in all 4 quadrants, No pressure ulcers or edema noted, pt is up ad michael to the bathroom, voiding, SCDs in place and functioning, pt instructed to call for assistance, pt verbalizes understanding, bed low and locked, side rails x3, call light within reach.

## 2017-11-06 NOTE — PROGRESS NOTES
PICC Placement Note    PRE-PROCEDURE VERIFICATION  Correct Procedure: yes. Time out completed with assistant Sagar Kitchen rn and all persons present in agreement with time out. Correct Site:  yes  Temperature:  , Temperature Source:    Recent Labs      11/04/17   0622   PLT  151   WBC  5.4     Allergies: Adhesive tape-silicones; Bactrim [sulfamethoprim ds]; Macrobid [nitrofurantoin monohyd/m-cryst]; Pcn [penicillins]; and Pyridium [phenazopyridine]  Education materials for PICC Care given to patient or family. PROCEDURE DETAIL  A double lumen PICC line was started for antibiotic therapy. The following documentation is in addition to the PICC properties in the lines/airways flowsheet :  Lot #: [id number]  xylocaine used: yes  Mid-Arm Circumference: 29 (cm)  Internal Catheter Length: 48 (cm)  Internal Catheter Total Length: 48 (cm)  Vein Selection for PICC:Left basilic  Central Line Bundle followed yes  Complication Related to Insertion: none  Both the insertion guidewire and sherlock guidewire were removed intact all ports have positive blood return and were flush well with normal saline. The placement was verified by sapiens ecg. The ECG results state the tip overlies the lower superior vena cava.              Line is okay to use: yes    Sendy Raza RN

## 2017-11-06 NOTE — PROGRESS NOTES
Awaiting BENTLEY to make plans for discharge, which I anticipate will be with cefazolin considering her PCN allergy with type 1 hypersensitivity.      Chino Jimenez MD

## 2017-11-07 NOTE — PROGRESS NOTES
New order received for physical therapy. Pt. Was being seen by therapy currently. Spoke with patient who stated she has been getting around independently in the room and has been making a lap around the hospital floor. She says she doesn't need us checking on her as she is getting around fine and hopes to go home soon. Will discharge.

## 2017-11-07 NOTE — PROGRESS NOTES
AM Assessment. Pt. Alert and oriented X4. Respirations even and unlabored. Lungs clear. Abd. Soft and non tender. Pt. Has no complaints at this time. Call light within reach.

## 2017-11-07 NOTE — PROGRESS NOTES
Problem: Nutrition Deficit  Goal: *Optimize nutritional status  Nutrition  Reason for assessment:  Length of stay day 5. Assessment:   Diet order(s): Regular  Food/Nutrition Patient History:  Pt presented to ED with fever and mild confusion; admitted with sepsis of unknown source. 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 Pt reports her po intake at home improved once she started on her \"medication\". Pt denies recent weight loss. Pt states she has received coupons from the cancer society for Ensure supplements but has not tried the Ensure yet. Anthropometrics:Height: 5' 4\" (162.6 cm),  Weight: 71.8 kg (158 lb 3.2 oz), Weight source not specified, Body mass index is 27.15 kg/(m^2). BMI class of overweight. Macronutrient needs:  EER:  5588-4128 kcal /day (25-28 kcal/kg BW)  EPR:  55-65 grams protein/day (1-1.2 grams/kg IBW)  Intake/Comparative Standards: Per RD meal rounds: pt reports lunch of soup, crackers and chicken tenders. 2 recorded intake with average intake of 60%. Pt potentially meets ~ 55% estimated kcal needs and 60% estimated protein needs. Nutrition Diagnosis: Inadequate oral intake r/t decreased ability to consume sufficient oral intake as evidenced by estimates of insufficient intake of energy or high quality protein from diet when compared to requirements. Intervention:  1. Meals and snacks: Continue current diet. Pt declined offer of assistance with supper meal selection; patient will order supper meal later. 2.  Nutrition Supplement Therapy:  Declined Ensure Enlive on meal trays. Pt willing to try Ensure - flavor preferences noted-; plans to drink if po intake inadequate. Sent 3 different flavors to room for patient to try. 3.  Discharge nutrition plan: Continue Regular diet with nutrition supplements as needed.   4.  Coordination of Nutrition Care:  Inter[facility] Patricia Matt, [address] [place] [address], LD, MPH  [phone]

## 2017-11-07 NOTE — PROGRESS NOTES
Shift assessment complete. Pt alert and oriented x4, respirations present, even and unlabored, HOB elevated, pt denies any SOB at this time, S1&S2 auscultated, HR regular, abd soft, non- tender, Active BS in all 4 quadrants, No pressure ulcers or edema noted, pt is up ad michael to the bathroom, voiding, PICC in place and capped, pt denies any pain at this time, pt instructed to call for assistance, pt verbalizes understanding, bed low and locked, side rails x3, call light within reach.

## 2017-11-07 NOTE — PROGRESS NOTES
Consult received to remove chest port . Spoke with Colletta Ceo primary Rn and chest port removed by surgery . Also see notes commenting on removal at bedside .

## 2017-11-07 NOTE — PROGRESS NOTES
DANIKA spoke with Greg [facility]ca and faxed his orders for ABX to [facility]. DANIKA spoke with Kayla Echevarria who received orders and an RN will come to connect pt. DANIKA made referral to Bennie Stover with [facility], [place], operated by [facility].

## 2017-11-07 NOTE — PROGRESS NOTES
Hospitalist Progress Note     Admit Date:  2017 10:01 PM   Name:  Tor Sullivan   Age:  64 y.o.  :  1960   MRN:  [id number]   PCP:  Cleo Lanza MD  Treatment Team: Attending Provider: Des Zarate MD; Consulting Provider: Myrtle Toledo MD; Utilization Review: Britni Jerry RN; Consulting Provider: Manju Rothman MD    Subjective:           Ms. Pat Lee is a 65 yo female with PMH of metastatic colon cancer followed by Dr. Ryan Cisneros (brain/lung/left adrenal) with colo-vaginal fistula per 10-27-17 CT admitted with sepsis. BC 2/2 MSSA from port, UA mildly dirty, CXR shows known mets. Day 3 vancomycin-stopped /day 5 ancef. TTE no vegetation/ EF 40-45%, seen by ID, cardio consulted for BENTLEY that shows AV with small vegetation. Seen by surgery and port removed at bedside 11-3, no intervention needed for fistula at present until current issues cleared. Plans for home at discharge with home antibiotic infusions pending course. 17 - seen no new complaints- worried about new BP medication. Objective:     Patient Vitals for the past 24 hrs:   Temp Pulse Resp BP SpO2   17 1125 99.3 °F (37.4 °C) 80 18 125/76 94 %   17 0728 98.5 °F (36.9 °C) 80 18 143/83 93 %   17 0223 99.2 °F (37.3 °C) 79 18 118/78 92 %   17 2300 98.6 °F (37 °C) 74 18 136/82 93 %   17 1929 98.3 °F (36.8 °C) 88 20 144/75 94 %     Oxygen Therapy  O2 Sat (%): 94 % (17 1125)  Pulse via Oximetry: 96 beats per minute (17 1650)  O2 Device: Room air (17)  O2 Flow Rate (L/min): 2 l/min (17)  No intake or output data in the 24 hours ending 17 1450      General:    Well nourished. Alert    CV:   RRR. No murmur, rub, or gallop. Lungs:   CTAB. No wheezing, rhonchi, or rales. Abdomen:   Soft, nontender, nondistended. Normal BS  Extremities: Warm and dry. No cyanosis or edema. Skin:     No rashes or jaundice.      Data Review:  I have reviewed all labs, meds, telemetry events, and studies from the last 24 hours. No results found for this or any previous visit (from the past 24 hour(s)). All Micro Results     Procedure Component Value Units Date/Time    CULTURE, CATHETER TIP [[id number]]  (Abnormal)  (Susceptibility) Collected:  11/03/17 1406    Order Status:  Completed Specimen:  Catheter Tip Updated:  11/07/17 0726     Special Requests: NO SPECIAL REQUESTS        Culture result:         <15 CFU STAPHYLOCOCCUS AUREUS (A)    CULTURE, BLOOD [[id number]] Collected:  11/04/17 0521    Order Status:  Completed Specimen:  Blood from Blood Updated:  11/06/17 0742     Special Requests: LEFT ANTECUBITAL        Culture result: NO GROWTH 2 DAYS       CULTURE, BLOOD [[id number]] Collected:  11/04/17 0525    Order Status:  Completed Specimen:  Blood from Blood Updated:  11/06/17 0742     Special Requests: LEFT HAND        Culture result: NO GROWTH 2 DAYS       CULTURE, BLOOD [[id number]] Collected:  11/03/17 0552    Order Status:  Completed Specimen:  Blood from Blood Updated:  11/06/17 0742     Special Requests: LEFT ANTECUBITAL        Culture result: NO GROWTH 3 DAYS       CULTURE, BLOOD [[id number]] Collected:  11/03/17 0556    Order Status:  Completed Specimen:  Blood from Blood Updated:  11/06/17 0742     Special Requests: LEFT HAND        Culture result: NO GROWTH 3 DAYS       CULTURE, BLOOD [[id number]]  (Abnormal) Collected:  11/01/17 2220    Order Status:  Completed Specimen:  Blood from Blood Updated:  11/04/17 0723     Special Requests: --        NO SPECIAL REQUESTS  BLUE PORT       GRAM STAIN GRAM POSITIVE COCCI                 AEROBIC AND ANAEROBIC BOTTLES              RESULTS VERIFIED, PHONED TO AND READ BACK BY OCTAVIA KENNEDY [facility] [id number]. SBRAZEL     Culture result: STAPHYLOCOCCUS AUREUS (A)               REFER TO SPECIMEN NUMBER  [id number] FOR SUSCEPTIBILITY      CULTURE, BLOOD [[id number]]  (Abnormal)  (Susceptibility) Collected:  11/01/17 2220    Order Status:  Completed Specimen:  Blood from Blood Updated:  11/04/17 0722     Special Requests: BLUE PORT        GRAM STAIN         GRAM POSITIVE COCCI AEROBIC AND ANAEROBIC BOTTLES              RESULTS VERIFIED, PHONED TO AND READ BACK BY OCTAVIA Francois [place] [address] [id number]. SBRAZEL     Culture result: STAPHYLOCOCCUS AUREUS (A)                 MRSA target DNA sequences not detected: SA target DNA sequence detected within the sample. Test performed by PCR  Culture/Sensitivity to follow (A)    INFLUENZA A & B AG (RAPID TEST) [[id number]] Collected:  11/01/17 2245    Order Status:  Completed Specimen:  Nasopharyngeal from Nasal washing Updated:  11/01/17 2300     Influenza A Ag NEGATIVE          NEGATIVE FOR THE PRESENCE OF INFLUENZA A ANTIGEN  INFECTION DUE TO INFLUENZA A CANNOT BE RULED OUT. BECAUSE THE ANTIGEN PRESENT IN THE SAMPLE MAY BE BELOW  THE DETECTION LIMIT OF THE TEST. A NEGATIVE TEST IS PRESUMPTIVE AND IT IS RECOMMENDED THAT THESE RESULTS BE CONFIRMED BY VIRAL CULTURE OR AN FDA-CLEARED INFLUENZA A AND B MOLECULAR ASSAY. Influenza B Ag NEGATIVE          NEGATIVE FOR THE PRESENCE OF INFLUENZA B ANTIGEN  INFECTION DUE TO INFLUENZA B CANNOT BE RULED OUT. BECAUSE THE ANTIGEN PRESENT IN THE SAMPLE MAY BE BELOW  THE DETECTION LIMIT OF THE TEST. A NEGATIVE TEST IS PRESUMPTIVE AND IT IS RECOMMENDED THAT THESE RESULTS BE CONFIRMED BY VIRAL CULTURE OR AN FDA-CLEARED INFLUENZA A AND B MOLECULAR ASSAY.                Current Meds:  Current Facility-Administered Medications   Medication Dose Route Frequency    pantoprazole (PROTONIX) tablet 40 mg  40 mg Oral ACB&D    sodium chloride (NS) flush 5 mL  5 mL InterCATHeter Q8H    acetaminophen (TYLENOL) tablet 650 mg  650 mg Oral Q4H PRN    ALPRAZolam (XANAX) tablet 1 mg  1 mg Oral QID PRN    HYDROcodone-acetaminophen (NORCO)  mg tablet 1 Tab  1 Tab Oral Q4H PRN    NUTRITIONAL SUPPORT ELECTROLYTE PRN ORDERS   Does Not Apply PRN    sodium chloride (NS) flush 5-10 mL  5-10 mL InterCATHeter PRN    carvedilol (COREG) tablet 3.125 mg  3.125 mg Oral BID WITH MEALS    ceFAZolin (ANCEF) 2 g in sterile water (preservative free) injection  2 g IntraVENous Q8H    heparin (porcine) pf 600 Units  600 Units InterCATHeter Q8H       Other Studies (last 24 hours):  No results found.     Assessment and Plan:     Hospital Problems as of 11/7/2017  Date Reviewed: 11/1/2017          Codes Class Noted - Resolved POA    Systolic congestive heart failure ([facility] [address] ICD-10-CM: I50.20  ICD-9-CM: 428.20, 428.0  11/6/2017 - Present Unknown        * (Principal)Severe sepsis ([facility] [address] ICD-10-CM: A41.9, R65.20  ICD-9-CM: 038.9, 995.92  11/1/2017 - Present Yes        Colon cancer metastasized to [facility]) ICD-10-CM: C18.9, C79.31  ICD-9-CM: 153.9, 198.3  8/30/2017 - Present Yes        Pulmonary metastases ([facility]) ICD-10-CM: C78.00  ICD-9-CM: 197.0  8/17/2017 - Present Yes        Secondary malignant neoplasm of [facility]) ICD-10-CM: C79.31  ICD-9-CM: 198.3  8/17/2017 - Present               PLAN:    · Continue ancef per Id recommendations until 12/15/2017  · Repeat BC NGTD, PICC in place and ID has ordered home abx  · followup anemia, likely some dilutional component with known colonic malignancy continue to hold nsaids and continue ppi  · Oncology has evaluated, will need ongoing goals of care discussions  · Reinforced tobacco use cessation, weaned to RA off O2   · PT/OT eval  · Pt and her  states she can be discharged tomorrow when he gets back into town    DC planning/Dispo:home tomorrow    DVT ppx:  SCD    Signed:  Ana Juárez MD

## 2017-11-07 NOTE — PROGRESS NOTES
Infectious Disease Progress Note    Today's Date: 2017   Admit Date: 2017    Impression:   · MSSA bacteremia () from infected port, removed 11/3/17  · MSSA AVIE  · Metastatic colon cancer (brain / lung / left adrenal)  · Active chemotherapy with FOLFOX/bevicizumab  · Rectovaginal fistula  · PCN allergy    Plan:   · Continue cefazolin. I will write OPAT orders today for continuous infusion cefazolin through 12/15/17  · We will followup in 3 weeks in our office  · When arrangements can be finalized, she can be discharged today    Cefazolin 6g IV q24 hours by continuous infusion  End of therapy 12/15/17  Lab monitoring: BMP, CBC q Monday  ID followup 17 at 14:00    I will sign off and see outpatient. Call back for any concerns. Anti-infectives:     IV cefazolin    Subjective:   She is tolerating cefazolin without any complications. She has no other complaints. PICC was placed yesterday. Allergies   Allergen Reactions    Adhesive Tape-Silicones Rash     Bad red rash.  Bactrim [Sulfamethoprim Ds] Hives     Swelling and itching      Macrobid [Nitrofurantoin Monohyd/M-Cryst] Other (comments)     Felt  \"out of it\"    Pcn [Penicillins] Hives     Swelling and itching also. Pt has tolerated cephalosporins in the past.    Pyridium [Phenazopyridine] Hives     Also itching and swelling. Review of Systems:  Pertinent items are noted in the History of Present Illness. Objective:     Visit Vitals    /83 (BP 1 Location: Right arm, BP Patient Position: At rest)    Pulse 80    Temp 98.5 °F (36.9 °C)    Resp 18    Ht 5' 4\" (1.626 m)    Wt 71.8 kg (158 lb 3.2 oz)    SpO2 93%    BMI 27.15 kg/m2     Temp (24hrs), Av.7 °F (37.1 °C), Min:98.3 °F (36.8 °C), Max:99.2 °F (37.3 °C)       Lines:  Central Venous Catheter:  R chest port          Physical Exam:    General:  Alert, cooperative, in no distress   Eyes:  Sclera anicteric.    Mouth/Throat: oral pharynx clear   Neck: Supple   Lungs:   Clear to auscultation anteriorly   CV:  Regular rate and rhythm,no murmur, click, rub or gallop   Abdomen:   non-distended   Extremities: No cyanosis or edema   Skin: no acute rash or lesions   Lymph nodes:    Musculoskeletal: No swelling or deformity   Lines/Devices:  Intact, no erythema, drainage or tenderness   Psych: Alert and oriented, normal mood affect given the setting         Data Review:     CBC:  Recent Labs      11/06/17 0616 11/05/17 0520   HGB  8.9*  9.1*   HCT  27.1*  26.1*       BMP:  No results for input(s): CREA, BUN, NA, K, CL, CO2, AGAP, GLU in the last 72 hours. LFTS:  No results for input(s): TBILI, ALT, SGOT, AP, TP, ALB in the last 72 hours.     Microbiology:     All Micro Results     Procedure Component Value Units Date/Time    CULTURE, CATHETER TIP [[id number]]  (Abnormal)  (Susceptibility) Collected:  11/03/17 1406    Order Status:  Completed Specimen:  Catheter Tip Updated:  11/07/17 0726     Special Requests: NO SPECIAL REQUESTS        Culture result:         <15 CFU STAPHYLOCOCCUS AUREUS (A)    CULTURE, BLOOD [[id number]] Collected:  11/04/17 0521    Order Status:  Completed Specimen:  Blood from Blood Updated:  11/06/17 0742     Special Requests: LEFT ANTECUBITAL        Culture result: NO GROWTH 2 DAYS       CULTURE, BLOOD [[id number]] Collected:  11/04/17 0525    Order Status:  Completed Specimen:  Blood from Blood Updated:  11/06/17 0742     Special Requests: LEFT HAND        Culture result: NO GROWTH 2 DAYS       CULTURE, BLOOD [[id number]] Collected:  11/03/17 0552    Order Status:  Completed Specimen:  Blood from Blood Updated:  11/06/17 0742     Special Requests: LEFT ANTECUBITAL        Culture result: NO GROWTH 3 DAYS       CULTURE, BLOOD [[id number]] Collected:  11/03/17 0556    Order Status:  Completed Specimen:  Blood from Blood Updated:  11/06/17 0742     Special Requests: LEFT HAND        Culture result: NO GROWTH 3 DAYS       CULTURE, BLOOD [[id number]] (Abnormal) Collected:  11/01/17 2220    Order Status:  Completed Specimen:  Blood from Blood Updated:  11/04/17 0723     Special Requests: --        NO SPECIAL REQUESTS  BLUE PORT       GRAM STAIN GRAM POSITIVE COCCI                 AEROBIC AND ANAEROBIC BOTTLES              RESULTS VERIFIED, PHONED TO AND READ BACK BY [address] [id number]. SBRAZEL     Culture result: STAPHYLOCOCCUS AUREUS (A)               REFER TO SPECIMEN NUMBER  [id number] FOR SUSCEPTIBILITY      CULTURE, BLOOD [[id number]]  (Abnormal)  (Susceptibility) Collected:  11/01/17 2220    Order Status:  Completed Specimen:  Blood from Blood Updated:  11/04/17 0722     Special Requests: BLUE PORT        GRAM STAIN         GRAM POSITIVE COCCI AEROBIC AND ANAEROBIC BOTTLES              RESULTS VERIFIED, PHONED TO AND READ BACK BY [facility] [id number]. SBRAZEL     Culture result: STAPHYLOCOCCUS AUREUS (A)                 MRSA target DNA sequences not detected: SA target DNA sequence detected within the sample. Test performed by PCR  Culture/Sensitivity to follow (A)    INFLUENZA A & B AG (RAPID TEST) [[id number]] Collected:  11/01/17 2245    Order Status:  Completed Specimen:  Nasopharyngeal from Nasal washing Updated:  11/01/17 2309     Influenza A Ag NEGATIVE          NEGATIVE FOR THE PRESENCE OF INFLUENZA A ANTIGEN  INFECTION DUE TO INFLUENZA A CANNOT BE RULED OUT. BECAUSE THE ANTIGEN PRESENT IN THE SAMPLE MAY BE BELOW  THE DETECTION LIMIT OF THE TEST. A NEGATIVE TEST IS PRESUMPTIVE AND IT IS RECOMMENDED THAT THESE RESULTS BE CONFIRMED BY VIRAL CULTURE OR AN FDA-CLEARED INFLUENZA A AND B MOLECULAR ASSAY. Influenza B Ag NEGATIVE          NEGATIVE FOR THE PRESENCE OF INFLUENZA B ANTIGEN  INFECTION DUE TO INFLUENZA B CANNOT BE RULED OUT. BECAUSE THE ANTIGEN PRESENT IN THE SAMPLE MAY BE BELOW  THE DETECTION LIMIT OF THE TEST.   A NEGATIVE TEST IS PRESUMPTIVE AND IT IS RECOMMENDED THAT THESE RESULTS BE CONFIRMED BY VIRAL CULTURE OR AN FDA-CLEARED INFLUENZA A AND B MOLECULAR ASSAY. Imagin/6/17:  SUMMARY:  -  Left ventricle: Systolic function was normal. Ejection fraction was estimated in the range of 55 % to 60 %. There were no regional wall motion abnormalities. -  Aortic valve: There was mild regurgitation. There was a small, mobile vegetation on the noncoronary cusp, on the left ventricular aspect. -  Mitral valve: There was mild regurgitation. There was no evidence for vegetation. -  Tricuspid valve: There was mild regurgitation. There was no evidence for vegetation. -  Pulmonic valve: There was no evidence for vegetation. 17 TTE: SUMMARY:  -  Left ventricle: Systolic function was mildly reduced. Ejection fraction was estimated in the range of 40 % to 45 %. There was hypokinesis of the basal-mid anteroseptal wall(s). -  Aortic valve: There was no evidence for vegetation. -  Mitral valve: There was no evidence for vegetation. CT chest/abd/pelvis (10/27/17)  IMPRESSION:    1. Rectovaginal fistula. 2.  Decreased size of the sigmoid colon mass and pericolic adenopathy. 3.  Metastatic disease in the lungs is overall slightly improved.   4.  Stable left adrenal metastatic lesion.       Signed By: Jason Colon MD     2017

## 2017-11-08 NOTE — PROGRESS NOTES
Discharge instructions and prescriptions provided and explained to the pt. Med side effect sheet reviewed. Opportunity for questions provided. Pt is taking a shower before she leaves. Instructed to call once ready to leave.

## 2017-11-08 NOTE — PROGRESS NOTES
Pt discharging today with [address] and [facility] for Infusion. Sw spoke with [facility] this am. Pt's  is coming home today a day early and they will pick him up from the airport so her caregiver is secured. [facility] coming to hospital to connect her to her continuous infusion. All paperwork received. No new needs iden.  Sis Overton

## 2017-11-08 NOTE — PROGRESS NOTES
AM Assessment. Pt. A/O X4. Respirations even and unlabored. Lungs clear, slightly diminished in lower lobes. Abd. Soft and non tender. Pt. Has no complaints at this time. Call light within reach.

## 2017-11-08 NOTE — DISCHARGE INSTRUCTIONS
DISCHARGE SUMMARY from Nurse    The following personal items are in your possession at time of discharge:    Dental Appliances: None        Home Medications: None  Jewelry: None  Clothing: None  Other Valuables: None             PATIENT INSTRUCTIONS:    After general anesthesia or intravenous sedation, for 24 hours or while taking prescription Narcotics:  · Limit your activities  · Do not drive and operate hazardous machinery  · Do not make important personal or business decisions  · Do  not drink alcoholic beverages  · If you have not urinated within 8 hours after discharge, please contact your surgeon on call. Report the following to your surgeon:  · Excessive pain, swelling, redness or odor of or around the surgical area  · Temperature over 100.5  · Nausea and vomiting lasting longer than 4 hours or if unable to take medications  · Any signs of decreased circulation or nerve impairment to extremity: change in color, persistent  numbness, tingling, coldness or increase pain  · Any questions        What to do at Home:  Recommended activity: Activity as tolerated, per MD    If you experience any of the following symptoms fever>101, pain unrelieved with medication, nausea/vomiting, shortness of breath, dizziness/fainting, chest pain. , please follow up with your doctor. *  Please give a list of your current medications to your Primary Care Provider. *  Please update this list whenever your medications are discontinued, doses are      changed, or new medications (including over-the-counter products) are added. *  Please carry medication information at all times in case of emergency situations. These are general instructions for a healthy lifestyle:    No smoking/ No tobacco products/ Avoid exposure to second hand smoke    Surgeon General's Warning:  Quitting smoking now greatly reduces serious risk to your health.     Obesity, smoking, and sedentary lifestyle greatly increases your risk for illness    A healthy diet, regular physical exercise & weight monitoring are important for maintaining a healthy lifestyle    You may be retaining fluid if you have a history of heart failure or if you experience any of the following symptoms:  Weight gain of 3 pounds or more overnight or 5 pounds in a week, increased swelling in our hands or feet or shortness of breath while lying flat in bed. Please call your doctor as soon as you notice any of these symptoms; do not wait until your next office visit. Recognize signs and symptoms of STROKE:    F-face looks uneven    A-arms unable to move or move unevenly    S-speech slurred or non-existent    T-time-call 911 as soon as signs and symptoms begin-DO NOT go       Back to bed or wait to see if you get better-TIME IS BRAIN. Warning Signs of HEART ATTACK     Call 911 if you have these symptoms:   Chest discomfort. Most heart attacks involve discomfort in the center of the chest that lasts more than a few minutes, or that goes away and comes back. It can feel like uncomfortable pressure, squeezing, fullness, or pain.  Discomfort in other areas of the upper body. Symptoms can include pain or discomfort in one or both arms, the back, neck, jaw, or stomach.  Shortness of breath with or without chest discomfort.  Other signs may include breaking out in a cold sweat, nausea, or lightheadedness. Don't wait more than five minutes to call 911 - MINUTES MATTER! Fast action can save your life. Calling 911 is almost always the fastest way to get lifesaving treatment. Emergency Medical Services staff can begin treatment when they arrive -- up to an hour sooner than if someone gets to the hospital by car. The discharge information has been reviewed with the patient. The patient verbalized understanding. Discharge medications reviewed with the patient and appropriate educational materials and side effects teaching were provided.                Sepsis: Care Instructions  Your Care Instructions    Sepsis is an infection that has spread throughout your body. It is a life-threatening condition and often causes extremely low blood pressure. This can lead to problems with many different organs. The cause of sepsis is not always clear, but it can happen as part of a long-term or sudden illness. Sometimes even a mild illness can lead to sepsis. Follow-up care is a key part of your treatment and safety. Be sure to make and go to all appointments, and call your doctor if you are having problems. It's also a good idea to know your test results and keep a list of the medicines you take. How can you care for yourself at home? · If your doctor prescribed antibiotics, take them as directed. Do not stop taking them just because you feel better. You need to take the full course of antibiotics. · Drink plenty of fluids, enough so that your urine is light yellow or clear like water. Choose water or caffeine-free clear liquids until you feel better. If you have kidney, heart, or liver disease and have to limit fluids, talk with your doctor before you increase your fluid intake. You can try rehydration drinks, such as Gatorade or Powerade. · Do not drink alcohol. · Eat a healthy diet. Include fruits, vegetables, and whole grains in your diet every day. · Walking is an easy way to get exercise. Gradually increase the amount you walk every day. Make sure your doctor knows that you are starting an exercise program.  · Do not smoke or use other tobacco products. If you need help quitting, talk to your doctor about stop-smoking programs and medicines. These can increase your chances of quitting for good. When should you call for help? Call 911 anytime you think you may need emergency care. For example, call if:  ? · You passed out (lost consciousness). ?Call your doctor now or seek immediate medical care if:  ? · You have a fever or chills.    ? · You have cool, pale, or clammy skin.   ? · You are dizzy or lightheaded, or you feel like you may faint. ? · You have any new symptoms, such as a cough, pain in one part of your body, or urinary problems. ? Watch closely for changes in your health, and be sure to contact your doctor if:  ? · You do not get better as expected. Where can you learn more? Go to http://marta-aishwarya.info/. Enter S102 in the search box to learn more about \"Sepsis: Care Instructions. \"  Current as of: March 20, 2017  Content Version: 11.4  © 6376-7297 [employer]. Care instructions adapted under license by [employer] (which disclaims liability or warranty for this information). If you have questions about a medical condition or this instruction, always ask your healthcare professional. [address] any warranty or liability for your use of this information.

## 2017-11-08 NOTE — PROGRESS NOTES
Discharge instructions and prescriptions provided and explained to the pt. Med side effect sheet reviewed. Opportunity for questions provided. Pt is waiting on iv med RN and her ride. Instructed to call once ready to leave.

## 2017-11-08 NOTE — DISCHARGE SUMMARY
Patient ID:  Eduardo Joya  [id number]  22 y.o.  1960  Admit date: 11/1/2017 10:01 PM  Discharge date and time: 11/8/2017  Attending: Baudilio Wilkes MD  PCP:  Lester Negrete MD  Treatment Team: Attending Provider: Baudilio Wilkes MD; Consulting Provider: Domenic Marsh MD; Utilization Review: Kiah Howard RN; Consulting Provider: Wilner Ramirez MD    Principal Diagnosis Severe sepsis [facility])   Principal Problem:    Severe sepsis [facility]) (11/1/2017)    Active Problems:    Pulmonary metastases ([facility] [address] (8/17/2017)      Colon cancer metastasized to brain [facility]) (3/67/5220)      Systolic congestive heart failure ([facility] [address] (11/6/2017)             Hospital Course:  Please refer to the admission H&P for details of presentation. In summary, the patient Ms. Lorrie Abraham is a 65 yo female with PMH of metastatic colon cancer followed by Dr. Catherine Escamilla (brain/lung/left adrenal) with colo-vaginal fistula per 10-27-17 CT admitted with sepsis. BC 2/2 MSSA from port, UA mildly dirty, CXR shows known mets. Day 3 vancomycin-stopped /day 5 ancef. TTE no vegetation/ EF 40-45%, seen by ID, cardio consulted for BENTLEY that shows AV with small vegetation. Seen by surgery and port removed at bedside 11-3, no intervention needed for fistula at present until current issues cleared. Seen By Infectious disease and plan for home at discharge with home antibiotic infusions until 12/15/2017 and follow with ID in 3 weeks. Follow up with her oncologist as scheduled. There was some concern for a decreased Ef on her echo and she was started on coreg but Maryam Hard showed that the ef was normal- d/w cardiology and the coreg was discontinued. BP borderline here. Pt will record as an outpt and follow up with her PCP. Significant Diagnostic Studies:       Labs: Results:       Chemistry No results for input(s): GLU, NA, K, CL, CO2, BUN, CREA, CA, AGAP, BUCR, TBIL, GPT, AP, TP, ALB, GLOB, AGRAT in the last 72 hours.    CBC w/Diff Recent Labs 11/06/17   0616   HGB  8.9*   HCT  27.1*      Cardiac Enzymes No results for input(s): CPK, CKND1, HARRY in the last 72 hours. No lab exists for component: CKRMB, TROIP   Coagulation No results for input(s): PTP, INR, APTT in the last 72 hours. No lab exists for component: INREXT    Lipid Panel No results found for: CHOL, CHOLPOCT, CHOLX, CHLST, CHOLV, 605365, HDL, LDL, LDLC, DLDLP, 187801, VLDLC, VLDL, TGLX, TRIGL, TRIGP, TGLPOCT, CHHD, CHHDX   BNP No results for input(s): BNPP in the last 72 hours. Liver Enzymes No results for input(s): TP, ALB, TBIL, AP, SGOT, GPT in the last 72 hours. No lab exists for component: DBIL   Thyroid Studies No results found for: T4, T3U, TSH, TSHEXT       Results for orders placed or performed during the hospital encounter of 11/01/17   EKG, 12 LEAD, INITIAL   Result Value Ref Range    Ventricular Rate 117 BPM    Atrial Rate 117 BPM    P-R Interval 118 ms    QRS Duration 72 ms    Q-T Interval 288 ms    QTC Calculation (Bezet) 401 ms    Calculated P Axis 47 degrees    Calculated R Axis 58 degrees    Calculated T Axis 41 degrees    Diagnosis       Sinus tachycardia  Low voltage QRS  Borderline ECG  When compared with ECG of 15-AUG-2017 16:53,  No significant change was found  Confirmed by Savanah Pearson MD (), Chioma Broussard ([id number]) on 11/2/2017 9:50:53 AM       CT Results (most recent):    Results from [place] encounter on 10/27/17   CT CHEST ABD PELV W CONT   Narrative CT of the Chest, Abdomen, and Pelvis    INDICATION:  Metastatic colon cancer, vaginal discharge concerning for  rectovaginal fistula    Multiple axial images were obtained through the chest, abdomen, and pelvis after  intravenous injection of 125cc of optiray 350. Radiation dose reduction  techniques were used for this study:   All CT scans performed at this facility  use one or all of the following: Automated exposure control, adjustment of the  mA and/or kVp according to patient's size, iterative reconstruction. Compared  with 08/16/2017. FINDINGS:  Chest CT: Again noted are bilateral pulmonary nodules consistent with metastatic  disease. Nodules are stable to slightly smaller. There is a stable 10 mm  nodule in the anterior right upper lobe. 8 mm nodule in the posterior left  upper lobe measured 10 mm on the prior exam.  No new nodules are seen. There  are no developing infiltrates. There is no effusion. There is no significant  adenopathy. There are no bony lesions. Abdomen CT:  There are no significant lesions in the liver or spleen. Gallbladder is absent. The left adrenal mass is stable in size, 3.2 cm. It  does show less enhancement than on the prior study. Right adrenal gland and  pancreas are unremarkable. There is normal enhancement of the kidneys. There is  no hydronephrosis. There is no adenopathy. There are no inflammatory changes  or fluid collections in the abdomen. Pelvis CT: Patient is post hysterectomy. While the sigmoid colon mass is mostly  resolved, there is now a rectovaginal fistula. There is a tract between the mid  sigmoid colon and vagina. There is extensive air in the vagina. The pericolic  lymph node mass is smaller, 18 x 16 mm. There are no bony lesions. Impression IMPRESSION:    1. Rectovaginal fistula. 2.  Decreased size of the sigmoid colon mass and pericolic adenopathy. 3.  Metastatic disease in the lungs is overall slightly improved. 4.  Stable left adrenal metastatic lesion. VAS/US Results (most recent):  No results found for this or any previous visit. XR Results (most recent):    Results from Hospital Encounter encounter on 11/01/17   XR CHEST PORT   Narrative CHEST X-RAY, one view. HISTORY:  Shortness of breath and fever. TECHNIQUE:  AP upright portable view. COMPARISON: None. Recent chest CT is reviewed. FINDINGS:   There are several pulmonary nodules. Lungs are otherwise clear. No  airspace disease. Costophrenic angles are sharp. Heart and pulmonary vasculature  is unremarkable. Impression IMPRESSION:  Negative for pneumonia. Pulmonary metastases demonstrated. Discharge Exam:  Visit Vitals    /75 (BP 1 Location: Right arm, BP Patient Position: At rest;Head of bed elevated (Comment degrees))    Pulse 98    Temp 98.4 °F (36.9 °C)    Resp 18    Ht 5' 4\" (1.626 m)    Wt 71.8 kg (158 lb 3.2 oz)    SpO2 96%    BMI 27.15 kg/m2     General appearance: alert, cooperative, no distress, appears stated age  Lungs: clear to auscultation bilaterally  Heart: regular rate and rhythm, S1, S2 normal, no murmur, click, rub or gallop  Abdomen: soft, non-tender. Bowel sounds normal. No masses,  no organomegaly  Extremities: no cyanosis or edema  Neurologic: Grossly normal    Disposition: home  Discharge Condition: stable  Patient Instructions:   Current Discharge Medication List      START taking these medications    Details   sterile water (preservative free) soln 5 mL with ceFAZolin 1 gram solr 2 g 2 g by IntraVENous route every eight (8) hours for 38 days. Actual script being prescribed by Dr. Lee Ann Keller (ID)  Qty: 45 Package, Refills: 0         CONTINUE these medications which have NOT CHANGED    Details   albuterol (PROVENTIL HFA, VENTOLIN HFA, PROAIR HFA) 90 mcg/actuation inhaler Take 2 Puffs by inhalation every six (6) hours as needed for Wheezing. Qty: 1 Inhaler, Refills: 1      ALPRAZolam (XANAX XR) 1 mg XR tablet 1 to 2 po bid, prn      HYDROcodone-acetaminophen (NORCO)  mg tablet Take 1 Tab by mouth every four (4) hours as needed. Max Daily Amount: 6 Tabs. Qty: 180 Tab, Refills: 0    Associated Diagnoses: Secondary malignant neoplasm of brain ([address] Mass of colon; Malignant neoplasm metastatic to lung, unspecified laterality (HCC)      ondansetron (ZOFRAN ODT) 8 mg disintegrating tablet Take 1 Tab by mouth every eight (8) hours as needed for Nausea.  Indications: PREVENTION OF POST-OPERATIVE NAUSEA AND VOMITING  Qty: 60 Tab, Refills: 2    Associated Diagnoses: Colon cancer metastasized to brain (HCC)      lidocaine-prilocaine (EMLA) topical cream Apply  to affected area as needed for Pain. Qty: 30 g, Refills: 0    Associated Diagnoses: Colon cancer metastasized to brain ([address]      dexamethasone (DECADRON) 2 mg tablet Take 1 Tab by mouth three (3) times daily (after meals). Qty: 20 Tab, Refills: 0      esomeprazole (NEXIUM) 40 mg capsule Take 40 mg by mouth daily. DIFLUNISAL (DOLOBID PO) Take 500 mg by mouth two (2) times a day. Activity: as tolerated  Diet: cardiac  Wound Care: Picc dressing per iv team services  Check BP qam and qpm for 1 week @ or around the same time. Record numbers an take to your pcp on follow up.     Follow-up  ·   PCP in 1 week  · Dr. [facility] as scheduled  · Oncology as scheduled  Time spent to discharge patient greater than 30 minutes  Signed:  Mukesh Rene MD  11/8/2017  10:41 AM

## 2017-11-10 NOTE — TELEPHONE ENCOUNTER
[address] Pharmacist consult. Ms. Jigna Vergara was discharged prior to my consult. I have called and only gotten voice mail. I have left my name and cell phone number. I asked her to call me with any medication questions or issues.   Her only new medication is Cefazolin 2g q8hr IV.  11/10/17  [id number]

## 2017-11-13 NOTE — PROGRESS NOTES
[facility] [place] - INPATIENT  Face to Face Encounter    Patients Name: Dipti Ledezma    YOB: 1960    Ordering Physician:   Amita Machuca    Primary Diagnosis: Severe sepsis [facility])    Date of Face to Face:   11-                           Face to Face Encounter findings are related to primary reason for home care:   yes. 1. I certify that the patient needs intermittent care as follows: skilled nursing care:  PICC care and antibiotic administration    2. I certify that this patient is homebound, that is: 1) patient requires the use of a indep device, special transportation, or assistance of another to leave the home; or 2) patient's condition makes leaving the home medically contraindicated; and 3) patient has a normal inability to leave the home and leaving the home requires considerable and taxing effort. Patient may leave the home for infrequent and short duration for medical reasons, and occasional absences for non-medical reasons. Homebound status is due to the following functional limitations: Patient with strength deficits limiting the performance of all ADL's without caregiver assistance or the use of an assistive device. 3. I certify that this patient is under my care and that I, or a nurse practitioner or  [id number], or clinical nurse specialist, or certified nurse midwife, working with me, had a Face-to-Face Encounter that meets the physician Face-to-Face Encounter requirements. The following are the clinical findings from the [address] [place] [address] encounter that support the need for skilled services and is a summary of the encounter:   See Progress Notes    See attached progess note      [address], LBSW  11/13/2017      THE FOLLOWING TO BE COMPLETED BY THE COMMUNITY PHYSICIAN:    I concur with the findings described above from the [facility] encounter that this patient is homebound and in need of a skilled service.     Certifying Physician: _____________________________________ Printed Certifying Physician Name: _____________________________________    Date: _________________

## 2017-12-06 NOTE — TELEPHONE ENCOUNTER
Mrs. Yuriy Durán said she was very nauseated and did not think she could stand the IV Cefazolin until 12/15. She said she could barely keep any liquids down. She has call into ID MD's office and explained to nurse the issue. She is waiting on the doctor to call back. She said all her lab work was good at appointment 11/29. She is taking the ondansetron, but it does not seem to help. It was actually prescribed after her chemo treatment. She said the chemo did not make her as sick as she is now. I will check back with her.

## 2017-12-07 PROBLEM — I33.0 ENDOCARDITIS DUE TO METHICILLIN SUSCEPTIBLE STAPHYLOCOCCUS AUREUS (MSSA): Status: ACTIVE | Noted: 2017-01-01

## 2017-12-07 PROBLEM — B95.61 ENDOCARDITIS DUE TO METHICILLIN SUSCEPTIBLE STAPHYLOCOCCUS AUREUS (MSSA): Status: ACTIVE | Noted: 2017-01-01

## 2017-12-08 NOTE — PROGRESS NOTES
Arrived to the [facility]. Daptomycin completed. Patient tolerated well. Any issues or concerns during appointment: none. Patient does not have any future appointments in outpatient infusion center at this time. Discharged ambulatory.

## 2017-12-22 NOTE — PROGRESS NOTES
Pt here from [facility] who had SRS to the brain for two parietal lesions, which ended 9/2017. Pt's 12/18/17 MRI Brain showed a new left cerebellar lesion, and a decrease in size of the two treated lesions. Pt has metastatic colon cancer. Her Chemo is on hold for now.

## 2017-12-22 NOTE — CONSULTS
Patient: Georgie Gordon MRN: [id number]  SSN: [id number]    YOB: 1960  Age: 62 y.o. Sex: female      Other Providers:  Lili Benitez MD    CHIEF COMPLAINT: Brain metastases     DIAGNOSIS: Presumed metastatic colorectal adenocarcinoma    SITE TREATED AND DOSE DELIVERED: Brain lesions ion the right and left parietal lobes received 22 Gy each in a single fraction of CyberKnife SRS using 6 MV photons prescribed to the 81% isodose line. The plan required 79 beams and had an estimated treatment time of 32 minutes. Treatment was delivered on 09/12/17. HISTORY OF PRESENT ILLNESS:  Georgie Gordon is a 62 y.o. female who I am seeing at the request of Dr. Kain Dalton regarding the possible radiation therapy and treatment of her brain metastases. On 08/15/17 she was noted to have numbness in her right hand and around her lips on the right side of her face. She also noted diminished  strength on the right. CT scan of the head on 08/15/17 showed 2 hyperdense masses in the parietal lobes bilaterally. These measured 2.3 x 1.4 cm in the left parietal lobe and 1.4 x 1.1 cm in the right parietal lobe. Both were associated with edema, greater on the left. These were felt to be suspicious for metastatic disease. MRI of the brain on 08/15/17 demonstrated intra-axial lesions in the superior bilateral parietal lobes measuring up to 2.3 cm on the left side and 1.2 cm on the right side. CT scan of the chest, abdomen and pelvis on 08/16/17 a suspected sigmoid carcinoma with adjacent metastatic adenopathy. Numerous pulmonary metastatic nodules were seen. In addition, left adrenal mass measuring 3.2 cm was felt to possibly represent further metastatic disease. CEA was found to be elevated at a level of 59.1. She was evaluated by neurosurgery and felt not to be a good surgical candidate based on the location of the larger metastasis near the motor strip on the left.  PET/CT scan on 08/24/17 showed metastatic disease as evidenced by bilateral pulmonary nodules, left adrenal metastatic focus and small bowel mesenteric FDG avid lymph node. A primary site was seen at the rectosigmoid junction with adjacent regional metastatic adenopathy. The pattern of the disease was felt to be somewhat atypical for a metastatic colon cancer considering the involvement of the left adrenal and mediastinal lymph nodes. Brain lesions ion the right and left parietal lobes received 22 Gy each in a single fraction of CyberKnife SRS using 6 MV photons prescribed to the 81% isodose line. The plan required 79 beams and had an estimated treatment time of 32 minutes. Treatment was delivered on 09/12/17. She tolerated treatment without acute incident. She had a few days with moderate fatigue. 3 weeks following treatment she began to have some patchy loss of hair in the treatment area. INTERVAL HISTORY: Ms. Levi Figueroa returns for 4 months after completion of CyberKnife SRS to 2 brain metastases. Following SRS, she was placed on FOLFOX and bevacizumab which she tolerated reasonably well. CT scan of the chest, abdomen and pelvis on 10/27/17 showed decreased size of the sigmoid colon mass and pericolic adenopathy. Metastatic disease in the lungs was overall slightly improved. Left adrenal metastatic lesion was stable. A rectovaginal fistula was noted. However, she developed MSSA endocarditis and her chemotherapy is currently on hold    MRI of the brain on 12/18/17 showed good response to treatment in the 2 treated brain metastases, but also showed a new 5 mm suspected metastases in the peripheral left cerebellum.     At this time, Ms. Levi Figueroa is feeling reasonably well. Jacoby Leong has had no cough or shortness of breath.  She has had no fevers, chills or night sweats.  She has noted no change in cognitive function.  She has no vision changes.  She denies gait instability or falls.        PAST MEDICAL HISTORY:    Past Medical History:   Diagnosis Date    Arthritis  Colon cancer ([facility] [address]     Metastatic to Lungs and Brain    Gastrointestinal disorder     Reflux    GERD (gastroesophageal reflux disease)     Other ill-defined conditions(799.89)     Herniated disk  lumbar and cervical       PAST SURGICAL HISTORY:   Past Surgical History:   Procedure Laterality Date    HX ADENOIDECTOMY      HX GYN      Hysterectomy-partial    HX KNEE ARTHROSCOPY Left 2014    HX LAP CHOLECYSTECTOMY      HX TONSILLECTOMY      HX VASCULAR ACCESS      LYN BIOPSY BREAST STEREOTACTIC Left 10-8-2014       MEDICATIONS:     Current Outpatient Prescriptions:     HYDROcodone-acetaminophen (NORCO)  mg tablet, Take 1 Tab by mouth every four (4) hours as needed. Max Daily Amount: 6 Tabs., Disp: 180 Tab, Rfl: 0    0.9 % SODIUM CHLORIDE (SALINE FLUSH IJ), 10 mL by IntraVENous route daily. daily to unused port, and after blood draws, Disp: , Rfl:     promethazine (PHENERGAN) 25 mg tablet, Take 25 mg by mouth every six (6) hours as needed for Nausea., Disp: , Rfl:     albuterol (PROVENTIL HFA, VENTOLIN HFA, PROAIR HFA) 90 mcg/actuation inhaler, Take 2 Puffs by inhalation every six (6) hours as needed for Wheezing., Disp: 1 Inhaler, Rfl: 1    ALPRAZolam (XANAX XR) 1 mg XR tablet, 1 to 2 po bid, prn, Disp: , Rfl:     ondansetron (ZOFRAN ODT) 8 mg disintegrating tablet, Take 1 Tab by mouth every eight (8) hours as needed for Nausea. Indications: PREVENTION OF POST-OPERATIVE NAUSEA AND VOMITING, Disp: 60 Tab, Rfl: 2    dexamethasone (DECADRON) 2 mg tablet, Take 1 Tab by mouth three (3) times daily (after meals). (Patient taking differently: Take 2 mg by mouth two (2) times daily (with meals). ), Disp: 20 Tab, Rfl: 0    esomeprazole (NEXIUM) 40 mg capsule, Take 40 mg by mouth daily. , Disp: , Rfl:     DIFLUNISAL (DOLOBID PO), Take 500 mg by mouth two (2) times a day., Disp: , Rfl:     ALLERGIES:   Allergies   Allergen Reactions    Adhesive Tape-Silicones Rash     Bad red rash.      Bactrim [Sulfamethoprim Ds] Hives     Swelling and itching      Macrobid [Nitrofurantoin Monohyd/M-Cryst] Other (comments)     Felt  \"out of it\"    Pcn [Penicillins] Hives     Swelling and itching also. Pt has tolerated cephalosporins in the past.    Pyridium [Phenazopyridine] Hives     Also itching and swelling. SOCIAL HISTORY:   Social History     Social History    Marital status:      Spouse name: N/A    Number of children: N/A    Years of education: N/A     Occupational History    Not on file. Social History Main Topics    Smoking status: Former Smoker     Packs/day: 0.25     Years: 10.00     Quit date: 8/13/2017    Smokeless tobacco: Never Used    Alcohol use No    Drug use: No    Sexual activity: Yes     Birth control/ protection: Surgical     Other Topics Concern    Not on file     Social History Narrative       FAMILY HISTORY:   Family History   Problem Relation Age of Onset   [facility] Stroke Mother     Hypertension Mother     Diabetes Mother     Heart Disease Father     Hypertension Father     Cancer Brother     Breast Cancer Neg Hx        REVIEW OF SYSTEMS: Please see the completed review of systems sheet in the chart that I have reviewed today. PHYSICAL EXAMINATION:   ECOG Performance status 1-2. VITAL SIGNS:   Visit Vitals    /90    Pulse (!) 111    Temp 98.1 °F (36.7 °C)    Resp 18    Wt 69.4 kg (153 lb)    BMI 26.26 kg/m2        GENERAL: The patient is well-developed, ambulatory, alert and in no acute distress. HEENT: Head is normocephalic, atraumatic. Pupils are equal, round and reactive to light and accommodation. Extraocular movement intact. Hearing is intact bilaterally to finger rub. Oral cavity reveals no lesions. Mucous membranes are moist. NECK: Neck is supple with no masses. CARDIOVASCULAR: Heart has regular rate and rhythm. There are no murmurs, rubs or gallops. Radial pulses are 2+ RESPIRATORY: Lungs are clear to auscultation and percussion.  There is normal respiratory effort. LYMPHATIC: There is no cervical or supraclavicular lymphadenopathy bilaterally. MUSCULOSKELETAL: Extremities reveal no cyanosis, clubbing or edema.  is 5+/5. NEURO:  Cranial nerves II-XII grossly intact. Muscular strength and sensation are intact throughout all four extremities. She is able to perform finger to nose test without difficulty. She has minimal difficulty with tandem gait. PATHOLOGY:    08/23/17:    DIAGNOSIS   RECTOSIGMOID MASS BIOPSIES: AT LEAST ADENOCARCINOMA IN SITU. LABORATORY:   Lab Results   Component Value Date/Time    Sodium 140 12/11/2017 12:20 PM    Potassium 3.8 12/11/2017 12:20 PM    Chloride 108 12/11/2017 12:20 PM    CO2 26 12/11/2017 12:20 PM    Anion gap 6 12/11/2017 12:20 PM    Glucose 98 12/11/2017 12:20 PM    BUN 7 12/11/2017 12:20 PM    Creatinine 0.35 12/11/2017 12:20 PM    GFR est AA >60 12/11/2017 12:20 PM    GFR est non-AA >60 12/11/2017 12:20 PM    Calcium 8.5 12/11/2017 12:20 PM    Magnesium 1.8 11/02/2017 01:54 AM    Albumin 2.5 12/11/2017 12:20 PM    Protein, total 6.5 12/11/2017 12:20 PM    Globulin 4.0 12/11/2017 12:20 PM    A-G Ratio 0.6 12/11/2017 12:20 PM    AST (SGOT) 29 12/11/2017 12:20 PM    ALT (SGPT) 12 12/11/2017 12:20 PM     Lab Results   Component Value Date/Time    WBC 6.7 12/11/2017 12:20 PM    HGB 13.4 12/11/2017 12:20 PM    HCT 41.6 12/11/2017 12:20 PM    PLATELET 537 98/82/7616 12:20 PM       RADIOLOGY:      12/18/17: MRI BRAIN WITHOUT AND WITH CONTRAST 12/18/2017     HISTORY: Colon cancer with brain metastases. CyberKnife therapy 4 months ago.     TECHNIQUE: Sagittal and axial T1-weighted, axial T2-weighted, axial FLAIR, axial  T2-weighted gradient-echo, axial diffusion weighted images with ADC maps and  postcontrast axial and coronal T1-weighted images of the brain.   15 August 15,  2017cc of MultiHance gadolinium contrast was administered intravenously without  adverse event to evaluate for pathological leptomeningeal or parenchymal  enhancement.     COMPARISON: August 15, 2017     FINDINGS: Again noted are peripherally enhancing intracranial metastases within  the superior parietal lobes. The metastasis on the left side measures 1.9 cm AP  (image 22 axial post) compared to 2.3 cm on the previous study. The lesion on  the right measures 7 mm AP diameter (axial image 22) compared to 12 mm AP on the  previous study. There is stable edema like signal on the FLAIR sequence around  these metastases.     There is a new 5 mm enhancing intra-axial lesion in the lateral left cerebellar  hemisphere (axial post image 7).    There is stable scattered hyperintense white matter lesions on the T2-weighted  and FLAIR sequences. This pattern may be present with chronic small vessel  ischemic disease or with migraine headaches.     There is no acute infarction, acute parenchymal hemorrhage, hydrocephalus or  abnormal extra-axial fluid collection.        IMPRESSION  IMPRESSION:     1. New 5 mm suspected metastasis in the peripheral left cerebellar.     2. Interval decrease in size of bilateral parietal metastases. 10/27/17: CT of the Chest, Abdomen, and Pelvis     INDICATION:  Metastatic colon cancer, vaginal discharge concerning for  rectovaginal fistula     Multiple axial images were obtained through the chest, abdomen, and pelvis after  intravenous injection of 125cc of optiray 350. Radiation dose reduction  techniques were used for this study: All CT scans performed at this facility  use one or all of the following: Automated exposure control, adjustment of the  mA and/or kVp according to patient's size, iterative reconstruction. Compared  with 08/16/2017.     FINDINGS:  Chest CT: Again noted are bilateral pulmonary nodules consistent with metastatic  disease. Nodules are stable to slightly smaller. There is a stable 10 mm  nodule in the anterior right upper lobe.   8 mm nodule in the posterior left  upper lobe measured 10 mm on the prior exam.  No new nodules are seen. There  are no developing infiltrates. There is no effusion. There is no significant  adenopathy. There are no bony lesions.     Abdomen CT:  There are no significant lesions in the liver or spleen. Gallbladder is absent. The left adrenal mass is stable in size, 3.2 cm. It  does show less enhancement than on the prior study. Right adrenal gland and  pancreas are unremarkable. There is normal enhancement of the kidneys. There is  no hydronephrosis. There is no adenopathy. There are no inflammatory changes  or fluid collections in the abdomen.     Pelvis CT: Patient is post hysterectomy. While the sigmoid colon mass is mostly  resolved, there is now a rectovaginal fistula. There is a tract between the mid  sigmoid colon and vagina. There is extensive air in the vagina. The pericolic  lymph node mass is smaller, 18 x 16 mm. There are no bony lesions.     IMPRESSION  IMPRESSION:    1. Rectovaginal fistula. 2.  Decreased size of the sigmoid colon mass and pericolic adenopathy. 3.  Metastatic disease in the lungs is overall slightly improved. 4.  Stable left adrenal metastatic lesion. Date: 8/16/2017: [facility] Brain W Wo Cont  MRI BRAIN WITHOUT AND WITH CONTRAST 8/15/2017 HISTORY: Numbness in right arm for several hours. TECHNIQUE: Sagittal and axial T1-weighted, axial T2-weighted, axial FLAIR, axial T2-weighted gradient-echo, axial diffusion weighted images with ADC maps and postcontrast axial and coronal T1-weighted images of the brain. 14cc of MultiHance gadolinium contrast was administered intravenously without adverse event to evaluate for pathological leptomeningeal or parenchymal enhancement. COMPARISON: Head CT from the same day FINDINGS: As demonstrated on the head CT, there are enhancing lesions in the superior parietal lobes, measuring 2.3 cm in diameter on the left side and measuring 1.2 cm in diameter on the right side.  There is a small amount of surrounding edema and mild local mass effect. There is no acute intracranial hemorrhage, acute infarction, hydrocephalus, or abnormal extra-axial fluid collection. On the T2-weighted and FLAIR sequences, there are a few scattered punctate hyperintense supratentorial white matter lesions. This is not an uncommon finding which may be present with asymptomatic patients, with migraine headaches or with mild chronic small vessel ischemic disease. IMPRESSION: Enhancing intra-axial lesions in the superior bilateral parietal lobes measuring up to 2.3 cm on the left side. Findings are suggestive of intracranial metastases. Ct Head Wo Cont    Result Date: 8/15/2017  CT Brain dated 8/15/2017  Comparison: None Clinical Information:  Right upper extremity weakness  5 mm axial images were obtained from skull base to vertex without contrast. Radiation dose reduction techniques were used for this study. Our scanners use one or all of the following:  Automated exposure control, adjustment of the mA and/or kV according to patient size, iterative reconstruction. Findings: Ventricles are normal in size. No midline shift. There are 2 hyperdense mass is noted. These measure 2.3 x 1.4 cm and the left parietal lobe and 1.4 x 1.1 cm medial right parietal lobe at the vertex. Both of these are associated with edema, greater on the left. This is suspicious for metastatic disease. Small foci of hemorrhage could have a similar appearance. Follow-up MRI is suggested. No extra-axial abnormality. No skull fracture. No destructive lesion in the skull. Mastoid air cells and visualized paranasal sinuses are aerated and unremarkable. Impression: Hyperdense masses in the parietal lobes bilaterally. This is suspicious for metastatic disease.  Preliminary results were called to Dr. Heath Soriano in the emergency department 8/15/2017 at 1620 hours     Ct Chest Abd Pelv W Cont    Result Date: 8/16/2017  CT of the chest, abdomen, and pelvis with contrast Comparison:  Noncontrast CT abdomen and pelvis 1/23/2010, MR brain 8/15/2015. Indication:  Metastatic brain lesions, evaluate for primary neoplasm. Technique:  CT imaging was performed of the chest, abdomen, and pelvis following the uncomplicated administration of intravenous contrast (Isovue 370, 100 mL). Oral contrast was given. Iodinated contrast was used due to the indications for the examination. If IV contrast material had not been administered, the likelihood of detecting abnormalities relevant to the patients condition would have been substantially decreased. Radiation dose reduction techniques were used for this study:  Our CT scanners use one or all of the following: Automated exposure control, adjustment of the mA and/or kVp according to patient's size, iterative reconstruction. Findings: CHEST CT: The heart size is normal. Right paratracheal lymph node 1.1 cm image 20; right hilar node 1.2 cm image 26. No pleural or pericardial effusion. The lung parenchyma is unremarkable. Diffuse scattered pulmonary metastatic nodules measuring up to 1.3 cm medial right upper lobe image 21. ABDOMEN CT: The liver is normal in appearance, with no focal abnormalities. Status post cholecystectomy. The right adrenal gland, pancreas, spleen, kidneys unremarkable. Left adrenal indeterminate nodule 3.2 cm series 2 image 54. There is no intra or extrahepatic biliary ductal dilatation. PELVIS CT: No bowel obstruction. There is short segment circumference wall thickening the sigmoid colon series 2 image 105 with adjacent matted adenopathy measuring up to 2.3 x 1.8 cm. There is no free air or free fluid in the abdomen or pelvis. The bladder is unremarkable. There are no aggressive appearing osseous lesions. IMPRESSION: 1. Suspect sigmoid carcinoma with adjacent metastatic adenopathy. 2. Numerous pulmonary metastatic nodules.  3. Left adrenal mass is indeterminate and additional site of metastasis is possible. Pet/ct Tumor Image Skull Thigh W (ini)    Result Date: 8/24/2017  PET/CT  Indication: Brain lesion, pulmonary nodules, colonic mass initial evaluation Radiopharmaceutical: 19.6 mCi F18-FDG, intravenously. Technique: Imaging was performed from the skull through the proximal thighs using routine PET/CT acquisition protocol. Imaging was performed approximately 60 minutes post injection. Oral contrast was administered. Radiation dose reduction techniques were used for this study:  Our CT scanners use one or all of the following: Automated exposure control, adjustment of the mA and/or kVp according to patient's size, iterative reconstruction. Serum glucose: 84 mg/dL prior to injection. Comparison studies: CT chest abdomen and pelvis 8/16/2017, MR brain 8/15/2017 Findings: Head and Neck: No lymphadenopathy or abnormal FDG uptake. Chest: Subcentimeter right hilar and right paratracheal lymph node with elevated FDG activity, image 60. Bilateral upper lobe pulmonary nodules some with central punctate focus of cavitation showed FDG uptake, Max SUV image 65 3.2. Abdomen/Pelvis: Small bowel mesenteric 9 mm lymph node image 176 with elevated FDG activity. Slim conglomerate adjacent to the rectosigmoid junction similar in size to previous examination with marked elevated FDG activity, max SUV 5.3 image 200. Adjacent rectosigmoid mass with Max SUV 9.9 image 208. No bowel obstruction. Left adrenal mass with elevated FDG activity, max SUV 7.2 image 113. IMPRESSION: 1. Metastatic disease as evidenced by bilateral pulmonary nodules, left adrenal metastatic focus, small bowel mesenteric FDG avid lymph node. 2. Primary adenocarcinoma rectosigmoid junction with adjacent regional metastatic adenopathy.  3. Pattern of disease could all represent metastatic colon carcinoma although left adrenal gland and mediastinal lymph node involvement is somewhat atypical.       IMPRESSION:  Alvarado Pruitt is a 62 y.o. female with presumed metastatic colorectal cancer with brain metastases. COUNSELING AND COORDINATION OF CARE: I have had a 60 minute consultation with Ms. Lorrie Abraham and her  of which greater than half is been spent counseling them about management options for her new brain metastasis. She tolerated prior SRS without significant difficulty and has had good response to treatment. The natural history of brain metastases was again discussed with the patient and her . Prognostic factors including stage, performance status, and number of intracranial metastases were discussed. Various treatment options including whole brain radiation therapy, surgery, radiosurgery and supportive care alone were compared and contrasted with regard to outcome and quality of life. The indications, benefits, side effects, and complications of potential stereotactic radiosurgery were reviewed. Fatigue, alopecia, headache, nausea and vomiting, as well as neurocognitive decline were among the complications highlighted. We have again discussed that [address] does not treat subclinical disease whereas whole brain radiation therapy does. For this reason, we will have to be vigilant in obtaining follow-up imaging of the brain. We will plan to treat her new metastasis with CyberKnife SRS to a dose of 24 Gy in a single fraction. She will undergo CT simulation next week with the intent of delivering treatment 1-2 weeks later. All of the patient's questions were answered to her satisfaction and she has signed a written informed consent. I appreciate the opportunity to participate in Ms. Sesay's care. Kofi Schwartz MD   December 22, 2017        Portions of this note were copied from prior encounters and reviewed for accuracy, currency, and represent documentation and tasks completed during this encounter. I verify and attest these portions to be unchanged from prior visits.     Kofi Schwartz MD  12/22/17

## 2018-01-01 ENCOUNTER — HOSPITAL ENCOUNTER (OUTPATIENT)
Dept: INFUSION THERAPY | Age: 58
Discharge: HOME | End: 2018-04-30
Payer: COMMERCIAL

## 2018-01-01 ENCOUNTER — HOME CARE VISIT (OUTPATIENT)
Dept: SCHEDULING | Facility: HOME HEALTH | Age: 58
End: 2018-01-01
Payer: COMMERCIAL

## 2018-01-01 ENCOUNTER — HOME CARE VISIT (OUTPATIENT)
Dept: SCHEDULING | Facility: HOME HEALTH | Age: 58
End: 2018-01-01
Payer: MEDICARE

## 2018-01-01 ENCOUNTER — APPOINTMENT (OUTPATIENT)
Dept: CT IMAGING | Age: 58
DRG: 330 | End: 2018-01-01
Attending: INTERNAL MEDICINE
Payer: COMMERCIAL

## 2018-01-01 ENCOUNTER — HOSPITAL ENCOUNTER (OUTPATIENT)
Dept: LAB | Age: 58
Discharge: HOME | End: 2018-01-05
Payer: COMMERCIAL

## 2018-01-01 ENCOUNTER — HOSPITAL ENCOUNTER (OUTPATIENT)
Dept: MRI IMAGING | Age: 58
Discharge: HOME | End: 2018-05-01
Attending: NURSE PRACTITIONER
Payer: COMMERCIAL

## 2018-01-01 ENCOUNTER — APPOINTMENT (OUTPATIENT)
Dept: INFUSION THERAPY | Age: 58
End: 2018-01-01
Payer: COMMERCIAL

## 2018-01-01 ENCOUNTER — HOSPITAL ENCOUNTER (OUTPATIENT)
Dept: SURGERY | Age: 58
Discharge: HOME | End: 2018-02-22
Payer: COMMERCIAL

## 2018-01-01 ENCOUNTER — HOME CARE VISIT (OUTPATIENT)
Dept: HOME HEALTH SERVICES | Facility: HOME HEALTH | Age: 58
End: 2018-01-01
Payer: COMMERCIAL

## 2018-01-01 ENCOUNTER — PATIENT OUTREACH (OUTPATIENT)
Dept: CASE MANAGEMENT | Age: 58
End: 2018-01-01

## 2018-01-01 ENCOUNTER — HOSPITAL ENCOUNTER (OUTPATIENT)
Dept: INFUSION THERAPY | Age: 58
Discharge: HOME | End: 2018-01-24
Payer: COMMERCIAL

## 2018-01-01 ENCOUNTER — HOSPITAL ENCOUNTER (OUTPATIENT)
Dept: INFUSION THERAPY | Age: 58
End: 2018-01-01

## 2018-01-01 ENCOUNTER — TELEPHONE (OUTPATIENT)
Dept: ONCOLOGY | Age: 58
End: 2018-01-01

## 2018-01-01 ENCOUNTER — APPOINTMENT (OUTPATIENT)
Dept: INFUSION THERAPY | Age: 58
End: 2018-01-01

## 2018-01-01 ENCOUNTER — HOSPITAL ENCOUNTER (OUTPATIENT)
Dept: INFUSION THERAPY | Age: 58
Discharge: HOME | End: 2018-05-02

## 2018-01-01 ENCOUNTER — APPOINTMENT (OUTPATIENT)
Dept: CT IMAGING | Age: 58
DRG: 539 | End: 2018-01-01
Attending: NURSE PRACTITIONER
Payer: COMMERCIAL

## 2018-01-01 ENCOUNTER — HOSPITAL ENCOUNTER (OUTPATIENT)
Dept: LAB | Age: 58
Discharge: HOME | End: 2018-01-10
Payer: COMMERCIAL

## 2018-01-01 ENCOUNTER — HOME CARE VISIT (OUTPATIENT)
Dept: HOSPICE | Facility: HOSPICE | Age: 58
End: 2018-01-01
Payer: MEDICARE

## 2018-01-01 ENCOUNTER — HOSPITAL ENCOUNTER (OUTPATIENT)
Dept: LAB | Age: 58
Discharge: HOME | End: 2018-07-06
Payer: COMMERCIAL

## 2018-01-01 ENCOUNTER — HOSPITAL ENCOUNTER (INPATIENT)
Age: 58
LOS: 11 days | Discharge: NURSING, REHAB OR LONG-TERM CARE | DRG: 539 | End: 2018-08-17
Attending: INTERNAL MEDICINE | Admitting: INTERNAL MEDICINE
Payer: COMMERCIAL

## 2018-01-01 ENCOUNTER — HOSPITAL ENCOUNTER (OUTPATIENT)
Dept: LAB | Age: 58
Discharge: HOME | End: 2018-04-30
Payer: COMMERCIAL

## 2018-01-01 ENCOUNTER — HOSPITAL ENCOUNTER (OUTPATIENT)
Dept: INTERVENTIONAL RADIOLOGY/VASCULAR | Age: 58
Discharge: HOME | End: 2018-01-02
Attending: INTERNAL MEDICINE
Payer: COMMERCIAL

## 2018-01-01 ENCOUNTER — HOSPITAL ENCOUNTER (OUTPATIENT)
Dept: MRI IMAGING | Age: 58
Discharge: HOME | End: 2018-07-02
Attending: ORTHOPAEDIC SURGERY
Payer: COMMERCIAL

## 2018-01-01 ENCOUNTER — HOSPITAL ENCOUNTER (INPATIENT)
Age: 58
LOS: 8 days | Discharge: HOME HEALTH | DRG: 330 | End: 2018-03-09
Attending: SURGERY | Admitting: SURGERY
Payer: COMMERCIAL

## 2018-01-01 ENCOUNTER — HOSPITAL ENCOUNTER (OUTPATIENT)
Dept: LAB | Age: 58
Discharge: HOME | End: 2018-03-27
Payer: COMMERCIAL

## 2018-01-01 ENCOUNTER — APPOINTMENT (OUTPATIENT)
Dept: MRI IMAGING | Age: 58
DRG: 539 | End: 2018-01-01
Attending: NURSE PRACTITIONER
Payer: COMMERCIAL

## 2018-01-01 ENCOUNTER — ANESTHESIA (OUTPATIENT)
Dept: SURGERY | Age: 58
DRG: 330 | End: 2018-01-01
Payer: COMMERCIAL

## 2018-01-01 ENCOUNTER — HOSPITAL ENCOUNTER (OUTPATIENT)
Dept: LAB | Age: 58
Discharge: HOME | End: 2018-01-03
Payer: COMMERCIAL

## 2018-01-01 ENCOUNTER — HOSPITAL ENCOUNTER (OUTPATIENT)
Dept: INFUSION THERAPY | Age: 58
Discharge: HOME | End: 2018-01-04
Payer: COMMERCIAL

## 2018-01-01 ENCOUNTER — HOSPITAL ENCOUNTER (OUTPATIENT)
Dept: LAB | Age: 58
Discharge: HOME | End: 2018-01-04

## 2018-01-01 ENCOUNTER — HOSPITAL ENCOUNTER (OUTPATIENT)
Dept: LAB | Age: 58
Discharge: HOME | End: 2018-08-28
Payer: COMMERCIAL

## 2018-01-01 ENCOUNTER — HOSPITAL ENCOUNTER (OUTPATIENT)
Dept: INFUSION THERAPY | Age: 58
Discharge: HOME | End: 2018-01-26
Payer: COMMERCIAL

## 2018-01-01 ENCOUNTER — ANESTHESIA (OUTPATIENT)
Dept: SURGERY | Age: 58
End: 2018-01-01
Payer: COMMERCIAL

## 2018-01-01 ENCOUNTER — HOSPITAL ENCOUNTER (OUTPATIENT)
Dept: LAB | Age: 58
Discharge: HOME | End: 2018-01-24
Payer: COMMERCIAL

## 2018-01-01 ENCOUNTER — HOSPITAL ENCOUNTER (OUTPATIENT)
Dept: CT IMAGING | Age: 58
Discharge: HOME | End: 2018-04-26
Attending: INTERNAL MEDICINE
Payer: COMMERCIAL

## 2018-01-01 ENCOUNTER — HOME CARE VISIT (OUTPATIENT)
Dept: HOME HEALTH SERVICES | Facility: HOME HEALTH | Age: 58
End: 2018-01-01

## 2018-01-01 ENCOUNTER — ANESTHESIA EVENT (OUTPATIENT)
Dept: SURGERY | Age: 58
End: 2018-01-01
Payer: COMMERCIAL

## 2018-01-01 ENCOUNTER — HOSPITAL ENCOUNTER (OUTPATIENT)
Dept: INFUSION THERAPY | Age: 58
Discharge: HOME | End: 2018-01-10
Payer: COMMERCIAL

## 2018-01-01 ENCOUNTER — HOSPITAL ENCOUNTER (OUTPATIENT)
Dept: RADIATION ONCOLOGY | Age: 58
Discharge: HOME | End: 2018-05-04
Payer: COMMERCIAL

## 2018-01-01 ENCOUNTER — HOME HEALTH ADMISSION (OUTPATIENT)
Dept: HOME HEALTH SERVICES | Facility: HOME HEALTH | Age: 58
End: 2018-01-01
Payer: COMMERCIAL

## 2018-01-01 ENCOUNTER — HOSPITAL ENCOUNTER (OUTPATIENT)
Age: 58
Setting detail: OUTPATIENT SURGERY
Discharge: HOME | End: 2018-01-02
Attending: RADIOLOGY | Admitting: RADIOLOGY
Payer: COMMERCIAL

## 2018-01-01 ENCOUNTER — HOSPITAL ENCOUNTER (OUTPATIENT)
Dept: INFUSION THERAPY | Age: 58
Discharge: HOME | End: 2018-08-28
Payer: COMMERCIAL

## 2018-01-01 ENCOUNTER — ANESTHESIA EVENT (OUTPATIENT)
Dept: SURGERY | Age: 58
DRG: 330 | End: 2018-01-01
Payer: COMMERCIAL

## 2018-01-01 ENCOUNTER — HOSPITAL ENCOUNTER (OUTPATIENT)
Dept: MRI IMAGING | Age: 58
Discharge: HOME | End: 2018-07-10
Attending: RADIOLOGY
Payer: COMMERCIAL

## 2018-01-01 ENCOUNTER — HOSPICE ADMISSION (OUTPATIENT)
Dept: HOSPICE | Facility: HOSPICE | Age: 58
End: 2018-01-01
Payer: MEDICARE

## 2018-01-01 ENCOUNTER — HOSPITAL ENCOUNTER (OUTPATIENT)
Dept: INFUSION THERAPY | Age: 58
Discharge: HOME | End: 2018-01-12
Payer: COMMERCIAL

## 2018-01-01 VITALS
BODY MASS INDEX: 23.04 KG/M2 | TEMPERATURE: 98.2 F | HEART RATE: 80 BPM | SYSTOLIC BLOOD PRESSURE: 109 MMHG | RESPIRATION RATE: 18 BRPM | HEIGHT: 63 IN | DIASTOLIC BLOOD PRESSURE: 74 MMHG | WEIGHT: 130 LBS | OXYGEN SATURATION: 97 %

## 2018-01-01 VITALS
TEMPERATURE: 99.2 F | SYSTOLIC BLOOD PRESSURE: 138 MMHG | OXYGEN SATURATION: 98 % | RESPIRATION RATE: 18 BRPM | HEART RATE: 78 BPM | DIASTOLIC BLOOD PRESSURE: 82 MMHG

## 2018-01-01 VITALS
OXYGEN SATURATION: 95 % | SYSTOLIC BLOOD PRESSURE: 122 MMHG | HEART RATE: 106 BPM | RESPIRATION RATE: 16 BRPM | DIASTOLIC BLOOD PRESSURE: 84 MMHG | TEMPERATURE: 97.4 F

## 2018-01-01 VITALS
OXYGEN SATURATION: 99 % | SYSTOLIC BLOOD PRESSURE: 142 MMHG | TEMPERATURE: 98.1 F | HEART RATE: 99 BPM | HEIGHT: 63 IN | BODY MASS INDEX: 42.88 KG/M2 | WEIGHT: 242 LBS | RESPIRATION RATE: 16 BRPM | DIASTOLIC BLOOD PRESSURE: 80 MMHG

## 2018-01-01 VITALS
OXYGEN SATURATION: 98 % | DIASTOLIC BLOOD PRESSURE: 60 MMHG | HEART RATE: 60 BPM | SYSTOLIC BLOOD PRESSURE: 120 MMHG | TEMPERATURE: 96.5 F

## 2018-01-01 VITALS
HEART RATE: 58 BPM | DIASTOLIC BLOOD PRESSURE: 70 MMHG | RESPIRATION RATE: 16 BRPM | SYSTOLIC BLOOD PRESSURE: 110 MMHG | OXYGEN SATURATION: 98 % | TEMPERATURE: 97.2 F

## 2018-01-01 VITALS
DIASTOLIC BLOOD PRESSURE: 82 MMHG | HEART RATE: 81 BPM | TEMPERATURE: 98.3 F | SYSTOLIC BLOOD PRESSURE: 122 MMHG | OXYGEN SATURATION: 97 % | RESPIRATION RATE: 16 BRPM

## 2018-01-01 VITALS
OXYGEN SATURATION: 99 % | TEMPERATURE: 98 F | DIASTOLIC BLOOD PRESSURE: 82 MMHG | SYSTOLIC BLOOD PRESSURE: 122 MMHG | RESPIRATION RATE: 16 BRPM | HEART RATE: 98 BPM

## 2018-01-01 VITALS
WEIGHT: 151.6 LBS | DIASTOLIC BLOOD PRESSURE: 90 MMHG | RESPIRATION RATE: 18 BRPM | HEART RATE: 96 BPM | TEMPERATURE: 98.4 F | OXYGEN SATURATION: 96 % | BODY MASS INDEX: 26.85 KG/M2 | SYSTOLIC BLOOD PRESSURE: 145 MMHG

## 2018-01-01 VITALS
OXYGEN SATURATION: 98 % | SYSTOLIC BLOOD PRESSURE: 110 MMHG | DIASTOLIC BLOOD PRESSURE: 60 MMHG | HEART RATE: 77 BPM | RESPIRATION RATE: 15 BRPM | TEMPERATURE: 98 F

## 2018-01-01 VITALS
HEIGHT: 63 IN | DIASTOLIC BLOOD PRESSURE: 78 MMHG | BODY MASS INDEX: 21.09 KG/M2 | WEIGHT: 119 LBS | SYSTOLIC BLOOD PRESSURE: 132 MMHG | RESPIRATION RATE: 16 BRPM | TEMPERATURE: 98.1 F

## 2018-01-01 VITALS
HEART RATE: 84 BPM | OXYGEN SATURATION: 97 % | DIASTOLIC BLOOD PRESSURE: 82 MMHG | TEMPERATURE: 96.4 F | RESPIRATION RATE: 18 BRPM | SYSTOLIC BLOOD PRESSURE: 132 MMHG

## 2018-01-01 VITALS
WEIGHT: 152 LBS | SYSTOLIC BLOOD PRESSURE: 163 MMHG | HEART RATE: 82 BPM | BODY MASS INDEX: 24.96 KG/M2 | DIASTOLIC BLOOD PRESSURE: 80 MMHG | RESPIRATION RATE: 15 BRPM | BODY MASS INDEX: 26.93 KG/M2 | OXYGEN SATURATION: 98 % | TEMPERATURE: 98.1 F | WEIGHT: 140.9 LBS | HEIGHT: 63 IN

## 2018-01-01 VITALS
RESPIRATION RATE: 17 BRPM | DIASTOLIC BLOOD PRESSURE: 84 MMHG | HEART RATE: 78 BPM | SYSTOLIC BLOOD PRESSURE: 122 MMHG | TEMPERATURE: 98.3 F | OXYGEN SATURATION: 97 %

## 2018-01-01 VITALS
OXYGEN SATURATION: 97 % | TEMPERATURE: 98.3 F | DIASTOLIC BLOOD PRESSURE: 84 MMHG | SYSTOLIC BLOOD PRESSURE: 122 MMHG | HEART RATE: 78 BPM | RESPIRATION RATE: 17 BRPM

## 2018-01-01 VITALS
DIASTOLIC BLOOD PRESSURE: 84 MMHG | RESPIRATION RATE: 16 BRPM | SYSTOLIC BLOOD PRESSURE: 128 MMHG | HEART RATE: 98 BPM | OXYGEN SATURATION: 98 % | TEMPERATURE: 98.3 F

## 2018-01-01 VITALS
SYSTOLIC BLOOD PRESSURE: 118 MMHG | RESPIRATION RATE: 17 BRPM | TEMPERATURE: 97.8 F | DIASTOLIC BLOOD PRESSURE: 82 MMHG | HEART RATE: 81 BPM | OXYGEN SATURATION: 98 %

## 2018-01-01 VITALS
BODY MASS INDEX: 20.98 KG/M2 | TEMPERATURE: 98.3 F | WEIGHT: 118.4 LBS | HEIGHT: 63 IN | SYSTOLIC BLOOD PRESSURE: 118 MMHG | DIASTOLIC BLOOD PRESSURE: 62 MMHG | HEART RATE: 72 BPM | RESPIRATION RATE: 16 BRPM

## 2018-01-01 VITALS
SYSTOLIC BLOOD PRESSURE: 108 MMHG | OXYGEN SATURATION: 98 % | HEART RATE: 98 BPM | RESPIRATION RATE: 17 BRPM | TEMPERATURE: 97 F | DIASTOLIC BLOOD PRESSURE: 82 MMHG

## 2018-01-01 VITALS
SYSTOLIC BLOOD PRESSURE: 118 MMHG | OXYGEN SATURATION: 99 % | RESPIRATION RATE: 18 BRPM | HEART RATE: 100 BPM | DIASTOLIC BLOOD PRESSURE: 78 MMHG | TEMPERATURE: 98 F

## 2018-01-01 VITALS
BODY MASS INDEX: 25.86 KG/M2 | OXYGEN SATURATION: 91 % | SYSTOLIC BLOOD PRESSURE: 148 MMHG | HEART RATE: 87 BPM | TEMPERATURE: 97.6 F | WEIGHT: 146 LBS | RESPIRATION RATE: 18 BRPM | DIASTOLIC BLOOD PRESSURE: 98 MMHG

## 2018-01-01 VITALS
OXYGEN SATURATION: 100 % | DIASTOLIC BLOOD PRESSURE: 70 MMHG | HEART RATE: 100 BPM | SYSTOLIC BLOOD PRESSURE: 118 MMHG | RESPIRATION RATE: 16 BRPM

## 2018-01-01 VITALS
OXYGEN SATURATION: 98 % | SYSTOLIC BLOOD PRESSURE: 112 MMHG | TEMPERATURE: 97.4 F | RESPIRATION RATE: 20 BRPM | HEART RATE: 100 BPM | DIASTOLIC BLOOD PRESSURE: 68 MMHG

## 2018-01-01 VITALS
SYSTOLIC BLOOD PRESSURE: 160 MMHG | RESPIRATION RATE: 20 BRPM | HEART RATE: 80 BPM | DIASTOLIC BLOOD PRESSURE: 100 MMHG | BODY MASS INDEX: 21.43 KG/M2 | WEIGHT: 121 LBS

## 2018-01-01 VITALS
RESPIRATION RATE: 18 BRPM | DIASTOLIC BLOOD PRESSURE: 80 MMHG | HEART RATE: 81 BPM | TEMPERATURE: 97.5 F | SYSTOLIC BLOOD PRESSURE: 140 MMHG | OXYGEN SATURATION: 97 %

## 2018-01-01 VITALS
TEMPERATURE: 97.4 F | DIASTOLIC BLOOD PRESSURE: 90 MMHG | HEART RATE: 90 BPM | RESPIRATION RATE: 16 BRPM | OXYGEN SATURATION: 98 % | SYSTOLIC BLOOD PRESSURE: 138 MMHG

## 2018-01-01 VITALS
SYSTOLIC BLOOD PRESSURE: 150 MMHG | RESPIRATION RATE: 15 BRPM | HEART RATE: 92 BPM | OXYGEN SATURATION: 93 % | DIASTOLIC BLOOD PRESSURE: 75 MMHG | TEMPERATURE: 98 F

## 2018-01-01 VITALS — SYSTOLIC BLOOD PRESSURE: 115 MMHG | OXYGEN SATURATION: 100 % | HEART RATE: 88 BPM | DIASTOLIC BLOOD PRESSURE: 73 MMHG

## 2018-01-01 VITALS
SYSTOLIC BLOOD PRESSURE: 136 MMHG | TEMPERATURE: 97.8 F | OXYGEN SATURATION: 98 % | RESPIRATION RATE: 16 BRPM | HEART RATE: 84 BPM | DIASTOLIC BLOOD PRESSURE: 82 MMHG

## 2018-01-01 VITALS
SYSTOLIC BLOOD PRESSURE: 122 MMHG | RESPIRATION RATE: 16 BRPM | OXYGEN SATURATION: 98 % | HEART RATE: 105 BPM | TEMPERATURE: 97.5 F | DIASTOLIC BLOOD PRESSURE: 80 MMHG

## 2018-01-01 VITALS
RESPIRATION RATE: 17 BRPM | OXYGEN SATURATION: 98 % | DIASTOLIC BLOOD PRESSURE: 82 MMHG | SYSTOLIC BLOOD PRESSURE: 112 MMHG | HEART RATE: 90 BPM | TEMPERATURE: 98.2 F

## 2018-01-01 VITALS
OXYGEN SATURATION: 98 % | HEART RATE: 76 BPM | DIASTOLIC BLOOD PRESSURE: 68 MMHG | TEMPERATURE: 98.8 F | RESPIRATION RATE: 16 BRPM | SYSTOLIC BLOOD PRESSURE: 108 MMHG

## 2018-01-01 VITALS
OXYGEN SATURATION: 98 % | HEART RATE: 68 BPM | DIASTOLIC BLOOD PRESSURE: 84 MMHG | RESPIRATION RATE: 17 BRPM | TEMPERATURE: 97.6 F | SYSTOLIC BLOOD PRESSURE: 128 MMHG

## 2018-01-01 VITALS
SYSTOLIC BLOOD PRESSURE: 148 MMHG | HEART RATE: 70 BPM | DIASTOLIC BLOOD PRESSURE: 80 MMHG | OXYGEN SATURATION: 97 % | TEMPERATURE: 97.8 F | RESPIRATION RATE: 17 BRPM

## 2018-01-01 VITALS
RESPIRATION RATE: 17 BRPM | HEART RATE: 78 BPM | TEMPERATURE: 98.4 F | OXYGEN SATURATION: 98 % | DIASTOLIC BLOOD PRESSURE: 84 MMHG | SYSTOLIC BLOOD PRESSURE: 132 MMHG

## 2018-01-01 VITALS
DIASTOLIC BLOOD PRESSURE: 84 MMHG | TEMPERATURE: 97.9 F | RESPIRATION RATE: 17 BRPM | OXYGEN SATURATION: 98 % | HEART RATE: 68 BPM | SYSTOLIC BLOOD PRESSURE: 132 MMHG

## 2018-01-01 VITALS
RESPIRATION RATE: 18 BRPM | WEIGHT: 127.3 LBS | TEMPERATURE: 97.5 F | SYSTOLIC BLOOD PRESSURE: 147 MMHG | DIASTOLIC BLOOD PRESSURE: 84 MMHG | OXYGEN SATURATION: 97 % | BODY MASS INDEX: 22.55 KG/M2 | HEART RATE: 83 BPM

## 2018-01-01 VITALS
HEART RATE: 80 BPM | OXYGEN SATURATION: 95 % | TEMPERATURE: 98.2 F | WEIGHT: 146.2 LBS | RESPIRATION RATE: 18 BRPM | BODY MASS INDEX: 25.9 KG/M2 | DIASTOLIC BLOOD PRESSURE: 95 MMHG | SYSTOLIC BLOOD PRESSURE: 143 MMHG

## 2018-01-01 VITALS
HEART RATE: 102 BPM | RESPIRATION RATE: 17 BRPM | OXYGEN SATURATION: 99 % | DIASTOLIC BLOOD PRESSURE: 72 MMHG | SYSTOLIC BLOOD PRESSURE: 106 MMHG | TEMPERATURE: 98.3 F

## 2018-01-01 VITALS
SYSTOLIC BLOOD PRESSURE: 124 MMHG | RESPIRATION RATE: 19 BRPM | DIASTOLIC BLOOD PRESSURE: 82 MMHG | HEART RATE: 102 BPM | TEMPERATURE: 95.1 F

## 2018-01-01 DIAGNOSIS — C18.6 MALIGNANT NEOPLASM OF DESCENDING COLON (HCC): ICD-10-CM

## 2018-01-01 DIAGNOSIS — C18.9 COLON CANCER METASTASIZED TO BRAIN (HCC): ICD-10-CM

## 2018-01-01 DIAGNOSIS — C79.31 COLON CANCER METASTASIZED TO BRAIN (HCC): ICD-10-CM

## 2018-01-01 DIAGNOSIS — E87.6 HYPOKALEMIA: ICD-10-CM

## 2018-01-01 DIAGNOSIS — C79.31 BRAIN METASTASIS (HCC): ICD-10-CM

## 2018-01-01 DIAGNOSIS — C79.31 SECONDARY MALIGNANT NEOPLASM OF BRAIN (HCC): ICD-10-CM

## 2018-01-01 DIAGNOSIS — K63.89 MASS OF COLON: ICD-10-CM

## 2018-01-01 DIAGNOSIS — F41.9 ANXIETY: Primary | ICD-10-CM

## 2018-01-01 DIAGNOSIS — M25.561 PAIN IN RIGHT KNEE: ICD-10-CM

## 2018-01-01 DIAGNOSIS — R11.15 NON-INTRACTABLE CYCLICAL VOMITING WITH NAUSEA: ICD-10-CM

## 2018-01-01 DIAGNOSIS — L03.818 CELLULITIS OF OTHER SPECIFIED SITE: ICD-10-CM

## 2018-01-01 DIAGNOSIS — N82.4 COLOVAGINAL FISTULA: ICD-10-CM

## 2018-01-01 DIAGNOSIS — C79.31 BRAIN METASTASES (HCC): ICD-10-CM

## 2018-01-01 DIAGNOSIS — R51.9 ACUTE NONINTRACTABLE HEADACHE, UNSPECIFIED HEADACHE TYPE: ICD-10-CM

## 2018-01-01 DIAGNOSIS — K63.89 MASS OF COLON: Primary | ICD-10-CM

## 2018-01-01 DIAGNOSIS — G40.109 LOCALIZATION-RELATED EPILEPSY (HCC): ICD-10-CM

## 2018-01-01 DIAGNOSIS — R51.9 NONINTRACTABLE HEADACHE, UNSPECIFIED CHRONICITY PATTERN, UNSPECIFIED HEADACHE TYPE: ICD-10-CM

## 2018-01-01 DIAGNOSIS — R52 SEVERE PAIN: Primary | ICD-10-CM

## 2018-01-01 DIAGNOSIS — M86.9 OSTEOMYELITIS, UNSPECIFIED SITE, UNSPECIFIED TYPE (HCC): ICD-10-CM

## 2018-01-01 DIAGNOSIS — M86.40 CHRONIC OSTEOMYELITIS WITH DRAINING SINUS, UNSPECIFIED SITE (HCC): ICD-10-CM

## 2018-01-01 LAB
ABO + RH BLD: NORMAL
ALBUMIN SERPL-MCNC: 2 G/DL (ref 3.5–5)
ALBUMIN SERPL-MCNC: 2.1 G/DL (ref 3.5–5)
ALBUMIN SERPL-MCNC: 2.2 G/DL (ref 3.5–5)
ALBUMIN SERPL-MCNC: 2.3 G/DL (ref 3.5–5)
ALBUMIN SERPL-MCNC: 2.4 G/DL (ref 3.5–5)
ALBUMIN SERPL-MCNC: 2.5 G/DL (ref 3.5–5)
ALBUMIN SERPL-MCNC: 2.5 G/DL (ref 3.5–5)
ALBUMIN SERPL-MCNC: 2.6 G/DL (ref 3.5–5)
ALBUMIN SERPL-MCNC: 2.6 G/DL (ref 3.5–5)
ALBUMIN SERPL-MCNC: 2.7 G/DL (ref 3.5–5)
ALBUMIN SERPL-MCNC: 2.7 G/DL (ref 3.5–5)
ALBUMIN SERPL-MCNC: 2.8 G/DL (ref 3.5–5)
ALBUMIN/GLOB SERPL: 0.4 {RATIO} (ref 1.2–3.5)
ALBUMIN/GLOB SERPL: 0.5 {RATIO} (ref 1.2–3.5)
ALBUMIN/GLOB SERPL: 0.6 {RATIO}
ALBUMIN/GLOB SERPL: 0.6 {RATIO} (ref 1.2–3.5)
ALBUMIN/GLOB SERPL: 0.7 {RATIO} (ref 1.2–3.5)
ALBUMIN/GLOB SERPL: 0.8 {RATIO} (ref 1.2–3.5)
ALP SERPL-CCNC: 124 U/L (ref 50–136)
ALP SERPL-CCNC: 134 U/L (ref 50–136)
ALP SERPL-CCNC: 139 U/L (ref 50–136)
ALP SERPL-CCNC: 144 U/L (ref 50–136)
ALP SERPL-CCNC: 147 U/L (ref 50–136)
ALP SERPL-CCNC: 148 U/L (ref 50–136)
ALP SERPL-CCNC: 148 U/L (ref 50–136)
ALP SERPL-CCNC: 149 U/L (ref 50–136)
ALP SERPL-CCNC: 149 U/L (ref 50–136)
ALP SERPL-CCNC: 153 U/L (ref 50–136)
ALP SERPL-CCNC: 154 U/L (ref 50–136)
ALP SERPL-CCNC: 155 U/L (ref 50–136)
ALP SERPL-CCNC: 158 U/L (ref 50–136)
ALP SERPL-CCNC: 159 U/L (ref 50–136)
ALP SERPL-CCNC: 169 U/L (ref 50–136)
ALP SERPL-CCNC: 170 U/L (ref 50–136)
ALP SERPL-CCNC: 170 U/L (ref 50–136)
ALP SERPL-CCNC: 247 U/L (ref 50–136)
ALT SERPL-CCNC: 11 U/L (ref 12–65)
ALT SERPL-CCNC: 13 U/L (ref 12–65)
ALT SERPL-CCNC: 15 U/L (ref 12–65)
ALT SERPL-CCNC: 16 U/L (ref 12–65)
ALT SERPL-CCNC: 17 U/L (ref 12–65)
ALT SERPL-CCNC: 17 U/L (ref 12–65)
ALT SERPL-CCNC: 18 U/L (ref 12–65)
ALT SERPL-CCNC: 21 U/L (ref 12–65)
ALT SERPL-CCNC: 22 U/L (ref 12–65)
ALT SERPL-CCNC: 25 U/L (ref 12–65)
ALT SERPL-CCNC: 25 U/L (ref 12–65)
ALT SERPL-CCNC: 27 U/L (ref 12–65)
ALT SERPL-CCNC: 27 U/L (ref 12–65)
ALT SERPL-CCNC: 30 U/L (ref 12–65)
ALT SERPL-CCNC: 47 U/L (ref 12–65)
ALT SERPL-CCNC: 9 U/L (ref 12–65)
ANION GAP SERPL CALC-SCNC: 10 MMOL/L (ref 7–16)
ANION GAP SERPL CALC-SCNC: 11 MMOL/L (ref 7–16)
ANION GAP SERPL CALC-SCNC: 13 MMOL/L (ref 7–16)
ANION GAP SERPL CALC-SCNC: 16 MMOL/L (ref 7–16)
ANION GAP SERPL CALC-SCNC: 7 MMOL/L (ref 7–16)
ANION GAP SERPL CALC-SCNC: 8 MMOL/L
ANION GAP SERPL CALC-SCNC: 8 MMOL/L (ref 7–16)
ANION GAP SERPL CALC-SCNC: 9 MMOL/L (ref 7–16)
APPEARANCE UR: ABNORMAL
AST SERPL-CCNC: 14 U/L (ref 15–37)
AST SERPL-CCNC: 14 U/L (ref 15–37)
AST SERPL-CCNC: 17 U/L (ref 15–37)
AST SERPL-CCNC: 18 U/L (ref 15–37)
AST SERPL-CCNC: 22 U/L (ref 15–37)
AST SERPL-CCNC: 23 U/L (ref 15–37)
AST SERPL-CCNC: 25 U/L (ref 15–37)
AST SERPL-CCNC: 27 U/L (ref 15–37)
AST SERPL-CCNC: 29 U/L (ref 15–37)
AST SERPL-CCNC: 31 U/L (ref 15–37)
AST SERPL-CCNC: 31 U/L (ref 15–37)
AST SERPL-CCNC: 32 U/L (ref 15–37)
AST SERPL-CCNC: 33 U/L (ref 15–37)
AST SERPL-CCNC: 37 U/L (ref 15–37)
AST SERPL-CCNC: 43 U/L (ref 15–37)
AST SERPL-CCNC: 48 U/L (ref 15–37)
AST SERPL-CCNC: 51 U/L (ref 15–37)
AST SERPL-CCNC: 64 U/L (ref 15–37)
BACTERIA SPEC CULT: NORMAL
BACTERIA URNS QL MICRO: ABNORMAL /HPF
BASOPHILS # BLD: 0 K/UL
BASOPHILS # BLD: 0 K/UL (ref 0–0.2)
BASOPHILS NFR BLD: 0 % (ref 0–2)
BILIRUB SERPL-MCNC: 0.2 MG/DL (ref 0.2–1.1)
BILIRUB SERPL-MCNC: 0.2 MG/DL (ref 0.2–1.1)
BILIRUB SERPL-MCNC: 0.3 MG/DL (ref 0.2–1.1)
BILIRUB SERPL-MCNC: 0.4 MG/DL (ref 0.2–1.1)
BILIRUB SERPL-MCNC: 0.5 MG/DL (ref 0.2–1.1)
BILIRUB SERPL-MCNC: 0.5 MG/DL (ref 0.2–1.1)
BILIRUB SERPL-MCNC: 0.6 MG/DL (ref 0.2–1.1)
BILIRUB SERPL-MCNC: 0.7 MG/DL (ref 0.2–1.1)
BILIRUB SERPL-MCNC: 1 MG/DL (ref 0.2–1.1)
BILIRUB SERPL-MCNC: 1 MG/DL (ref 0.2–1.1)
BILIRUB UR QL: NEGATIVE
BLOOD GROUP ANTIBODIES SERPL: NORMAL
BUN SERPL-MCNC: 10 MG/DL (ref 6–23)
BUN SERPL-MCNC: 13 MG/DL (ref 6–23)
BUN SERPL-MCNC: 13 MG/DL (ref 6–23)
BUN SERPL-MCNC: 14 MG/DL (ref 6–23)
BUN SERPL-MCNC: 15 MG/DL (ref 6–23)
BUN SERPL-MCNC: 16 MG/DL (ref 6–23)
BUN SERPL-MCNC: 18 MG/DL (ref 6–23)
BUN SERPL-MCNC: 20 MG/DL (ref 6–23)
BUN SERPL-MCNC: 5 MG/DL (ref 6–23)
BUN SERPL-MCNC: 5 MG/DL (ref 6–23)
BUN SERPL-MCNC: 6 MG/DL (ref 6–23)
BUN SERPL-MCNC: 7 MG/DL (ref 6–23)
BUN SERPL-MCNC: 7 MG/DL (ref 6–23)
BUN SERPL-MCNC: 8 MG/DL (ref 6–23)
BUN SERPL-MCNC: 9 MG/DL (ref 6–23)
CA-I BLD-MCNC: 4.96 MG/DL (ref 4–5.2)
CALCIUM SERPL-MCNC: 7.8 MG/DL (ref 8.3–10.4)
CALCIUM SERPL-MCNC: 7.9 MG/DL (ref 8.3–10.4)
CALCIUM SERPL-MCNC: 7.9 MG/DL (ref 8.3–10.4)
CALCIUM SERPL-MCNC: 8.1 MG/DL (ref 8.3–10.4)
CALCIUM SERPL-MCNC: 8.2 MG/DL (ref 8.3–10.4)
CALCIUM SERPL-MCNC: 8.2 MG/DL (ref 8.3–10.4)
CALCIUM SERPL-MCNC: 8.3 MG/DL (ref 8.3–10.4)
CALCIUM SERPL-MCNC: 8.3 MG/DL (ref 8.3–10.4)
CALCIUM SERPL-MCNC: 8.4 MG/DL (ref 8.3–10.4)
CALCIUM SERPL-MCNC: 8.5 MG/DL (ref 8.3–10.4)
CALCIUM SERPL-MCNC: 8.6 MG/DL (ref 8.3–10.4)
CALCIUM SERPL-MCNC: 8.6 MG/DL (ref 8.3–10.4)
CALCIUM SERPL-MCNC: 8.7 MG/DL (ref 8.3–10.4)
CALCIUM SERPL-MCNC: 8.7 MG/DL (ref 8.3–10.4)
CALCIUM SERPL-MCNC: 8.8 MG/DL (ref 8.3–10.4)
CALCIUM SERPL-MCNC: 8.9 MG/DL (ref 8.3–10.4)
CALCIUM SERPL-MCNC: 9 MG/DL (ref 8.3–10.4)
CALCIUM SERPL-MCNC: 9.1 MG/DL (ref 8.3–10.4)
CALCIUM SERPL-MCNC: 9.1 MG/DL (ref 8.3–10.4)
CALCIUM SERPL-MCNC: 9.2 MG/DL (ref 8.3–10.4)
CANCER AG19-9 SERPL-ACNC: 1338.7 U/ML (ref 2–37)
CEA SERPL-MCNC: 270 NG/ML (ref 0–3)
CEA SERPL-MCNC: 33.6 NG/ML (ref 0–3)
CEA SERPL-MCNC: 358.3 NG/ML (ref 0–3)
CHLORIDE SERPL-SCNC: 100 MMOL/L (ref 98–107)
CHLORIDE SERPL-SCNC: 101 MMOL/L (ref 98–107)
CHLORIDE SERPL-SCNC: 102 MMOL/L (ref 98–107)
CHLORIDE SERPL-SCNC: 104 MMOL/L (ref 98–107)
CHLORIDE SERPL-SCNC: 105 MMOL/L (ref 98–107)
CHLORIDE SERPL-SCNC: 106 MMOL/L (ref 98–107)
CHLORIDE SERPL-SCNC: 107 MMOL/L (ref 98–107)
CHLORIDE SERPL-SCNC: 107 MMOL/L (ref 98–107)
CHLORIDE SERPL-SCNC: 108 MMOL/L (ref 98–107)
CHLORIDE SERPL-SCNC: 92 MMOL/L (ref 98–107)
CHLORIDE SERPL-SCNC: 99 MMOL/L (ref 98–107)
CO2 SERPL-SCNC: 17 MMOL/L (ref 21–32)
CO2 SERPL-SCNC: 19 MMOL/L (ref 21–32)
CO2 SERPL-SCNC: 23 MMOL/L (ref 21–32)
CO2 SERPL-SCNC: 24 MMOL/L (ref 21–32)
CO2 SERPL-SCNC: 24 MMOL/L (ref 21–32)
CO2 SERPL-SCNC: 25 MMOL/L (ref 21–32)
CO2 SERPL-SCNC: 25 MMOL/L (ref 21–32)
CO2 SERPL-SCNC: 26 MMOL/L (ref 21–32)
CO2 SERPL-SCNC: 26 MMOL/L (ref 21–32)
CO2 SERPL-SCNC: 27 MMOL/L (ref 21–32)
CO2 SERPL-SCNC: 27 MMOL/L (ref 21–32)
CO2 SERPL-SCNC: 28 MMOL/L (ref 21–32)
CO2 SERPL-SCNC: 29 MMOL/L (ref 21–32)
CO2 SERPL-SCNC: 29 MMOL/L (ref 21–32)
CO2 SERPL-SCNC: 30 MMOL/L (ref 21–32)
COLOR UR: YELLOW
CREAT SERPL-MCNC: 0.29 MG/DL (ref 0.6–1)
CREAT SERPL-MCNC: 0.3 MG/DL (ref 0.6–1)
CREAT SERPL-MCNC: 0.31 MG/DL (ref 0.6–1)
CREAT SERPL-MCNC: 0.32 MG/DL (ref 0.6–1)
CREAT SERPL-MCNC: 0.33 MG/DL (ref 0.6–1)
CREAT SERPL-MCNC: 0.34 MG/DL (ref 0.6–1)
CREAT SERPL-MCNC: 0.35 MG/DL (ref 0.6–1)
CREAT SERPL-MCNC: 0.36 MG/DL (ref 0.6–1)
CREAT SERPL-MCNC: 0.37 MG/DL (ref 0.6–1)
CREAT SERPL-MCNC: 0.39 MG/DL (ref 0.6–1)
CREAT SERPL-MCNC: 0.41 MG/DL (ref 0.6–1)
CREAT SERPL-MCNC: 0.44 MG/DL (ref 0.6–1)
CREAT SERPL-MCNC: 0.45 MG/DL (ref 0.6–1)
CREAT SERPL-MCNC: 0.46 MG/DL (ref 0.6–1)
CREAT SERPL-MCNC: 0.46 MG/DL (ref 0.6–1)
CREAT SERPL-MCNC: 0.5 MG/DL (ref 0.6–1)
CREAT SERPL-MCNC: 0.52 MG/DL (ref 0.6–1)
CREAT SERPL-MCNC: 0.57 MG/DL (ref 0.6–1)
CREAT SERPL-MCNC: 0.59 MG/DL (ref 0.6–1)
CREAT SERPL-MCNC: 0.62 MG/DL (ref 0.6–1)
CREAT SERPL-MCNC: 0.62 MG/DL (ref 0.6–1)
CREAT SERPL-MCNC: 0.69 MG/DL (ref 0.6–1)
DIFFERENTIAL METHOD BLD: ABNORMAL
EOSINOPHIL # BLD: 0 K/UL
EOSINOPHIL # BLD: 0 K/UL (ref 0–0.8)
EOSINOPHIL # BLD: 0.1 K/UL (ref 0–0.8)
EOSINOPHIL # BLD: 0.2 K/UL (ref 0–0.8)
EOSINOPHIL NFR BLD: 0 % (ref 0.5–7.8)
EOSINOPHIL NFR BLD: 1 % (ref 0.5–7.8)
EOSINOPHIL NFR BLD: 2 % (ref 0.5–7.8)
EOSINOPHIL NFR BLD: 2 % (ref 0.5–7.8)
EOSINOPHIL NFR BLD: 3 % (ref 0.5–7.8)
EOSINOPHIL NFR BLD: 3 % (ref 0.5–7.8)
EPI CELLS #/AREA URNS HPF: ABNORMAL /HPF
ERYTHROCYTE [DISTWIDTH] IN BLOOD BY AUTOMATED COUNT: 12 % (ref 11.9–14.6)
ERYTHROCYTE [DISTWIDTH] IN BLOOD BY AUTOMATED COUNT: 12 % (ref 11.9–14.6)
ERYTHROCYTE [DISTWIDTH] IN BLOOD BY AUTOMATED COUNT: 12.1 % (ref 11.9–14.6)
ERYTHROCYTE [DISTWIDTH] IN BLOOD BY AUTOMATED COUNT: 12.4 % (ref 11.9–14.6)
ERYTHROCYTE [DISTWIDTH] IN BLOOD BY AUTOMATED COUNT: 13.5 % (ref 11.9–14.6)
ERYTHROCYTE [DISTWIDTH] IN BLOOD BY AUTOMATED COUNT: 13.5 % (ref 11.9–14.6)
ERYTHROCYTE [DISTWIDTH] IN BLOOD BY AUTOMATED COUNT: 14.3 % (ref 11.9–14.6)
ERYTHROCYTE [DISTWIDTH] IN BLOOD BY AUTOMATED COUNT: 14.6 %
ERYTHROCYTE [DISTWIDTH] IN BLOOD BY AUTOMATED COUNT: 14.7 %
ERYTHROCYTE [DISTWIDTH] IN BLOOD BY AUTOMATED COUNT: 14.8 % (ref 11.9–14.6)
ERYTHROCYTE [DISTWIDTH] IN BLOOD BY AUTOMATED COUNT: 14.9 %
ERYTHROCYTE [DISTWIDTH] IN BLOOD BY AUTOMATED COUNT: 14.9 % (ref 11.9–14.6)
ERYTHROCYTE [DISTWIDTH] IN BLOOD BY AUTOMATED COUNT: 14.9 % (ref 11.9–14.6)
ERYTHROCYTE [DISTWIDTH] IN BLOOD BY AUTOMATED COUNT: 15.2 %
ERYTHROCYTE [DISTWIDTH] IN BLOOD BY AUTOMATED COUNT: 15.2 %
ERYTHROCYTE [DISTWIDTH] IN BLOOD BY AUTOMATED COUNT: 15.6 %
ERYTHROCYTE [DISTWIDTH] IN BLOOD BY AUTOMATED COUNT: 15.8 %
ERYTHROCYTE [DISTWIDTH] IN BLOOD BY AUTOMATED COUNT: 15.9 %
ERYTHROCYTE [DISTWIDTH] IN BLOOD BY AUTOMATED COUNT: 16 %
ERYTHROCYTE [DISTWIDTH] IN BLOOD BY AUTOMATED COUNT: 16 % (ref 11.9–14.6)
ERYTHROCYTE [DISTWIDTH] IN BLOOD BY AUTOMATED COUNT: 17 %
ERYTHROCYTE [DISTWIDTH] IN BLOOD BY AUTOMATED COUNT: 17 % (ref 11.9–14.6)
ERYTHROCYTE [DISTWIDTH] IN BLOOD BY AUTOMATED COUNT: 17.4 % (ref 11.9–14.6)
ERYTHROCYTE [DISTWIDTH] IN BLOOD BY AUTOMATED COUNT: 17.9 % (ref 11.9–14.6)
GLOBULIN SER CALC-MCNC: 3.3 G/DL (ref 2.3–3.5)
GLOBULIN SER CALC-MCNC: 3.3 G/DL (ref 2.3–3.5)
GLOBULIN SER CALC-MCNC: 3.6 G/DL (ref 2.3–3.5)
GLOBULIN SER CALC-MCNC: 3.7 G/DL (ref 2.3–3.5)
GLOBULIN SER CALC-MCNC: 3.7 G/DL (ref 2.3–3.5)
GLOBULIN SER CALC-MCNC: 3.8 G/DL (ref 2.3–3.5)
GLOBULIN SER CALC-MCNC: 4 G/DL (ref 2.3–3.5)
GLOBULIN SER CALC-MCNC: 4 G/DL (ref 2.3–3.5)
GLOBULIN SER CALC-MCNC: 4.1 G/DL (ref 2.3–3.5)
GLOBULIN SER CALC-MCNC: 4.2 G/DL (ref 2.3–3.5)
GLOBULIN SER CALC-MCNC: 4.2 G/DL (ref 2.3–3.5)
GLOBULIN SER CALC-MCNC: 4.3 G/DL (ref 2.3–3.5)
GLOBULIN SER CALC-MCNC: 4.4 G/DL
GLOBULIN SER CALC-MCNC: 4.4 G/DL (ref 2.3–3.5)
GLOBULIN SER CALC-MCNC: 5.3 G/DL (ref 2.3–3.5)
GLOBULIN SER CALC-MCNC: 5.9 G/DL (ref 2.3–3.5)
GLUCOSE SERPL-MCNC: 105 MG/DL (ref 65–100)
GLUCOSE SERPL-MCNC: 105 MG/DL (ref 65–100)
GLUCOSE SERPL-MCNC: 107 MG/DL (ref 65–100)
GLUCOSE SERPL-MCNC: 111 MG/DL (ref 65–100)
GLUCOSE SERPL-MCNC: 112 MG/DL (ref 65–100)
GLUCOSE SERPL-MCNC: 123 MG/DL (ref 65–100)
GLUCOSE SERPL-MCNC: 124 MG/DL (ref 65–100)
GLUCOSE SERPL-MCNC: 128 MG/DL (ref 65–100)
GLUCOSE SERPL-MCNC: 137 MG/DL (ref 65–100)
GLUCOSE SERPL-MCNC: 138 MG/DL (ref 65–100)
GLUCOSE SERPL-MCNC: 139 MG/DL (ref 65–100)
GLUCOSE SERPL-MCNC: 141 MG/DL (ref 65–100)
GLUCOSE SERPL-MCNC: 145 MG/DL (ref 65–100)
GLUCOSE SERPL-MCNC: 146 MG/DL (ref 65–100)
GLUCOSE SERPL-MCNC: 157 MG/DL (ref 65–100)
GLUCOSE SERPL-MCNC: 159 MG/DL (ref 65–100)
GLUCOSE SERPL-MCNC: 204 MG/DL (ref 65–100)
GLUCOSE SERPL-MCNC: 307 MG/DL (ref 65–100)
GLUCOSE SERPL-MCNC: 82 MG/DL (ref 65–100)
GLUCOSE SERPL-MCNC: 84 MG/DL (ref 65–100)
GLUCOSE SERPL-MCNC: 85 MG/DL (ref 65–100)
GLUCOSE SERPL-MCNC: 90 MG/DL (ref 65–100)
GLUCOSE SERPL-MCNC: 94 MG/DL (ref 65–100)
GLUCOSE SERPL-MCNC: 96 MG/DL (ref 65–100)
GLUCOSE UR STRIP.AUTO-MCNC: NEGATIVE MG/DL
HCT VFR BLD AUTO: 24.5 % (ref 35.8–46.3)
HCT VFR BLD AUTO: 24.5 % (ref 35.8–46.3)
HCT VFR BLD AUTO: 25.5 % (ref 35.8–46.3)
HCT VFR BLD AUTO: 25.5 % (ref 35.8–46.3)
HCT VFR BLD AUTO: 25.9 % (ref 35.8–46.3)
HCT VFR BLD AUTO: 26.1 % (ref 35.8–46.3)
HCT VFR BLD AUTO: 26.5 % (ref 35.8–46.3)
HCT VFR BLD AUTO: 27 % (ref 35.8–46.3)
HCT VFR BLD AUTO: 28 % (ref 35.8–46.3)
HCT VFR BLD AUTO: 28.8 % (ref 35.8–46.3)
HCT VFR BLD AUTO: 29.1 % (ref 35.8–46.3)
HCT VFR BLD AUTO: 30.4 % (ref 35.8–46.3)
HCT VFR BLD AUTO: 33.7 % (ref 35.8–46.3)
HCT VFR BLD AUTO: 34.7 % (ref 35.8–46.3)
HCT VFR BLD AUTO: 36.1 % (ref 35.8–46.3)
HCT VFR BLD AUTO: 36.5 % (ref 35.8–46.3)
HCT VFR BLD AUTO: 37.1 % (ref 35.8–46.3)
HCT VFR BLD AUTO: 37.2 % (ref 35.8–46.3)
HCT VFR BLD AUTO: 37.7 % (ref 35.8–46.3)
HCT VFR BLD AUTO: 38.7 % (ref 35.8–46.3)
HCT VFR BLD AUTO: 38.9 % (ref 35.8–46.3)
HCT VFR BLD AUTO: 40.2 % (ref 35.8–46.3)
HCT VFR BLD AUTO: 40.7 % (ref 35.8–46.3)
HCT VFR BLD AUTO: 41.6 % (ref 35.8–46.3)
HGB BLD-MCNC: 10.1 G/DL (ref 11.7–15.4)
HGB BLD-MCNC: 11.1 G/DL (ref 11.7–15.4)
HGB BLD-MCNC: 11.7 G/DL (ref 11.7–15.4)
HGB BLD-MCNC: 12.5 G/DL (ref 11.7–15.4)
HGB BLD-MCNC: 12.6 G/DL (ref 11.7–15.4)
HGB BLD-MCNC: 12.7 G/DL (ref 11.7–15.4)
HGB BLD-MCNC: 12.8 G/DL (ref 11.7–15.4)
HGB BLD-MCNC: 12.8 G/DL (ref 11.7–15.4)
HGB BLD-MCNC: 13.3 G/DL (ref 11.7–15.4)
HGB BLD-MCNC: 13.4 G/DL (ref 11.7–15.4)
HGB BLD-MCNC: 13.7 G/DL (ref 11.7–15.4)
HGB BLD-MCNC: 7.8 G/DL (ref 11.7–15.4)
HGB BLD-MCNC: 8.1 G/DL (ref 11.7–15.4)
HGB BLD-MCNC: 8.2 G/DL (ref 11.7–15.4)
HGB BLD-MCNC: 8.2 G/DL (ref 11.7–15.4)
HGB BLD-MCNC: 8.3 G/DL (ref 11.7–15.4)
HGB BLD-MCNC: 8.7 G/DL (ref 11.7–15.4)
HGB BLD-MCNC: 9 G/DL (ref 11.7–15.4)
HGB BLD-MCNC: 9 G/DL (ref 11.7–15.4)
HGB BLD-MCNC: 9.3 G/DL (ref 11.7–15.4)
HGB BLD-MCNC: 9.5 G/DL (ref 11.7–15.4)
HGB BLD-MCNC: 9.5 G/DL (ref 11.7–15.4)
HGB UR QL STRIP: ABNORMAL
IMM GRANULOCYTES # BLD: 0 K/UL
IMM GRANULOCYTES # BLD: 0 K/UL (ref 0–0.5)
IMM GRANULOCYTES # BLD: 0.1 K/UL
IMM GRANULOCYTES # BLD: 0.2 K/UL
IMM GRANULOCYTES # BLD: 0.2 K/UL (ref 0–0.5)
IMM GRANULOCYTES # BLD: 0.3 K/UL
IMM GRANULOCYTES # BLD: 0.3 K/UL (ref 0–0.5)
IMM GRANULOCYTES # BLD: 0.4 K/UL (ref 0–0.5)
IMM GRANULOCYTES NFR BLD AUTO: 0 % (ref 0–5)
IMM GRANULOCYTES NFR BLD AUTO: 1 % (ref 0–5)
IMM GRANULOCYTES NFR BLD AUTO: 1 % (ref 0–5)
IMM GRANULOCYTES NFR BLD AUTO: 2 % (ref 0–5)
IMM GRANULOCYTES NFR BLD AUTO: 3 % (ref 0–5)
IMM GRANULOCYTES NFR BLD AUTO: 3 % (ref 0–5)
IMM GRANULOCYTES NFR BLD AUTO: 4 % (ref 0–5)
IMM GRANULOCYTES NFR BLD AUTO: 8 % (ref 0–5)
KETONES UR QL STRIP.AUTO: NEGATIVE MG/DL
LEUKOCYTE ESTERASE UR QL STRIP.AUTO: ABNORMAL
LYMPHOCYTES # BLD: 0.6 K/UL
LYMPHOCYTES # BLD: 0.6 K/UL (ref 0.5–4.6)
LYMPHOCYTES # BLD: 0.7 K/UL
LYMPHOCYTES # BLD: 0.8 K/UL
LYMPHOCYTES # BLD: 0.8 K/UL
LYMPHOCYTES # BLD: 0.9 K/UL
LYMPHOCYTES # BLD: 0.9 K/UL (ref 0.5–4.6)
LYMPHOCYTES # BLD: 0.9 K/UL (ref 0.5–4.6)
LYMPHOCYTES # BLD: 1 K/UL
LYMPHOCYTES # BLD: 1.1 K/UL (ref 0.5–4.6)
LYMPHOCYTES # BLD: 1.2 K/UL
LYMPHOCYTES # BLD: 1.2 K/UL (ref 0.5–4.6)
LYMPHOCYTES # BLD: 1.3 K/UL (ref 0.5–4.6)
LYMPHOCYTES # BLD: 1.7 K/UL (ref 0.5–4.6)
LYMPHOCYTES # BLD: 1.7 K/UL (ref 0.5–4.6)
LYMPHOCYTES # BLD: 1.8 K/UL (ref 0.5–4.6)
LYMPHOCYTES # BLD: 1.8 K/UL (ref 0.5–4.6)
LYMPHOCYTES # BLD: 1.9 K/UL (ref 0.5–4.6)
LYMPHOCYTES # BLD: 2 K/UL (ref 0.5–4.6)
LYMPHOCYTES # BLD: 2.6 K/UL (ref 0.5–4.6)
LYMPHOCYTES NFR BLD MANUAL: 13 % (ref 16–44)
LYMPHOCYTES NFR BLD MANUAL: 17 % (ref 16–44)
LYMPHOCYTES NFR BLD: 10 % (ref 13–44)
LYMPHOCYTES NFR BLD: 10 % (ref 13–44)
LYMPHOCYTES NFR BLD: 11 % (ref 13–44)
LYMPHOCYTES NFR BLD: 12 % (ref 13–44)
LYMPHOCYTES NFR BLD: 12 % (ref 13–44)
LYMPHOCYTES NFR BLD: 14 % (ref 13–44)
LYMPHOCYTES NFR BLD: 15 % (ref 13–44)
LYMPHOCYTES NFR BLD: 17 % (ref 13–44)
LYMPHOCYTES NFR BLD: 19 % (ref 13–44)
LYMPHOCYTES NFR BLD: 21 % (ref 13–44)
LYMPHOCYTES NFR BLD: 22 % (ref 13–44)
LYMPHOCYTES NFR BLD: 25 % (ref 13–44)
LYMPHOCYTES NFR BLD: 25 % (ref 13–44)
LYMPHOCYTES NFR BLD: 29 % (ref 13–44)
LYMPHOCYTES NFR BLD: 32 % (ref 13–44)
LYMPHOCYTES NFR BLD: 32 % (ref 13–44)
LYMPHOCYTES NFR BLD: 38 % (ref 13–44)
LYMPHOCYTES NFR BLD: 43 % (ref 13–44)
LYMPHOCYTES NFR BLD: 6 % (ref 13–44)
MAGNESIUM SERPL-MCNC: 1.7 MG/DL (ref 1.8–2.4)
MAGNESIUM SERPL-MCNC: 1.7 MG/DL (ref 1.8–2.4)
MAGNESIUM SERPL-MCNC: 1.8 MG/DL (ref 1.8–2.4)
MAGNESIUM SERPL-MCNC: 1.9 MG/DL (ref 1.8–2.4)
MAGNESIUM SERPL-MCNC: 2 MG/DL (ref 1.8–2.4)
MAGNESIUM SERPL-MCNC: 2 MG/DL (ref 1.8–2.4)
MAGNESIUM SERPL-MCNC: 2.1 MG/DL (ref 1.8–2.4)
MAGNESIUM SERPL-MCNC: 2.1 MG/DL (ref 1.8–2.4)
MAGNESIUM SERPL-MCNC: 2.2 MG/DL (ref 1.8–2.4)
MAGNESIUM SERPL-MCNC: 2.3 MG/DL (ref 1.8–2.4)
MCH RBC QN AUTO: 27.9 PG (ref 26.1–32.9)
MCH RBC QN AUTO: 28.5 PG (ref 26.1–32.9)
MCH RBC QN AUTO: 28.7 PG (ref 26.1–32.9)
MCH RBC QN AUTO: 29.1 PG (ref 26.1–32.9)
MCH RBC QN AUTO: 29.1 PG (ref 26.1–32.9)
MCH RBC QN AUTO: 29.2 PG (ref 26.1–32.9)
MCH RBC QN AUTO: 30.1 PG (ref 26.1–32.9)
MCH RBC QN AUTO: 30.2 PG (ref 26.1–32.9)
MCH RBC QN AUTO: 30.5 PG (ref 26.1–32.9)
MCH RBC QN AUTO: 30.5 PG (ref 26.1–32.9)
MCH RBC QN AUTO: 31.3 PG (ref 26.1–32.9)
MCH RBC QN AUTO: 32 PG (ref 26.1–32.9)
MCH RBC QN AUTO: 32.1 PG (ref 26.1–32.9)
MCH RBC QN AUTO: 32.2 PG (ref 26.1–32.9)
MCH RBC QN AUTO: 32.2 PG (ref 26.1–32.9)
MCH RBC QN AUTO: 32.4 PG (ref 26.1–32.9)
MCH RBC QN AUTO: 32.4 PG (ref 26.1–32.9)
MCH RBC QN AUTO: 32.5 PG (ref 26.1–32.9)
MCH RBC QN AUTO: 32.7 PG (ref 26.1–32.9)
MCH RBC QN AUTO: 32.8 PG (ref 26.1–32.9)
MCH RBC QN AUTO: 33.1 PG (ref 26.1–32.9)
MCH RBC QN AUTO: 33.1 PG (ref 26.1–32.9)
MCHC RBC AUTO-ENTMCNC: 31.7 G/DL (ref 31.4–35)
MCHC RBC AUTO-ENTMCNC: 31.8 G/DL (ref 31.4–35)
MCHC RBC AUTO-ENTMCNC: 32.2 G/DL (ref 31.4–35)
MCHC RBC AUTO-ENTMCNC: 32.5 G/DL (ref 31.4–35)
MCHC RBC AUTO-ENTMCNC: 32.6 G/DL (ref 31.4–35)
MCHC RBC AUTO-ENTMCNC: 32.9 G/DL (ref 31.4–35)
MCHC RBC AUTO-ENTMCNC: 33 G/DL (ref 31.4–35)
MCHC RBC AUTO-ENTMCNC: 33.1 G/DL (ref 31.4–35)
MCHC RBC AUTO-ENTMCNC: 33.2 G/DL (ref 31.4–35)
MCHC RBC AUTO-ENTMCNC: 33.3 G/DL (ref 31.4–35)
MCHC RBC AUTO-ENTMCNC: 33.3 G/DL (ref 31.4–35)
MCHC RBC AUTO-ENTMCNC: 33.6 G/DL (ref 31.4–35)
MCHC RBC AUTO-ENTMCNC: 33.7 G/DL (ref 31.4–35)
MCHC RBC AUTO-ENTMCNC: 33.7 G/DL (ref 31.4–35)
MCHC RBC AUTO-ENTMCNC: 34 G/DL (ref 31.4–35)
MCHC RBC AUTO-ENTMCNC: 34.8 G/DL (ref 31.4–35)
MCHC RBC AUTO-ENTMCNC: 34.9 G/DL (ref 31.4–35)
MCV RBC AUTO: 100 FL (ref 79.6–97.8)
MCV RBC AUTO: 100.8 FL (ref 79.6–97.8)
MCV RBC AUTO: 101.2 FL (ref 79.6–97.8)
MCV RBC AUTO: 101.6 FL (ref 79.6–97.8)
MCV RBC AUTO: 84.3 FL (ref 79.6–97.8)
MCV RBC AUTO: 85.3 FL (ref 79.6–97.8)
MCV RBC AUTO: 86.5 FL (ref 79.6–97.8)
MCV RBC AUTO: 86.5 FL (ref 79.6–97.8)
MCV RBC AUTO: 86.6 FL (ref 79.6–97.8)
MCV RBC AUTO: 87.7 FL (ref 79.6–97.8)
MCV RBC AUTO: 88.4 FL (ref 79.6–97.8)
MCV RBC AUTO: 89.8 FL (ref 79.6–97.8)
MCV RBC AUTO: 91.7 FL (ref 79.6–97.8)
MCV RBC AUTO: 92.6 FL (ref 79.6–97.8)
MCV RBC AUTO: 92.7 FL (ref 79.6–97.8)
MCV RBC AUTO: 96.5 FL (ref 79.6–97.8)
MCV RBC AUTO: 96.7 FL (ref 79.6–97.8)
MCV RBC AUTO: 98 FL (ref 79.6–97.8)
MCV RBC AUTO: 98 FL (ref 79.6–97.8)
MCV RBC AUTO: 98.5 FL (ref 79.6–97.8)
MCV RBC AUTO: 98.5 FL (ref 79.6–97.8)
MCV RBC AUTO: 98.6 FL (ref 79.6–97.8)
MCV RBC AUTO: 98.6 FL (ref 79.6–97.8)
MCV RBC AUTO: 99.6 FL (ref 79.6–97.8)
METAMYELOCYTES NFR BLD MANUAL: 2 %
METAMYELOCYTES NFR BLD MANUAL: 2 %
MM INDURATION POC: 0 MM (ref 0–5)
MONOCYTES # BLD: 0.1 K/UL
MONOCYTES # BLD: 0.2 K/UL (ref 0.1–1.3)
MONOCYTES # BLD: 0.3 K/UL
MONOCYTES # BLD: 0.4 K/UL
MONOCYTES # BLD: 0.4 K/UL (ref 0.1–1.3)
MONOCYTES # BLD: 0.5 K/UL (ref 0.1–1.3)
MONOCYTES # BLD: 0.5 K/UL (ref 0.1–1.3)
MONOCYTES # BLD: 0.6 K/UL
MONOCYTES # BLD: 0.6 K/UL
MONOCYTES # BLD: 0.6 K/UL (ref 0.1–1.3)
MONOCYTES # BLD: 0.6 K/UL (ref 0.1–1.3)
MONOCYTES # BLD: 0.7 K/UL (ref 0.1–1.3)
MONOCYTES # BLD: 0.7 K/UL (ref 0.1–1.3)
MONOCYTES # BLD: 0.8 K/UL (ref 0.1–1.3)
MONOCYTES # BLD: 0.9 K/UL (ref 0.1–1.3)
MONOCYTES # BLD: 1.1 K/UL (ref 0.1–1.3)
MONOCYTES NFR BLD MANUAL: 3 % (ref 3–9)
MONOCYTES NFR BLD: 1 % (ref 4–12)
MONOCYTES NFR BLD: 10 % (ref 4–12)
MONOCYTES NFR BLD: 10 % (ref 4–12)
MONOCYTES NFR BLD: 11 % (ref 4–12)
MONOCYTES NFR BLD: 16 % (ref 4–12)
MONOCYTES NFR BLD: 2 % (ref 4–12)
MONOCYTES NFR BLD: 2 % (ref 4–12)
MONOCYTES NFR BLD: 4 % (ref 4–12)
MONOCYTES NFR BLD: 6 % (ref 4–12)
MONOCYTES NFR BLD: 6 % (ref 4–12)
MONOCYTES NFR BLD: 7 % (ref 4–12)
MONOCYTES NFR BLD: 8 % (ref 4–12)
MONOCYTES NFR BLD: 8 % (ref 4–12)
MONOCYTES NFR BLD: 9 % (ref 4–12)
MONOCYTES NFR BLD: 9 % (ref 4–12)
MUCOUS THREADS URNS QL MICRO: ABNORMAL /LPF
MYELOCYTES NFR BLD MANUAL: 2 %
MYELOCYTES NFR BLD MANUAL: 3 %
NEUTS BAND NFR BLD MANUAL: 4 % (ref 0–10)
NEUTS SEG # BLD: 1.8 K/UL (ref 1.7–8.2)
NEUTS SEG # BLD: 10.1 K/UL (ref 1.7–8.2)
NEUTS SEG # BLD: 2.5 K/UL (ref 1.7–8.2)
NEUTS SEG # BLD: 2.6 K/UL (ref 1.7–8.2)
NEUTS SEG # BLD: 3.5 K/UL (ref 1.7–8.2)
NEUTS SEG # BLD: 3.7 K/UL (ref 1.7–8.2)
NEUTS SEG # BLD: 3.9 K/UL
NEUTS SEG # BLD: 4.2 K/UL
NEUTS SEG # BLD: 4.2 K/UL (ref 1.7–8.2)
NEUTS SEG # BLD: 4.3 K/UL (ref 1.7–8.2)
NEUTS SEG # BLD: 4.5 K/UL
NEUTS SEG # BLD: 4.6 K/UL
NEUTS SEG # BLD: 4.7 K/UL
NEUTS SEG # BLD: 4.9 K/UL (ref 1.7–8.2)
NEUTS SEG # BLD: 5 K/UL
NEUTS SEG # BLD: 5.2 K/UL
NEUTS SEG # BLD: 5.3 K/UL (ref 1.7–8.2)
NEUTS SEG # BLD: 5.4 K/UL
NEUTS SEG # BLD: 5.5 K/UL (ref 1.7–8.2)
NEUTS SEG # BLD: 5.7 K/UL (ref 1.7–8.2)
NEUTS SEG # BLD: 5.8 K/UL (ref 1.7–8.2)
NEUTS SEG # BLD: 6.1 K/UL
NEUTS SEG # BLD: 7.7 K/UL (ref 1.7–8.2)
NEUTS SEG # BLD: 9 K/UL (ref 1.7–8.2)
NEUTS SEG NFR BLD MANUAL: 75 % (ref 47–75)
NEUTS SEG NFR BLD MANUAL: 79 % (ref 47–75)
NEUTS SEG NFR BLD: 44 % (ref 43–78)
NEUTS SEG NFR BLD: 51 % (ref 43–78)
NEUTS SEG NFR BLD: 57 % (ref 43–78)
NEUTS SEG NFR BLD: 59 % (ref 43–78)
NEUTS SEG NFR BLD: 60 % (ref 43–78)
NEUTS SEG NFR BLD: 61 % (ref 43–78)
NEUTS SEG NFR BLD: 64 % (ref 43–78)
NEUTS SEG NFR BLD: 67 % (ref 43–78)
NEUTS SEG NFR BLD: 69 % (ref 43–78)
NEUTS SEG NFR BLD: 69 % (ref 43–78)
NEUTS SEG NFR BLD: 70 % (ref 43–78)
NEUTS SEG NFR BLD: 71 % (ref 43–78)
NEUTS SEG NFR BLD: 74 % (ref 43–78)
NEUTS SEG NFR BLD: 75 % (ref 43–78)
NEUTS SEG NFR BLD: 77 % (ref 43–78)
NEUTS SEG NFR BLD: 79 % (ref 43–78)
NEUTS SEG NFR BLD: 81 % (ref 43–78)
NEUTS SEG NFR BLD: 82 % (ref 43–78)
NEUTS SEG NFR BLD: 83 % (ref 43–78)
NEUTS SEG NFR BLD: 85 % (ref 43–78)
NEUTS SEG NFR BLD: 88 % (ref 43–78)
NEUTS SEG NFR BLD: 88 % (ref 43–78)
NITRITE UR QL STRIP.AUTO: NEGATIVE
NRBC # BLD: 0 K/UL (ref 0–0.2)
NRBC # BLD: 0.01 K/UL (ref 0–0.2)
NRBC # BLD: 0.01 K/UL (ref 0–0.2)
NRBC # BLD: 0.03 K/UL (ref 0–0.2)
NRBC # BLD: 0.03 K/UL (ref 0–0.2)
NRBC # BLD: 0.04 K/UL (ref 0–0.2)
NRBC # BLD: 0.04 K/UL (ref 0–0.2)
NRBC # BLD: 0.05 K/UL (ref 0–0.2)
NRBC # BLD: 0.06 K/UL (ref 0–0.2)
NRBC # BLD: 0.13 K/UL (ref 0–0.2)
NRBC BLD-RTO: 0 PER 100 WBC (ref 0–2)
PH UR STRIP: 6 [PH] (ref 5–9)
PHOSPHATE SERPL-MCNC: 3.6 MG/DL (ref 2.5–4.5)
PLATELET # BLD AUTO: 216 K/UL (ref 150–450)
PLATELET # BLD AUTO: 216 K/UL (ref 150–450)
PLATELET # BLD AUTO: 217 K/UL (ref 150–450)
PLATELET # BLD AUTO: 219 K/UL (ref 150–450)
PLATELET # BLD AUTO: 225 K/UL (ref 150–450)
PLATELET # BLD AUTO: 234 K/UL (ref 150–450)
PLATELET # BLD AUTO: 247 K/UL (ref 150–450)
PLATELET # BLD AUTO: 247 K/UL (ref 150–450)
PLATELET # BLD AUTO: 249 K/UL (ref 150–450)
PLATELET # BLD AUTO: 252 K/UL (ref 150–450)
PLATELET # BLD AUTO: 262 K/UL (ref 150–450)
PLATELET # BLD AUTO: 268 K/UL (ref 150–450)
PLATELET # BLD AUTO: 271 K/UL (ref 150–450)
PLATELET # BLD AUTO: 273 K/UL (ref 150–450)
PLATELET # BLD AUTO: 284 K/UL (ref 150–450)
PLATELET # BLD AUTO: 289 K/UL (ref 150–450)
PLATELET # BLD AUTO: 299 K/UL (ref 150–450)
PLATELET # BLD AUTO: 300 K/UL (ref 150–450)
PLATELET # BLD AUTO: 311 K/UL (ref 150–450)
PLATELET # BLD AUTO: 315 K/UL (ref 150–450)
PLATELET # BLD AUTO: 343 K/UL (ref 150–450)
PLATELET # BLD AUTO: 350 K/UL (ref 150–450)
PLATELET # BLD AUTO: 364 K/UL (ref 150–450)
PLATELET # BLD AUTO: 454 K/UL (ref 150–450)
PLATELET COMMENTS,PCOM: ADEQUATE
PMV BLD AUTO: 10 FL (ref 10.8–14.1)
PMV BLD AUTO: 10.1 FL (ref 10.8–14.1)
PMV BLD AUTO: 10.1 FL (ref 10.8–14.1)
PMV BLD AUTO: 10.2 FL (ref 10.8–14.1)
PMV BLD AUTO: 10.2 FL (ref 10.8–14.1)
PMV BLD AUTO: 10.3 FL (ref 10.8–14.1)
PMV BLD AUTO: 10.4 FL (ref 10.8–14.1)
PMV BLD AUTO: 10.6 FL (ref 10.8–14.1)
PMV BLD AUTO: 8.8 FL (ref 10.8–14.1)
PMV BLD AUTO: 9 FL (ref 10.8–14.1)
PMV BLD AUTO: 9 FL (ref 10.8–14.1)
PMV BLD AUTO: 9 FL (ref 9.4–12.3)
PMV BLD AUTO: 9.1 FL (ref 9.4–12.3)
PMV BLD AUTO: 9.1 FL (ref 9.4–12.3)
PMV BLD AUTO: 9.2 FL (ref 9.4–12.3)
PMV BLD AUTO: 9.4 FL (ref 9.4–12.3)
PMV BLD AUTO: 9.5 FL (ref 9.4–12.3)
POTASSIUM SERPL-SCNC: 2.8 MMOL/L (ref 3.5–5.1)
POTASSIUM SERPL-SCNC: 3.2 MMOL/L (ref 3.5–5.1)
POTASSIUM SERPL-SCNC: 3.4 MMOL/L (ref 3.5–5.1)
POTASSIUM SERPL-SCNC: 3.5 MMOL/L (ref 3.5–5.1)
POTASSIUM SERPL-SCNC: 3.6 MMOL/L (ref 3.5–5.1)
POTASSIUM SERPL-SCNC: 3.6 MMOL/L (ref 3.5–5.1)
POTASSIUM SERPL-SCNC: 3.8 MMOL/L (ref 3.5–5.1)
POTASSIUM SERPL-SCNC: 3.8 MMOL/L (ref 3.5–5.1)
POTASSIUM SERPL-SCNC: 3.9 MMOL/L (ref 3.5–5.1)
POTASSIUM SERPL-SCNC: 3.9 MMOL/L (ref 3.5–5.1)
POTASSIUM SERPL-SCNC: 4 MMOL/L (ref 3.5–5.1)
POTASSIUM SERPL-SCNC: 4.1 MMOL/L (ref 3.5–5.1)
POTASSIUM SERPL-SCNC: 4.2 MMOL/L (ref 3.5–5.1)
POTASSIUM SERPL-SCNC: 4.3 MMOL/L (ref 3.5–5.1)
POTASSIUM SERPL-SCNC: 4.4 MMOL/L (ref 3.5–5.1)
PPD POC: NEGATIVE NEGATIVE
PROT SERPL-MCNC: 5.8 G/DL (ref 6.3–8.2)
PROT SERPL-MCNC: 5.9 G/DL (ref 6.3–8.2)
PROT SERPL-MCNC: 5.9 G/DL (ref 6.3–8.2)
PROT SERPL-MCNC: 6 G/DL (ref 6.3–8.2)
PROT SERPL-MCNC: 6.1 G/DL (ref 6.3–8.2)
PROT SERPL-MCNC: 6.1 G/DL (ref 6.3–8.2)
PROT SERPL-MCNC: 6.2 G/DL (ref 6.3–8.2)
PROT SERPL-MCNC: 6.3 G/DL (ref 6.3–8.2)
PROT SERPL-MCNC: 6.4 G/DL (ref 6.3–8.2)
PROT SERPL-MCNC: 6.5 G/DL (ref 6.3–8.2)
PROT SERPL-MCNC: 6.8 G/DL (ref 6.3–8.2)
PROT SERPL-MCNC: 6.8 G/DL (ref 6.3–8.2)
PROT SERPL-MCNC: 7 G/DL (ref 6.3–8.2)
PROT SERPL-MCNC: 7.1 G/DL (ref 6.3–8.2)
PROT SERPL-MCNC: 7.1 G/DL (ref 6.3–8.2)
PROT SERPL-MCNC: 7.7 G/DL (ref 6.3–8.2)
PROT SERPL-MCNC: 8.2 G/DL (ref 6.3–8.2)
PROT UR STRIP-MCNC: 30 MG/DL
PROT UR-MCNC: 15 MG/DL
PROT UR-MCNC: 25 MG/DL
PROT UR-MCNC: 68 MG/DL
RBC # BLD AUTO: 2.42 M/UL (ref 4.05–5.2)
RBC # BLD AUTO: 2.45 M/UL (ref 4.05–5.2)
RBC # BLD AUTO: 2.53 M/UL (ref 4.05–5.2)
RBC # BLD AUTO: 2.55 M/UL (ref 4.05–5.2)
RBC # BLD AUTO: 2.56 M/UL (ref 4.05–5.2)
RBC # BLD AUTO: 2.65 M/UL (ref 4.05–5.2)
RBC # BLD AUTO: 2.74 M/UL (ref 4.05–5.2)
RBC # BLD AUTO: 2.74 M/UL (ref 4.05–5.2)
RBC # BLD AUTO: 2.84 M/UL (ref 4.05–5.2)
RBC # BLD AUTO: 2.92 M/UL (ref 4.05–5.2)
RBC # BLD AUTO: 2.97 M/UL (ref 4.05–5.25)
RBC # BLD AUTO: 3.15 M/UL (ref 4.05–5.25)
RBC # BLD AUTO: 3.54 M/UL (ref 4.05–5.25)
RBC # BLD AUTO: 3.89 M/UL (ref 4.05–5.25)
RBC # BLD AUTO: 4.02 M/UL (ref 4.05–5.25)
RBC # BLD AUTO: 4.2 M/UL (ref 4.05–5.25)
RBC # BLD AUTO: 4.22 M/UL (ref 4.05–5.25)
RBC # BLD AUTO: 4.3 M/UL (ref 4.05–5.25)
RBC # BLD AUTO: 4.3 M/UL (ref 4.05–5.25)
RBC # BLD AUTO: 4.35 M/UL (ref 4.05–5.25)
RBC # BLD AUTO: 4.44 M/UL (ref 4.05–5.25)
RBC # BLD AUTO: 4.49 M/UL (ref 4.05–5.25)
RBC # BLD AUTO: 4.55 M/UL (ref 4.05–5.25)
RBC # BLD AUTO: 4.59 M/UL (ref 4.05–5.25)
RBC #/AREA URNS HPF: ABNORMAL /HPF
RBC MORPH BLD: ABNORMAL
SERVICE CMNT-IMP: NORMAL
SODIUM SERPL-SCNC: 132 MMOL/L (ref 136–145)
SODIUM SERPL-SCNC: 133 MMOL/L (ref 136–145)
SODIUM SERPL-SCNC: 133 MMOL/L (ref 136–145)
SODIUM SERPL-SCNC: 135 MMOL/L (ref 136–145)
SODIUM SERPL-SCNC: 136 MMOL/L (ref 136–145)
SODIUM SERPL-SCNC: 137 MMOL/L (ref 136–145)
SODIUM SERPL-SCNC: 138 MMOL/L (ref 136–145)
SODIUM SERPL-SCNC: 139 MMOL/L (ref 136–145)
SODIUM SERPL-SCNC: 140 MMOL/L (ref 136–145)
SODIUM SERPL-SCNC: 141 MMOL/L (ref 136–145)
SODIUM SERPL-SCNC: 142 MMOL/L (ref 136–145)
SODIUM SERPL-SCNC: 143 MMOL/L (ref 136–145)
SP GR UR REFRACTOMETRY: 1.04 (ref 1–1.02)
SPECIMEN EXP DATE BLD: NORMAL
UROBILINOGEN UR QL STRIP.AUTO: 1 EU/DL (ref 0.2–1)
WBC # BLD AUTO: 10.3 K/UL (ref 4.3–11.1)
WBC # BLD AUTO: 12.5 K/UL (ref 4.3–11.1)
WBC # BLD AUTO: 4.1 K/UL (ref 4.3–11.1)
WBC # BLD AUTO: 4.6 K/UL (ref 4.3–11.1)
WBC # BLD AUTO: 5 K/UL (ref 4.3–11.1)
WBC # BLD AUTO: 5.3 K/UL (ref 4.3–11.1)
WBC # BLD AUTO: 5.3 K/UL (ref 4.3–11.1)
WBC # BLD AUTO: 5.4 K/UL (ref 4.3–11.1)
WBC # BLD AUTO: 5.4 K/UL (ref 4.3–11.1)
WBC # BLD AUTO: 5.6 K/UL (ref 4.3–11.1)
WBC # BLD AUTO: 5.8 K/UL (ref 4.3–11.1)
WBC # BLD AUTO: 6.1 K/UL (ref 4.3–11.1)
WBC # BLD AUTO: 6.3 K/UL (ref 4.3–11.1)
WBC # BLD AUTO: 6.6 K/UL (ref 4.3–11.1)
WBC # BLD AUTO: 7.1 K/UL (ref 4.3–11.1)
WBC # BLD AUTO: 7.7 K/UL (ref 4.3–11.1)
WBC # BLD AUTO: 7.8 K/UL (ref 4.3–11.1)
WBC # BLD AUTO: 7.9 K/UL (ref 4.3–11.1)
WBC # BLD AUTO: 8.2 K/UL (ref 4.3–11.1)
WBC # BLD AUTO: 8.2 K/UL (ref 4.3–11.1)
WBC # BLD AUTO: 8.4 K/UL (ref 4.3–11.1)
WBC # BLD AUTO: 8.8 K/UL (ref 4.3–11.1)
WBC MORPH BLD: ABNORMAL
WBC URNS QL MICRO: ABNORMAL /HPF

## 2018-01-01 PROCEDURE — 74011000302 HC RX REV CODE- 302: Performed by: NURSE PRACTITIONER

## 2018-01-01 PROCEDURE — 74011250636 HC RX REV CODE- 250/636: Performed by: INTERNAL MEDICINE

## 2018-01-01 PROCEDURE — A4371 SKIN BARRIER POWDER PER OZ: HCPCS

## 2018-01-01 PROCEDURE — 85025 COMPLETE CBC W/AUTO DIFF WBC: CPT

## 2018-01-01 PROCEDURE — G0157 HHC PT ASSISTANT EA 15: HCPCS

## 2018-01-01 PROCEDURE — 77030020263 HC SOL INJ SOD CL0.9% LFCR 1000ML

## 2018-01-01 PROCEDURE — 82378 CARCINOEMBRYONIC ANTIGEN: CPT | Performed by: INTERNAL MEDICINE

## 2018-01-01 PROCEDURE — 77030018842 HC SOL IRR SOD CL 9% BAXT -A: Performed by: SURGERY

## 2018-01-01 PROCEDURE — C9113 INJ PANTOPRAZOLE SODIUM, VIA: HCPCS | Performed by: SURGERY

## 2018-01-01 PROCEDURE — 96361 HYDRATE IV INFUSION ADD-ON: CPT

## 2018-01-01 PROCEDURE — 83735 ASSAY OF MAGNESIUM: CPT

## 2018-01-01 PROCEDURE — 99232 SBSQ HOSP IP/OBS MODERATE 35: CPT | Performed by: PSYCHIATRY & NEUROLOGY

## 2018-01-01 PROCEDURE — 96375 TX/PRO/DX INJ NEW DRUG ADDON: CPT

## 2018-01-01 PROCEDURE — 77030032490 HC SLV COMPR SCD KNE COVD -B: Performed by: SURGERY

## 2018-01-01 PROCEDURE — A4406 PECTIN BASED OSTOMY PASTE: HCPCS

## 2018-01-01 PROCEDURE — 74011250637 HC RX REV CODE- 250/637: Performed by: INTERNAL MEDICINE

## 2018-01-01 PROCEDURE — 80053 COMPREHEN METABOLIC PANEL: CPT

## 2018-01-01 PROCEDURE — 74011000250 HC RX REV CODE- 250

## 2018-01-01 PROCEDURE — 74011250636 HC RX REV CODE- 250/636: Performed by: PHYSICIAN ASSISTANT

## 2018-01-01 PROCEDURE — 74011250636 HC RX REV CODE- 250/636: Performed by: NURSE PRACTITIONER

## 2018-01-01 PROCEDURE — 80048 BASIC METABOLIC PNL TOTAL CA: CPT | Performed by: SURGERY

## 2018-01-01 PROCEDURE — G0299 HHS/HOSPICE OF RN EA 15 MIN: HCPCS

## 2018-01-01 PROCEDURE — 74011250637 HC RX REV CODE- 250/637: Performed by: NURSE PRACTITIONER

## 2018-01-01 PROCEDURE — 85025 COMPLETE CBC W/AUTO DIFF WBC: CPT | Performed by: SURGERY

## 2018-01-01 PROCEDURE — 97530 THERAPEUTIC ACTIVITIES: CPT

## 2018-01-01 PROCEDURE — A4425 OST PCH DRAIN FOR BARRIER FL: HCPCS

## 2018-01-01 PROCEDURE — 74011636320 HC RX REV CODE- 636/320: Performed by: INTERNAL MEDICINE

## 2018-01-01 PROCEDURE — A4414 OST SKNBAR W/O CONV<=4 SQ IN: HCPCS

## 2018-01-01 PROCEDURE — G0151 HHCP-SERV OF PT,EA 15 MIN: HCPCS

## 2018-01-01 PROCEDURE — 80053 COMPREHEN METABOLIC PANEL: CPT | Performed by: INTERNAL MEDICINE

## 2018-01-01 PROCEDURE — 74011250637 HC RX REV CODE- 250/637: Performed by: PHYSICIAN ASSISTANT

## 2018-01-01 PROCEDURE — 87040 BLOOD CULTURE FOR BACTERIA: CPT

## 2018-01-01 PROCEDURE — 77030018667 ADMN ST IV BLD FENW -A

## 2018-01-01 PROCEDURE — 94762 N-INVAS EAR/PLS OXIMTRY CONT: CPT

## 2018-01-01 PROCEDURE — 97161 PT EVAL LOW COMPLEX 20 MIN: CPT

## 2018-01-01 PROCEDURE — 36415 COLL VENOUS BLD VENIPUNCTURE: CPT

## 2018-01-01 PROCEDURE — 74011250636 HC RX REV CODE- 250/636: Performed by: SURGERY

## 2018-01-01 PROCEDURE — 74011250636 HC RX REV CODE- 250/636

## 2018-01-01 PROCEDURE — 77030008477 HC STYL SATN SLP COVD -A: Performed by: ANESTHESIOLOGY

## 2018-01-01 PROCEDURE — 77030036998 HC STPLR CRV CUT CNTOUR J&J -F: Performed by: SURGERY

## 2018-01-01 PROCEDURE — 74011000258 HC RX REV CODE- 258: Performed by: NURSE PRACTITIONER

## 2018-01-01 PROCEDURE — 77030027688 HC DRSG MEPILEX 16-48IN NO BORD MOLN -A

## 2018-01-01 PROCEDURE — 99233 SBSQ HOSP IP/OBS HIGH 50: CPT | Performed by: INTERNAL MEDICINE

## 2018-01-01 PROCEDURE — 77030011640 HC PAD GRND REM COVD -A: Performed by: SURGERY

## 2018-01-01 PROCEDURE — 65270000029 HC RM PRIVATE

## 2018-01-01 PROCEDURE — 77001 FLUOROGUIDE FOR VEIN DEVICE: CPT

## 2018-01-01 PROCEDURE — A6252 ABSORPT DRG >16 <=48 W/O BDR: HCPCS

## 2018-01-01 PROCEDURE — A4407 EXT WEAR OST SKN BARR <=4SQ": HCPCS

## 2018-01-01 PROCEDURE — C1758 CATHETER, URETERAL: HCPCS | Performed by: SURGERY

## 2018-01-01 PROCEDURE — 96374 THER/PROPH/DIAG INJ IV PUSH: CPT

## 2018-01-01 PROCEDURE — 96368 THER/DIAG CONCURRENT INF: CPT

## 2018-01-01 PROCEDURE — A6222 GAUZE <=16 IN NO W/SAL W/O B: HCPCS

## 2018-01-01 PROCEDURE — C1788 PORT, INDWELLING, IMP: HCPCS

## 2018-01-01 PROCEDURE — 77030012406 HC DRN WND PENRS BARD -A: Performed by: SURGERY

## 2018-01-01 PROCEDURE — 85025 COMPLETE CBC W/AUTO DIFF WBC: CPT | Performed by: INTERNAL MEDICINE

## 2018-01-01 PROCEDURE — 77030031139 HC SUT VCRL2 J&J -A

## 2018-01-01 PROCEDURE — 77030018832 HC SOL IRR H20 ICUM -A: Performed by: SURGERY

## 2018-01-01 PROCEDURE — 97166 OT EVAL MOD COMPLEX 45 MIN: CPT

## 2018-01-01 PROCEDURE — 36592 COLLECT BLOOD FROM PICC: CPT

## 2018-01-01 PROCEDURE — 86580 TB INTRADERMAL TEST: CPT | Performed by: NURSE PRACTITIONER

## 2018-01-01 PROCEDURE — 83735 ASSAY OF MAGNESIUM: CPT | Performed by: INTERNAL MEDICINE

## 2018-01-01 PROCEDURE — 96415 CHEMO IV INFUSION ADDL HR: CPT

## 2018-01-01 PROCEDURE — A4424 OST PCH DRAIN W BAR & FILTER: HCPCS

## 2018-01-01 PROCEDURE — 74011000250 HC RX REV CODE- 250: Performed by: ANESTHESIOLOGY

## 2018-01-01 PROCEDURE — 77030020250 HC SOL INJ D 5% LFCR 1000ML BG LF

## 2018-01-01 PROCEDURE — 97110 THERAPEUTIC EXERCISES: CPT

## 2018-01-01 PROCEDURE — 85025 COMPLETE CBC W/AUTO DIFF WBC: CPT | Performed by: NURSE PRACTITIONER

## 2018-01-01 PROCEDURE — 74011000258 HC RX REV CODE- 258: Performed by: INTERNAL MEDICINE

## 2018-01-01 PROCEDURE — A4452 WATERPROOF TAPE: HCPCS

## 2018-01-01 PROCEDURE — 77030002996 HC SUT SLK J&J -A: Performed by: SURGERY

## 2018-01-01 PROCEDURE — 77030018719 HC DRSG PTCH ANTIMIC J&J -A

## 2018-01-01 PROCEDURE — C1894 INTRO/SHEATH, NON-LASER: HCPCS

## 2018-01-01 PROCEDURE — 77030031131 HC SUT MXN P COVD -B

## 2018-01-01 PROCEDURE — 77030019605

## 2018-01-01 PROCEDURE — 99239 HOSP IP/OBS DSCHRG MGMT >30: CPT | Performed by: INTERNAL MEDICINE

## 2018-01-01 PROCEDURE — 36591 DRAW BLOOD OFF VENOUS DEVICE: CPT

## 2018-01-01 PROCEDURE — 77030010507 HC ADH SKN DERMBND J&J -B

## 2018-01-01 PROCEDURE — A5120 SKIN BARRIER, WIPE OR SWAB: HCPCS

## 2018-01-01 PROCEDURE — A4363 OSTOMY CLAMP, REPLACEMENT: HCPCS

## 2018-01-01 PROCEDURE — 36415 COLL VENOUS BLD VENIPUNCTURE: CPT | Performed by: NURSE PRACTITIONER

## 2018-01-01 PROCEDURE — 83735 ASSAY OF MAGNESIUM: CPT | Performed by: HOSPITALIST

## 2018-01-01 PROCEDURE — 74011250637 HC RX REV CODE- 250/637: Performed by: HOSPITALIST

## 2018-01-01 PROCEDURE — 77030009980 HC RELD STPLR TR J&J -B: Performed by: SURGERY

## 2018-01-01 PROCEDURE — 36415 COLL VENOUS BLD VENIPUNCTURE: CPT | Performed by: HOSPITALIST

## 2018-01-01 PROCEDURE — 84156 ASSAY OF PROTEIN URINE: CPT | Performed by: INTERNAL MEDICINE

## 2018-01-01 PROCEDURE — 77030008703 HC TU ET UNCUF COVD -A: Performed by: ANESTHESIOLOGY

## 2018-01-01 PROCEDURE — 84100 ASSAY OF PHOSPHORUS: CPT | Performed by: HOSPITALIST

## 2018-01-01 PROCEDURE — 77030019927 HC TBNG IRR CYSTO BAXT -A: Performed by: SURGERY

## 2018-01-01 PROCEDURE — 74011000250 HC RX REV CODE- 250: Performed by: SURGERY

## 2018-01-01 PROCEDURE — 96413 CHEMO IV INFUSION 1 HR: CPT

## 2018-01-01 PROCEDURE — 36592 COLLECT BLOOD FROM PICC: CPT | Performed by: NURSE PRACTITIONER

## 2018-01-01 PROCEDURE — 36415 COLL VENOUS BLD VENIPUNCTURE: CPT | Performed by: SURGERY

## 2018-01-01 PROCEDURE — 76210000001 HC OR PH I REC 2.5 TO 3 HR: Performed by: SURGERY

## 2018-01-01 PROCEDURE — C1769 GUIDE WIRE: HCPCS | Performed by: SURGERY

## 2018-01-01 PROCEDURE — 81001 URINALYSIS AUTO W/SCOPE: CPT

## 2018-01-01 PROCEDURE — 400013 HH SOC

## 2018-01-01 PROCEDURE — 86901 BLOOD TYPING SEROLOGIC RH(D): CPT | Performed by: SURGERY

## 2018-01-01 PROCEDURE — 77030018521 HC STPLR ENDOSCOPIC J&J -C: Performed by: SURGERY

## 2018-01-01 PROCEDURE — 70553 MRI BRAIN STEM W/O & W/DYE: CPT

## 2018-01-01 PROCEDURE — 96411 CHEMO IV PUSH ADDL DRUG: CPT

## 2018-01-01 PROCEDURE — 77030018786 HC NDL GD F/USND BARD -B

## 2018-01-01 PROCEDURE — A9577 INJ MULTIHANCE: HCPCS | Performed by: NURSE PRACTITIONER

## 2018-01-01 PROCEDURE — 95816 EEG AWAKE AND DROWSY: CPT | Performed by: INTERNAL MEDICINE

## 2018-01-01 PROCEDURE — 82378 CARCINOEMBRYONIC ANTIGEN: CPT

## 2018-01-01 PROCEDURE — 77030013567 HC DRN WND RESERV BARD -A: Performed by: SURGERY

## 2018-01-01 PROCEDURE — 77030010541: Performed by: SURGERY

## 2018-01-01 PROCEDURE — 36415 COLL VENOUS BLD VENIPUNCTURE: CPT | Performed by: INTERNAL MEDICINE

## 2018-01-01 PROCEDURE — 77030020782 HC GWN BAIR PAWS FLX 3M -B: Performed by: ANESTHESIOLOGY

## 2018-01-01 PROCEDURE — 77030011264 HC ELECTRD BLD EXT COVD -A: Performed by: SURGERY

## 2018-01-01 PROCEDURE — 77030019908 HC STETH ESOPH SIMS -A: Performed by: ANESTHESIOLOGY

## 2018-01-01 PROCEDURE — 77030018836 HC SOL IRR NACL ICUM -A: Performed by: SURGERY

## 2018-01-01 PROCEDURE — 51798 US URINE CAPACITY MEASURE: CPT

## 2018-01-01 PROCEDURE — 70450 CT HEAD/BRAIN W/O DYE: CPT

## 2018-01-01 PROCEDURE — 88307 TISSUE EXAM BY PATHOLOGIST: CPT | Performed by: SURGERY

## 2018-01-01 PROCEDURE — 96523 IRRIG DRUG DELIVERY DEVICE: CPT

## 2018-01-01 PROCEDURE — 71260 CT THORAX DX C+: CPT

## 2018-01-01 PROCEDURE — 77030031139 HC SUT VCRL2 J&J -A: Performed by: SURGERY

## 2018-01-01 PROCEDURE — 77030008467 HC STPLR SKN COVD -B: Performed by: SURGERY

## 2018-01-01 PROCEDURE — 76060000037 HC ANESTHESIA 3 TO 3.5 HR: Performed by: SURGERY

## 2018-01-01 PROCEDURE — 77030010522

## 2018-01-01 PROCEDURE — A4394 OSTOMY POUCH LIQ DEODORANT: HCPCS

## 2018-01-01 PROCEDURE — 77030003560 HC NDL HUBR BARD -A

## 2018-01-01 PROCEDURE — 74011250636 HC RX REV CODE- 250/636: Performed by: RADIOLOGY

## 2018-01-01 PROCEDURE — 73721 MRI JNT OF LWR EXTRE W/O DYE: CPT

## 2018-01-01 PROCEDURE — 74011250636 HC RX REV CODE- 250/636: Performed by: ANESTHESIOLOGY

## 2018-01-01 PROCEDURE — 65660000000 HC RM CCU STEPDOWN

## 2018-01-01 PROCEDURE — 0T788DZ DILATION OF BILATERAL URETERS WITH INTRALUMINAL DEVICE, VIA NATURAL OR ARTIFICIAL OPENING ENDOSCOPIC: ICD-10-PCS | Performed by: UROLOGY

## 2018-01-01 PROCEDURE — 76060000032 HC ANESTHESIA 0.5 TO 1 HR: Performed by: RADIOLOGY

## 2018-01-01 PROCEDURE — 96417 CHEMO IV INFUS EACH ADDL SEQ: CPT

## 2018-01-01 PROCEDURE — A5057 1 PC OST POU W BUILT-IN CONV: HCPCS

## 2018-01-01 PROCEDURE — 77030010544 HC BG URET DRNG BARD -A: Performed by: SURGERY

## 2018-01-01 PROCEDURE — 99211 OFF/OP EST MAY X REQ PHY/QHP: CPT

## 2018-01-01 PROCEDURE — 96366 THER/PROPH/DIAG IV INF ADDON: CPT

## 2018-01-01 PROCEDURE — 77030010541

## 2018-01-01 PROCEDURE — 80048 BASIC METABOLIC PNL TOTAL CA: CPT | Performed by: HOSPITALIST

## 2018-01-01 PROCEDURE — 0DTN0ZZ RESECTION OF SIGMOID COLON, OPEN APPROACH: ICD-10-PCS | Performed by: SURGERY

## 2018-01-01 PROCEDURE — 74177 CT ABD & PELVIS W/CONTRAST: CPT

## 2018-01-01 PROCEDURE — 99231 SBSQ HOSP IP/OBS SF/LOW 25: CPT | Performed by: PSYCHIATRY & NEUROLOGY

## 2018-01-01 PROCEDURE — 74011000258 HC RX REV CODE- 258: Performed by: SURGERY

## 2018-01-01 PROCEDURE — 02HV33Z INSERTION OF INFUSION DEVICE INTO SUPERIOR VENA CAVA, PERCUTANEOUS APPROACH: ICD-10-PCS | Performed by: NURSE PRACTITIONER

## 2018-01-01 PROCEDURE — 76937 US GUIDE VASCULAR ACCESS: CPT

## 2018-01-01 PROCEDURE — 86301 IMMUNOASSAY TUMOR CA 19-9: CPT

## 2018-01-01 PROCEDURE — 36569 INSJ PICC 5 YR+ W/O IMAGING: CPT | Performed by: NURSE PRACTITIONER

## 2018-01-01 PROCEDURE — 82330 ASSAY OF CALCIUM: CPT | Performed by: NURSE PRACTITIONER

## 2018-01-01 PROCEDURE — 400014 HH F/U

## 2018-01-01 PROCEDURE — 97162 PT EVAL MOD COMPLEX 30 MIN: CPT

## 2018-01-01 PROCEDURE — A4216 STERILE WATER/SALINE, 10 ML: HCPCS

## 2018-01-01 PROCEDURE — 80048 BASIC METABOLIC PNL TOTAL CA: CPT | Performed by: NURSE PRACTITIONER

## 2018-01-01 PROCEDURE — 74011250636 HC RX REV CODE- 250/636: Performed by: HOSPITALIST

## 2018-01-01 PROCEDURE — A9575 INJ GADOTERATE MEGLUMI 0.1ML: HCPCS | Performed by: RADIOLOGY

## 2018-01-01 PROCEDURE — 76010000172 HC OR TIME 2.5 TO 3 HR INTENSV-TIER 1: Performed by: SURGERY

## 2018-01-01 PROCEDURE — 99223 1ST HOSP IP/OBS HIGH 75: CPT | Performed by: PSYCHIATRY & NEUROLOGY

## 2018-01-01 PROCEDURE — 0DTP0ZZ RESECTION OF RECTUM, OPEN APPROACH: ICD-10-PCS | Performed by: SURGERY

## 2018-01-01 PROCEDURE — 77030011278 HC ELECTRD LIG IMPT COVD -F: Performed by: SURGERY

## 2018-01-01 PROCEDURE — 74011000250 HC RX REV CODE- 250: Performed by: NURSE PRACTITIONER

## 2018-01-01 PROCEDURE — A6212 FOAM DRG <=16 SQ IN W/BORDER: HCPCS

## 2018-01-01 PROCEDURE — 96365 THER/PROPH/DIAG IV INF INIT: CPT

## 2018-01-01 PROCEDURE — 0D1M0Z4 BYPASS DESCENDING COLON TO CUTANEOUS, OPEN APPROACH: ICD-10-PCS | Performed by: SURGERY

## 2018-01-01 PROCEDURE — 77030034850: Performed by: SURGERY

## 2018-01-01 PROCEDURE — 99223 1ST HOSP IP/OBS HIGH 75: CPT | Performed by: INTERNAL MEDICINE

## 2018-01-01 PROCEDURE — 83735 ASSAY OF MAGNESIUM: CPT | Performed by: NURSE PRACTITIONER

## 2018-01-01 PROCEDURE — 77030003029 HC SUT VCRL J&J -B: Performed by: SURGERY

## 2018-01-01 PROCEDURE — 77030002966 HC SUT PDS J&J -A: Performed by: SURGERY

## 2018-01-01 PROCEDURE — 74011250637 HC RX REV CODE- 250/637: Performed by: ANESTHESIOLOGY

## 2018-01-01 PROCEDURE — 94760 N-INVAS EAR/PLS OXIMETRY 1: CPT

## 2018-01-01 PROCEDURE — A6260 WOUND CLEANSER ANY TYPE/SIZE: HCPCS

## 2018-01-01 PROCEDURE — 74011000250 HC RX REV CODE- 250: Performed by: PHYSICIAN ASSISTANT

## 2018-01-01 PROCEDURE — C1751 CATH, INF, PER/CENT/MIDLINE: HCPCS

## 2018-01-01 PROCEDURE — A9575 INJ GADOTERATE MEGLUMI 0.1ML: HCPCS | Performed by: INTERNAL MEDICINE

## 2018-01-01 RX ORDER — LORAZEPAM 2 MG/ML
1 INJECTION INTRAMUSCULAR
Status: COMPLETED | OUTPATIENT
Start: 2018-01-01 | End: 2018-01-01

## 2018-01-01 RX ORDER — DEXTROSE MONOHYDRATE 50 MG/ML
25 INJECTION, SOLUTION INTRAVENOUS CONTINUOUS
Status: ACTIVE | OUTPATIENT
Start: 2018-01-01 | End: 2018-01-01

## 2018-01-01 RX ORDER — HEPARIN SODIUM (PORCINE) LOCK FLUSH IV SOLN 100 UNIT/ML 100 UNIT/ML
500 SOLUTION INTRAVENOUS ONCE
Status: COMPLETED | OUTPATIENT
Start: 2018-01-01 | End: 2018-01-01

## 2018-01-01 RX ORDER — ONDANSETRON 2 MG/ML
8 INJECTION INTRAMUSCULAR; INTRAVENOUS ONCE
Status: ACTIVE | OUTPATIENT
Start: 2018-01-01 | End: 2018-01-01

## 2018-01-01 RX ORDER — HYDRALAZINE HYDROCHLORIDE 20 MG/ML
10 INJECTION INTRAMUSCULAR; INTRAVENOUS
Status: DISCONTINUED | OUTPATIENT
Start: 2018-01-01 | End: 2018-01-01 | Stop reason: HOSPADM

## 2018-01-01 RX ORDER — DEXAMETHASONE SODIUM PHOSPHATE 100 MG/10ML
10 INJECTION INTRAMUSCULAR; INTRAVENOUS ONCE
Status: ACTIVE | OUTPATIENT
Start: 2018-01-01 | End: 2018-01-01

## 2018-01-01 RX ORDER — ENOXAPARIN SODIUM 100 MG/ML
40 INJECTION SUBCUTANEOUS EVERY 24 HOURS
Status: CANCELLED | OUTPATIENT
Start: 2018-01-01

## 2018-01-01 RX ORDER — LEVETIRACETAM 500 MG/1
1000 TABLET ORAL 2 TIMES DAILY
Status: DISCONTINUED | OUTPATIENT
Start: 2018-01-01 | End: 2018-01-01 | Stop reason: HOSPADM

## 2018-01-01 RX ORDER — ONDANSETRON 2 MG/ML
INJECTION INTRAMUSCULAR; INTRAVENOUS AS NEEDED
Status: DISCONTINUED | OUTPATIENT
Start: 2018-01-01 | End: 2018-01-01 | Stop reason: HOSPADM

## 2018-01-01 RX ORDER — ENOXAPARIN SODIUM 100 MG/ML
40 INJECTION SUBCUTANEOUS EVERY 24 HOURS
Status: DISCONTINUED | OUTPATIENT
Start: 2018-01-01 | End: 2018-01-01

## 2018-01-01 RX ORDER — SODIUM CHLORIDE 0.9 % (FLUSH) 0.9 %
10 SYRINGE (ML) INJECTION
Status: COMPLETED | OUTPATIENT
Start: 2018-01-01 | End: 2018-01-01

## 2018-01-01 RX ORDER — HYDROMORPHONE HYDROCHLORIDE 1 MG/ML
1 INJECTION, SOLUTION INTRAMUSCULAR; INTRAVENOUS; SUBCUTANEOUS ONCE
Status: ACTIVE | OUTPATIENT
Start: 2018-01-01 | End: 2018-01-01

## 2018-01-01 RX ORDER — POTASSIUM CHLORIDE AND SODIUM CHLORIDE 450; 150 MG/100ML; MG/100ML
INJECTION, SOLUTION INTRAVENOUS CONTINUOUS
Status: DISCONTINUED | OUTPATIENT
Start: 2018-01-01 | End: 2018-01-01

## 2018-01-01 RX ORDER — ROCURONIUM BROMIDE 10 MG/ML
INJECTION, SOLUTION INTRAVENOUS AS NEEDED
Status: DISCONTINUED | OUTPATIENT
Start: 2018-01-01 | End: 2018-01-01 | Stop reason: HOSPADM

## 2018-01-01 RX ORDER — HEPARIN 100 UNIT/ML
300 SYRINGE INTRAVENOUS AS NEEDED
Status: DISCONTINUED | OUTPATIENT
Start: 2018-01-01 | End: 2018-01-01 | Stop reason: HOSPADM

## 2018-01-01 RX ORDER — POTASSIUM CHLORIDE AND SODIUM CHLORIDE 450; 150 MG/100ML; MG/100ML
INJECTION, SOLUTION INTRAVENOUS CONTINUOUS
Status: DISCONTINUED | OUTPATIENT
Start: 2018-01-01 | End: 2018-01-01 | Stop reason: SDUPTHER

## 2018-01-01 RX ORDER — LEVETIRACETAM 1000 MG/1
1000 TABLET ORAL 2 TIMES DAILY
Qty: 60 TAB | Refills: 0 | Status: SHIPPED | OUTPATIENT
Start: 2018-01-01 | End: 2018-01-01 | Stop reason: SDUPTHER

## 2018-01-01 RX ORDER — EPINEPHRINE 1 MG/ML
0.3 INJECTION, SOLUTION, CONCENTRATE INTRAVENOUS AS NEEDED
Status: CANCELLED | OUTPATIENT
Start: 2018-01-01

## 2018-01-01 RX ORDER — SODIUM CHLORIDE 0.9 % (FLUSH) 0.9 %
10-40 SYRINGE (ML) INJECTION AS NEEDED
Status: DISCONTINUED | OUTPATIENT
Start: 2018-01-01 | End: 2018-01-01 | Stop reason: HOSPADM

## 2018-01-01 RX ORDER — CYCLOBENZAPRINE HCL 10 MG
10 TABLET ORAL
Status: DISCONTINUED | OUTPATIENT
Start: 2018-01-01 | End: 2018-01-01

## 2018-01-01 RX ORDER — SODIUM CHLORIDE 0.9 % (FLUSH) 0.9 %
5-10 SYRINGE (ML) INJECTION AS NEEDED
Status: DISCONTINUED | OUTPATIENT
Start: 2018-01-01 | End: 2018-01-01 | Stop reason: HOSPADM

## 2018-01-01 RX ORDER — LIDOCAINE AND PRILOCAINE 25; 25 MG/G; MG/G
CREAM TOPICAL AS NEEDED
Status: DISCONTINUED | OUTPATIENT
Start: 2018-01-01 | End: 2018-01-01 | Stop reason: HOSPADM

## 2018-01-01 RX ORDER — PANTOPRAZOLE SODIUM 40 MG/1
40 TABLET, DELAYED RELEASE ORAL
Status: DISCONTINUED | OUTPATIENT
Start: 2018-01-01 | End: 2018-01-01 | Stop reason: HOSPADM

## 2018-01-01 RX ORDER — SODIUM CHLORIDE 0.9 % (FLUSH) 0.9 %
10 SYRINGE (ML) INJECTION AS NEEDED
Status: ACTIVE | OUTPATIENT
Start: 2018-01-01 | End: 2018-01-01

## 2018-01-01 RX ORDER — ALBUTEROL SULFATE 0.83 MG/ML
2.5 SOLUTION RESPIRATORY (INHALATION) AS NEEDED
Status: CANCELLED | OUTPATIENT
Start: 2018-01-01

## 2018-01-01 RX ORDER — DIAZEPAM 10 MG/2ML
5 INJECTION INTRAMUSCULAR
Status: DISCONTINUED | OUTPATIENT
Start: 2018-01-01 | End: 2018-01-01

## 2018-01-01 RX ORDER — HYDROMORPHONE HYDROCHLORIDE 2 MG/ML
0.5 INJECTION, SOLUTION INTRAMUSCULAR; INTRAVENOUS; SUBCUTANEOUS
Status: DISCONTINUED | OUTPATIENT
Start: 2018-01-01 | End: 2018-01-01 | Stop reason: HOSPADM

## 2018-01-01 RX ORDER — DIPHENHYDRAMINE HYDROCHLORIDE 50 MG/ML
25 INJECTION, SOLUTION INTRAMUSCULAR; INTRAVENOUS ONCE
Status: COMPLETED | OUTPATIENT
Start: 2018-01-01 | End: 2018-01-01

## 2018-01-01 RX ORDER — ONDANSETRON 8 MG/1
8 TABLET, ORALLY DISINTEGRATING ORAL
Status: DISCONTINUED | OUTPATIENT
Start: 2018-01-01 | End: 2018-01-01 | Stop reason: HOSPADM

## 2018-01-01 RX ORDER — TRAMADOL HYDROCHLORIDE 50 MG/1
50 TABLET ORAL
Status: DISCONTINUED | OUTPATIENT
Start: 2018-01-01 | End: 2018-01-01

## 2018-01-01 RX ORDER — HYDROMORPHONE HYDROCHLORIDE 2 MG/ML
0.5 INJECTION, SOLUTION INTRAMUSCULAR; INTRAVENOUS; SUBCUTANEOUS
Status: DISCONTINUED | OUTPATIENT
Start: 2018-01-01 | End: 2018-01-01

## 2018-01-01 RX ORDER — ALBUTEROL SULFATE 90 UG/1
2 AEROSOL, METERED RESPIRATORY (INHALATION)
Status: DISCONTINUED | OUTPATIENT
Start: 2018-01-01 | End: 2018-01-01 | Stop reason: HOSPADM

## 2018-01-01 RX ORDER — DIPHENHYDRAMINE HYDROCHLORIDE 50 MG/ML
12.5 INJECTION, SOLUTION INTRAMUSCULAR; INTRAVENOUS ONCE
Status: DISCONTINUED | OUTPATIENT
Start: 2018-01-01 | End: 2018-01-01 | Stop reason: HOSPADM

## 2018-01-01 RX ORDER — HYDROMORPHONE HYDROCHLORIDE 2 MG/ML
1 INJECTION, SOLUTION INTRAMUSCULAR; INTRAVENOUS; SUBCUTANEOUS
Status: DISCONTINUED | OUTPATIENT
Start: 2018-01-01 | End: 2018-01-01

## 2018-01-01 RX ORDER — LORAZEPAM 2 MG/ML
1 INJECTION INTRAMUSCULAR
Status: DISCONTINUED | OUTPATIENT
Start: 2018-01-01 | End: 2018-01-01 | Stop reason: HOSPADM

## 2018-01-01 RX ORDER — ONDANSETRON 2 MG/ML
4 INJECTION INTRAMUSCULAR; INTRAVENOUS
Status: DISCONTINUED | OUTPATIENT
Start: 2018-01-01 | End: 2018-01-01 | Stop reason: HOSPADM

## 2018-01-01 RX ORDER — HEPARIN 100 UNIT/ML
300-500 SYRINGE INTRAVENOUS AS NEEDED
Status: ACTIVE | OUTPATIENT
Start: 2018-01-01 | End: 2018-01-01

## 2018-01-01 RX ORDER — FENTANYL CITRATE 50 UG/ML
25 INJECTION, SOLUTION INTRAMUSCULAR; INTRAVENOUS ONCE
Status: DISCONTINUED | OUTPATIENT
Start: 2018-01-01 | End: 2018-01-01 | Stop reason: HOSPADM

## 2018-01-01 RX ORDER — ALPRAZOLAM 0.5 MG/1
1 TABLET ORAL
Status: DISCONTINUED | OUTPATIENT
Start: 2018-01-01 | End: 2018-01-01 | Stop reason: HOSPADM

## 2018-01-01 RX ORDER — SODIUM CHLORIDE, SODIUM LACTATE, POTASSIUM CHLORIDE, CALCIUM CHLORIDE 600; 310; 30; 20 MG/100ML; MG/100ML; MG/100ML; MG/100ML
1000 INJECTION, SOLUTION INTRAVENOUS CONTINUOUS
Status: CANCELLED | OUTPATIENT
Start: 2018-01-01 | End: 2018-01-01

## 2018-01-01 RX ORDER — HEPARIN SODIUM 200 [USP'U]/100ML
1000 INJECTION, SOLUTION INTRAVENOUS
Status: DISCONTINUED | OUTPATIENT
Start: 2018-01-01 | End: 2018-01-01 | Stop reason: HOSPADM

## 2018-01-01 RX ORDER — HEPARIN 100 UNIT/ML
500 SYRINGE INTRAVENOUS AS NEEDED
Status: DISPENSED | OUTPATIENT
Start: 2018-01-01 | End: 2018-01-01

## 2018-01-01 RX ORDER — HYDROCODONE BITARTRATE AND ACETAMINOPHEN 10; 325 MG/1; MG/1
1 TABLET ORAL
Status: DISCONTINUED | OUTPATIENT
Start: 2018-01-01 | End: 2018-01-01 | Stop reason: HOSPADM

## 2018-01-01 RX ORDER — DEXAMETHASONE SODIUM PHOSPHATE 100 MG/10ML
10 INJECTION INTRAMUSCULAR; INTRAVENOUS ONCE
Status: COMPLETED | OUTPATIENT
Start: 2018-01-01 | End: 2018-01-01

## 2018-01-01 RX ORDER — HEPARIN 100 UNIT/ML
300 SYRINGE INTRAVENOUS AS NEEDED
Status: DISPENSED | OUTPATIENT
Start: 2018-01-01 | End: 2018-01-01

## 2018-01-01 RX ORDER — NEOSTIGMINE METHYLSULFATE 1 MG/ML
INJECTION INTRAVENOUS AS NEEDED
Status: DISCONTINUED | OUTPATIENT
Start: 2018-01-01 | End: 2018-01-01 | Stop reason: HOSPADM

## 2018-01-01 RX ORDER — CEFAZOLIN SODIUM/WATER 2 G/20 ML
2 SYRINGE (ML) INTRAVENOUS EVERY 8 HOURS
Status: DISCONTINUED | OUTPATIENT
Start: 2018-01-01 | End: 2018-01-01 | Stop reason: HOSPADM

## 2018-01-01 RX ORDER — FLUOROURACIL 50 MG/ML
400 INJECTION, SOLUTION INTRAVENOUS ONCE
Status: DISPENSED | OUTPATIENT
Start: 2018-01-01 | End: 2018-01-01

## 2018-01-01 RX ORDER — MIDAZOLAM HYDROCHLORIDE 1 MG/ML
INJECTION, SOLUTION INTRAMUSCULAR; INTRAVENOUS AS NEEDED
Status: DISCONTINUED | OUTPATIENT
Start: 2018-01-01 | End: 2018-01-01 | Stop reason: HOSPADM

## 2018-01-01 RX ORDER — HYDROMORPHONE HYDROCHLORIDE 2 MG/ML
0.5 INJECTION, SOLUTION INTRAMUSCULAR; INTRAVENOUS; SUBCUTANEOUS
Status: CANCELLED | OUTPATIENT
Start: 2018-01-01

## 2018-01-01 RX ORDER — HEPARIN 100 UNIT/ML
300 SYRINGE INTRAVENOUS EVERY 8 HOURS
Status: DISCONTINUED | OUTPATIENT
Start: 2018-01-01 | End: 2018-01-01 | Stop reason: HOSPADM

## 2018-01-01 RX ORDER — SODIUM CHLORIDE 0.9 % (FLUSH) 0.9 %
10-30 SYRINGE (ML) INJECTION AS NEEDED
Status: DISCONTINUED | OUTPATIENT
Start: 2018-01-01 | End: 2018-01-01 | Stop reason: HOSPADM

## 2018-01-01 RX ORDER — SODIUM CHLORIDE, SODIUM LACTATE, POTASSIUM CHLORIDE, CALCIUM CHLORIDE 600; 310; 30; 20 MG/100ML; MG/100ML; MG/100ML; MG/100ML
100 INJECTION, SOLUTION INTRAVENOUS CONTINUOUS
Status: DISCONTINUED | OUTPATIENT
Start: 2018-01-01 | End: 2018-01-01 | Stop reason: HOSPADM

## 2018-01-01 RX ORDER — ACETAMINOPHEN 500 MG
1000 TABLET ORAL ONCE
Status: CANCELLED | OUTPATIENT
Start: 2018-01-01 | End: 2018-01-01

## 2018-01-01 RX ORDER — SODIUM CHLORIDE 9 MG/ML
50 INJECTION, SOLUTION INTRAVENOUS CONTINUOUS
Status: DISCONTINUED | OUTPATIENT
Start: 2018-01-01 | End: 2018-01-01 | Stop reason: HOSPADM

## 2018-01-01 RX ORDER — LIDOCAINE HYDROCHLORIDE 20 MG/ML
INJECTION, SOLUTION EPIDURAL; INFILTRATION; INTRACAUDAL; PERINEURAL AS NEEDED
Status: DISCONTINUED | OUTPATIENT
Start: 2018-01-01 | End: 2018-01-01 | Stop reason: HOSPADM

## 2018-01-01 RX ORDER — MIDAZOLAM HYDROCHLORIDE 1 MG/ML
2 INJECTION, SOLUTION INTRAMUSCULAR; INTRAVENOUS
Status: CANCELLED | OUTPATIENT
Start: 2018-01-01

## 2018-01-01 RX ORDER — LIDOCAINE HYDROCHLORIDE AND EPINEPHRINE 20; 10 MG/ML; UG/ML
20-200 INJECTION, SOLUTION INFILTRATION; PERINEURAL ONCE
Status: COMPLETED | OUTPATIENT
Start: 2018-01-01 | End: 2018-01-01

## 2018-01-01 RX ORDER — ONDANSETRON 2 MG/ML
8 INJECTION INTRAMUSCULAR; INTRAVENOUS AS NEEDED
Status: ACTIVE | OUTPATIENT
Start: 2018-01-01 | End: 2018-01-01

## 2018-01-01 RX ORDER — FLUOROURACIL 50 MG/ML
400 INJECTION, SOLUTION INTRAVENOUS ONCE
Status: COMPLETED | OUTPATIENT
Start: 2018-01-01 | End: 2018-01-01

## 2018-01-01 RX ORDER — HYDROMORPHONE HYDROCHLORIDE 2 MG/ML
1 INJECTION, SOLUTION INTRAMUSCULAR; INTRAVENOUS; SUBCUTANEOUS ONCE
Status: COMPLETED | OUTPATIENT
Start: 2018-01-01 | End: 2018-01-01

## 2018-01-01 RX ORDER — ACETAMINOPHEN 500 MG
500 TABLET ORAL
Status: DISCONTINUED | OUTPATIENT
Start: 2018-01-01 | End: 2018-01-01 | Stop reason: HOSPADM

## 2018-01-01 RX ORDER — SODIUM CHLORIDE 0.9 % (FLUSH) 0.9 %
5-10 SYRINGE (ML) INJECTION AS NEEDED
Status: CANCELLED | OUTPATIENT
Start: 2018-01-01

## 2018-01-01 RX ORDER — ONDANSETRON 2 MG/ML
8 INJECTION INTRAMUSCULAR; INTRAVENOUS ONCE
Status: COMPLETED | OUTPATIENT
Start: 2018-01-01 | End: 2018-01-01

## 2018-01-01 RX ORDER — DIPHENHYDRAMINE HYDROCHLORIDE 50 MG/ML
25 INJECTION, SOLUTION INTRAMUSCULAR; INTRAVENOUS ONCE
Status: ACTIVE | OUTPATIENT
Start: 2018-01-01 | End: 2018-01-01

## 2018-01-01 RX ORDER — MAGNESIUM SULFATE 1 G/100ML
1 INJECTION INTRAVENOUS ONCE
Status: COMPLETED | OUTPATIENT
Start: 2018-01-01 | End: 2018-01-01

## 2018-01-01 RX ORDER — OXYCODONE HYDROCHLORIDE 5 MG/1
5 TABLET ORAL
Status: DISCONTINUED | OUTPATIENT
Start: 2018-01-01 | End: 2018-01-01 | Stop reason: HOSPADM

## 2018-01-01 RX ORDER — ALBUTEROL SULFATE 0.83 MG/ML
2.5 SOLUTION RESPIRATORY (INHALATION)
Status: DISCONTINUED | OUTPATIENT
Start: 2018-01-01 | End: 2018-01-01 | Stop reason: HOSPADM

## 2018-01-01 RX ORDER — CLINDAMYCIN PHOSPHATE 900 MG/50ML
900 INJECTION INTRAVENOUS
Status: DISCONTINUED | OUTPATIENT
Start: 2018-01-01 | End: 2018-01-01 | Stop reason: SDUPTHER

## 2018-01-01 RX ORDER — DIPHENHYDRAMINE HYDROCHLORIDE 50 MG/ML
50 INJECTION, SOLUTION INTRAMUSCULAR; INTRAVENOUS AS NEEDED
Status: ACTIVE | OUTPATIENT
Start: 2018-01-01 | End: 2018-01-01

## 2018-01-01 RX ORDER — LIDOCAINE HYDROCHLORIDE 10 MG/ML
0.1 INJECTION INFILTRATION; PERINEURAL AS NEEDED
Status: DISCONTINUED | OUTPATIENT
Start: 2018-01-01 | End: 2018-01-01 | Stop reason: HOSPADM

## 2018-01-01 RX ORDER — MIDAZOLAM HYDROCHLORIDE 1 MG/ML
2 INJECTION, SOLUTION INTRAMUSCULAR; INTRAVENOUS ONCE
Status: DISCONTINUED | OUTPATIENT
Start: 2018-01-01 | End: 2018-01-01 | Stop reason: HOSPADM

## 2018-01-01 RX ORDER — ACETAMINOPHEN 325 MG/1
650 TABLET ORAL ONCE
Status: COMPLETED | OUTPATIENT
Start: 2018-01-01 | End: 2018-01-01

## 2018-01-01 RX ORDER — DEXAMETHASONE SODIUM PHOSPHATE 4 MG/ML
INJECTION, SOLUTION INTRA-ARTICULAR; INTRALESIONAL; INTRAMUSCULAR; INTRAVENOUS; SOFT TISSUE AS NEEDED
Status: DISCONTINUED | OUTPATIENT
Start: 2018-01-01 | End: 2018-01-01 | Stop reason: HOSPADM

## 2018-01-01 RX ORDER — SODIUM CHLORIDE, SODIUM LACTATE, POTASSIUM CHLORIDE, CALCIUM CHLORIDE 600; 310; 30; 20 MG/100ML; MG/100ML; MG/100ML; MG/100ML
INJECTION, SOLUTION INTRAVENOUS
Status: DISCONTINUED | OUTPATIENT
Start: 2018-01-01 | End: 2018-01-01 | Stop reason: HOSPADM

## 2018-01-01 RX ORDER — HYDROCORTISONE SODIUM SUCCINATE 100 MG/2ML
100 INJECTION, POWDER, FOR SOLUTION INTRAMUSCULAR; INTRAVENOUS AS NEEDED
Status: CANCELLED | OUTPATIENT
Start: 2018-01-01

## 2018-01-01 RX ORDER — ALBUTEROL SULFATE 0.83 MG/ML
2.5 SOLUTION RESPIRATORY (INHALATION) AS NEEDED
Status: CANCELLED
Start: 2018-01-01

## 2018-01-01 RX ORDER — GADOTERATE MEGLUMINE 376.9 MG/ML
10 INJECTION INTRAVENOUS
Status: COMPLETED | OUTPATIENT
Start: 2018-01-01 | End: 2018-01-01

## 2018-01-01 RX ORDER — HYDROCODONE BITARTRATE AND ACETAMINOPHEN 10; 325 MG/1; MG/1
1 TABLET ORAL
Qty: 90 TAB | Refills: 0 | Status: SHIPPED | OUTPATIENT
Start: 2018-01-01

## 2018-01-01 RX ORDER — HYDROMORPHONE HYDROCHLORIDE 2 MG/ML
1 INJECTION, SOLUTION INTRAMUSCULAR; INTRAVENOUS; SUBCUTANEOUS
Status: DISCONTINUED | OUTPATIENT
Start: 2018-01-01 | End: 2018-01-01 | Stop reason: HOSPADM

## 2018-01-01 RX ORDER — SODIUM CHLORIDE 0.9 % (FLUSH) 0.9 %
5-10 SYRINGE (ML) INJECTION EVERY 8 HOURS
Status: DISCONTINUED | OUTPATIENT
Start: 2018-01-01 | End: 2018-01-01 | Stop reason: HOSPADM

## 2018-01-01 RX ORDER — DEXTROSE MONOHYDRATE 50 MG/ML
25 INJECTION, SOLUTION INTRAVENOUS CONTINUOUS
Status: DISPENSED | OUTPATIENT
Start: 2018-01-01 | End: 2018-01-01

## 2018-01-01 RX ORDER — OXYCODONE AND ACETAMINOPHEN 5; 325 MG/1; MG/1
1 TABLET ORAL AS NEEDED
Status: DISCONTINUED | OUTPATIENT
Start: 2018-01-01 | End: 2018-01-01 | Stop reason: HOSPADM

## 2018-01-01 RX ORDER — KETOROLAC TROMETHAMINE 30 MG/ML
30 INJECTION, SOLUTION INTRAMUSCULAR; INTRAVENOUS EVERY 8 HOURS
Status: COMPLETED | OUTPATIENT
Start: 2018-01-01 | End: 2018-01-01

## 2018-01-01 RX ORDER — FENTANYL CITRATE 50 UG/ML
INJECTION, SOLUTION INTRAMUSCULAR; INTRAVENOUS AS NEEDED
Status: DISCONTINUED | OUTPATIENT
Start: 2018-01-01 | End: 2018-01-01 | Stop reason: HOSPADM

## 2018-01-01 RX ORDER — PROMETHAZINE HYDROCHLORIDE 25 MG/1
25 TABLET ORAL
Status: DISCONTINUED | OUTPATIENT
Start: 2018-01-01 | End: 2018-01-01 | Stop reason: HOSPADM

## 2018-01-01 RX ORDER — DEXAMETHASONE 4 MG/1
4 TABLET ORAL EVERY 8 HOURS
Status: DISCONTINUED | OUTPATIENT
Start: 2018-01-01 | End: 2018-01-01 | Stop reason: HOSPADM

## 2018-01-01 RX ORDER — POTASSIUM CHLORIDE 29.8 MG/ML
40 INJECTION INTRAVENOUS ONCE
Status: COMPLETED | OUTPATIENT
Start: 2018-01-01 | End: 2018-01-01

## 2018-01-01 RX ORDER — FLUOROURACIL 50 MG/ML
300 INJECTION, SOLUTION INTRAVENOUS ONCE
Status: COMPLETED | OUTPATIENT
Start: 2018-01-01 | End: 2018-01-01

## 2018-01-01 RX ORDER — FAMOTIDINE 20 MG/1
20 TABLET, FILM COATED ORAL ONCE
Status: COMPLETED | OUTPATIENT
Start: 2018-01-01 | End: 2018-01-01

## 2018-01-01 RX ORDER — NALOXONE HYDROCHLORIDE 0.4 MG/ML
0.4 INJECTION, SOLUTION INTRAMUSCULAR; INTRAVENOUS; SUBCUTANEOUS
Status: DISCONTINUED | OUTPATIENT
Start: 2018-01-01 | End: 2018-01-01 | Stop reason: HOSPADM

## 2018-01-01 RX ORDER — HEPARIN SODIUM (PORCINE) LOCK FLUSH IV SOLN 100 UNIT/ML 100 UNIT/ML
300 SOLUTION INTRAVENOUS AS NEEDED
Status: DISCONTINUED | OUTPATIENT
Start: 2018-01-01 | End: 2018-01-01 | Stop reason: HOSPADM

## 2018-01-01 RX ORDER — CEFAZOLIN SODIUM/WATER 2 G/20 ML
2 SYRINGE (ML) INTRAVENOUS EVERY 8 HOURS
Qty: 1740 ML | Refills: 0 | Status: SHIPPED
Start: 2018-01-01 | End: 2018-01-01

## 2018-01-01 RX ORDER — SODIUM CHLORIDE 0.9 % (FLUSH) 0.9 %
10 SYRINGE (ML) INJECTION EVERY 8 HOURS
Status: DISCONTINUED | OUTPATIENT
Start: 2018-01-01 | End: 2018-01-01 | Stop reason: HOSPADM

## 2018-01-01 RX ORDER — TRAMADOL HYDROCHLORIDE 50 MG/1
100 TABLET ORAL
Qty: 40 TAB | Refills: 0 | Status: SHIPPED
Start: 2018-01-01 | End: 2018-01-01 | Stop reason: ALTCHOICE

## 2018-01-01 RX ORDER — PROPOFOL 10 MG/ML
INJECTION, EMULSION INTRAVENOUS
Status: DISCONTINUED | OUTPATIENT
Start: 2018-01-01 | End: 2018-01-01 | Stop reason: HOSPADM

## 2018-01-01 RX ORDER — LORAZEPAM 2 MG/ML
INJECTION INTRAMUSCULAR
Status: COMPLETED
Start: 2018-01-01 | End: 2018-01-01

## 2018-01-01 RX ORDER — TRAMADOL HYDROCHLORIDE 50 MG/1
100 TABLET ORAL
Status: DISCONTINUED | OUTPATIENT
Start: 2018-01-01 | End: 2018-01-01 | Stop reason: HOSPADM

## 2018-01-01 RX ORDER — MIDAZOLAM HYDROCHLORIDE 1 MG/ML
2 INJECTION, SOLUTION INTRAMUSCULAR; INTRAVENOUS
Status: DISCONTINUED | OUTPATIENT
Start: 2018-01-01 | End: 2018-01-01 | Stop reason: HOSPADM

## 2018-01-01 RX ORDER — GLYCOPYRROLATE 0.2 MG/ML
INJECTION INTRAMUSCULAR; INTRAVENOUS AS NEEDED
Status: DISCONTINUED | OUTPATIENT
Start: 2018-01-01 | End: 2018-01-01 | Stop reason: HOSPADM

## 2018-01-01 RX ORDER — DEXAMETHASONE 0.5 MG/1
2 TABLET ORAL EVERY 12 HOURS
Status: DISCONTINUED | OUTPATIENT
Start: 2018-01-01 | End: 2018-01-01

## 2018-01-01 RX ORDER — DEXAMETHASONE 0.5 MG/1
1 TABLET ORAL EVERY 12 HOURS
Status: DISCONTINUED | OUTPATIENT
Start: 2018-01-01 | End: 2018-01-01

## 2018-01-01 RX ORDER — FLUOXETINE 10 MG/1
10 CAPSULE ORAL DAILY
Status: DISCONTINUED | OUTPATIENT
Start: 2018-01-01 | End: 2018-01-01 | Stop reason: HOSPADM

## 2018-01-01 RX ORDER — HYDROMORPHONE HCL IN 0.9% NACL 50 MG/50ML
0.5 PLASTIC BAG, INJECTION (ML) INJECTION
Status: DISCONTINUED | OUTPATIENT
Start: 2018-01-01 | End: 2018-01-01

## 2018-01-01 RX ORDER — ONDANSETRON 2 MG/ML
4 INJECTION INTRAMUSCULAR; INTRAVENOUS
Status: CANCELLED | OUTPATIENT
Start: 2018-01-01

## 2018-01-01 RX ORDER — SODIUM CHLORIDE 0.9 % (FLUSH) 0.9 %
10 SYRINGE (ML) INJECTION AS NEEDED
Status: DISCONTINUED | OUTPATIENT
Start: 2018-01-01 | End: 2018-01-01 | Stop reason: HOSPADM

## 2018-01-01 RX ORDER — POTASSIUM CHLORIDE 14.9 MG/ML
20 INJECTION INTRAVENOUS ONCE
Status: COMPLETED | OUTPATIENT
Start: 2018-01-01 | End: 2018-01-01

## 2018-01-01 RX ORDER — LORAZEPAM 2 MG/ML
1 INJECTION INTRAMUSCULAR
Status: DISCONTINUED | OUTPATIENT
Start: 2018-01-01 | End: 2018-01-01

## 2018-01-01 RX ORDER — DIPHENHYDRAMINE HYDROCHLORIDE 50 MG/ML
50 INJECTION, SOLUTION INTRAMUSCULAR; INTRAVENOUS AS NEEDED
Status: CANCELLED
Start: 2018-01-01

## 2018-01-01 RX ORDER — ENOXAPARIN SODIUM 100 MG/ML
40 INJECTION SUBCUTANEOUS EVERY 24 HOURS
Status: DISCONTINUED | OUTPATIENT
Start: 2018-01-01 | End: 2018-01-01 | Stop reason: HOSPADM

## 2018-01-01 RX ORDER — CEFAZOLIN SODIUM IN 0.9 % NACL 2 G/50 ML
2 INTRAVENOUS SOLUTION, PIGGYBACK (ML) INTRAVENOUS ONCE
Status: COMPLETED | OUTPATIENT
Start: 2018-01-01 | End: 2018-01-01

## 2018-01-01 RX ORDER — HYDROMORPHONE HYDROCHLORIDE 1 MG/ML
1 INJECTION, SOLUTION INTRAMUSCULAR; INTRAVENOUS; SUBCUTANEOUS ONCE
Status: COMPLETED | OUTPATIENT
Start: 2018-01-01 | End: 2018-01-01

## 2018-01-01 RX ORDER — GADOTERATE MEGLUMINE 376.9 MG/ML
12 INJECTION INTRAVENOUS
Status: COMPLETED | OUTPATIENT
Start: 2018-01-01 | End: 2018-01-01

## 2018-01-01 RX ORDER — PROPOFOL 10 MG/ML
INJECTION, EMULSION INTRAVENOUS AS NEEDED
Status: DISCONTINUED | OUTPATIENT
Start: 2018-01-01 | End: 2018-01-01 | Stop reason: HOSPADM

## 2018-01-01 RX ORDER — KETOROLAC TROMETHAMINE 30 MG/ML
INJECTION, SOLUTION INTRAMUSCULAR; INTRAVENOUS AS NEEDED
Status: DISCONTINUED | OUTPATIENT
Start: 2018-01-01 | End: 2018-01-01 | Stop reason: HOSPADM

## 2018-01-01 RX ORDER — CLINDAMYCIN PHOSPHATE 900 MG/50ML
900 INJECTION INTRAVENOUS
Status: CANCELLED | OUTPATIENT
Start: 2018-01-01 | End: 2018-01-01

## 2018-01-01 RX ORDER — ACETAMINOPHEN 325 MG/1
650 TABLET ORAL ONCE
Status: ACTIVE | OUTPATIENT
Start: 2018-01-01 | End: 2018-01-01

## 2018-01-01 RX ORDER — ALPRAZOLAM 1 MG/1
1 TABLET ORAL
Qty: 60 TAB | Refills: 0 | Status: SHIPPED | OUTPATIENT
Start: 2018-01-01

## 2018-01-01 RX ORDER — ACETAMINOPHEN 325 MG/1
650 TABLET ORAL AS NEEDED
Status: ACTIVE | OUTPATIENT
Start: 2018-01-01 | End: 2018-01-01

## 2018-01-01 RX ORDER — SODIUM CHLORIDE 0.9 % (FLUSH) 0.9 %
5-10 SYRINGE (ML) INJECTION EVERY 8 HOURS
Status: CANCELLED | OUTPATIENT
Start: 2018-01-01

## 2018-01-01 RX ORDER — KETOROLAC TROMETHAMINE 30 MG/ML
30 INJECTION, SOLUTION INTRAMUSCULAR; INTRAVENOUS
Status: COMPLETED | OUTPATIENT
Start: 2018-01-01 | End: 2018-01-01

## 2018-01-01 RX ORDER — LIDOCAINE HYDROCHLORIDE 10 MG/ML
0.1 INJECTION INFILTRATION; PERINEURAL AS NEEDED
Status: CANCELLED | OUTPATIENT
Start: 2018-01-01

## 2018-01-01 RX ORDER — DEXAMETHASONE 4 MG/1
4 TABLET ORAL 3 TIMES DAILY
Status: DISCONTINUED | OUTPATIENT
Start: 2018-01-01 | End: 2018-01-01

## 2018-01-01 RX ADMIN — HYDROMORPHONE HYDROCHLORIDE 1 MG: 2 INJECTION, SOLUTION INTRAMUSCULAR; INTRAVENOUS; SUBCUTANEOUS at 06:21

## 2018-01-01 RX ADMIN — HYDROMORPHONE HYDROCHLORIDE 1 MG: 2 INJECTION, SOLUTION INTRAMUSCULAR; INTRAVENOUS; SUBCUTANEOUS at 19:38

## 2018-01-01 RX ADMIN — HYDROMORPHONE HYDROCHLORIDE 1 MG: 2 INJECTION, SOLUTION INTRAMUSCULAR; INTRAVENOUS; SUBCUTANEOUS at 17:19

## 2018-01-01 RX ADMIN — PANTOPRAZOLE SODIUM 40 MG: 40 TABLET, DELAYED RELEASE ORAL at 08:12

## 2018-01-01 RX ADMIN — SODIUM CHLORIDE AND POTASSIUM CHLORIDE: 4.5; 1.49 INJECTION, SOLUTION INTRAVENOUS at 17:34

## 2018-01-01 RX ADMIN — Medication 2 G: at 10:19

## 2018-01-01 RX ADMIN — SODIUM CHLORIDE 40 MG: 9 INJECTION INTRAMUSCULAR; INTRAVENOUS; SUBCUTANEOUS at 08:27

## 2018-01-01 RX ADMIN — DIATRIZOATE MEGLUMINE AND DIATRIZOATE SODIUM 15 ML: 660; 100 LIQUID ORAL; RECTAL at 15:14

## 2018-01-01 RX ADMIN — Medication 10 ML: at 13:03

## 2018-01-01 RX ADMIN — HYDROMORPHONE HYDROCHLORIDE 1 MG: 2 INJECTION, SOLUTION INTRAMUSCULAR; INTRAVENOUS; SUBCUTANEOUS at 19:49

## 2018-01-01 RX ADMIN — DEXAMETHASONE 4 MG: 4 TABLET ORAL at 14:48

## 2018-01-01 RX ADMIN — Medication 2 G: at 02:37

## 2018-01-01 RX ADMIN — Medication 10 ML: at 05:25

## 2018-01-01 RX ADMIN — LEVETIRACETAM 1000 MG: 500 TABLET, FILM COATED ORAL at 09:35

## 2018-01-01 RX ADMIN — PANTOPRAZOLE SODIUM 40 MG: 40 TABLET, DELAYED RELEASE ORAL at 07:14

## 2018-01-01 RX ADMIN — DEXAMETHASONE SODIUM PHOSPHATE 10 MG: 10 INJECTION INTRAMUSCULAR; INTRAVENOUS at 13:03

## 2018-01-01 RX ADMIN — HYDROMORPHONE HYDROCHLORIDE 1 MG: 2 INJECTION, SOLUTION INTRAMUSCULAR; INTRAVENOUS; SUBCUTANEOUS at 01:00

## 2018-01-01 RX ADMIN — HYDROMORPHONE HYDROCHLORIDE 1 MG: 2 INJECTION, SOLUTION INTRAMUSCULAR; INTRAVENOUS; SUBCUTANEOUS at 14:02

## 2018-01-01 RX ADMIN — ONDANSETRON 4 MG: 2 INJECTION INTRAMUSCULAR; INTRAVENOUS at 08:38

## 2018-01-01 RX ADMIN — ENOXAPARIN SODIUM 40 MG: 40 INJECTION, SOLUTION INTRAVENOUS; SUBCUTANEOUS at 09:18

## 2018-01-01 RX ADMIN — HYDROMORPHONE HYDROCHLORIDE 1 MG: 2 INJECTION, SOLUTION INTRAMUSCULAR; INTRAVENOUS; SUBCUTANEOUS at 08:53

## 2018-01-01 RX ADMIN — DEXAMETHASONE 2 MG: 0.5 TABLET ORAL at 09:02

## 2018-01-01 RX ADMIN — ONDANSETRON 4 MG: 2 INJECTION INTRAMUSCULAR; INTRAVENOUS at 22:51

## 2018-01-01 RX ADMIN — SODIUM CHLORIDE 500 ML: 900 INJECTION, SOLUTION INTRAVENOUS at 13:10

## 2018-01-01 RX ADMIN — DEXAMETHASONE SODIUM PHOSPHATE 10 MG: 10 INJECTION INTRAMUSCULAR; INTRAVENOUS at 16:13

## 2018-01-01 RX ADMIN — HYDROCODONE BITARTRATE AND ACETAMINOPHEN 1 TABLET: 10; 325 TABLET ORAL at 07:02

## 2018-01-01 RX ADMIN — Medication 10 ML: at 19:45

## 2018-01-01 RX ADMIN — KETOROLAC TROMETHAMINE 30 MG: 30 INJECTION, SOLUTION INTRAMUSCULAR at 02:16

## 2018-01-01 RX ADMIN — HYDROMORPHONE HYDROCHLORIDE 1 MG: 2 INJECTION, SOLUTION INTRAMUSCULAR; INTRAVENOUS; SUBCUTANEOUS at 07:34

## 2018-01-01 RX ADMIN — SODIUM CHLORIDE, PRESERVATIVE FREE 300 UNITS: 5 INJECTION INTRAVENOUS at 19:28

## 2018-01-01 RX ADMIN — Medication 10 ML: at 16:00

## 2018-01-01 RX ADMIN — Medication 10 ML: at 14:00

## 2018-01-01 RX ADMIN — LORAZEPAM 1 MG: 2 INJECTION INTRAMUSCULAR at 17:40

## 2018-01-01 RX ADMIN — DEXAMETHASONE 4 MG: 4 TABLET ORAL at 14:05

## 2018-01-01 RX ADMIN — HYDROMORPHONE HYDROCHLORIDE 1 MG: 2 INJECTION, SOLUTION INTRAMUSCULAR; INTRAVENOUS; SUBCUTANEOUS at 22:10

## 2018-01-01 RX ADMIN — SODIUM CHLORIDE AND POTASSIUM CHLORIDE: 4.5; 1.49 INJECTION, SOLUTION INTRAVENOUS at 14:09

## 2018-01-01 RX ADMIN — ALPRAZOLAM 1 MG: 0.5 TABLET ORAL at 17:57

## 2018-01-01 RX ADMIN — Medication 2 G: at 12:38

## 2018-01-01 RX ADMIN — ALPRAZOLAM 1 MG: 0.5 TABLET ORAL at 20:57

## 2018-01-01 RX ADMIN — LEVETIRACETAM 1000 MG: 500 TABLET ORAL at 16:58

## 2018-01-01 RX ADMIN — GADOTERATE MEGLUMINE 12 ML: 376.9 INJECTION INTRAVENOUS at 10:42

## 2018-01-01 RX ADMIN — PROPOFOL 200 MG: 10 INJECTION, EMULSION INTRAVENOUS at 07:35

## 2018-01-01 RX ADMIN — Medication 10 ML: at 14:35

## 2018-01-01 RX ADMIN — HYDROMORPHONE HYDROCHLORIDE 1 MG: 2 INJECTION, SOLUTION INTRAMUSCULAR; INTRAVENOUS; SUBCUTANEOUS at 19:20

## 2018-01-01 RX ADMIN — HYDROCODONE BITARTRATE AND ACETAMINOPHEN 1 TABLET: 10; 325 TABLET ORAL at 08:10

## 2018-01-01 RX ADMIN — ONDANSETRON 8 MG: 2 INJECTION INTRAMUSCULAR; INTRAVENOUS at 13:43

## 2018-01-01 RX ADMIN — LEVETIRACETAM 1000 MG: 500 TABLET, FILM COATED ORAL at 09:19

## 2018-01-01 RX ADMIN — ONDANSETRON 4 MG: 2 INJECTION INTRAMUSCULAR; INTRAVENOUS at 12:02

## 2018-01-01 RX ADMIN — SODIUM CHLORIDE 500 ML: 900 INJECTION, SOLUTION INTRAVENOUS at 10:19

## 2018-01-01 RX ADMIN — ACETAMINOPHEN 650 MG: 325 TABLET, FILM COATED ORAL at 11:35

## 2018-01-01 RX ADMIN — LEVETIRACETAM 1000 MG: 500 TABLET, FILM COATED ORAL at 18:28

## 2018-01-01 RX ADMIN — HYDROMORPHONE HYDROCHLORIDE 0.5 MG: 2 INJECTION, SOLUTION INTRAMUSCULAR; INTRAVENOUS; SUBCUTANEOUS at 15:14

## 2018-01-01 RX ADMIN — LEVETIRACETAM 1000 MG: 500 TABLET, FILM COATED ORAL at 08:14

## 2018-01-01 RX ADMIN — DEXTROSE MONOHYDRATE 25 ML/HR: 5 INJECTION, SOLUTION INTRAVENOUS at 12:45

## 2018-01-01 RX ADMIN — FLUOXETINE 10 MG: 10 CAPSULE ORAL at 08:22

## 2018-01-01 RX ADMIN — DEXAMETHASONE 4 MG: 4 TABLET ORAL at 14:29

## 2018-01-01 RX ADMIN — SODIUM CHLORIDE 40 MG: 9 INJECTION INTRAMUSCULAR; INTRAVENOUS; SUBCUTANEOUS at 07:35

## 2018-01-01 RX ADMIN — ONDANSETRON 8 MG: 2 INJECTION INTRAMUSCULAR; INTRAVENOUS at 14:33

## 2018-01-01 RX ADMIN — TRAMADOL HYDROCHLORIDE 50 MG: 50 TABLET, FILM COATED ORAL at 09:34

## 2018-01-01 RX ADMIN — DEXAMETHASONE 4 MG: 4 TABLET ORAL at 05:40

## 2018-01-01 RX ADMIN — Medication 2 G: at 19:36

## 2018-01-01 RX ADMIN — HYDROMORPHONE HYDROCHLORIDE 1 MG: 2 INJECTION, SOLUTION INTRAMUSCULAR; INTRAVENOUS; SUBCUTANEOUS at 20:29

## 2018-01-01 RX ADMIN — SODIUM CHLORIDE 40 MG: 9 INJECTION INTRAMUSCULAR; INTRAVENOUS; SUBCUTANEOUS at 08:38

## 2018-01-01 RX ADMIN — ENOXAPARIN SODIUM 40 MG: 40 INJECTION, SOLUTION INTRAVENOUS; SUBCUTANEOUS at 08:14

## 2018-01-01 RX ADMIN — HYDROMORPHONE HYDROCHLORIDE 0.5 MG: 2 INJECTION, SOLUTION INTRAMUSCULAR; INTRAVENOUS; SUBCUTANEOUS at 14:00

## 2018-01-01 RX ADMIN — DEXAMETHASONE SODIUM PHOSPHATE 10 MG: 10 INJECTION INTRAMUSCULAR; INTRAVENOUS at 11:44

## 2018-01-01 RX ADMIN — PANTOPRAZOLE SODIUM 40 MG: 40 TABLET, DELAYED RELEASE ORAL at 07:32

## 2018-01-01 RX ADMIN — DEXAMETHASONE 4 MG: 4 TABLET ORAL at 09:35

## 2018-01-01 RX ADMIN — HYDROMORPHONE HYDROCHLORIDE 1 MG: 1 INJECTION, SOLUTION INTRAMUSCULAR; INTRAVENOUS; SUBCUTANEOUS at 14:03

## 2018-01-01 RX ADMIN — ONDANSETRON 4 MG: 2 INJECTION INTRAMUSCULAR; INTRAVENOUS at 02:45

## 2018-01-01 RX ADMIN — SODIUM CHLORIDE, PRESERVATIVE FREE 300 UNITS: 5 INJECTION INTRAVENOUS at 15:27

## 2018-01-01 RX ADMIN — HYDROMORPHONE HYDROCHLORIDE 1 MG: 2 INJECTION, SOLUTION INTRAMUSCULAR; INTRAVENOUS; SUBCUTANEOUS at 06:27

## 2018-01-01 RX ADMIN — SODIUM CHLORIDE, PRESERVATIVE FREE 300 UNITS: 5 INJECTION INTRAVENOUS at 21:20

## 2018-01-01 RX ADMIN — LIDOCAINE HYDROCHLORIDE AND EPINEPHRINE 120 MG: 20; 10 INJECTION, SOLUTION INFILTRATION; PERINEURAL at 11:58

## 2018-01-01 RX ADMIN — HYDROMORPHONE HYDROCHLORIDE 1 MG: 2 INJECTION, SOLUTION INTRAMUSCULAR; INTRAVENOUS; SUBCUTANEOUS at 05:08

## 2018-01-01 RX ADMIN — HYDROMORPHONE HYDROCHLORIDE 1 MG: 2 INJECTION, SOLUTION INTRAMUSCULAR; INTRAVENOUS; SUBCUTANEOUS at 11:57

## 2018-01-01 RX ADMIN — PROMETHAZINE HYDROCHLORIDE 12.5 MG: 25 INJECTION INTRAMUSCULAR; INTRAVENOUS at 16:29

## 2018-01-01 RX ADMIN — FENTANYL CITRATE 50 MCG: 50 INJECTION, SOLUTION INTRAMUSCULAR; INTRAVENOUS at 08:19

## 2018-01-01 RX ADMIN — HYDROCODONE BITARTRATE AND ACETAMINOPHEN 1 TABLET: 10; 325 TABLET ORAL at 08:12

## 2018-01-01 RX ADMIN — POTASSIUM CHLORIDE 40 MEQ: 400 INJECTION, SOLUTION INTRAVENOUS at 13:05

## 2018-01-01 RX ADMIN — Medication 2 G: at 11:30

## 2018-01-01 RX ADMIN — HYDROMORPHONE HYDROCHLORIDE 0.5 MG: 2 INJECTION, SOLUTION INTRAMUSCULAR; INTRAVENOUS; SUBCUTANEOUS at 09:32

## 2018-01-01 RX ADMIN — HYDROMORPHONE HYDROCHLORIDE 1 MG: 2 INJECTION, SOLUTION INTRAMUSCULAR; INTRAVENOUS; SUBCUTANEOUS at 02:26

## 2018-01-01 RX ADMIN — HYDROMORPHONE HYDROCHLORIDE 1 MG: 2 INJECTION, SOLUTION INTRAMUSCULAR; INTRAVENOUS; SUBCUTANEOUS at 08:38

## 2018-01-01 RX ADMIN — HYDROMORPHONE HYDROCHLORIDE 1 MG: 2 INJECTION, SOLUTION INTRAMUSCULAR; INTRAVENOUS; SUBCUTANEOUS at 03:06

## 2018-01-01 RX ADMIN — HYDROCODONE BITARTRATE AND ACETAMINOPHEN 1 TABLET: 10; 325 TABLET ORAL at 18:37

## 2018-01-01 RX ADMIN — Medication 2 G: at 10:00

## 2018-01-01 RX ADMIN — SODIUM CHLORIDE AND POTASSIUM CHLORIDE: 4.5; 1.49 INJECTION, SOLUTION INTRAVENOUS at 04:51

## 2018-01-01 RX ADMIN — HYDROMORPHONE HYDROCHLORIDE 1 MG: 2 INJECTION, SOLUTION INTRAMUSCULAR; INTRAVENOUS; SUBCUTANEOUS at 23:55

## 2018-01-01 RX ADMIN — Medication 10 ML: at 21:20

## 2018-01-01 RX ADMIN — ONDANSETRON 4 MG: 2 INJECTION INTRAMUSCULAR; INTRAVENOUS at 12:42

## 2018-01-01 RX ADMIN — ENOXAPARIN SODIUM 40 MG: 40 INJECTION, SOLUTION INTRAVENOUS; SUBCUTANEOUS at 08:32

## 2018-01-01 RX ADMIN — DEXAMETHASONE 4 MG: 4 TABLET ORAL at 14:30

## 2018-01-01 RX ADMIN — DEXTROSE MONOHYDRATE 25 ML/HR: 5 INJECTION, SOLUTION INTRAVENOUS at 14:30

## 2018-01-01 RX ADMIN — ONDANSETRON 4 MG: 2 INJECTION INTRAMUSCULAR; INTRAVENOUS at 13:41

## 2018-01-01 RX ADMIN — CLINDAMYCIN PHOSPHATE 900 MG: 150 INJECTION, SOLUTION INTRAVENOUS at 08:08

## 2018-01-01 RX ADMIN — HYDROMORPHONE HYDROCHLORIDE 1 MG: 2 INJECTION, SOLUTION INTRAMUSCULAR; INTRAVENOUS; SUBCUTANEOUS at 16:57

## 2018-01-01 RX ADMIN — NEOSTIGMINE METHYLSULFATE 3 MG: 1 INJECTION INTRAVENOUS at 10:01

## 2018-01-01 RX ADMIN — ALPRAZOLAM 1 MG: 0.5 TABLET ORAL at 18:33

## 2018-01-01 RX ADMIN — ALPRAZOLAM 1 MG: 0.5 TABLET ORAL at 22:24

## 2018-01-01 RX ADMIN — ONDANSETRON 4 MG: 2 INJECTION INTRAMUSCULAR; INTRAVENOUS at 13:33

## 2018-01-01 RX ADMIN — HYDROMORPHONE HYDROCHLORIDE 1 MG: 2 INJECTION, SOLUTION INTRAMUSCULAR; INTRAVENOUS; SUBCUTANEOUS at 21:48

## 2018-01-01 RX ADMIN — Medication 10 ML: at 06:15

## 2018-01-01 RX ADMIN — Medication 2 G: at 19:27

## 2018-01-01 RX ADMIN — HYDROMORPHONE HYDROCHLORIDE 1 MG: 2 INJECTION, SOLUTION INTRAMUSCULAR; INTRAVENOUS; SUBCUTANEOUS at 10:56

## 2018-01-01 RX ADMIN — ENOXAPARIN SODIUM 40 MG: 40 INJECTION, SOLUTION INTRAVENOUS; SUBCUTANEOUS at 08:42

## 2018-01-01 RX ADMIN — FENTANYL CITRATE 25 MCG: 50 INJECTION, SOLUTION INTRAMUSCULAR; INTRAVENOUS at 09:37

## 2018-01-01 RX ADMIN — SODIUM CHLORIDE, PRESERVATIVE FREE 300 UNITS: 5 INJECTION INTRAVENOUS at 14:35

## 2018-01-01 RX ADMIN — HYDROCODONE BITARTRATE AND ACETAMINOPHEN 1 TABLET: 10; 325 TABLET ORAL at 21:43

## 2018-01-01 RX ADMIN — HYDROMORPHONE HYDROCHLORIDE 0.5 MG: 2 INJECTION, SOLUTION INTRAMUSCULAR; INTRAVENOUS; SUBCUTANEOUS at 12:49

## 2018-01-01 RX ADMIN — IOPAMIDOL 100 ML: 755 INJECTION, SOLUTION INTRAVENOUS at 16:00

## 2018-01-01 RX ADMIN — PANTOPRAZOLE SODIUM 40 MG: 40 TABLET, DELAYED RELEASE ORAL at 07:35

## 2018-01-01 RX ADMIN — Medication 10 ML: at 22:22

## 2018-01-01 RX ADMIN — HYDROMORPHONE HYDROCHLORIDE 1 MG: 2 INJECTION, SOLUTION INTRAMUSCULAR; INTRAVENOUS; SUBCUTANEOUS at 02:30

## 2018-01-01 RX ADMIN — SODIUM CHLORIDE, PRESERVATIVE FREE 300 UNITS: 5 INJECTION INTRAVENOUS at 13:48

## 2018-01-01 RX ADMIN — HYDROMORPHONE HYDROCHLORIDE 1 MG: 2 INJECTION, SOLUTION INTRAMUSCULAR; INTRAVENOUS; SUBCUTANEOUS at 18:17

## 2018-01-01 RX ADMIN — SODIUM CHLORIDE 1000 MG: 900 INJECTION, SOLUTION INTRAVENOUS at 18:11

## 2018-01-01 RX ADMIN — Medication 10 ML: at 02:53

## 2018-01-01 RX ADMIN — HYDROMORPHONE HYDROCHLORIDE 1 MG: 2 INJECTION, SOLUTION INTRAMUSCULAR; INTRAVENOUS; SUBCUTANEOUS at 11:12

## 2018-01-01 RX ADMIN — HYDROMORPHONE HYDROCHLORIDE 1 MG: 2 INJECTION, SOLUTION INTRAMUSCULAR; INTRAVENOUS; SUBCUTANEOUS at 08:06

## 2018-01-01 RX ADMIN — ONDANSETRON 4 MG: 2 INJECTION INTRAMUSCULAR; INTRAVENOUS at 03:06

## 2018-01-01 RX ADMIN — HYDROMORPHONE HYDROCHLORIDE 1 MG: 2 INJECTION, SOLUTION INTRAMUSCULAR; INTRAVENOUS; SUBCUTANEOUS at 00:50

## 2018-01-01 RX ADMIN — ONDANSETRON 4 MG: 2 INJECTION INTRAMUSCULAR; INTRAVENOUS at 19:06

## 2018-01-01 RX ADMIN — DEXAMETHASONE 4 MG: 4 TABLET ORAL at 22:24

## 2018-01-01 RX ADMIN — Medication 10 ML: at 16:51

## 2018-01-01 RX ADMIN — LEVETIRACETAM 1000 MG: 500 TABLET, FILM COATED ORAL at 08:42

## 2018-01-01 RX ADMIN — LORAZEPAM 1 MG: 2 INJECTION, SOLUTION INTRAMUSCULAR; INTRAVENOUS at 17:40

## 2018-01-01 RX ADMIN — GLYCOPYRROLATE 0.4 MG: 0.2 INJECTION INTRAMUSCULAR; INTRAVENOUS at 10:01

## 2018-01-01 RX ADMIN — Medication 2 G: at 03:00

## 2018-01-01 RX ADMIN — HYDROCODONE BITARTRATE AND ACETAMINOPHEN 1 TABLET: 10; 325 TABLET ORAL at 08:14

## 2018-01-01 RX ADMIN — MIDAZOLAM HYDROCHLORIDE 2 MG: 1 INJECTION, SOLUTION INTRAMUSCULAR; INTRAVENOUS at 11:28

## 2018-01-01 RX ADMIN — ONDANSETRON 4 MG: 2 INJECTION INTRAMUSCULAR; INTRAVENOUS at 12:21

## 2018-01-01 RX ADMIN — HYDROMORPHONE HYDROCHLORIDE 1 MG: 2 INJECTION, SOLUTION INTRAMUSCULAR; INTRAVENOUS; SUBCUTANEOUS at 03:26

## 2018-01-01 RX ADMIN — SODIUM CHLORIDE AND POTASSIUM CHLORIDE: 4.5; 1.49 INJECTION, SOLUTION INTRAVENOUS at 23:42

## 2018-01-01 RX ADMIN — FLUOROURACIL 480 MG: 50 INJECTION, SOLUTION INTRAVENOUS at 18:25

## 2018-01-01 RX ADMIN — HYDROMORPHONE HYDROCHLORIDE 1 MG: 2 INJECTION, SOLUTION INTRAMUSCULAR; INTRAVENOUS; SUBCUTANEOUS at 20:43

## 2018-01-01 RX ADMIN — SODIUM CHLORIDE 6.25 MG: 9 INJECTION INTRAMUSCULAR; INTRAVENOUS; SUBCUTANEOUS at 10:35

## 2018-01-01 RX ADMIN — SODIUM CHLORIDE 500 ML: 900 INJECTION, SOLUTION INTRAVENOUS at 11:33

## 2018-01-01 RX ADMIN — HYDROMORPHONE HYDROCHLORIDE 1 MG: 2 INJECTION, SOLUTION INTRAMUSCULAR; INTRAVENOUS; SUBCUTANEOUS at 07:36

## 2018-01-01 RX ADMIN — POTASSIUM CHLORIDE 20 MEQ: 200 INJECTION, SOLUTION INTRAVENOUS at 14:45

## 2018-01-01 RX ADMIN — ENOXAPARIN SODIUM 40 MG: 40 INJECTION, SOLUTION INTRAVENOUS; SUBCUTANEOUS at 09:35

## 2018-01-01 RX ADMIN — Medication 2 G: at 02:48

## 2018-01-01 RX ADMIN — SODIUM CHLORIDE, PRESERVATIVE FREE 300 UNITS: 5 INJECTION INTRAVENOUS at 20:26

## 2018-01-01 RX ADMIN — HYDROMORPHONE HYDROCHLORIDE 1 MG: 2 INJECTION, SOLUTION INTRAMUSCULAR; INTRAVENOUS; SUBCUTANEOUS at 01:16

## 2018-01-01 RX ADMIN — LEVETIRACETAM 1000 MG: 500 TABLET, FILM COATED ORAL at 17:48

## 2018-01-01 RX ADMIN — HYDROCODONE BITARTRATE AND ACETAMINOPHEN 1 TABLET: 10; 325 TABLET ORAL at 20:26

## 2018-01-01 RX ADMIN — Medication 2 G: at 11:43

## 2018-01-01 RX ADMIN — HYDROMORPHONE HYDROCHLORIDE 0.5 MG: 2 INJECTION, SOLUTION INTRAMUSCULAR; INTRAVENOUS; SUBCUTANEOUS at 00:34

## 2018-01-01 RX ADMIN — FLUOROURACIL 700 MG: 50 INJECTION, SOLUTION INTRAVENOUS at 14:55

## 2018-01-01 RX ADMIN — Medication 2 G: at 11:34

## 2018-01-01 RX ADMIN — HYDROMORPHONE HYDROCHLORIDE 1 MG: 2 INJECTION, SOLUTION INTRAMUSCULAR; INTRAVENOUS; SUBCUTANEOUS at 07:21

## 2018-01-01 RX ADMIN — Medication 10 ML: at 21:47

## 2018-01-01 RX ADMIN — SODIUM CHLORIDE 40 MG: 9 INJECTION INTRAMUSCULAR; INTRAVENOUS; SUBCUTANEOUS at 08:13

## 2018-01-01 RX ADMIN — HYDROMORPHONE HYDROCHLORIDE 1 MG: 2 INJECTION, SOLUTION INTRAMUSCULAR; INTRAVENOUS; SUBCUTANEOUS at 07:28

## 2018-01-01 RX ADMIN — SODIUM CHLORIDE, PRESERVATIVE FREE 300 UNITS: 5 INJECTION INTRAVENOUS at 05:13

## 2018-01-01 RX ADMIN — HYDROCODONE BITARTRATE AND ACETAMINOPHEN 1 TABLET: 10; 325 TABLET ORAL at 02:34

## 2018-01-01 RX ADMIN — PANTOPRAZOLE SODIUM 40 MG: 40 TABLET, DELAYED RELEASE ORAL at 06:32

## 2018-01-01 RX ADMIN — HYDROMORPHONE HYDROCHLORIDE 1 MG: 2 INJECTION, SOLUTION INTRAMUSCULAR; INTRAVENOUS; SUBCUTANEOUS at 05:51

## 2018-01-01 RX ADMIN — ALPRAZOLAM 1 MG: 0.5 TABLET ORAL at 15:38

## 2018-01-01 RX ADMIN — Medication 2 G: at 10:59

## 2018-01-01 RX ADMIN — DEXAMETHASONE 2 MG: 0.5 TABLET ORAL at 19:27

## 2018-01-01 RX ADMIN — Medication 2 G: at 19:17

## 2018-01-01 RX ADMIN — SODIUM CHLORIDE 1000 MG: 900 INJECTION, SOLUTION INTRAVENOUS at 18:14

## 2018-01-01 RX ADMIN — SODIUM CHLORIDE, PRESERVATIVE FREE 500 UNITS: 5 INJECTION INTRAVENOUS at 12:21

## 2018-01-01 RX ADMIN — SODIUM CHLORIDE 100 ML: 900 INJECTION, SOLUTION INTRAVENOUS at 16:51

## 2018-01-01 RX ADMIN — HYDROMORPHONE HYDROCHLORIDE 1 MG: 2 INJECTION, SOLUTION INTRAMUSCULAR; INTRAVENOUS; SUBCUTANEOUS at 08:46

## 2018-01-01 RX ADMIN — Medication 2 G: at 19:45

## 2018-01-01 RX ADMIN — HYDROCODONE BITARTRATE AND ACETAMINOPHEN 1 TABLET: 10; 325 TABLET ORAL at 20:11

## 2018-01-01 RX ADMIN — MIDAZOLAM HYDROCHLORIDE 2 MG: 1 INJECTION, SOLUTION INTRAMUSCULAR; INTRAVENOUS at 06:49

## 2018-01-01 RX ADMIN — HYDROMORPHONE HYDROCHLORIDE 1 MG: 2 INJECTION, SOLUTION INTRAMUSCULAR; INTRAVENOUS; SUBCUTANEOUS at 10:43

## 2018-01-01 RX ADMIN — Medication 10 ML: at 06:31

## 2018-01-01 RX ADMIN — HYDROMORPHONE HYDROCHLORIDE 1 MG: 2 INJECTION, SOLUTION INTRAMUSCULAR; INTRAVENOUS; SUBCUTANEOUS at 13:13

## 2018-01-01 RX ADMIN — ONDANSETRON 4 MG: 2 INJECTION INTRAMUSCULAR; INTRAVENOUS at 03:26

## 2018-01-01 RX ADMIN — PANTOPRAZOLE SODIUM 40 MG: 40 TABLET, DELAYED RELEASE ORAL at 07:11

## 2018-01-01 RX ADMIN — HYDROCODONE BITARTRATE AND ACETAMINOPHEN 1 TABLET: 10; 325 TABLET ORAL at 11:50

## 2018-01-01 RX ADMIN — HYDROCODONE BITARTRATE AND ACETAMINOPHEN 1 TABLET: 10; 325 TABLET ORAL at 16:23

## 2018-01-01 RX ADMIN — ONDANSETRON 4 MG: 2 INJECTION INTRAMUSCULAR; INTRAVENOUS at 16:57

## 2018-01-01 RX ADMIN — ENOXAPARIN SODIUM 40 MG: 40 INJECTION, SOLUTION INTRAVENOUS; SUBCUTANEOUS at 08:22

## 2018-01-01 RX ADMIN — FENTANYL CITRATE 100 MCG: 50 INJECTION, SOLUTION INTRAMUSCULAR; INTRAVENOUS at 11:28

## 2018-01-01 RX ADMIN — DEXAMETHASONE 4 MG: 4 TABLET ORAL at 05:22

## 2018-01-01 RX ADMIN — HYDROMORPHONE HYDROCHLORIDE 1 MG: 2 INJECTION, SOLUTION INTRAMUSCULAR; INTRAVENOUS; SUBCUTANEOUS at 20:58

## 2018-01-01 RX ADMIN — Medication 10 ML: at 10:43

## 2018-01-01 RX ADMIN — SODIUM CHLORIDE, PRESERVATIVE FREE 300 UNITS: 5 INJECTION INTRAVENOUS at 14:47

## 2018-01-01 RX ADMIN — LEUCOVORIN CALCIUM 480 MG: 350 INJECTION, POWDER, LYOPHILIZED, FOR SOLUTION INTRAMUSCULAR; INTRAVENOUS at 16:21

## 2018-01-01 RX ADMIN — Medication 10 ML: at 05:51

## 2018-01-01 RX ADMIN — DIPHENHYDRAMINE HYDROCHLORIDE 25 MG: 50 INJECTION, SOLUTION INTRAMUSCULAR; INTRAVENOUS at 11:36

## 2018-01-01 RX ADMIN — KETOROLAC TROMETHAMINE 30 MG: 30 INJECTION, SOLUTION INTRAMUSCULAR at 09:41

## 2018-01-01 RX ADMIN — SODIUM CHLORIDE AND POTASSIUM CHLORIDE: 4.5; 1.49 INJECTION, SOLUTION INTRAVENOUS at 16:16

## 2018-01-01 RX ADMIN — PANTOPRAZOLE SODIUM 40 MG: 40 TABLET, DELAYED RELEASE ORAL at 07:51

## 2018-01-01 RX ADMIN — HYDROMORPHONE HYDROCHLORIDE 0.5 MG: 2 INJECTION, SOLUTION INTRAMUSCULAR; INTRAVENOUS; SUBCUTANEOUS at 18:22

## 2018-01-01 RX ADMIN — SODIUM CHLORIDE AND POTASSIUM CHLORIDE: 4.5; 1.49 INJECTION, SOLUTION INTRAVENOUS at 01:29

## 2018-01-01 RX ADMIN — HYDROMORPHONE HYDROCHLORIDE 1 MG: 2 INJECTION, SOLUTION INTRAMUSCULAR; INTRAVENOUS; SUBCUTANEOUS at 21:50

## 2018-01-01 RX ADMIN — ALPRAZOLAM 1 MG: 0.5 TABLET ORAL at 10:13

## 2018-01-01 RX ADMIN — SODIUM CHLORIDE AND POTASSIUM CHLORIDE: 4.5; 1.49 INJECTION, SOLUTION INTRAVENOUS at 05:51

## 2018-01-01 RX ADMIN — HYDROMORPHONE HYDROCHLORIDE 1 MG: 2 INJECTION, SOLUTION INTRAMUSCULAR; INTRAVENOUS; SUBCUTANEOUS at 22:33

## 2018-01-01 RX ADMIN — Medication 2 G: at 03:22

## 2018-01-01 RX ADMIN — HYDROMORPHONE HYDROCHLORIDE 1 MG: 2 INJECTION, SOLUTION INTRAMUSCULAR; INTRAVENOUS; SUBCUTANEOUS at 15:50

## 2018-01-01 RX ADMIN — Medication 10 ML: at 05:16

## 2018-01-01 RX ADMIN — FLUOROURACIL 1536 MG: 50 INJECTION, SOLUTION INTRAVENOUS at 18:25

## 2018-01-01 RX ADMIN — FENTANYL CITRATE 50 MCG: 50 INJECTION, SOLUTION INTRAMUSCULAR; INTRAVENOUS at 07:26

## 2018-01-01 RX ADMIN — SODIUM CHLORIDE, SODIUM LACTATE, POTASSIUM CHLORIDE, CALCIUM CHLORIDE: 600; 310; 30; 20 INJECTION, SOLUTION INTRAVENOUS at 08:08

## 2018-01-01 RX ADMIN — ALPRAZOLAM 1 MG: 0.5 TABLET ORAL at 18:45

## 2018-01-01 RX ADMIN — Medication 10 ML: at 21:32

## 2018-01-01 RX ADMIN — HYDROMORPHONE HYDROCHLORIDE 1 MG: 2 INJECTION, SOLUTION INTRAMUSCULAR; INTRAVENOUS; SUBCUTANEOUS at 19:52

## 2018-01-01 RX ADMIN — LEVETIRACETAM 1000 MG: 500 TABLET ORAL at 07:11

## 2018-01-01 RX ADMIN — SODIUM CHLORIDE, PRESERVATIVE FREE 300 UNITS: 5 INJECTION INTRAVENOUS at 05:22

## 2018-01-01 RX ADMIN — HYDROMORPHONE HYDROCHLORIDE 1 MG: 2 INJECTION, SOLUTION INTRAMUSCULAR; INTRAVENOUS; SUBCUTANEOUS at 14:04

## 2018-01-01 RX ADMIN — ENOXAPARIN SODIUM 40 MG: 40 INJECTION, SOLUTION INTRAVENOUS; SUBCUTANEOUS at 08:28

## 2018-01-01 RX ADMIN — SODIUM CHLORIDE 100 ML: 900 INJECTION, SOLUTION INTRAVENOUS at 16:00

## 2018-01-01 RX ADMIN — SODIUM CHLORIDE, PRESERVATIVE FREE 300 UNITS: 5 INJECTION INTRAVENOUS at 05:38

## 2018-01-01 RX ADMIN — HYDROMORPHONE HYDROCHLORIDE 0.5 MG: 2 INJECTION, SOLUTION INTRAMUSCULAR; INTRAVENOUS; SUBCUTANEOUS at 22:26

## 2018-01-01 RX ADMIN — DEXAMETHASONE SODIUM PHOSPHATE 4 MG: 4 INJECTION, SOLUTION INTRA-ARTICULAR; INTRALESIONAL; INTRAMUSCULAR; INTRAVENOUS; SOFT TISSUE at 09:31

## 2018-01-01 RX ADMIN — HYDROCODONE BITARTRATE AND ACETAMINOPHEN 1 TABLET: 10; 325 TABLET ORAL at 18:28

## 2018-01-01 RX ADMIN — PANTOPRAZOLE SODIUM 40 MG: 40 TABLET, DELAYED RELEASE ORAL at 08:32

## 2018-01-01 RX ADMIN — SODIUM CHLORIDE, SODIUM LACTATE, POTASSIUM CHLORIDE, AND CALCIUM CHLORIDE: 600; 310; 30; 20 INJECTION, SOLUTION INTRAVENOUS at 08:49

## 2018-01-01 RX ADMIN — FENTANYL CITRATE 50 MCG: 50 INJECTION, SOLUTION INTRAMUSCULAR; INTRAVENOUS at 09:21

## 2018-01-01 RX ADMIN — SODIUM CHLORIDE AND POTASSIUM CHLORIDE: 4.5; 1.49 INJECTION, SOLUTION INTRAVENOUS at 09:26

## 2018-01-01 RX ADMIN — HYDROMORPHONE HYDROCHLORIDE 1 MG: 2 INJECTION, SOLUTION INTRAMUSCULAR; INTRAVENOUS; SUBCUTANEOUS at 12:04

## 2018-01-01 RX ADMIN — DEXAMETHASONE 2 MG: 0.5 TABLET ORAL at 08:27

## 2018-01-01 RX ADMIN — ACETAMINOPHEN 500 MG: 500 TABLET, FILM COATED ORAL at 11:26

## 2018-01-01 RX ADMIN — HYDROMORPHONE HYDROCHLORIDE 1 MG: 2 INJECTION, SOLUTION INTRAMUSCULAR; INTRAVENOUS; SUBCUTANEOUS at 18:01

## 2018-01-01 RX ADMIN — HYDROMORPHONE HYDROCHLORIDE 0.5 MG: 2 INJECTION, SOLUTION INTRAMUSCULAR; INTRAVENOUS; SUBCUTANEOUS at 08:39

## 2018-01-01 RX ADMIN — ENOXAPARIN SODIUM 40 MG: 40 INJECTION, SOLUTION INTRAVENOUS; SUBCUTANEOUS at 08:19

## 2018-01-01 RX ADMIN — ONDANSETRON 4 MG: 2 INJECTION INTRAMUSCULAR; INTRAVENOUS at 22:04

## 2018-01-01 RX ADMIN — LEVETIRACETAM 1000 MG: 500 TABLET, FILM COATED ORAL at 07:51

## 2018-01-01 RX ADMIN — HYDROMORPHONE HYDROCHLORIDE 1 MG: 2 INJECTION, SOLUTION INTRAMUSCULAR; INTRAVENOUS; SUBCUTANEOUS at 17:24

## 2018-01-01 RX ADMIN — ALPRAZOLAM 1 MG: 0.5 TABLET ORAL at 11:55

## 2018-01-01 RX ADMIN — HYDROCODONE BITARTRATE AND ACETAMINOPHEN 1 TABLET: 10; 325 TABLET ORAL at 16:09

## 2018-01-01 RX ADMIN — HYDROMORPHONE HYDROCHLORIDE 1 MG: 2 INJECTION, SOLUTION INTRAMUSCULAR; INTRAVENOUS; SUBCUTANEOUS at 11:41

## 2018-01-01 RX ADMIN — HYDROMORPHONE HYDROCHLORIDE 0.5 MG: 2 INJECTION, SOLUTION INTRAMUSCULAR; INTRAVENOUS; SUBCUTANEOUS at 02:36

## 2018-01-01 RX ADMIN — LEVETIRACETAM 1000 MG: 500 TABLET, FILM COATED ORAL at 18:37

## 2018-01-01 RX ADMIN — ALPRAZOLAM 1 MG: 0.5 TABLET ORAL at 18:31

## 2018-01-01 RX ADMIN — Medication 10 ML: at 17:05

## 2018-01-01 RX ADMIN — Medication 2 G: at 02:51

## 2018-01-01 RX ADMIN — DEXAMETHASONE 2 MG: 0.5 TABLET ORAL at 07:52

## 2018-01-01 RX ADMIN — ONDANSETRON 4 MG: 2 INJECTION INTRAMUSCULAR; INTRAVENOUS at 23:03

## 2018-01-01 RX ADMIN — HYDROMORPHONE HYDROCHLORIDE 1 MG: 2 INJECTION, SOLUTION INTRAMUSCULAR; INTRAVENOUS; SUBCUTANEOUS at 17:57

## 2018-01-01 RX ADMIN — TRAMADOL HYDROCHLORIDE 50 MG: 50 TABLET, FILM COATED ORAL at 12:21

## 2018-01-01 RX ADMIN — VANCOMYCIN HYDROCHLORIDE 1000 MG: 1 INJECTION, POWDER, LYOPHILIZED, FOR SOLUTION INTRAVENOUS at 06:19

## 2018-01-01 RX ADMIN — HYDROMORPHONE HYDROCHLORIDE 1 MG: 2 INJECTION, SOLUTION INTRAMUSCULAR; INTRAVENOUS; SUBCUTANEOUS at 01:26

## 2018-01-01 RX ADMIN — HYDROMORPHONE HYDROCHLORIDE 1 MG: 2 INJECTION, SOLUTION INTRAMUSCULAR; INTRAVENOUS; SUBCUTANEOUS at 15:51

## 2018-01-01 RX ADMIN — HYDROMORPHONE HYDROCHLORIDE 1 MG: 2 INJECTION, SOLUTION INTRAMUSCULAR; INTRAVENOUS; SUBCUTANEOUS at 17:25

## 2018-01-01 RX ADMIN — Medication 10 ML: at 19:28

## 2018-01-01 RX ADMIN — ENOXAPARIN SODIUM 40 MG: 40 INJECTION, SOLUTION INTRAVENOUS; SUBCUTANEOUS at 07:52

## 2018-01-01 RX ADMIN — DEXAMETHASONE 4 MG: 4 TABLET ORAL at 05:38

## 2018-01-01 RX ADMIN — Medication 10 ML: at 14:05

## 2018-01-01 RX ADMIN — FENTANYL CITRATE 50 MCG: 50 INJECTION, SOLUTION INTRAMUSCULAR; INTRAVENOUS at 08:26

## 2018-01-01 RX ADMIN — HYDROMORPHONE HYDROCHLORIDE 1 MG: 2 INJECTION, SOLUTION INTRAMUSCULAR; INTRAVENOUS; SUBCUTANEOUS at 09:41

## 2018-01-01 RX ADMIN — Medication 10 ML: at 05:41

## 2018-01-01 RX ADMIN — HYDROMORPHONE HYDROCHLORIDE 0.5 MG: 2 INJECTION, SOLUTION INTRAMUSCULAR; INTRAVENOUS; SUBCUTANEOUS at 18:43

## 2018-01-01 RX ADMIN — SODIUM CHLORIDE, PRESERVATIVE FREE 300 UNITS: 5 INJECTION INTRAVENOUS at 21:37

## 2018-01-01 RX ADMIN — HYDROMORPHONE HYDROCHLORIDE 1 MG: 2 INJECTION, SOLUTION INTRAMUSCULAR; INTRAVENOUS; SUBCUTANEOUS at 10:27

## 2018-01-01 RX ADMIN — Medication 10 ML: at 05:30

## 2018-01-01 RX ADMIN — KETOROLAC TROMETHAMINE 30 MG: 30 INJECTION, SOLUTION INTRAMUSCULAR at 16:06

## 2018-01-01 RX ADMIN — HYDROMORPHONE HYDROCHLORIDE 1 MG: 2 INJECTION, SOLUTION INTRAMUSCULAR; INTRAVENOUS; SUBCUTANEOUS at 10:20

## 2018-01-01 RX ADMIN — DEXAMETHASONE 4 MG: 4 TABLET ORAL at 19:49

## 2018-01-01 RX ADMIN — HYDROCODONE BITARTRATE AND ACETAMINOPHEN 1 TABLET: 10; 325 TABLET ORAL at 13:14

## 2018-01-01 RX ADMIN — Medication 2 G: at 03:48

## 2018-01-01 RX ADMIN — SODIUM CHLORIDE, PRESERVATIVE FREE 300 UNITS: 5 INJECTION INTRAVENOUS at 21:32

## 2018-01-01 RX ADMIN — HYDROCODONE BITARTRATE AND ACETAMINOPHEN 1 TABLET: 10; 325 TABLET ORAL at 08:32

## 2018-01-01 RX ADMIN — DIPHENHYDRAMINE HYDROCHLORIDE 25 MG: 50 INJECTION, SOLUTION INTRAMUSCULAR; INTRAVENOUS at 14:20

## 2018-01-01 RX ADMIN — MAGNESIUM SULFATE HEPTAHYDRATE 1 G: 1 INJECTION, SOLUTION INTRAVENOUS at 21:53

## 2018-01-01 RX ADMIN — DEXAMETHASONE 4 MG: 4 TABLET ORAL at 06:32

## 2018-01-01 RX ADMIN — SODIUM CHLORIDE AND POTASSIUM CHLORIDE: 4.5; 1.49 INJECTION, SOLUTION INTRAVENOUS at 06:32

## 2018-01-01 RX ADMIN — KETOROLAC TROMETHAMINE 30 MG: 30 INJECTION, SOLUTION INTRAMUSCULAR at 10:19

## 2018-01-01 RX ADMIN — HYDROCODONE BITARTRATE AND ACETAMINOPHEN 1 TABLET: 10; 325 TABLET ORAL at 15:47

## 2018-01-01 RX ADMIN — FENTANYL CITRATE 25 MCG: 50 INJECTION, SOLUTION INTRAMUSCULAR; INTRAVENOUS at 10:20

## 2018-01-01 RX ADMIN — HYDROMORPHONE HYDROCHLORIDE 1 MG: 2 INJECTION, SOLUTION INTRAMUSCULAR; INTRAVENOUS; SUBCUTANEOUS at 08:20

## 2018-01-01 RX ADMIN — ONDANSETRON 4 MG: 2 INJECTION INTRAMUSCULAR; INTRAVENOUS at 17:07

## 2018-01-01 RX ADMIN — CYCLOBENZAPRINE HYDROCHLORIDE 10 MG: 10 TABLET, FILM COATED ORAL at 16:15

## 2018-01-01 RX ADMIN — HYDROCODONE BITARTRATE AND ACETAMINOPHEN 1 TABLET: 10; 325 TABLET ORAL at 20:14

## 2018-01-01 RX ADMIN — HYDROMORPHONE HYDROCHLORIDE 0.5 MG: 2 INJECTION, SOLUTION INTRAMUSCULAR; INTRAVENOUS; SUBCUTANEOUS at 16:17

## 2018-01-01 RX ADMIN — OXALIPLATIN 96 MG: 5 INJECTION, SOLUTION, CONCENTRATE INTRAVENOUS at 16:24

## 2018-01-01 RX ADMIN — SODIUM CHLORIDE 1000 MG: 900 INJECTION, SOLUTION INTRAVENOUS at 06:29

## 2018-01-01 RX ADMIN — HYDROMORPHONE HYDROCHLORIDE 1 MG: 2 INJECTION, SOLUTION INTRAMUSCULAR; INTRAVENOUS; SUBCUTANEOUS at 23:13

## 2018-01-01 RX ADMIN — LIDOCAINE HYDROCHLORIDE 60 MG: 20 INJECTION, SOLUTION EPIDURAL; INFILTRATION; INTRACAUDAL; PERINEURAL at 07:35

## 2018-01-01 RX ADMIN — HYDROMORPHONE HYDROCHLORIDE 1 MG: 2 INJECTION, SOLUTION INTRAMUSCULAR; INTRAVENOUS; SUBCUTANEOUS at 09:27

## 2018-01-01 RX ADMIN — KETOROLAC TROMETHAMINE 30 MG: 30 INJECTION, SOLUTION INTRAMUSCULAR at 18:21

## 2018-01-01 RX ADMIN — DEXAMETHASONE 4 MG: 4 TABLET ORAL at 21:47

## 2018-01-01 RX ADMIN — Medication 10 ML: at 14:09

## 2018-01-01 RX ADMIN — HYDROMORPHONE HYDROCHLORIDE 1 MG: 2 INJECTION, SOLUTION INTRAMUSCULAR; INTRAVENOUS; SUBCUTANEOUS at 15:42

## 2018-01-01 RX ADMIN — HYDROMORPHONE HYDROCHLORIDE 1 MG: 2 INJECTION, SOLUTION INTRAMUSCULAR; INTRAVENOUS; SUBCUTANEOUS at 16:45

## 2018-01-01 RX ADMIN — HYDROMORPHONE HYDROCHLORIDE 1 MG: 2 INJECTION, SOLUTION INTRAMUSCULAR; INTRAVENOUS; SUBCUTANEOUS at 03:30

## 2018-01-01 RX ADMIN — TRAMADOL HYDROCHLORIDE 50 MG: 50 TABLET, FILM COATED ORAL at 11:17

## 2018-01-01 RX ADMIN — LIDOCAINE HYDROCHLORIDE 60 MG: 20 INJECTION, SOLUTION EPIDURAL; INFILTRATION; INTRACAUDAL; PERINEURAL at 11:37

## 2018-01-01 RX ADMIN — SODIUM CHLORIDE AND POTASSIUM CHLORIDE: 4.5; 1.49 INJECTION, SOLUTION INTRAVENOUS at 16:58

## 2018-01-01 RX ADMIN — HYDROMORPHONE HYDROCHLORIDE 1 MG: 2 INJECTION, SOLUTION INTRAMUSCULAR; INTRAVENOUS; SUBCUTANEOUS at 14:46

## 2018-01-01 RX ADMIN — HYDROMORPHONE HYDROCHLORIDE 1 MG: 2 INJECTION, SOLUTION INTRAMUSCULAR; INTRAVENOUS; SUBCUTANEOUS at 22:52

## 2018-01-01 RX ADMIN — HYDROCODONE BITARTRATE AND ACETAMINOPHEN 1 TABLET: 10; 325 TABLET ORAL at 08:22

## 2018-01-01 RX ADMIN — Medication 10 ML: at 23:57

## 2018-01-01 RX ADMIN — HYDROMORPHONE HYDROCHLORIDE 1 MG: 2 INJECTION, SOLUTION INTRAMUSCULAR; INTRAVENOUS; SUBCUTANEOUS at 05:55

## 2018-01-01 RX ADMIN — LEVETIRACETAM 1000 MG: 500 TABLET, FILM COATED ORAL at 17:57

## 2018-01-01 RX ADMIN — ONDANSETRON 4 MG: 2 INJECTION INTRAMUSCULAR; INTRAVENOUS at 03:58

## 2018-01-01 RX ADMIN — SODIUM CHLORIDE, PRESERVATIVE FREE 300 UNITS: 5 INJECTION INTRAVENOUS at 05:16

## 2018-01-01 RX ADMIN — HYDROMORPHONE HYDROCHLORIDE 0.5 MG: 2 INJECTION, SOLUTION INTRAMUSCULAR; INTRAVENOUS; SUBCUTANEOUS at 23:15

## 2018-01-01 RX ADMIN — FLUOROURACIL 700 MG: 50 INJECTION, SOLUTION INTRAVENOUS at 16:46

## 2018-01-01 RX ADMIN — DEXAMETHASONE 4 MG: 4 TABLET ORAL at 21:57

## 2018-01-01 RX ADMIN — OXALIPLATIN 149 MG: 5 INJECTION, SOLUTION, CONCENTRATE INTRAVENOUS at 12:48

## 2018-01-01 RX ADMIN — ROCURONIUM BROMIDE 20 MG: 10 INJECTION, SOLUTION INTRAVENOUS at 08:00

## 2018-01-01 RX ADMIN — HYDROCODONE BITARTRATE AND ACETAMINOPHEN 1 TABLET: 10; 325 TABLET ORAL at 13:53

## 2018-01-01 RX ADMIN — GADOBENATE DIMEGLUMINE 10 ML: 529 INJECTION, SOLUTION INTRAVENOUS at 10:33

## 2018-01-01 RX ADMIN — ONDANSETRON 8 MG: 2 INJECTION INTRAMUSCULAR; INTRAVENOUS at 13:05

## 2018-01-01 RX ADMIN — HYDROMORPHONE HYDROCHLORIDE 1 MG: 2 INJECTION, SOLUTION INTRAMUSCULAR; INTRAVENOUS; SUBCUTANEOUS at 10:37

## 2018-01-01 RX ADMIN — SODIUM CHLORIDE, PRESERVATIVE FREE 300 UNITS: 5 INJECTION INTRAVENOUS at 13:04

## 2018-01-01 RX ADMIN — ROCURONIUM BROMIDE 50 MG: 10 INJECTION, SOLUTION INTRAVENOUS at 07:35

## 2018-01-01 RX ADMIN — LEVETIRACETAM 1000 MG: 500 TABLET, FILM COATED ORAL at 18:33

## 2018-01-01 RX ADMIN — Medication 2 G: at 20:26

## 2018-01-01 RX ADMIN — ACETAMINOPHEN 650 MG: 325 TABLET ORAL at 14:18

## 2018-01-01 RX ADMIN — DEXAMETHASONE 2 MG: 0.5 TABLET ORAL at 21:37

## 2018-01-01 RX ADMIN — HYDROMORPHONE HYDROCHLORIDE 0.5 MG: 2 INJECTION, SOLUTION INTRAMUSCULAR; INTRAVENOUS; SUBCUTANEOUS at 20:47

## 2018-01-01 RX ADMIN — ALPRAZOLAM 1 MG: 0.5 TABLET ORAL at 13:55

## 2018-01-01 RX ADMIN — SODIUM CHLORIDE, PRESERVATIVE FREE 300 UNITS: 5 INJECTION INTRAVENOUS at 05:30

## 2018-01-01 RX ADMIN — Medication 10 ML: at 21:57

## 2018-01-01 RX ADMIN — ALPRAZOLAM 1 MG: 0.5 TABLET ORAL at 20:14

## 2018-01-01 RX ADMIN — HYDROMORPHONE HYDROCHLORIDE 1 MG: 2 INJECTION, SOLUTION INTRAMUSCULAR; INTRAVENOUS; SUBCUTANEOUS at 21:06

## 2018-01-01 RX ADMIN — HYDROMORPHONE HYDROCHLORIDE 1 MG: 2 INJECTION, SOLUTION INTRAMUSCULAR; INTRAVENOUS; SUBCUTANEOUS at 15:03

## 2018-01-01 RX ADMIN — Medication 10 ML: at 05:38

## 2018-01-01 RX ADMIN — ONDANSETRON 4 MG: 2 INJECTION INTRAMUSCULAR; INTRAVENOUS at 08:27

## 2018-01-01 RX ADMIN — HYDROMORPHONE HYDROCHLORIDE 1 MG: 2 INJECTION, SOLUTION INTRAMUSCULAR; INTRAVENOUS; SUBCUTANEOUS at 22:16

## 2018-01-01 RX ADMIN — SODIUM CHLORIDE, SODIUM LACTATE, POTASSIUM CHLORIDE, CALCIUM CHLORIDE: 600; 310; 30; 20 INJECTION, SOLUTION INTRAVENOUS at 11:30

## 2018-01-01 RX ADMIN — ENOXAPARIN SODIUM 40 MG: 40 INJECTION, SOLUTION INTRAVENOUS; SUBCUTANEOUS at 09:04

## 2018-01-01 RX ADMIN — PANTOPRAZOLE SODIUM 40 MG: 40 TABLET, DELAYED RELEASE ORAL at 07:23

## 2018-01-01 RX ADMIN — FAMOTIDINE 20 MG: 20 TABLET, FILM COATED ORAL at 06:49

## 2018-01-01 RX ADMIN — LEVETIRACETAM 1000 MG: 500 TABLET, FILM COATED ORAL at 09:03

## 2018-01-01 RX ADMIN — SODIUM CHLORIDE AND POTASSIUM CHLORIDE: 4.5; 1.49 INJECTION, SOLUTION INTRAVENOUS at 22:54

## 2018-01-01 RX ADMIN — HYDROMORPHONE HYDROCHLORIDE 1 MG: 2 INJECTION, SOLUTION INTRAMUSCULAR; INTRAVENOUS; SUBCUTANEOUS at 03:58

## 2018-01-01 RX ADMIN — HYDROCODONE BITARTRATE AND ACETAMINOPHEN 1 TABLET: 10; 325 TABLET ORAL at 02:59

## 2018-01-01 RX ADMIN — HYDROMORPHONE HYDROCHLORIDE 0.5 MG: 2 INJECTION, SOLUTION INTRAMUSCULAR; INTRAVENOUS; SUBCUTANEOUS at 10:36

## 2018-01-01 RX ADMIN — Medication 10 ML: at 05:15

## 2018-01-01 RX ADMIN — DEXAMETHASONE 4 MG: 4 TABLET ORAL at 21:32

## 2018-01-01 RX ADMIN — SODIUM CHLORIDE AND POTASSIUM CHLORIDE: 4.5; 1.49 INJECTION, SOLUTION INTRAVENOUS at 13:40

## 2018-01-01 RX ADMIN — HYDROMORPHONE HYDROCHLORIDE 0.5 MG: 2 INJECTION, SOLUTION INTRAMUSCULAR; INTRAVENOUS; SUBCUTANEOUS at 15:28

## 2018-01-01 RX ADMIN — Medication 10 ML: at 10:33

## 2018-01-01 RX ADMIN — HYDROMORPHONE HYDROCHLORIDE 1 MG: 2 INJECTION, SOLUTION INTRAMUSCULAR; INTRAVENOUS; SUBCUTANEOUS at 12:42

## 2018-01-01 RX ADMIN — GADOTERATE MEGLUMINE 10 ML: 376.9 INJECTION INTRAVENOUS at 11:56

## 2018-01-01 RX ADMIN — SODIUM CHLORIDE 40 MG: 9 INJECTION INTRAMUSCULAR; INTRAVENOUS; SUBCUTANEOUS at 08:09

## 2018-01-01 RX ADMIN — HYDROMORPHONE HYDROCHLORIDE 1 MG: 2 INJECTION, SOLUTION INTRAMUSCULAR; INTRAVENOUS; SUBCUTANEOUS at 06:49

## 2018-01-01 RX ADMIN — DEXAMETHASONE 4 MG: 4 TABLET ORAL at 05:16

## 2018-01-01 RX ADMIN — HYDROMORPHONE HYDROCHLORIDE 1 MG: 2 INJECTION, SOLUTION INTRAMUSCULAR; INTRAVENOUS; SUBCUTANEOUS at 04:43

## 2018-01-01 RX ADMIN — LEVETIRACETAM 1000 MG: 500 TABLET, FILM COATED ORAL at 17:27

## 2018-01-01 RX ADMIN — HYDROMORPHONE HYDROCHLORIDE 1 MG: 2 INJECTION, SOLUTION INTRAMUSCULAR; INTRAVENOUS; SUBCUTANEOUS at 19:43

## 2018-01-01 RX ADMIN — SODIUM CHLORIDE, PRESERVATIVE FREE 300 UNITS: 5 INJECTION INTRAVENOUS at 13:19

## 2018-01-01 RX ADMIN — PANTOPRAZOLE SODIUM 40 MG: 40 TABLET, DELAYED RELEASE ORAL at 08:24

## 2018-01-01 RX ADMIN — HYDROCODONE BITARTRATE AND ACETAMINOPHEN 1 TABLET: 10; 325 TABLET ORAL at 15:33

## 2018-01-01 RX ADMIN — LEUCOVORIN CALCIUM 700 MG: 350 INJECTION, POWDER, LYOPHILIZED, FOR SOLUTION INTRAMUSCULAR; INTRAVENOUS at 12:48

## 2018-01-01 RX ADMIN — LEVETIRACETAM 1000 MG: 500 TABLET, FILM COATED ORAL at 08:27

## 2018-01-01 RX ADMIN — HYDROCODONE BITARTRATE AND ACETAMINOPHEN 1 TABLET: 10; 325 TABLET ORAL at 14:30

## 2018-01-01 RX ADMIN — SODIUM CHLORIDE, PRESERVATIVE FREE 300 UNITS: 5 INJECTION INTRAVENOUS at 14:18

## 2018-01-01 RX ADMIN — ROCURONIUM BROMIDE 10 MG: 10 INJECTION, SOLUTION INTRAVENOUS at 08:47

## 2018-01-01 RX ADMIN — HYDROCODONE BITARTRATE AND ACETAMINOPHEN 1 TABLET: 10; 325 TABLET ORAL at 17:01

## 2018-01-01 RX ADMIN — PROPOFOL 200 MCG/KG/MIN: 10 INJECTION, EMULSION INTRAVENOUS at 11:37

## 2018-01-01 RX ADMIN — ALPRAZOLAM 1 MG: 0.5 TABLET ORAL at 10:51

## 2018-01-01 RX ADMIN — Medication 2 G: at 10:46

## 2018-01-01 RX ADMIN — ONDANSETRON 8 MG: 8 TABLET, ORALLY DISINTEGRATING ORAL at 08:10

## 2018-01-01 RX ADMIN — HYDROMORPHONE HYDROCHLORIDE 1 MG: 2 INJECTION, SOLUTION INTRAMUSCULAR; INTRAVENOUS; SUBCUTANEOUS at 13:14

## 2018-01-01 RX ADMIN — LEVETIRACETAM 1000 MG: 500 TABLET, FILM COATED ORAL at 08:22

## 2018-01-01 RX ADMIN — HYDROMORPHONE HYDROCHLORIDE 1 MG: 2 INJECTION, SOLUTION INTRAMUSCULAR; INTRAVENOUS; SUBCUTANEOUS at 13:22

## 2018-01-01 RX ADMIN — HYDROMORPHONE HYDROCHLORIDE 1 MG: 2 INJECTION, SOLUTION INTRAMUSCULAR; INTRAVENOUS; SUBCUTANEOUS at 14:34

## 2018-01-01 RX ADMIN — SODIUM CHLORIDE AND POTASSIUM CHLORIDE: 4.5; 1.49 INJECTION, SOLUTION INTRAVENOUS at 22:32

## 2018-01-01 RX ADMIN — ONDANSETRON 4 MG: 2 INJECTION INTRAMUSCULAR; INTRAVENOUS at 20:58

## 2018-01-01 RX ADMIN — Medication 2 G: at 18:38

## 2018-01-01 RX ADMIN — Medication 2 G: at 02:39

## 2018-01-01 RX ADMIN — Medication 2 G: at 13:03

## 2018-01-01 RX ADMIN — HYDROCODONE BITARTRATE AND ACETAMINOPHEN 1 TABLET: 10; 325 TABLET ORAL at 12:15

## 2018-01-01 RX ADMIN — SODIUM CHLORIDE AND POTASSIUM CHLORIDE: 4.5; 1.49 INJECTION, SOLUTION INTRAVENOUS at 21:43

## 2018-01-01 RX ADMIN — HYDROMORPHONE HYDROCHLORIDE 1 MG: 2 INJECTION, SOLUTION INTRAMUSCULAR; INTRAVENOUS; SUBCUTANEOUS at 06:13

## 2018-01-01 RX ADMIN — HYDROMORPHONE HYDROCHLORIDE 0.5 MG: 2 INJECTION, SOLUTION INTRAMUSCULAR; INTRAVENOUS; SUBCUTANEOUS at 08:24

## 2018-01-01 RX ADMIN — Medication 10 ML: at 13:17

## 2018-01-01 RX ADMIN — ALPRAZOLAM 1 MG: 0.5 TABLET ORAL at 10:03

## 2018-01-01 RX ADMIN — SODIUM CHLORIDE, PRESERVATIVE FREE 300 UNITS: 5 INJECTION INTRAVENOUS at 13:17

## 2018-01-01 RX ADMIN — SODIUM CHLORIDE AND POTASSIUM CHLORIDE: 4.5; 1.49 INJECTION, SOLUTION INTRAVENOUS at 14:03

## 2018-01-01 RX ADMIN — HYDROMORPHONE HYDROCHLORIDE 0.5 MG: 2 INJECTION, SOLUTION INTRAMUSCULAR; INTRAVENOUS; SUBCUTANEOUS at 08:47

## 2018-01-01 RX ADMIN — IOPAMIDOL 100 ML: 755 INJECTION, SOLUTION INTRAVENOUS at 16:51

## 2018-01-01 RX ADMIN — Medication 10 ML: at 14:48

## 2018-01-01 RX ADMIN — LEVETIRACETAM 1000 MG: 500 TABLET, FILM COATED ORAL at 08:19

## 2018-01-01 RX ADMIN — Medication 2 G: at 18:15

## 2018-01-01 RX ADMIN — ENOXAPARIN SODIUM 40 MG: 40 INJECTION, SOLUTION INTRAVENOUS; SUBCUTANEOUS at 09:00

## 2018-01-01 RX ADMIN — SODIUM CHLORIDE 1000 MG: 900 INJECTION, SOLUTION INTRAVENOUS at 05:47

## 2018-01-01 RX ADMIN — HYDROCODONE BITARTRATE AND ACETAMINOPHEN 1 TABLET: 10; 325 TABLET ORAL at 07:30

## 2018-01-01 RX ADMIN — SODIUM CHLORIDE AND POTASSIUM CHLORIDE: 4.5; 1.49 INJECTION, SOLUTION INTRAVENOUS at 05:53

## 2018-01-01 RX ADMIN — HYDROMORPHONE HYDROCHLORIDE 0.5 MG: 2 INJECTION, SOLUTION INTRAMUSCULAR; INTRAVENOUS; SUBCUTANEOUS at 23:57

## 2018-01-01 RX ADMIN — Medication 10 ML: at 11:56

## 2018-01-01 RX ADMIN — HYDROMORPHONE HYDROCHLORIDE 0.5 MG: 2 INJECTION, SOLUTION INTRAMUSCULAR; INTRAVENOUS; SUBCUTANEOUS at 09:04

## 2018-01-01 RX ADMIN — ONDANSETRON 4 MG: 2 INJECTION INTRAMUSCULAR; INTRAVENOUS at 08:09

## 2018-01-01 RX ADMIN — DEXAMETHASONE 4 MG: 4 TABLET ORAL at 14:04

## 2018-01-01 RX ADMIN — SODIUM CHLORIDE 2000 ML: 900 INJECTION, SOLUTION INTRAVENOUS at 13:00

## 2018-01-01 RX ADMIN — SODIUM CHLORIDE, PRESERVATIVE FREE 300 UNITS: 5 INJECTION INTRAVENOUS at 14:15

## 2018-01-01 RX ADMIN — SODIUM CHLORIDE, SODIUM LACTATE, POTASSIUM CHLORIDE, AND CALCIUM CHLORIDE 100 ML/HR: 600; 310; 30; 20 INJECTION, SOLUTION INTRAVENOUS at 06:28

## 2018-01-01 RX ADMIN — Medication 20 ML: at 14:15

## 2018-01-01 RX ADMIN — DIPHENHYDRAMINE HYDROCHLORIDE 25 MG: 50 INJECTION, SOLUTION INTRAMUSCULAR; INTRAVENOUS at 13:41

## 2018-01-01 RX ADMIN — SODIUM CHLORIDE, PRESERVATIVE FREE 300 UNITS: 5 INJECTION INTRAVENOUS at 22:05

## 2018-01-01 RX ADMIN — Medication 2 G: at 02:27

## 2018-01-01 RX ADMIN — Medication 2 G: at 10:27

## 2018-01-01 RX ADMIN — SODIUM CHLORIDE, PRESERVATIVE FREE 300 UNITS: 5 INJECTION INTRAVENOUS at 21:47

## 2018-01-01 RX ADMIN — Medication 2 G: at 02:54

## 2018-01-01 RX ADMIN — HYDROMORPHONE HYDROCHLORIDE 0.5 MG: 2 INJECTION, SOLUTION INTRAMUSCULAR; INTRAVENOUS; SUBCUTANEOUS at 03:27

## 2018-01-01 RX ADMIN — Medication 10 ML: at 12:21

## 2018-01-01 RX ADMIN — ROCURONIUM BROMIDE 10 MG: 10 INJECTION, SOLUTION INTRAVENOUS at 09:15

## 2018-01-01 RX ADMIN — ALPRAZOLAM 1 MG: 0.5 TABLET ORAL at 10:28

## 2018-01-01 RX ADMIN — ALPRAZOLAM 1 MG: 0.5 TABLET ORAL at 09:03

## 2018-01-01 RX ADMIN — HYDROCODONE BITARTRATE AND ACETAMINOPHEN 1 TABLET: 10; 325 TABLET ORAL at 13:55

## 2018-01-01 RX ADMIN — DEXAMETHASONE 4 MG: 4 TABLET ORAL at 21:20

## 2018-01-01 RX ADMIN — HYDROMORPHONE HYDROCHLORIDE 1 MG: 2 INJECTION, SOLUTION INTRAMUSCULAR; INTRAVENOUS; SUBCUTANEOUS at 21:32

## 2018-01-01 RX ADMIN — CEFAZOLIN 2 G: 1 INJECTION, POWDER, FOR SOLUTION INTRAMUSCULAR; INTRAVENOUS; PARENTERAL at 11:42

## 2018-01-01 RX ADMIN — HYDROMORPHONE HYDROCHLORIDE 1 MG: 2 INJECTION, SOLUTION INTRAMUSCULAR; INTRAVENOUS; SUBCUTANEOUS at 05:45

## 2018-01-01 RX ADMIN — Medication 10 ML: at 13:48

## 2018-01-01 RX ADMIN — ONDANSETRON 4 MG: 2 INJECTION INTRAMUSCULAR; INTRAVENOUS at 17:25

## 2018-01-01 RX ADMIN — HYDROMORPHONE HYDROCHLORIDE 1 MG: 2 INJECTION, SOLUTION INTRAMUSCULAR; INTRAVENOUS; SUBCUTANEOUS at 14:10

## 2018-01-01 RX ADMIN — FLUOROURACIL 1536 MG: 50 INJECTION, SOLUTION INTRAVENOUS at 19:00

## 2018-01-01 RX ADMIN — Medication 10 ML: at 15:28

## 2018-01-01 RX ADMIN — LEVETIRACETAM 1000 MG: 500 TABLET, FILM COATED ORAL at 17:00

## 2018-01-01 RX ADMIN — HYDROMORPHONE HYDROCHLORIDE 1 MG: 2 INJECTION, SOLUTION INTRAMUSCULAR; INTRAVENOUS; SUBCUTANEOUS at 11:22

## 2018-01-01 RX ADMIN — HYDROMORPHONE HYDROCHLORIDE 1 MG: 2 INJECTION, SOLUTION INTRAMUSCULAR; INTRAVENOUS; SUBCUTANEOUS at 03:11

## 2018-01-01 RX ADMIN — ONDANSETRON 4 MG: 2 INJECTION INTRAMUSCULAR; INTRAVENOUS at 09:31

## 2018-01-01 RX ADMIN — HYDROMORPHONE HYDROCHLORIDE 1 MG: 2 INJECTION, SOLUTION INTRAMUSCULAR; INTRAVENOUS; SUBCUTANEOUS at 02:14

## 2018-01-01 RX ADMIN — Medication 10 ML: at 22:05

## 2018-01-01 RX ADMIN — ACETAMINOPHEN 650 MG: 325 TABLET, FILM COATED ORAL at 13:40

## 2018-01-01 RX ADMIN — DEXAMETHASONE SODIUM PHOSPHATE 10 MG: 10 INJECTION INTRAMUSCULAR; INTRAVENOUS at 13:45

## 2018-01-01 RX ADMIN — Medication 2 G: at 20:22

## 2018-01-01 RX ADMIN — LEUCOVORIN CALCIUM 700 MG: 350 INJECTION, POWDER, LYOPHILIZED, FOR SOLUTION INTRAMUSCULAR; INTRAVENOUS at 14:45

## 2018-01-01 RX ADMIN — HYDROCODONE BITARTRATE AND ACETAMINOPHEN 1 TABLET: 10; 325 TABLET ORAL at 18:35

## 2018-01-01 RX ADMIN — Medication 10 ML: at 11:32

## 2018-01-01 RX ADMIN — SODIUM CHLORIDE, PRESERVATIVE FREE 300 UNITS: 5 INJECTION INTRAVENOUS at 05:40

## 2018-01-01 RX ADMIN — Medication 10 ML: at 14:18

## 2018-01-01 RX ADMIN — HEPARIN SODIUM (PORCINE) LOCK FLUSH IV SOLN 100 UNIT/ML 500 UNITS: 100 SOLUTION at 11:59

## 2018-01-01 RX ADMIN — HYDROCODONE BITARTRATE AND ACETAMINOPHEN 1 TABLET: 10; 325 TABLET ORAL at 05:30

## 2018-01-01 RX ADMIN — OXALIPLATIN 149 MG: 5 INJECTION, SOLUTION, CONCENTRATE INTRAVENOUS at 14:45

## 2018-01-01 RX ADMIN — DEXAMETHASONE 4 MG: 4 TABLET ORAL at 13:48

## 2018-01-01 RX ADMIN — HYDROMORPHONE HYDROCHLORIDE 1 MG: 2 INJECTION, SOLUTION INTRAMUSCULAR; INTRAVENOUS; SUBCUTANEOUS at 12:41

## 2018-01-01 RX ADMIN — DEXAMETHASONE 4 MG: 4 TABLET ORAL at 13:16

## 2018-01-01 RX ADMIN — BEVACIZUMAB 338 MG: 400 INJECTION, SOLUTION INTRAVENOUS at 12:09

## 2018-01-01 RX ADMIN — Medication 10 ML: at 07:21

## 2018-01-01 RX ADMIN — HYDROMORPHONE HYDROCHLORIDE 1 MG: 2 INJECTION, SOLUTION INTRAMUSCULAR; INTRAVENOUS; SUBCUTANEOUS at 16:04

## 2018-01-01 RX ADMIN — SODIUM CHLORIDE, PRESERVATIVE FREE 300 UNITS: 5 INJECTION INTRAVENOUS at 06:12

## 2018-01-01 RX ADMIN — SODIUM CHLORIDE AND POTASSIUM CHLORIDE: 4.5; 1.49 INJECTION, SOLUTION INTRAVENOUS at 12:47

## 2018-01-01 RX ADMIN — Medication 10 ML: at 13:19

## 2018-01-01 RX ADMIN — HYDROMORPHONE HYDROCHLORIDE 1 MG: 2 INJECTION, SOLUTION INTRAMUSCULAR; INTRAVENOUS; SUBCUTANEOUS at 09:35

## 2018-01-01 RX ADMIN — DEXAMETHASONE 4 MG: 4 TABLET ORAL at 05:50

## 2018-01-01 RX ADMIN — SODIUM CHLORIDE, PRESERVATIVE FREE 300 UNITS: 5 INJECTION INTRAVENOUS at 15:41

## 2018-01-01 RX ADMIN — SODIUM CHLORIDE, PRESERVATIVE FREE 300 UNITS: 5 INJECTION INTRAVENOUS at 19:49

## 2018-01-01 RX ADMIN — HYDROMORPHONE HYDROCHLORIDE 1 MG: 2 INJECTION, SOLUTION INTRAMUSCULAR; INTRAVENOUS; SUBCUTANEOUS at 00:31

## 2018-01-01 RX ADMIN — HEPARIN SODIUM 2000 UNITS: 200 INJECTION, SOLUTION INTRAVENOUS at 11:57

## 2018-01-01 RX ADMIN — Medication 2 G: at 11:11

## 2018-01-01 RX ADMIN — Medication 10 ML: at 14:30

## 2018-01-01 RX ADMIN — BEVACIZUMAB 338 MG: 400 INJECTION, SOLUTION INTRAVENOUS at 14:09

## 2018-01-01 RX ADMIN — HYDROMORPHONE HYDROCHLORIDE 0.5 MG: 2 INJECTION, SOLUTION INTRAMUSCULAR; INTRAVENOUS; SUBCUTANEOUS at 18:39

## 2018-01-01 RX ADMIN — SODIUM CHLORIDE, PRESERVATIVE FREE 300 UNITS: 5 INJECTION INTRAVENOUS at 21:57

## 2018-01-01 RX ADMIN — BEVACIZUMAB 287 MG: 400 INJECTION, SOLUTION INTRAVENOUS at 14:38

## 2018-01-01 RX ADMIN — LEVETIRACETAM 1000 MG: 500 TABLET, FILM COATED ORAL at 18:22

## 2018-01-01 RX ADMIN — PANTOPRAZOLE SODIUM 40 MG: 40 TABLET, DELAYED RELEASE ORAL at 08:19

## 2018-01-01 RX ADMIN — Medication 2 G: at 18:01

## 2018-01-01 RX ADMIN — HYDROMORPHONE HYDROCHLORIDE 1 MG: 2 INJECTION, SOLUTION INTRAMUSCULAR; INTRAVENOUS; SUBCUTANEOUS at 04:29

## 2018-01-01 RX ADMIN — ONDANSETRON 4 MG: 2 INJECTION INTRAMUSCULAR; INTRAVENOUS at 22:35

## 2018-01-01 RX ADMIN — Medication 2 G: at 02:43

## 2018-01-01 RX ADMIN — KETOROLAC TROMETHAMINE 30 MG: 30 INJECTION, SOLUTION INTRAMUSCULAR; INTRAVENOUS at 10:01

## 2018-01-01 RX ADMIN — SODIUM CHLORIDE 40 MG: 9 INJECTION INTRAMUSCULAR; INTRAVENOUS; SUBCUTANEOUS at 08:20

## 2018-01-01 RX ADMIN — DEXTROSE MONOHYDRATE 25 ML/HR: 5 INJECTION, SOLUTION INTRAVENOUS at 14:43

## 2018-01-01 RX ADMIN — DEXAMETHASONE 2 MG: 0.5 TABLET ORAL at 22:05

## 2018-01-01 RX ADMIN — HYDROMORPHONE HYDROCHLORIDE 1 MG: 2 INJECTION, SOLUTION INTRAMUSCULAR; INTRAVENOUS; SUBCUTANEOUS at 18:21

## 2018-01-01 RX ADMIN — HYDROCODONE BITARTRATE AND ACETAMINOPHEN 1 TABLET: 10; 325 TABLET ORAL at 10:14

## 2018-01-01 RX ADMIN — PANTOPRAZOLE SODIUM 40 MG: 40 TABLET, DELAYED RELEASE ORAL at 09:03

## 2018-01-01 RX ADMIN — SODIUM CHLORIDE, PRESERVATIVE FREE 300 UNITS: 5 INJECTION INTRAVENOUS at 06:31

## 2018-01-01 RX ADMIN — DIATRIZOATE MEGLUMINE AND DIATRIZOATE SODIUM 15 ML: 660; 100 LIQUID ORAL; RECTAL at 16:00

## 2018-01-01 RX ADMIN — ONDANSETRON 8 MG: 8 TABLET, ORALLY DISINTEGRATING ORAL at 03:28

## 2018-01-01 RX ADMIN — HYDROMORPHONE HYDROCHLORIDE 1 MG: 2 INJECTION, SOLUTION INTRAMUSCULAR; INTRAVENOUS; SUBCUTANEOUS at 23:56

## 2018-01-01 RX ADMIN — HYDROMORPHONE HYDROCHLORIDE 1 MG: 2 INJECTION, SOLUTION INTRAMUSCULAR; INTRAVENOUS; SUBCUTANEOUS at 18:14

## 2018-01-01 RX ADMIN — HYDROCODONE BITARTRATE AND ACETAMINOPHEN 1 TABLET: 10; 325 TABLET ORAL at 17:52

## 2018-01-01 RX ADMIN — SODIUM CHLORIDE 40 MG: 9 INJECTION INTRAMUSCULAR; INTRAVENOUS; SUBCUTANEOUS at 08:58

## 2018-01-01 RX ADMIN — SODIUM CHLORIDE, PRESERVATIVE FREE 300 UNITS: 5 INJECTION INTRAVENOUS at 14:30

## 2018-01-01 RX ADMIN — TUBERCULIN PURIFIED PROTEIN DERIVATIVE 5 UNITS: 5 INJECTION, SOLUTION INTRADERMAL at 16:28

## 2018-01-01 RX ADMIN — HYDROMORPHONE HYDROCHLORIDE 0.5 MG: 2 INJECTION, SOLUTION INTRAMUSCULAR; INTRAVENOUS; SUBCUTANEOUS at 22:05

## 2018-01-02 NOTE — PROCEDURES
Department of Interventional Radiology  [phone]        Interventional Radiology Brief Procedure Note    Patient: Pinky Calvillo MRN: [id number]  SSN: [id number]    YOB: 1960  Age: 62 y.o.   Sex: female      Date of Procedure: 1/2/2018    Pre-Procedure Diagnosis: colon cancer    Post-Procedure Diagnosis: SAME    Procedure(s): Venous Chest Port Placement    Brief Description of Procedure: US, fluoro guided left IJ venous chest port placed    Performed By: Linda Justice PA-C     Assistants: None    Anesthesia:TIVS/MAC    Estimated Blood Loss: Less than 10ml    Specimens: None    Implants: Subcutaneous Port    Findings: catheter tip in right atrium     Complications: None    Recommendations: ok to use port     Follow Up: referring MD    Signed By: Linda Justice PA-C     January 2, 2018

## 2018-01-02 NOTE — H&P
Department of Interventional Radiology  [phone]    History and Physical    Patient:  Fabiana Anderson MRN:  [id number]  SSN:  [id number]    YOB: 1960  Age:  62 y.o. Sex:  female      Primary Care Provider:  Michelle Gandara MD  Referring Physician:  Mamta Dooley MD    Subjective:     Chief Complaint: port    History of the Present Illness: The patient is a 62 y.o. female who presents for venous chest port placement. Colon cancer. Last port removed due to bacteremia. NPO. Past Medical History:   Diagnosis Date    Arthritis     RA    Colon cancer ([facility] [address]     Metastatic to Lungs and Brain    Gastrointestinal disorder     Reflux    GERD (gastroesophageal reflux disease)     controlled  with med    Other ill-defined conditions(799.89)     Herniated disk  lumbar and cervical    Staph aureus infection 12/29/2017    site: port- was adm to [facility] Sept 2017 for sepsis-port was dc'd Oct 2017     Past Surgical History:   Procedure Laterality Date    HX ADENOIDECTOMY      HX GYN      Hysterectomy-partial    HX KNEE ARTHROSCOPY Left 2014    HX LAP CHOLECYSTECTOMY      HX TONSILLECTOMY      HX VASCULAR ACCESS      port inserted Sept 2017- out [birth year]/2/64 (colton)/then had PICC in to give IV antibiotics then dc'd 12/15/17    LYN BIOPSY BREAST STEREOTACTIC Left 10-8-2014        Review of Systems:    Pertinent items are noted in the History of Present Illness. No current outpatient prescriptions on file. No current facility-administered medications for this encounter. Allergies   Allergen Reactions    Adhesive Tape-Silicones Rash     Bad red rash.  Bactrim [Sulfamethoprim Ds] Hives     Swelling and itching      Macrobid [Nitrofurantoin Monohyd/M-Cryst] Other (comments)     Felt  \"out of it\"    Pcn [Penicillins] Hives     Swelling and itching also. Pt has tolerated cephalosporins in the past.    Pyridium [Phenazopyridine] Hives     Also itching and swelling. Family History   Problem Relation Age of Onset   Karson Bun Stroke Mother     Hypertension Mother     Diabetes Mother     Heart Disease Father     Hypertension Father     Cancer Brother     Breast Cancer Neg Hx      Social History   Substance Use Topics    Smoking status: Former Smoker     Packs/day: 0.25     Years: 10.00     Quit date: 8/13/2017    Smokeless tobacco: Never Used    Alcohol use No        Objective:       Physical Examination:    Vitals:    01/02/18 0920   BP: 137/71   Pulse: 94   Resp: 16   Temp: 98.1 °F (36.7 °C)   SpO2: 96%   Weight: 68.9 kg (152 lb)   Height: 5' 3\" (1.6 m)     Blood pressure 137/71, pulse 94, temperature 98.1 °F (36.7 °C), resp. rate 16, height 5' 3\" (1.6 m), weight 68.9 kg (152 lb), SpO2 96 %.   Cor: regular  Lungs: clr  Abd: soft  Ext: warm    Laboratory:     Lab Results   Component Value Date/Time    Sodium 141 12/28/2017 03:00 PM    Sodium 140 12/11/2017 12:20 PM    Potassium 3.7 12/28/2017 03:00 PM    Potassium 3.8 12/11/2017 12:20 PM    Chloride 109 12/28/2017 03:00 PM    Chloride 108 12/11/2017 12:20 PM    CO2 24 12/28/2017 03:00 PM    CO2 26 12/11/2017 12:20 PM    Anion gap 8 12/28/2017 03:00 PM    Anion gap 6 12/11/2017 12:20 PM    Glucose 129 12/28/2017 03:00 PM    Glucose 98 12/11/2017 12:20 PM    BUN 4 12/28/2017 03:00 PM    BUN 7 12/11/2017 12:20 PM    Creatinine 0.38 12/28/2017 03:00 PM    Creatinine 0.35 12/11/2017 12:20 PM    GFR est AA >60 12/28/2017 03:00 PM    GFR est AA >60 12/11/2017 12:20 PM    GFR est non-AA >60 12/28/2017 03:00 PM    GFR est non-AA >60 12/11/2017 12:20 PM    Calcium 8.4 12/28/2017 03:00 PM    Calcium 8.5 12/11/2017 12:20 PM    Magnesium 1.8 11/02/2017 01:54 AM    Albumin 2.4 12/28/2017 03:00 PM    Albumin 2.5 12/11/2017 12:20 PM    Protein, total 6.6 12/28/2017 03:00 PM    Protein, total 6.5 12/11/2017 12:20 PM    Globulin 4.2 12/28/2017 03:00 PM    Globulin 4.0 12/11/2017 12:20 PM    A-G Ratio 0.6 12/28/2017 03:00 PM    A-G Ratio 0.6 12/11/2017 12:20 PM    AST (SGOT) 15 12/28/2017 03:00 PM    AST (SGOT) 29 12/11/2017 12:20 PM    ALT (SGPT) 12 12/28/2017 03:00 PM    ALT (SGPT) 12 12/11/2017 12:20 PM     Lab Results   Component Value Date/Time    WBC 6.1 12/28/2017 03:00 PM    WBC 6.7 12/11/2017 12:20 PM    HGB 12.6 12/28/2017 03:00 PM    HGB 13.4 12/11/2017 12:20 PM    HCT 37.8 12/28/2017 03:00 PM    HCT 41.6 12/11/2017 12:20 PM    PLATELET 973 82/57/9699 03:00 PM    PLATELET 321 34/55/5875 12:20 PM     Lab Results   Component Value Date/Time    aPTT 24.9 08/18/2017 04:46 PM    aPTT 29.3 08/15/2017 04:36 PM    Prothrombin time 11.2 08/18/2017 04:46 PM    Prothrombin time 11.5 08/15/2017 04:36 PM    INR 1.1 08/18/2017 04:46 PM    INR 1.1 08/15/2017 04:36 PM       Assessment:     Colon cancer    Plan:     Planned Procedure:  Port placement    Risks, benefits, and alternatives reviewed with patient and she agrees to proceed with the procedure.       Signed By: Kirti Rebollar PA-C     January 2, 2018

## 2018-01-02 NOTE — IP AVS SNAPSHOT
Lul Romero 
 
 
 [address] [facility] [address] [facility] 
[phone] Patient: Cecy Sanchez MRN: [id number] :1960 My Medications Notice This visit is during an admission. Changes to the med list made in this visit will be reflected in the After Visit Summary of the admission.

## 2018-01-02 NOTE — DISCHARGE INSTRUCTIONS
[address]  Department of Interventional Radiology  [facility]  [phone] Office  [phone] Fax  Implanted Port Discharge Instructions      General Instructions:   A port is like an implanted IV. They are usually ordered for patients who will be getting chemotherapy, but can also be used as an IV for long term antibiotics, large amounts of fluids, and/or blood products. Your blood can be drawn from your port for labs also. Those patients who do not have good veins find the ports convenient as they can get the IV they need with one stick. The port can be used long term, and the care is easy. The device is under the skin, and once the skin heals, care is minimal. All that is required is the nurse who accesses the port will need to flush it with heparinized saline after each use. Ports are usually placed in the chest wall, usually on the right side. But they can be place in the arms and in the abdomen. Home Care Instructions: If your port is in your arm, do not allow blood pressure or other IVs to be place in that arm. Do not allow bra straps or any clothing to rub the skin over the port. Do not bathe or swim until the skin has healed and if the port is accessed. Once it is healed, and when the port is not accessed, it is okay to bathe and swim. Restrict yourself to light activity for the first 5 days after getting the port put in, after that, resume normal activity slowly. You may resume your normal diet and medications. Follow-Up Instructions: Please see your oncologist, or whatever physician ordered the port as he/she has requested of you. Call If: You should call your Physician and/or the Radiology Nurse if you notice redness, pus, swelling, or pain from the area of your incision. Call if you should develop a fever. The nurses who access your port will know to call your doctor if the port does not seem to be working properly. You need to tell the nurses who use the port if you should have any pain or swelling at the site during an infusion. To Reach Us: If you have any questions about your procedure, please call the Interventional Radiology department at [phone]. After business hours (5pm) and weekends, call the answering service at [phone] and ask for the Radiologist on call to be paged. Interventional Radiology General Nurse Discharge    After general anesthesia or intravenous sedation, for 24 hours or while taking prescription Narcotics:  · Limit your activities  · Do not drive and operate hazardous machinery  · Do not make important personal or business decisions  · Do  not drink alcoholic beverages  · If you have not urinated within 8 hours after discharge, please contact your surgeon on call. * Please give a list of your current medications to your Primary Care Provider. * Please update this list whenever your medications are discontinued, doses are     changed, or new medications (including over-the-counter products) are added. * Please carry medication information at all times in case of emergency situations. These are general instructions for a healthy lifestyle:    No smoking/ No tobacco products/ Avoid exposure to second hand smoke  Surgeon General's Warning:  Quitting smoking now greatly reduces serious risk to your health. Obesity, smoking, and sedentary lifestyle greatly increases your risk for illness  A healthy diet, regular physical exercise & weight monitoring are important for maintaining a healthy lifestyle    You may be retaining fluid if you have a history of heart failure or if you experience any of the following symptoms:  Weight gain of 3 pounds or more overnight or 5 pounds in a week, increased swelling in our hands or feet or shortness of breath while lying flat in bed.   Please call your doctor as soon as you notice any of these symptoms; do not wait until your next office visit. Recognize signs and symptoms of STROKE:  F-face looks uneven    A-arms unable to move or move unevenly    S-speech slurred or non-existent    T-time-call 911 as soon as signs and symptoms begin-DO NOT go       Back to bed or wait to see if you get better-TIME IS BRAIN. Patient Signature:  Date: 1/2/2018  Discharging Nurse:  William Fernandez RN

## 2018-01-02 NOTE — PROGRESS NOTES
Assisted patient to stretcher and back to IR Recovery room 6, family updated on status of care at bedside verbalizing understanding of instruct.  Patient tolerating procedures well

## 2018-01-02 NOTE — ANESTHESIA PREPROCEDURE EVALUATION
Anesthetic History   No history of anesthetic complications            Review of Systems / Medical History  Patient summary reviewed and pertinent labs reviewed    Pulmonary  Within defined limits                 Neuro/Psych              Cardiovascular          CHF (EF 40-45% 2017 echo)        Exercise tolerance: >4 METS     GI/Hepatic/Renal     GERD: well controlled           Endo/Other        Arthritis and cancer (metastatic to lungs and brain.)     Other Findings   Comments: H/o previous port placement.              Physical Exam    Airway  Mallampati: II  TM Distance: 4 - 6 cm  Neck ROM: normal range of motion   Mouth opening: Normal     Cardiovascular    Rhythm: regular  Rate: normal      Pertinent negatives: No murmur, JVD and peripheral edema   Dental         Pulmonary  Breath sounds clear to auscultation               Abdominal         Other Findings            Anesthetic Plan    ASA: 3  Anesthesia type: total IV anesthesia          Induction: Intravenous  Anesthetic plan and risks discussed with: Patient

## 2018-01-02 NOTE — PROGRESS NOTES
TRANSFER - OUT REPORT:    Verbal report given to Peyton roldan RN(name) on Royce Velasquez  being transferred to IR Recovery room 6(unit) for routine progression of care       Report consisted of patients Situation, Background, Assessment and   Recommendations(SBAR). Information from the following report(s) SBAR, Kardex, Procedure Summary, Intake/Output, MAR and Accordion was reviewed with the receiving nurse. Lines:   Venous Access Device REMOVAL site for implanted port Upper chest (subclavicular area, right (Active)       Venous Access Device left upper chest 8fr port; angiodynamics  01/02/18 Upper chest (subclavicular area), left (Active)       Peripheral IV 01/02/18 Left Antecubital (Active)   Site Assessment Clean, dry, & intact 1/2/2018  9:26 AM   Phlebitis Assessment 0 1/2/2018  9:26 AM   Infiltration Assessment 0 1/2/2018  9:26 AM   Dressing Status Clean, dry, & intact 1/2/2018  9:26 AM   Hub Color/Line Status Pink 1/2/2018  9:26 AM        Opportunity for questions and clarification was provided.       Patient transported with:   Registered Nurse

## 2018-01-02 NOTE — PROGRESS NOTES
Recovery period without difficulty. Pt alert and oriented and denies pain. Dressing is clean, dry, and intact. Reviewed discharge instructions with patient and spouse, both verbalized understanding. Pt escorted to lobby discharge area via wheelchair. Vital signs and Chuy score completed.

## 2018-01-02 NOTE — PROGRESS NOTES
Patient to IR room for procedure. Patient awake and alert, verbalized name, , and procedure to be performed. Patient moved to procedure table independently.  Anesthesia in managing sedation; see flow sheet for details

## 2018-01-02 NOTE — ANESTHESIA POSTPROCEDURE EVALUATION
Post-Anesthesia Evaluation and Assessment    Patient: Cecy Sanchez MRN: [id number]  SSN: [id number]    YOB: 1960  Age: 62 y.o. Sex: female       Cardiovascular Function/Vital Signs  Visit Vitals    /75    Pulse 92    Temp 36.7 °C (98 °F)    Resp 15    SpO2 93%       Patient is status post total IV anesthesia anesthesia for Procedure(s):  IR ANESTHESIA/PORT INSERT. Nausea/Vomiting: None    Postoperative hydration reviewed and adequate. Pain:      Managed    Neurological Status: At baseline    Mental Status and Level of Consciousness: Arousable    Pulmonary Status:   O2 Device: Room air (01/02/18 1219)   Adequate oxygenation and airway patent    Complications related to anesthesia: None    Post-anesthesia assessment completed.  No concerns    Signed By: Barry Dockery MD     January 2, 2018

## 2018-01-02 NOTE — IP AVS SNAPSHOT
Shonda Lacey 
 
 
 [address] [facility] [address] [place] [address] 
[phone] Patient: Emery Robertson MRN: [id number] :1960 About your hospitalization You were admitted on:  2018 You last received care in the:  [facility]iology [facility]ials You were discharged on:  2018 Why you were hospitalized Your primary diagnosis was:  Not on File Follow-up Information None Your Scheduled Appointments 2018 11:00 AM EST  
IR INS TNL CVC W PT OVER 5 YRS with [facility] IR UNIT 1, [facility] IR RADIOLOGIST RESOURCE, D IR ANESTHESIA  
[facility] Radiology Specials ([address] [place] [address]) [address] [address] [address] [facility]  
[phone] Interventional Radiology Procedure Referring Physicians: 1) Fax H&P/recent office notes and lab work, no older than 30 days (CBC, BMP, PT/PTT) to Interventional Radiology to facilitate prompt scheduling. 2)Patients with contrast dye allergies must be pre medicated prior to arrival. 3) Obtain clearance to hold blood thinners from prescribing Physician and give Patient instructions prior to arrival. 4)Hold oral diabetic medications the day of the procedure. If Insulin is required, take 1/2 dose the day of the procedure. 5) Pt should not eat or drink anything past midnight 6) Pt to arrive 1 hour to 1.5 hours early depending upon sedation method. 7) Responsible adult  required to drive Patient home after recovery period. Recovery period can vary depending on sedation and patient condition. 8) Requires approval from Radiologist prior to scheduling 9) Interventional Radiology Scheduling can be contacted at [phone] [facility] [address] [facility] [address] ANESTHESIA with Marcos Trotter MD  
D SURGERY ([address] [place] [address]) [address] [facility] [address] [place] [address]  
[phone]  2018  2:30 PM EST  
LAB with [facility] OUTREACH INSURANCE  
 [address] [facility]) Adam Dye [address] [facility]  
[phone] Wednesday January 03, 2018  3:00 PM EST Follow Up with SULMA MOTT NP1 [facility] Hematology and Oncology [facility]) C/ Inder King [address] [facility] [address]  
[phone] Thursday January 04, 2018  8:00 AM EST Chemo with [facility] [address] Aishwarya Lozano [phone] [facility] [address]  
[phone] [address] Wednesday January 17, 2018  9:00 AM EST  
LAB with [address]  
18[address] [facility] Adam Dye [address] [facility]  
[phone] Wednesday January 17, 2018  9:30 AM EST Follow Up with Omi Green MD  
[facility] Hematology and Oncology [facility]) C/ nIder King [address] [facility] [address]  
[phone] Wednesday January 17, 2018 10:00 AM EST Chemo with Chichi Winters. [address] [facility]) [address] Aishwarya Lozano [phone] [facility] [address]  
[phone] [address] Discharge Orders None A check kenia indicates which time of day the medication should be taken. My Medications Notice This visit is during an admission. Changes to the med list made in this visit will be reflected in the After Visit Summary of the admission. My Medications Notice This visit is during an admission. Changes to the med list made in this visit will be reflected in the After Visit Summary of the admission. Discharge Instructions Deborah [address] Department of Interventional Radiology [facility] [phone] Office 
[phone] Fax Implanted [facility] Discharge Instructions General Instructions: 
 A port is like an implanted IV. They are usually ordered for patients who will be getting chemotherapy, but can also be used as an IV for long term antibiotics, large amounts of fluids, and/or blood products. Your blood can be drawn from your port for labs also. Those patients who do not have good veins find the ports convenient as they can get the IV they need with one stick. The port can be used long term, and the care is easy. The device is under the skin, and once the skin heals, care is minimal. All that is required is the nurse who accesses the port will need to flush it with heparinized saline after each use. Ports are usually placed in the chest wall, usually on the right side. But they can be place in the arms and in the abdomen. Home Care Instructions: If your port is in your arm, do not allow blood pressure or other IVs to be place in that arm. Do not allow bra straps or any clothing to rub the skin over the port. Do not bathe or swim until the skin has healed and if the port is accessed. Once it is healed, and when the port is not accessed, it is okay to bathe and swim. Restrict yourself to light activity for the first 5 days after getting the port put in, after that, resume normal activity slowly. You may resume your normal diet and medications. Follow-Up Instructions: Please see your oncologist, or whatever physician ordered the port as he/she has requested of you. Call If: You should call your Physician and/or the Radiology Nurse if you notice redness, pus, swelling, or pain from the area of your incision. Call if you should develop a fever. The nurses who access your port will know to call your doctor if the port does not seem to be working properly. You need to tell the nurses who use the port if you should have any pain or swelling at the site during an infusion. To Reach Us: If you have any questions about your procedure, please call the Interventional Radiology department at [phone]. After business hours (5pm) and weekends, call the answering service at [phone] and ask for the Radiologist on call to be paged. Interventional Radiology General Nurse Discharge After general anesthesia or intravenous sedation, for 24 hours or while taking prescription Narcotics: · Limit your activities · Do not drive and operate hazardous machinery · Do not make important personal or business decisions · Do  not drink alcoholic beverages · If you have not urinated within 8 hours after discharge, please contact your surgeon on call. * Please give a list of your current medications to your Primary Care Provider. * Please update this list whenever your medications are discontinued, doses are 
   changed, or new medications (including over-the-counter products) are added. * Please carry medication information at all times in case of emergency situations. These are general instructions for a healthy lifestyle: No smoking/ No tobacco products/ Avoid exposure to second hand smoke Surgeon General's Warning:  Quitting smoking now greatly reduces serious risk to your health. Obesity, smoking, and sedentary lifestyle greatly increases your risk for illness A healthy diet, regular physical exercise & weight monitoring are important for maintaining a healthy lifestyle You may be retaining fluid if you have a history of heart failure or if you experience any of the following symptoms:  Weight gain of 3 pounds or more overnight or 5 pounds in a week, increased swelling in our hands or feet or shortness of breath while lying flat in bed. Please call your doctor as soon as you notice any of these symptoms; do not wait until your next office visit. Recognize signs and symptoms of STROKE: 
F-face looks uneven A-arms unable to move or move unevenly S-speech slurred or non-existent T-time-call 911 as soon as signs and symptoms begin-DO NOT go Back to bed or wait to see if you get better-TIME IS BRAIN. Patient Signature: 
Date: 1/2/2018 Discharging Nurse: Slime Gray RN Introducing [facility]! Dear Elio Sosa: 
Thank you for requesting a [employer] account. Our records indicate that you already have an active [employer]ap[employer] account. You can access your account anytime at [email or web address] Did you know that you can access your hospital and ER discharge instructions at any time in Adapx? You can also review all of your test results from your hospital stay or ER visit. Additional Information If you have questions, please visit the Frequently Asked Questions section of the Adapx website at [email or web address] Remember, Adapx is NOT to be used for urgent needs. For medical emergencies, dial 911. Now available from your iPhone and Android! Providers Seen During Your Hospitalization Provider Specialty Primary office phone Romeo Mccord MD Hematology [phone] Your Primary Care Physician (PCP) Primary Care Physician Office Phone Office Fax [address] [facility], [address] [place] [address] [phone] You are allergic to the following Allergen Reactions Adhesive Tape-Silicones Rash Bad red rash. Bactrim (Sulfamethoprim Ds) Hives Swelling and itching Macrobid (Nitrofurantoin Monohyd/M-Cryst) Other (comments) Felt  \"out of it\" Pcn (Penicillins) Hives Swelling and itching also. Pt has tolerated cephalosporins in the past.  
    
 Pyridium (Phenazopyridine) Hives Also itching and swelling. Recent Documentation Height Weight BMI OB Status Smoking Status 1.6 m 68.9 kg 26.93 kg/m2 Hysterectomy Former Smoker Emergency Contacts Name Discharge Info Relation Home Work Mobile Wale Sesay DISCHARGE CAREGIVER [3] Spouse [3] [phone] Patient Belongings The following personal items are in your possession at time of discharge: 
                             
 
  
  
 Please provide this summary of care documentation to your next provider. Signatures-by signing, you are acknowledging that this After Visit Summary has been reviewed with you and you have received a copy. Patient Signature:  ____________________________________________________________ Date:  ____________________________________________________________  
  
Otis Payor Provider Signature:  ____________________________________________________________ Date:  ____________________________________________________________

## 2018-01-04 NOTE — PROGRESS NOTES
[address]    Patient arrived to [facility] for chemotherapy and treatment held today due to possible port infection. Provider notified: Dr Simon Jacobo and Debbra Severance NP both saw pt in infusion. New orders received and/or recommendations for follow-up: pt to be seen in office tomorrow.     Pt sent to lab for blood cultures x 2 peripheral.

## 2018-01-10 NOTE — PROGRESS NOTES
Pt arrived ambulatory to [facility] with port previously accessed with dressing dry and intact and with good blood return. NS infusing. Pre meds given as ordered. Avastin infused over 30 minutes. D5 infusing. Oxaliplatin infused over 2 hours. Leucovorin infused over 2 hours. 5FU given IV push. Pump infusing over 46 hours. Pt aware of next appt on 1/12/18 at 1700. Pt discharged ambulatory.

## 2018-01-10 NOTE — PROGRESS NOTES
Massage THERAPY: Daily Note    Referring Physician: Emilie Smith MD  Medical/Referring Diagnosis: Malignant neoplasm of colon, unspecified [C18.9]   Precautions/Allergies: Adhesive tape-silicones; Bactrim [sulfamethoprim ds]; Macrobid [nitrofurantoin monohyd/m-cryst]; Pcn [penicillins]; and Pyridium [phenazopyridine]  SUBJECTIVE:  Present Symptoms: none reported to me     Pre-Treatment Pain: 0/10  Past Medical History:    Ms. Mary Ann Baird  has a past medical history of Arthritis; Colon cancer ([facility] [address] Gastrointestinal disorder; GERD (gastroesophageal reflux disease); Other ill-defined conditions(799.89); and Staph aureus infection (12/29/2017). Ms. Mary Ann Baird  has a past surgical history that includes hx lap cholecystectomy; hx gyn; hx tonsillectomy; hx adenoidectomy; reggie biopsy breast stereotactic (Left, 10-8-2014); hx knee arthroscopy (Left, 2014); and hx vascular access. Current Medications:       Current Outpatient Prescriptions:     escitalopram oxalate (LEXAPRO) 10 mg tablet, , Disp: , Rfl:     doxycycline (ADOXA) 100 mg tablet, Take 1 Tab by mouth two (2) times a day for 10 days. , Disp: 20 Tab, Rfl: 0    cephALEXin (KEFLEX) 500 mg capsule, Take 1 Cap by mouth four (4) times daily for 7 days. , Disp: 28 Cap, Rfl: 0    HYDROcodone-acetaminophen (NORCO)  mg tablet, Take 1 Tab by mouth every four (4) hours as needed. Max Daily Amount: 6 Tabs., Disp: 180 Tab, Rfl: 0    0.9 % SODIUM CHLORIDE (SALINE FLUSH IJ), 10 mL by IntraVENous route daily.  daily to unused port, and after blood draws, Disp: , Rfl:     promethazine (PHENERGAN) 25 mg tablet, Take 25 mg by mouth every six (6) hours as needed for Nausea., Disp: , Rfl:     albuterol (PROVENTIL HFA, VENTOLIN HFA, PROAIR HFA) 90 mcg/actuation inhaler, Take 2 Puffs by inhalation every six (6) hours as needed for Wheezing., Disp: 1 Inhaler, Rfl: 1    ALPRAZolam (XANAX XR) 1 mg XR tablet, 1 to 2 po bid, prn, Disp: , Rfl:     ondansetron (ZOFRAN ODT) 8 mg disintegrating tablet, Take 1 Tab by mouth every eight (8) hours as needed for Nausea. Indications: PREVENTION OF POST-OPERATIVE NAUSEA AND VOMITING, Disp: 60 Tab, Rfl: 2    esomeprazole (NEXIUM) 40 mg capsule, Take 40 mg by mouth daily. , Disp: , Rfl:     Current Facility-Administered Medications:     dextrose 5% infusion, 25 mL/hr, IntraVENous, CONTINUOUS, Eugenio Pires MD, Last Rate: 25 mL/hr at 01/10/18 1245, 25 mL/hr at 01/10/18 1245    leucovorin (WELLCOVORIN) 700 mg in dextrose 5% 250 mL IVPB, 400 mg/m2 (Treatment Plan Recorded), IntraVENous, ONCE, Eugenio Pires MD, 700 mg at 01/10/18 1248    oxaliplatin (ELOXATIN) 149 mg in dextrose 5% 250 mL chemo infusion, 85 mg/m2 (Treatment Plan Recorded), IntraVENous, ONCE, Eugenio Pires MD, 149 mg at 01/10/18 1248    fluorouracil (ADRUCIL) chemo syringe 700 mg, 400 mg/m2 (Treatment Plan Recorded), IntraVENous, ONCE, Eugenio Pires MD    fluorouracil (ADRUCIL) CADD cassette 4,200 mg (Patient Supplied), 2,400 mg/m2 (Treatment Plan Recorded), IntraVENous, ONCE, Eugenio Pires MD    saline peripheral flush soln 10 mL, 10 mL, InterCATHeter, PRN, Eugenio Pires MD, 10 mL at 01/10/18 1132       OBJECTIVE/ASSESSMENT:  Objective Measure: Tool Used: Subjective Units of Distress Scale (SUDS)  Score:  Pre-Treatment: /100 Post-Treatment: /100   Interpretation of Score: Rating of patient's distress, fear, anxiety or discomfort on a scale of 0-100. Observations of Patient:  Patient resting, spouse in room, appears alert  Response To Treatment: good, patient fell asleep   Post-Treatment Pain: 0/10  TREATMENT:    (In addition to Assessment/Re-Assessment sessions the following treatments were rendered)  Treatment Provided:  []  Soft tissue massage  []  Healing Touch   Location: left  hand  Patient Position: seated  Time: 10 minutes    PLAN OF CARE:    []  I will follow up with this patient as needed. [x]  No follow up visit necessary.     Thank you for this referral.  Wale Hedrick

## 2018-01-12 NOTE — PROGRESS NOTES
Arrived to the [facility]. Patient arrived with chemo pump from home. Pump completed prior to arrival and pump turned off by patient. Pump disconnected using chemo precautions and cassette discarded into chemogator. completed. Patient tolerated well. Port flushed and de-accessed. Any issues or concerns during appointment: None. Patient aware of next infusion appointment on 1/24 (date) at [phone] (time). Discharged ambulatory in stable condition.

## 2018-01-24 NOTE — PROGRESS NOTES
Problem: Chemotherapy Treatment  Goal: *Chemotherapy regimen followed  Outcome: Progressing Towards Goal  Verbalizes/demonstrates understanding of purpose/procedure/potential side effects of oxaliplatin/leucovorin/fluorouracil/avastin.

## 2018-01-24 NOTE — PROGRESS NOTES
Pt arrived ambulatory today at 1300, to receive IV chemotherapy. Pt tolerated without difficulty. Patient discharged via ambulatory accompanied by spouse. Instructed to notify physician of any problems, questions or concerns. Allowed opportunity for patient/family to ask questions. Verbalized understanding. Next appointment is Jan 26 at 1600 with Esperanza Muir.

## 2018-01-26 NOTE — PROGRESS NOTES
Arrived to the [facility]. 5 FU pump completed.  Patient tolerated well  Any issues or concerns during appointment: No  Patient aware of next infusion appointment on Thursday,February 8th @ 5185  Discharged home

## 2018-01-29 PROBLEM — N82.4 COLOVAGINAL FISTULA: Status: ACTIVE | Noted: 2018-01-01

## 2018-02-22 NOTE — PERIOP NOTES
Patient verified name, , and surgery as listed in [facility]. Patient provided medical/health information and PTA medications to the best of their ability. TYPE  CASE:3  Orders per surgeon: Received yesand dated yes. Labs per surgeon:cbc,bmp,type and screen-dos. Results: -  Labs per anesthesia protocol: none. Results none  EKG  :    PT REQUESTS DR. Darnell Link IF AVAILABLE  Patient provided with and instructed on education handouts including Guide to Surgery, blood transfusions, pain management, and hand hygiene for the family and community, and [facility] brochure.     hibiclens and instructions given per hospital policy. Instructed patient to continue previous medications as prescribed prior to surgery unless otherwise directed and to take the following medications the day of surgery according to anesthesia guidelines : albuterol as needed,xanax,hydrocodone,nexium,zofran or phenergan as needed . Instructed patient to hold  the following medications: none. Original medication prescription bottles none visualized during patient appointment. Patient teach back successful and patient demonstrates knowledge of instruction.

## 2018-03-01 PROBLEM — C20 RECTAL CANCER (HCC): Status: ACTIVE | Noted: 2018-01-01

## 2018-03-01 NOTE — ROUTINE PROCESS
TRANSFER - OUT REPORT:    Verbal report given to [facility] RN on Axel Chavez  being transferred to  for routine post - op       Report consisted of patients Situation, Background, Assessment and   Recommendations(SBAR). Information from the following report(s) SBAR and Procedure Summary was reviewed with the receiving nurse. Lines:   Venous Access Device left upper chest 8fr port; angiodynamics  01/02/18 Upper chest (subclavicular area), left (Active)       Peripheral IV 03/01/18 Right Arm (Active)   Site Assessment Clean, dry, & intact 3/1/2018 11:24 AM   Phlebitis Assessment 0 3/1/2018 11:24 AM   Infiltration Assessment 0 3/1/2018 11:24 AM   Dressing Status Clean, dry, & intact 3/1/2018 11:24 AM   Dressing Type Transparent;Tape 3/1/2018 11:24 AM   Hub Color/Line Status Capped 3/1/2018 11:24 AM   Alcohol Cap Used No 3/1/2018 11:24 AM       Peripheral IV 03/01/18 Left (Active)   Site Assessment Clean, dry, & intact 3/1/2018 11:24 AM   Phlebitis Assessment 0 3/1/2018 11:24 AM   Infiltration Assessment 0 3/1/2018 11:24 AM   Dressing Status Clean, dry, & intact 3/1/2018 10:22 AM   Dressing Type Transparent;Tape 3/1/2018 11:24 AM   Hub Color/Line Status Infusing 3/1/2018 11:24 AM   Alcohol Cap Used No 3/1/2018 10:22 AM        Opportunity for questions and clarification was provided. Patient transported with:   O2 @ 2 liters  Tech    VTE prophylaxis orders have been written for Axel Chavez. Patient and family given floor number and nurses name. Family updated re: pt status after security code verified.

## 2018-03-01 NOTE — IP AVS SNAPSHOT
[facility] 
 
 
 [address] 322 [facility] 
[phone] Patient: Gabriela Day MRN: [id number] :1960 About your hospitalization You were admitted on:  2018 You last received care in the:  [facility] 2 SURGICAL You were discharged on:  2018 Why you were hospitalized Your primary diagnosis was:  Rectal Cancer (Hcc) Your diagnoses also included:  Secondary Malignant Neoplasm Of Brain (Hcc), Confusion Follow-up Information Follow up With Details Comments Contact Info Charlie Fernandez, [address] [facility] [address] [facility] [address] 
[phone] Aranza Mtz MD On  2:45 Miko Ross [address] [place] [address] 
[phone] Your Scheduled Appointments Thursday March 15, 2018  2:45 PM EDT Global Post Op with Aranza Mtz MD  
[facility]) [address] [place] [address]  
[phone] 2018  1:00 PM EDT  
LAB with [address]  
[address] OUTREACH INSURANCE [facility] Adam [address]  
[phone] 2018  1:30 PM EDT Follow Up with Zach Rod MD  
[facility]ology [facility] On[facility]) AFUA/ Inder King [address] [facility] [address]  
[phone] 2018 10:15 AM EDT  
MR BRAIN W WO CON with [facility] UNIT 1 [facility] MRI ([facility]) [address] [facility] W [facility]  
[phone] IMPLANTS - Bring information (ID card) for any medically implanted devices.  - Patients with a history of metal in eyes will require orbit x-rays for clearance prior to MRI  PATIENT ARRIVAL - Please arrive 30 minutes early to check in.  
  
    
Miko Duran, [address] [place] [address]- 2nd floor of Outpatient Medical Office Building except  MRIs with sedation - Sarthak Duran, [facility] the front of the hospital building and check in at [address] [place] [address] and Registration. Discharge Orders None A check kenia indicates which time of day the medication should be taken. My Medications START taking these medications Instructions Each Dose to Equal  
 Morning Noon Evening Bedtime  
 levETIRAcetam 1,000 mg tablet Take 1 Tab by mouth two (2) times a day. 1000 mg  
    
  
   
   
   
  
  
 traMADol 50 mg tablet Commonly known as:  ULTRAM  
Notes to Patient:  Take on as needed schedule Take 2 Tabs by mouth every six (6) hours as needed. Max Daily Amount: 400 mg.  
 100 mg CONTINUE taking these medications Instructions Each Dose to Equal  
 Morning Noon Evening Bedtime  
 albuterol 90 mcg/actuation inhaler Commonly known as:  PROVENTIL HFA, VENTOLIN HFA, PROAIR HFA Notes to Patient:  Take on as needed schedule Take 2 Puffs by inhalation every six (6) hours as needed for Wheezing. 2 Puff ALPRAZolam 1 mg XR tablet Commonly known as:  XANAX XR Notes to Patient:  Take on as needed schedule 1 to 2 po bid, prn HYDROcodone-acetaminophen  mg tablet Commonly known as:  Ada Gut Notes to Patient:  Take on as needed schedule Take 1 Tab by mouth every four (4) hours as needed. Max Daily Amount: 6 Tabs. 1 Tab  
    
   
   
   
  
 lidocaine-prilocaine topical cream  
Commonly known as:  EMLA Apply  to affected area as needed for Pain. NexIUM 40 mg capsule Generic drug:  esomeprazole Take 40 mg by mouth daily. 40 mg  
    
  
   
   
   
  
 ondansetron 8 mg disintegrating tablet Commonly known as:  ZOFRAN ODT Notes to Patient:  Take on as needed schedule Take 1 Tab by mouth every eight (8) hours as needed for Nausea. Indications: PREVENTION OF POST-OPERATIVE NAUSEA AND VOMITING  
 8 mg  
    
   
   
   
  
 promethazine 25 mg tablet Commonly known as:  PHENERGAN Notes to Patient:  Take on as needed schedule Take 25 mg by mouth every six (6) hours as needed for Nausea. 25 mg Where to Get Your Medications These medications were sent to [address], [address]  . Rachael [address] Shob[address]ay[address] Fung, [address] [place] [address] Phone:  [phone]  
  levETIRAcetam 1,000 mg tablet Information on where to get these meds will be given to you by the nurse or doctor. ! Ask your nurse or doctor about these medications  
  traMADol 50 mg tablet Discharge Instructions Discharge Instructions/Follow-up Plans: MD Instructions: 
  
Follow-up with Dr. Charlene Brady in 1 week. Keep incisions clean and dry, may remain uncovered. Do not apply lotions, creams or ointments to incisions. 
  
Diet - as tolerated - Soft foods diet. Activity - ambulate - as tolerated - no heavy lifting >10lb. May shower - no tub baths or soaking/submerging. 
  
No driving while taking narcotics. Do not drink alcohol while taking narcotics. Resume other home medications. Resume home [place].  Add Ultram for pain control as needed. 
  
If problems or questions arise, please call our office at [phone]. 
  
Greater than 30 minutes were spent discharging the patient 
  
  
 
DISCHARGE SUMMARY from Nurse PATIENT INSTRUCTIONS: 
 
After general anesthesia or intravenous sedation, for 24 hours or while taking prescription Narcotics: · Limit your activities · Do not drive and operate hazardous machinery · Do not make important personal or business decisions · Do  not drink alcoholic beverages · If you have not urinated within 8 hours after discharge, please contact your surgeon on call. Report the following to your surgeon: 
· Excessive pain, swelling, redness or odor of or around the surgical area · Temperature over 100.5 · Nausea and vomiting lasting longer than 4 hours or if unable to take medications · Any signs of decreased circulation or nerve impairment to extremity: change in color, persistent  numbness, tingling, coldness or increase pain · Any questions What to do at Home: 
Recommended activity: Activity as tolerated, per MD instructions If you experience any of the following symptoms fever > 100.5, nausea, vomiting, pain, chest pain and/or shortness of breath please follow up with MD. 
 
*  Please give a list of your current medications to your Primary Care Provider. *  Please update this list whenever your medications are discontinued, doses are 
    changed, or new medications (including over-the-counter products) are added. *  Please carry medication information at all times in case of emergency situations. These are general instructions for a healthy lifestyle: No smoking/ No tobacco products/ Avoid exposure to second hand smoke Surgeon General's Warning:  Quitting smoking now greatly reduces serious risk to your health. Obesity, smoking, and sedentary lifestyle greatly increases your risk for illness A healthy diet, regular physical exercise & weight monitoring are important for maintaining a healthy lifestyle You may be retaining fluid if you have a history of heart failure or if you experience any of the following symptoms:  Weight gain of 3 pounds or more overnight or 5 pounds in a week, increased swelling in our hands or feet or shortness of breath while lying flat in bed. Please call your doctor as soon as you notice any of these symptoms; do not wait until your next office visit. Recognize signs and symptoms of STROKE: 
 
F-face looks uneven A-arms unable to move or move unevenly S-speech slurred or non-existent T-time-call 911 as soon as signs and symptoms begin-DO NOT go Back to bed or wait to see if you get better-TIME IS BRAIN. Warning Signs of HEART ATTACK Call 911 if you have these symptoms: 
? Chest discomfort. Most heart attacks involve discomfort in the center of the chest that lasts more than a few minutes, or that goes away and comes back. It can feel like uncomfortable pressure, squeezing, fullness, or pain. ? Discomfort in other areas of the upper body. Symptoms can include pain or discomfort in one or both arms, the back, neck, jaw, or stomach. ? Shortness of breath with or without chest discomfort. ? Other signs may include breaking out in a cold sweat, nausea, or lightheadedness. Don't wait more than five minutes to call [address]! Fast action can save your life. Calling 911 is almost always the fastest way to get lifesaving treatment. Emergency Medical Services staff can begin treatment when they arrive  up to an hour sooner than if someone gets to the hospital by car. The discharge information has been reviewed with the patient. The patient verbalized understanding. Discharge medications reviewed with the patient and appropriate educational materials and side effects teaching were provided. ___________________________________________________________________________________________________________________________________ Cystoscopy: What to Expect at [facility] Your Recovery A cystoscopy is a procedure that lets a doctor look inside of the bladder and the urethra. The urethra is the tube that carries urine from the bladder to outside the body. The doctor uses a thin, lighted tool called a cystoscope. Your bladder is filled with fluid. This stretches the bladder so that your doctor can look closely at the inside of your bladder.  
After the cystoscopy, your urethra may be sore at first, and it may burn when you urinate for the first few days after the procedure. You may feel the need to urinate more often, and your urine may be pink. These symptoms should get better in 1 or 2 days. You will probably be able to go back to most of your usual activities in 1 or 2 days. This care sheet gives you a general idea about how long it will take for you to recover. But each person recovers at a different pace. Follow the steps below to get better as quickly as possible. How can you care for yourself at home? Activity ? · Rest when you feel tired. Getting enough sleep will help you recover. ? · Try to walk each day. Start by walking a little more than you did the day before. Bit by bit, increase the amount you walk. Walking boosts blood flow and helps prevent pneumonia and constipation. ? · Avoid strenuous activities, such as bicycle riding, jogging, weight lifting, or aerobic exercise, until your doctor says it is okay. ? · Ask your doctor when you can drive again. ? · Most people are able to return to work within 1 or 2 days after the procedure. ? · You may shower and take baths as usual.  
? · Ask your doctor when it is okay for you to have sex. Diet ? · You can eat your normal diet. If your stomach is upset, try bland, low-fat foods like plain rice, broiled chicken, toast, and yogurt. ? · Drink plenty of fluids (unless your doctor tells you not to). Medicines ? · Take pain medicines exactly as directed. ¨ If the doctor gave you a prescription medicine for pain, take it as prescribed. ¨ If you are not taking a prescription pain medicine, ask your doctor if you can take an over-the-counter medicine. ? · If you think your pain medicine is making you sick to your stomach: 
¨ Take your medicine after meals (unless your doctor has told you not to). ¨ Ask your doctor for a different pain medicine. ? · If your doctor prescribed antibiotics, take them as directed.  Do not stop taking them just because you feel better. You need to take the full course of antibiotics. Follow-up care is a key part of your treatment and safety. Be sure to make and go to all appointments, and call your doctor if you are having problems. It's also a good idea to know your test results and keep a list of the medicines you take. When should you call for help? Call 911 anytime you think you may need emergency care. For example, call if: 
? · You passed out (lost consciousness). ? · You have severe trouble breathing. ? · You have sudden chest pain and shortness of breath, or you cough up blood. ? · You have severe belly pain. ?Call your doctor now or seek immediate medical care if: 
? · You are sick to your stomach or cannot keep fluids down. ? · Your urine is still red or you see blood clots after you have urinated several times. ? · You have trouble passing urine or stool, especially if you have pain or swelling in your lower belly. ? · You have signs of a blood clot, such as: 
¨ Pain in your calf, back of the knee, thigh, or groin. ¨ Redness and swelling in your leg or groin. ? · You develop a fever or severe chills. ? · You have pain in your back just below your rib cage. This is called flank pain. ? Watch closely for changes in your health, and be sure to contact your doctor if: 
? · You have pain or burning when you urinate. A burning feeling is normal for a day or two after the test, but call if it does not get better. ? · You have a frequent urge to urinate but can pass only small amounts of urine. ? · Your urine is pink, red, or cloudy, or smells bad. It is normal for the urine to have a pinkish color for a few days after the test, but call if it does not get better. Where can you learn more? Go to http://marta-aishwarya.info/. Enter V184 in the search box to learn more about \"Cystoscopy: What to Expect at Home. \" Current as of: May 12, 2017 Content Version: 11.4 © 8197-2722 [employer]. Care instructions adapted under license by [employer] (which disclaims liability or warranty for this information). If you have questions about a medical condition or this instruction, always ask your healthcare professional. [address] any warranty or liability for your use of this information. Open Bowel Resection: What to Expect at Home Your Recovery You are likely to have pain that comes and goes for the next few days after bowel surgery. You may have bowel cramps, and your cut (incision) may hurt. You may also feel like you have the flu. You may have a low fever and feel tired and nauseated. This is common. You should feel better after a week and will probably be back to normal in 2 to 3 weeks. This care sheet gives you a general idea about how long it will take for you to recover. But each person recovers at a different pace. Follow the steps below to get better as quickly as possible. How can you care for yourself at home? Activity ? · Rest when you feel tired. Getting enough sleep will help you recover. ? · Try to walk each day. Start by walking a little more than you did the day before. Bit by bit, increase the amount you walk. Walking boosts blood flow and helps prevent pneumonia and constipation. ? · Avoid strenuous activities, such as biking, jogging, weight lifting, or aerobic exercise, until your doctor says it is okay. ? · Ask your doctor when you can drive again. ? · You will probably need to take 3 to 4 weeks off from work. It depends on the type of work you do and how you feel. You may need to take off 4 to 6 weeks if you lift heavy objects in your job. ? · You may shower 24 to 48 hours after surgery, if your doctor says it is okay. Pat the cut (incision) dry. Do not take a bath for the first 2 weeks, or until your doctor tells you it is okay. ? · Ask your doctor when it is okay for you to have sex. Diet ? · You may not have much appetite after the surgery. But try to eat a healthy diet. Your doctor will tell you about any foods you should not eat. ? · Eat a low-fiber diet for several weeks after surgery. Eat many small meals throughout the day. Add high-fiber foods a little at a time. ? · Eat yogurt. It puts good bacteria into your colon and helps prevent diarrhea. ? · Try to avoid nuts, seeds, and corn for a while. They may be hard to digest.  
? · You may need to take vitamins that contain sodium and potassium. Ask your doctor. ? · Drink plenty of fluids to avoid becoming dehydrated. Medicines ? · Your doctor will tell you if and when you can restart your medicines. He or she will also give you instructions about taking any new medicines. ? · If you take blood thinners, such as warfarin (Coumadin), clopidogrel (Plavix), or aspirin, be sure to talk to your doctor. He or she will tell you if and when to start taking those medicines again. Make sure that you understand exactly what your doctor wants you to do. ? · Take pain medicines exactly as directed. ¨ If the doctor gave you a prescription medicine for pain, take it as prescribed. ¨ If you are not taking a prescription pain medicine, ask your doctor if you can take an over-the-counter medicine. ¨ Do not take two or more pain medicines at the same time unless the doctor told you to. Many pain medicines have acetaminophen, which is Tylenol. Too much acetaminophen (Tylenol) can be harmful. ? · If you think your pain medicine is making you sick to your stomach: 
¨ Take your medicine after meals (unless your doctor tells you not to). ¨ Ask your doctor for a different pain medicine. ? · If your doctor prescribed antibiotics, take them as directed. Do not stop taking them just because you feel better. You need to take the full course of antibiotics. ? · You may need to take some medicines in a different form. You will be told whether to crush pills or take a liquid form of the medicine. ? · If your doctor gives you a stool softener, take it as directed. Incision care ? · If you have strips of tape on the incision, leave the tape on for a week or until it falls off.  
? · Wash the area daily with warm, soapy water, and pat it dry. Follow-up care is a key part of your treatment and safety. Be sure to make and go to all appointments, and call your doctor if you are having problems. It's also a good idea to know your test results and keep a list of the medicines you take. When should you call for help? Call 911 anytime you think you may need emergency care. For example, call if: 
? · You passed out (lost consciousness). ? · You are short of breath. ?Call your doctor now or seek immediate medical care if: 
? · You are sick to your stomach and cannot drink fluids or keep them down. ? · You have signs of a blood clot in your leg (called a deep vein thrombosis), such as: 
¨ Pain in your calf, back of the knee, thigh, or groin. ¨ Redness and swelling in your leg or groin. ? · You have signs of infection, such as: 
¨ Increased pain, swelling, warmth, or redness. ¨ Red streaks leading from the incision. ¨ Pus draining from the incision. ¨ A fever. ? · You have pain that does not get better after you take pain medicine. ? · You have loose stitches, or your incision comes open. ? · Bright red blood has soaked through the bandage. ? · You cannot pass stools or gas. ? Watch closely for any changes in your health, and be sure to contact your doctor if you have any problems. Where can you learn more? Go to http://marta-aishwarya.info/. Enter 067 6831 in the search box to learn more about \"Open Bowel Resection: What to Expect at Home. \" Current as of: May 12, 2017 Content Version: 11.4 © 0989-9154 [employer]. Care instructions adapted under license by [employer] (which disclaims liability or warranty for this information). If you have questions about a medical condition or this instruction, always ask your healthcare professional. [address] any warranty or liability for your use of this information. TVtrip Announcement We are excited to announce that we are making your provider's discharge notes available to you in TVtrip. You will see these notes when they are completed and signed by the physician that discharged you from your recent hospital stay. If you have any questions or concerns about any information you see in TVtrip, please call the Health Information Department where you were seen or reach out to your Primary Care Provider for more information about your plan of care. Introducing [facility]! Dear Moni Julian: 
Thank you for requesting a TVtrip account. Our records indicate that you already have an active TVtrip account. You can access your account anytime at [email or web address] Did you know that you can access your hospital and ER discharge instructions at any time in TVtrip? You can also review all of your test results from your hospital stay or ER visit. Additional Information If you have questions, please visit the Frequently Asked Questions section of the TVtrip website at [email or web address] Remember, TVtrip is NOT to be used for urgent needs. For medical emergencies, dial 911. Now available from your iPhone and Android! Providers Seen During Your Hospitalization Provider Specialty Primary office phone Millie Tao MD General Surgery [phone] Your Primary Care Physician (PCP) Primary Care Physician Office Phone Office Fax [address] [facility], [address] [place] [address] [phone] You are allergic to the following Allergen Reactions Latex Rash Adhesive Tape-Silicones Rash Bad red rash. Ativan (Lorazepam) Other (comments) Pt states \"gets hallucinations and confused\" Bactrim (Sulfamethoprim Ds) Hives Swelling and itching Lexapro (Escitalopram Oxalate) Other (comments) Macrobid (Nitrofurantoin Monohyd/M-Cryst) Other (comments) Felt  \"out of it\" Pcn (Penicillins) Hives Swelling and itching also. Pt has tolerated cephalosporins in the past.  
    
 Pyridium (Phenazopyridine) Hives Also itching and swelling. Recent Documentation Height Weight BMI OB Status Smoking Status 1.6 m 59 kg 23.03 kg/m2 Hysterectomy Current Some Day Smoker Emergency Contacts Name Discharge Info Relation Home Work Mobile Wale Sesay DISCHARGE CAREGIVER [3] Spouse [3] [phone] Patient Belongings The following personal items are in your possession at time of discharge: 
  Dental Appliances: At home, Partials  Visual Aid: Glasses      Home Medications: None   Jewelry: None  Clothing: Pajamas, Footwear, Undergarments    Other Valuables: Cell Phone Please provide this summary of care documentation to your next provider. Signatures-by signing, you are acknowledging that this After Visit Summary has been reviewed with you and you have received a copy. Patient Signature:  ____________________________________________________________ Date:  ____________________________________________________________  
  
Otis Payor Provider Signature:  ____________________________________________________________ Date:  ____________________________________________________________

## 2018-03-01 NOTE — H&P
Surgery H&P     Subjective:   Ms. Daren Griffiths an 62 y.o. female who was referred for evaluation and treatment of a mass located in the sigmoid colon and rectum. Mass was identified by colonoscopy, biopsies on endoscopy and CT scan. Current symptoms include pelvic pain and profuse stool drainage throught the vagina . The mass was biopsied. Pathology is positive for adenocarcinoma. The lesion was not tattooed. Colonoscopy Colonoscopy was complete to cecum. .           Patient Active Problem List   Diagnosis Code    Pulmonary metastases ([facility] C78.00    Secondary malignant neoplasm of brain ([address] C79.31    Mass of colon K63.9    Breast cancer screening Z12.31    Colon cancer metastasized to brain ([address] C18.9, C79.31    Malignant neoplasm of descending colon ([facility] [address] C18.6    Severe sepsis ([address] A41.9, U20.95    Systolic congestive heart failure ([address] I50.20    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 (MSSA) I33.0, B95.61    Colovaginal fistula 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.4           Patient Active Problem List     Diagnosis Date Noted    Colovaginal fistula 01/29/2018    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 (MSSA) 36/13/7143    Systolic congestive heart failure ([address] 11/06/2017    Severe sepsis ([address] 11/01/2017    Colon cancer metastasized to brain ([address] 08/30/2017    Malignant neoplasm of descending colon ([facility] [address] 08/30/2017    Pulmonary metastases ([address] 08/17/2017    Secondary malignant neoplasm of brain ([facility] [address] 08/17/2017    Mass of colon 08/17/2017    Breast cancer screening 08/17/2017             Current Outpatient Prescriptions   Medication Sig Dispense Refill    ondansetron (ZOFRAN ODT) 8 mg disintegrating tablet Take 1 Tab by mouth every eight (8) hours as needed for Nausea. Indications: PREVENTION OF POST-OPERATIVE NAUSEA AND VOMITING 90 Tab 2    prochlorperazine (COMPAZINE) 10 mg tablet Take 1 Tab by mouth every six (6) hours as needed for up to 7 days.  Indications: CANCER CHEMOTHERAPY-INDUCED NAUSEA AND VOMITING 120 Tab 2    lidocaine-prilocaine (EMLA) topical cream Apply  to affected area as needed for Pain. 30 g 3    HYDROcodone-acetaminophen (NORCO)  mg tablet Take 1 Tab by mouth every four (4) hours as needed. Max Daily Amount: 6 Tabs. 180 Tab 0    escitalopram oxalate (LEXAPRO) 10 mg tablet          0.9 % SODIUM CHLORIDE (SALINE FLUSH IJ) 10 mL by IntraVENous route daily. daily to unused port, and after blood draws        promethazine (PHENERGAN) 25 mg tablet Take 25 mg by mouth every six (6) hours as needed for Nausea.        albuterol (PROVENTIL HFA, VENTOLIN HFA, PROAIR HFA) 90 mcg/actuation inhaler Take 2 Puffs by inhalation every six (6) hours as needed for Wheezing. 1 Inhaler 1    ALPRAZolam (XANAX XR) 1 mg XR tablet 1 to 2 po bid, prn        esomeprazole (NEXIUM) 40 mg capsule Take 40 mg by mouth daily.                      Facility-Administered Medications Ordered in Other Visits   Medication Dose Route Frequency Provider Last Rate Last Dose    central line flush (saline) syringe 10-30 mL  10-30 mL InterCATHeter PRN Zach Rod MD    20 mL at 01/26/18 1415            Allergies   Allergen Reactions    Adhesive Tape-Silicones Rash       Bad red rash.      Bactrim [Sulfamethoprim Ds] Hives       Swelling and itching    Macrobid [Nitrofurantoin Monohyd/M-Cryst] Other (comments)       Felt  \"out of it\"    Pcn [Penicillins] Hives       Swelling and itching also.     Pt has tolerated cephalosporins in the past.    Pyridium [Phenazopyridine] Hives       Also itching and swelling.      Past Medical History:   Diagnosis Date    Arthritis       RA    Colon cancer ([facility] [address]       Metastatic to Lungs and Brain    Gastrointestinal disorder       Reflux    GERD (gastroesophageal reflux disease)       controlled  with med    Other ill-defined conditions(799.89)       Herniated disk  lumbar and cervical    Staph aureus infection 12/29/2017     site: port- was [facility] Sept 2017 for sepsis-port was dc'd Oct 2017            Past Surgical History:   Procedure Laterality Date    HX ADENOIDECTOMY        HX GYN         Hysterectomy-partial    HX KNEE ARTHROSCOPY Left 2014    HX LAP CHOLECYSTECTOMY        HX TONSILLECTOMY        HX VASCULAR ACCESS         port inserted Sept 2017- out 24/2/47 (colton)/then had PICC in to give IV antibiotics then dc'd 12/15/17    LYN BIOPSY BREAST STEREOTACTIC Left 10-8-2014            Family History   Problem Relation Age of Onset    Stroke Mother      Hypertension Mother      Diabetes Mother      Heart Disease Father      Hypertension Father      Cancer Brother      Breast Cancer Neg Hx              Social History   Substance Use Topics    Smoking status: Former Smoker       Packs/day: 0.25       Years: 10.00       Quit date: 8/13/2017    Smokeless tobacco: Never Used    Alcohol use No     Review of Systems  ROS documented by nursing, reviewed with patient. Objective   Objective:           Visit Vitals    Ht 5' 3\" (1.6 m)    Wt 146 lb (66.2 kg)    BMI 25.86 kg/m2     Gen: Well developed, well nourished 62 y.o. female in no acute distress  Head: normocephalic, atraumatic  Mouth: Clear, no overt lesions, oral mucosa pink and moist  Neck: supple, no masses, no adenopathy or carotid bruits, trachea midline  Resp: clear to auscultation bilaterally, no wheeze, rhonchi or rales, excursions normal and symmetrical  Cardio: Regular rate and rhythm, no murmurs, clicks, gallops or rubs, no edema or varicosities  Abdomen: soft, nontender, nondistended, normoactive bowel sounds, no hernias, no hepatosplenomegaly   Vaginal drainage of feces.    Extremities: warm, well-perfused, no tenderness or swelling, normal gait/station  Neuro: sensation and strength grossly intact and symmetrical  Psych: alert and oriented to person, place and time                 Assessment   Assessment/Plan:   Royce Velasquez is an 62 y.o. female with mass located in the sigmoid colon and rectum with a colovaginal fistula. Discussed with the patient in detail the current pathology and surgical options. Discussed this case as well with GYN oncology and her oncologist.  Given the patient's level of pain and symptoms I would recommend a palliative resection with end colostomy creation to alleviate her pain and resolve her fistula. Patient and her  understands that surgery is not with curative intent and she will need continued chemotherapy and possible radiation therapy. She understands this and is very anxious to proceed.   We'll discuss with her oncologist appropriate timing of the procedure.

## 2018-03-01 NOTE — OP NOTES
[address]  MR#: [id number]  : 1960  ACCOUNT #: [de-identified]   DATE OF SERVICE: 2018    PREOPERATIVE DIAGNOSIS: Colovaginal fistula and sigmoid colonic mass. POSTOPERATIVE DIAGNOSIS: Colovaginal fistula and sigmoid colonic mass. PROCEDURE PERFORMED: Cystoscopy and bilateral ureteral catheter placement. SURGEON: Natalia Bass DO    ASSISTANT: None    ANESTHESIA:  General.    ESTIMATED BLOOD LOSS:  None. SPECIMENS REMOVED: none    IMPLANTS:  Bilateral ureteral catheters. COMPLICATIONS:  None immediate. CLINICAL HISTORY:  This is a 15-year-old female with a history of metastatic colon cancer who presents today for possible resection of her colonic mass as well as a diverting colostomy with Dr. Stas Joshi. He has requested bilateral ureteral catheters prior to his procedure. All risks, benefits and alternatives to the above-mentioned procedure have been reviewed and she is willing to proceed at this time. DESCRIPTION OF OPERATIVE PROCEDURE:  Patient consent was obtained. The patient was brought back to the operating room, at which time she was placed in the supine position. After the uneventful induction of general anesthesia, she was then placed in dorsal lithotomy position. Her genital area was prepped and draped and a sterile field applied. A 22-Ecuadorean cystoscope was inserted into the urethra and advanced into the bladder under direct visualization. The bladder was systematically surveyed. No abnormalities were seen. Single bilateral ureteral orifices were seen in their normal orthotopic position. A guidewire was passed up the right collecting system and a 5-Ecuadorean open-ended ureteral catheter was then passed over the wire without significant difficulty. A similar procedure was performed on the left side. The scope was removed and a 16-Ecuadorean Silastic catheter was placed to dependent drainage. The ureteral catheters were then sewn to the Jeter catheter to prevent dislodgement. The case was then handed back over to Dr. Jessica Ellison.       DO Saskia Sims / Abbey Jones  D: 03/01/2018 11:01     T: 03/01/2018 14:11  JOB #: [id number]

## 2018-03-01 NOTE — ANESTHESIA PREPROCEDURE EVALUATION
Anesthetic History   No history of anesthetic complications            Review of Systems / Medical History  Patient summary reviewed and pertinent labs reviewed    Pulmonary  Within defined limits                 Neuro/Psych   Within defined limits           Cardiovascular          CHF        Exercise tolerance: >4 METS  Comments: EF normal   GI/Hepatic/Renal     GERD: well controlled           Endo/Other        Arthritis and cancer (colon with mets)     Other Findings            Physical Exam    Airway  Mallampati: II  TM Distance: 4 - 6 cm  Neck ROM: normal range of motion   Mouth opening: Normal     Cardiovascular  Regular rate and rhythm,  S1 and S2 normal,  no murmur, click, rub, or gallop  Rhythm: regular  Rate: normal         Dental  No notable dental hx       Pulmonary  Breath sounds clear to auscultation               Abdominal  GI exam deferred       Other Findings            Anesthetic Plan    ASA: 2  Anesthesia type: general          Induction: Intravenous  Anesthetic plan and risks discussed with: Patient

## 2018-03-01 NOTE — PROGRESS NOTES
Dual skin assessment completed with Wale Obregon RN, midline abdominal  incision CDI. Right ALEJANDRO lower abdominal. Left colostomy. Otherwise skin clean, dry and intact.

## 2018-03-01 NOTE — PROGRESS NOTES
TRANSFER - IN REPORT:    Verbal report received from Newton Larios RN  on Axel Bidding  being received from PACU for routine post - op      Report consisted of patients Situation, Background, Assessment and   Recommendations(SBAR). Information from the following report(s) SBAR, Kardex, OR Summary, Procedure Summary, Intake/Output, MAR and Recent Results was reviewed with the receiving nurse. Opportunity for questions and clarification was provided. Assessment completed upon patients arrival to unit and care assumed.

## 2018-03-02 NOTE — PROGRESS NOTES
END OF SHIFT NOTE:    INTAKE/OUTPUT  03/01 0701 - 03/02 0700  In: 3102 [I.V.:3102]  Out: 8843 [Urine:5045; Drains:205]  Voiding: YES  Catheter: YES  Drain:   [place] Drain 03/01/18 Mid;Lower Abdomen (Active)   Site Assessment Clean, dry, & intact 3/2/2018 12:35 AM   Dressing Status Clean, dry, & intact 3/2/2018 12:35 AM   Status Patent;Draining 3/2/2018 12:35 AM   Drainage Color Serosanguinous 3/2/2018 12:35 AM   Output (ml) 0 ml 3/2/2018  3:58 AM       Colostomy 03/01/18 Left Abdomen (Active)   Drainage Color Other (Comment) 3/2/2018 12:35 AM   Site Assessment Clean, dry, intact 3/2/2018 12:35 AM               Flatus: Patient does not have flatus present. Stool:  0 occurrences. Characteristics:  Stool Assessment  Stool Amount:  (no stool present)  Stool Source/Status: Colostomy    Emesis: 0 occurrences. Characteristics:        VITAL SIGNS  Patient Vitals for the past 12 hrs:   Temp Pulse Resp BP SpO2   03/02/18 0358 98.4 °F (36.9 °C) 98 16 132/82 97 %   03/01/18 2332 (!) 100.5 °F (38.1 °C) 99 16 139/85 98 %   03/01/18 1959 99.3 °F (37.4 °C) (!) 105 16 151/89 97 %       Pain Assessment  Pain Intensity 1: 10 (03/02/18 0555)  Pain Location 1: Abdomen  Pain Intervention(s) 1: Medication (see MAR)  Patient Stated Pain Goal: 0    Ambulating  Yes    Shift report given to oncoming nurse at the bedside.     Amairani Dockery RN

## 2018-03-02 NOTE — ANESTHESIA POSTPROCEDURE EVALUATION
Post-Anesthesia Evaluation and Assessment    Patient: Terrie Atkinson MRN: [id number]  SSN: [id number]    YOB: 1960  Age: 62 y.o. Sex: female       Cardiovascular Function/Vital Signs  Visit Vitals    /89    Pulse (!) 105    Temp 37.4 °C (99.3 °F)    Resp 16    Ht 5' 3\" (1.6 m)    Wt 62.5 kg (137 lb 11.2 oz)    SpO2 97%    BMI 24.39 kg/m2       Patient is status post general anesthesia for Procedure(s):  OPEN COLON RESECTION WITH COLOSTOMY  CYSTOSCOPY BILATERAL URETERAL STENT INSERTION. Nausea/Vomiting: None    Postoperative hydration reviewed and adequate. Pain:  Pain Scale 1: FLACC (03/01/18 2142)  Pain Intensity 1: 0 (03/01/18 2142)   Managed    Neurological Status:   Neuro (WDL): Exceptions to WDL (03/01/18 1124)  Neuro  Neurologic State: Drowsy (03/01/18 1956)  Orientation Level: Oriented X4 (03/01/18 1956)  Cognition: Follows commands (03/01/18 1956)  Speech: Clear (03/01/18 1956)  Assessment L Pupil: Brisk;Round (03/01/18 1124)  Size L Pupil (mm): 3 (03/01/18 1124)  Assessment R Pupil: Brisk;Round (03/01/18 1124)  Size R Pupil (mm): 3 (03/01/18 1124)  LUE Motor Response: Purposeful (03/01/18 1956)  LLE Motor Response: Purposeful (03/01/18 1956)  RUE Motor Response: Purposeful (03/01/18 1956)  RLE Motor Response: Purposeful (03/01/18 1956)   At baseline    Mental Status and Level of Consciousness: Arousable    Pulmonary Status:   O2 Device: Room air (03/01/18 1330)   Adequate oxygenation and airway patent    Complications related to anesthesia: None    Post-anesthesia assessment completed.  No concerns    Signed By: Veronica Lyn MD     March 1, 2018

## 2018-03-02 NOTE — PROGRESS NOTES
Follow-up visit to assess pt's spiritual needs. Pt's  In room; cared for pt's spouse. Ministry of prayer & presence to demonstrate caring & concern.

## 2018-03-02 NOTE — PROGRESS NOTES
PLAN:  IVF - .45% Sodium Chloride with KCL 20 @ 125cc/hr  Pain/ nausea control  Jeter Cath  NPO with sips of clears  SCD  OOB/ Ambulate    ASSESSMENT:  Admit Date: 3/1/2018   1 Day Post-Op  Procedure(s):  OPEN COLON RESECTION WITH COLOSTOMY  CYSTOSCOPY BILATERAL URETERAL STENT INSERTION    Principal Problem:    Rectal cancer ([address] (3/1/2018)       SUBJECTIVE:  Pt awake in bed. Reports pain not adequately controlled. Dilaudid PCA ordered, however, pharmacy unable to supply PCA at this time. Pain medications adjusted. Pt also c/o nausea without vomiting. Jeter patent. Family at bedside. AF, NAD. OBJECTIVE:  Constitutional: Alert, cooperative, no acute distress; appears stated age   Visit Vitals    /85    Pulse (!) 107    Temp 98.8 °F (37.1 °C)    Resp 18    Ht 5' 3\" (1.6 m)    Wt 136 lb 9.6 oz (62 kg)    SpO2 97%    BMI 24.2 kg/m2     Eyes:Sclera are clear. ENMT: no external lesions gross hearing normal; no obvious neck masses, no ear or lip lesions  CV: RRR. Normal perfusion  Resp: No JVD. Breathing is  non-labored; no audible wheezing. GI: soft, appropriately tender, non-distended,   ALEJANDRO drain Serosanguinous - 205cc/24 hours  : Jeter 3925cc/ 24 hours  Musculoskeletal: unremarkable with normal function. No embolic signs or cyanosis.    Neuro:  Oriented; moves all 4; no focal deficits  Psychiatric: normal affect and mood, no memory impairment      Patient Vitals for the past 24 hrs:   BP Temp Pulse Resp SpO2 Weight   03/02/18 0717 150/85 98.8 °F (37.1 °C) (!) 107 18 97 % -   03/02/18 0600 - - - - - 136 lb 9.6 oz (62 kg)   03/02/18 0358 132/82 98.4 °F (36.9 °C) 98 16 97 % -   03/01/18 2332 139/85 (!) 100.5 °F (38.1 °C) 99 16 98 % -   03/01/18 1959 151/89 99.3 °F (37.4 °C) (!) 105 16 97 % -   03/01/18 1633 158/90 - 98 - - -   03/01/18 1515 (!) 165/130 98.1 °F (36.7 °C) (!) 105 18 95 % -   03/01/18 1327 167/75 98.7 °F (37.1 °C) 79 18 98 % -   03/01/18 1259 - - - - 100 % -   03/01/18 1248 171/90 - 89 29 100 % -   03/01/18 1214 180/81 - 85 17 98 % -   03/01/18 1209 178/82 - 90 19 99 % -   03/01/18 1204 183/85 - 85 16 98 % -   03/01/18 1159 174/81 - 86 16 98 % -     Labs:  Recent Labs      03/02/18   0411   WBC  12.5*   HGB  12.5   PLT  262   NA  142   K  4.0   CL  106   CO2  27   BUN  5*   CREA  0.36*   GLU  105*       Rashad Cyr, FNP-BC    The patient was seen in conjunction with Dr. Marie Lenz who independently evaluated the patient, reviewed the chart and agreed with the assessment and plan.

## 2018-03-03 NOTE — PROGRESS NOTES
END OF SHIFT NOTE:    INTAKE/OUTPUT  03/02 0701 - 03/03 0700  In: 4615 [I.V.:3129]  Out: 1375 [Urine:2675; Drains:10]  Voiding: YES  Catheter: YES  Drain:   [place] Drain 03/01/18 Mid;Lower Abdomen (Active)   Site Assessment Clean, dry, & intact 3/3/2018  2:14 AM   Dressing Status Clean, dry, & intact 3/3/2018  2:14 AM   Status Patent; Charged;Draining 3/3/2018  2:14 AM   Drainage Color Serosanguinous 3/3/2018  2:14 AM   Output (ml) 0 ml 3/2/2018 11:19 PM       Colostomy 03/01/18 Left Abdomen (Active)   Drainage Color Serosanguinous 3/2/2018  8:15 PM   Site Assessment Clean, dry, intact 3/2/2018  8:15 PM   Output (ml) 0 mL 3/2/2018  3:51 PM               Flatus: Patient does not have flatus present. Stool:  0 occurrences. Characteristics:  Stool Assessment  Stool Amount:  (no stool present)  Stool Source/Status: Colostomy    Emesis: 0 occurrences. Characteristics:        VITAL SIGNS  Patient Vitals for the past 12 hrs:   Temp Pulse Resp BP SpO2   03/03/18 0413 98.6 °F (37 °C) 81 18 108/73 96 %   03/03/18 0218 - - - - 94 %   03/02/18 2319 99.2 °F (37.3 °C) 84 18 118/73 98 %   03/02/18 1930 99.2 °F (37.3 °C) 86 18 119/73 97 %       Pain Assessment  Pain Intensity 1: 8 (03/03/18 0552)  Pain Location 1: Abdomen  Pain Intervention(s) 1: Medication (see MAR)  Patient Stated Pain Goal: 0    Ambulating  Yes    Shift report given to oncoming nurse at the bedside.     Matheus Hussein RN

## 2018-03-03 NOTE — PROGRESS NOTES
PLAN:  IVF - .45% Sodium Chloride with KCL 20 @ 125cc/hr  Pain/ nausea control  Jeter Cath  NPO with sips of clears  Waiting return of bowel function  SCD  OOB/ Ambulate    ASSESSMENT:  Admit Date: 3/1/2018   2 Days Post-Op  Procedure(s):  OPEN COLON RESECTION WITH COLOSTOMY  CYSTOSCOPY BILATERAL URETERAL STENT INSERTION    Principal Problem:    Rectal cancer ([address] (3/1/2018)       SUBJECTIVE:  Pt awake in bed. No new complaints. -flatus/ - ostomy output. Encouraged OOB/ Ambulate. Jeter patent. Family at bedside. AF, NAD. OBJECTIVE:  Constitutional: Alert, cooperative, no acute distress; appears stated age   Visit Vitals    /90    Pulse 83    Temp 99.5 °F (37.5 °C)    Resp 18    Ht 5' 3\" (1.6 m)    Wt 137 lb (62.1 kg)    SpO2 99%    BMI 24.27 kg/m2     Eyes:Sclera are clear. ENMT: no external lesions gross hearing normal; no obvious neck masses, no ear or lip lesions  CV: RRR. Normal perfusion  Resp: No JVD. Breathing is  non-labored; no audible wheezing. GI: soft, appropriately tender, non-distended,   ALEJANDRO drain Serosanguinous - 10cc/24 hours. Colostomy - no output  : Jeter 3275cc/ 24 hours  Musculoskeletal: unremarkable with normal function. No embolic signs or cyanosis.    Neuro:  Oriented; moves all 4; no focal deficits  Psychiatric: normal affect and mood, no memory impairment    Patient Vitals for the past 24 hrs:   BP Temp Pulse Resp SpO2 Weight   03/03/18 1115 157/90 99.5 °F (37.5 °C) 83 18 99 % -   03/03/18 0725 123/66 98.6 °F (37 °C) 88 18 96 % -   03/03/18 0645 - - - - - 137 lb (62.1 kg)   03/03/18 0413 108/73 98.6 °F (37 °C) 81 18 96 % -   03/03/18 0218 - - - - 94 % -   03/02/18 2319 118/73 99.2 °F (37.3 °C) 84 18 98 % -   03/02/18 1930 119/73 99.2 °F (37.3 °C) 86 18 97 % -   03/02/18 1522 143/79 98.9 °F (37.2 °C) 92 18 97 % -     Labs:  Recent Labs      03/02/18   0411   WBC  12.5*   HGB  12.5   PLT  262   NA  142   K  4.0   CL  106   CO2  27   BUN  5*   CREA  0.36*   GLU  105* Saskia Rubin, CARROLP-BC    The patient was seen in conjunction with Dr. Clarence Anderson who independently evaluated the patient, reviewed the chart and agreed with the assessment and plan.

## 2018-03-04 NOTE — PROGRESS NOTES
Pt. Complaining of feeling pressure in bladder area and c/o catheter possibly relating to her nausea. Patient asked if catheter can be D/C. Asked if the on call MD can be called. Explained to patient that it Mccarty probably needs to stay in at this time due to medical condition and notes do not indicate that Mccarty needs to come out at this time. Patient agreed to wait until am to talk to rounding MD. Explained that it is her right for the MD to be called, so if that is what she decides, she can let nursing know and the MD will be called. Nursing staff drained mccarty to relieve pressure. Will continue to monitor.

## 2018-03-04 NOTE — PROGRESS NOTES
PLAN:  IVF - .45% Sodium Chloride with KCL 20 @ 125cc/hr  Pain/ nausea control  Jeter Cath  NPO with sips of clears  Waiting return of bowel function  SCD  OOB/ Ambulate    ASSESSMENT:  Admit Date: 3/1/2018   3 Days Post-Op  Procedure(s):  OPEN COLON RESECTION WITH COLOSTOMY  CYSTOSCOPY BILATERAL URETERAL STENT INSERTION    Principal Problem:    Rectal cancer ([address] (3/1/2018)       SUBJECTIVE:  Pt awake and sitting in recliner. No new complaints. -flatus/ - ostomy output. Encouraged OOB/ Ambulate. Jeter patent. Family at bedside. AF, NAD. OBJECTIVE:  Constitutional: Alert, cooperative, no acute distress; appears stated age   Visit Vitals    /87    Pulse 91    Temp 98.7 °F (37.1 °C)    Resp 18    Ht 5' 3\" (1.6 m)    Wt 137 lb (62.1 kg)    SpO2 99%    BMI 24.27 kg/m2     Eyes:Sclera are clear. ENMT: no external lesions gross hearing normal; no obvious neck masses, no ear or lip lesions  CV: RRR. Normal perfusion  Resp: No JVD.  Breathing is  non-labored; no audible wheezing.    GI: soft, appropriately tender, non-distended, ALEJANDRO drain Serosanguinous - 105 cc/24 hours. Colostomy - no output, BS active. : Jeter 3445cc/ 24 hours  Musculoskeletal: unremarkable with normal function. No embolic signs or cyanosis.    Neuro:  Oriented; moves all 4; no focal deficits  Psychiatric: normal affect and mood, no memory impairment       Patient Vitals for the past 24 hrs:   BP Temp Pulse Resp SpO2   03/04/18 0629 134/87 98.7 °F (37.1 °C) 91 18 99 %   03/03/18 2243 161/80 99.1 °F (37.3 °C) 85 18 98 %   03/03/18 2000 149/87 98.6 °F (37 °C) 83 18 99 %   03/03/18 1515 149/81 99.2 °F (37.3 °C) 80 18 97 %   03/03/18 1115 157/90 99.5 °F (37.5 °C) 83 18 99 %     Labs:  Recent Labs      03/02/18   0411   WBC  12.5*   HGB  12.5   PLT  262   NA  142   K  4.0   CL  106   CO2  27   BUN  5*   CREA  0.36*   GLU  105*       Keven Darren, FNP-BC    The patient was seen in conjunction with Dr. Glenn Lawson who independently evaluated the patient, reviewed the chart and agreed with the assessment and plan.

## 2018-03-04 NOTE — PROGRESS NOTES
END OF SHIFT NOTE:    INTAKE/OUTPUT  03/03 0701 - 03/04 0700  In: 6108 [P.O.:240; I.V.:1314]  Out: 8535 [Urine:2845; Drains:100]  Voiding: YES  Catheter: YES  Drain:   [place] Drain 03/01/18 Mid;Lower Abdomen (Active)   Site Assessment Other (Comment) 3/3/2018  7:30 PM   Dressing Status Clean, dry, & intact 3/4/2018 12:05 AM   Status Patent;Draining 3/4/2018 12:05 AM   Drainage Color Serosanguinous 3/4/2018 12:05 AM   Output (ml) 0 ml 3/3/2018  7:30 PM       Colostomy 03/01/18 Left Abdomen (Active)   Drainage Color Serosanguinous 3/4/2018 12:05 AM   Site Assessment Pink;Red 3/4/2018 12:05 AM   Treatment Other (Comment) 3/4/2018 12:05 AM   Output (ml) 0 mL 3/4/2018 12:05 AM               Flatus: Patient does not have flatus present. Stool:  0 occurrences. Characteristics:  Stool Assessment  Stool Amount:  (no stool present)  Stool Source/Status: Colostomy    Emesis: 0 occurrences. Characteristics:        VITAL SIGNS  Patient Vitals for the past 12 hrs:   Temp Pulse Resp BP SpO2   03/03/18 2243 99.1 °F (37.3 °C) 85 18 161/80 98 %   03/03/18 2000 98.6 °F (37 °C) 83 18 149/87 99 %       Pain Assessment  Pain Intensity 1: 8 (03/03/18 2323)  Pain Location 1: Abdomen  Pain Intervention(s) 1: Medication (see MAR)  Patient Stated Pain Goal: 0    Ambulating  No    Shift report given to oncoming nurse at the bedside.     Madhavi Álvarez

## 2018-03-04 NOTE — PROGRESS NOTES
Problem: Falls - Risk of  Goal: *Absence of Falls  Document Anant Fall Risk and appropriate interventions in the flowsheet.    Outcome: Progressing Towards Goal  Fall Risk Interventions:  Mobility Interventions: Communicate number of staff needed for ambulation/transfer, Patient to call before getting OOB         Medication Interventions: Patient to call before getting OOB    Elimination Interventions: Call light in reach, Patient to call for help with toileting needs    History of Falls Interventions: Consult care management for discharge planning, Evaluate medications/consider consulting pharmacy

## 2018-03-04 NOTE — PROGRESS NOTES
END OF SHIFT NOTE:    INTAKE/OUTPUT  03/02 0701 - 03/03 0700  In: 5321 [I.V.:3535]  Out: 6604 [Urine:3275; Drains:10]  Voiding: NO  Catheter: YES  Drain:   [place] Drain 03/01/18 Mid;Lower Abdomen (Active)   Site Assessment Clean, dry, & intact 3/3/2018  2:00 PM   Dressing Status Clean, dry, & intact 3/3/2018  2:00 PM   Status Patent; Charged;Draining 3/3/2018  2:00 PM   Drainage Color Serosanguinous 3/3/2018  2:00 PM   Output (ml) 25 ml 3/3/2018  3:15 PM       Colostomy 03/01/18 Left Abdomen (Active)   Drainage Color Serosanguinous 3/3/2018  2:00 PM   Site Assessment Clean, dry, intact 3/3/2018  2:00 PM   Output (ml) 0 mL 3/2/2018  3:51 PM               Flatus: Patient does not have flatus present. Stool:  0 occurrences. Characteristics:  Stool Assessment  Stool Amount:  (no stool present)  Stool Source/Status: Colostomy    Emesis: 0 occurrences. Characteristics:        VITAL SIGNS  Patient Vitals for the past 12 hrs:   Temp Pulse Resp BP SpO2   03/03/18 1515 99.2 °F (37.3 °C) 80 18 149/81 97 %   03/03/18 1115 99.5 °F (37.5 °C) 83 18 157/90 99 %       Pain Assessment  Pain Intensity 1: 7 (03/03/18 1750)  Pain Location 1: Abdomen  Pain Intervention(s) 1: Medication (see MAR)  Patient Stated Pain Goal: 0    Ambulating  Yes with assistance    Shift report given to oncoming nurse at the bedside.     Jumana Devine, RN

## 2018-03-05 NOTE — PROGRESS NOTES
PLAN:  Decrease IVF - .45% Sodium Chloride with KCL 20 @ 75cc/hr  Pain/ nausea control  Jeter Cath  NPO with sips of clears  Waiting return of bowel function  SCD/Protonix  OOB/ Ambulate  Consult wound for ostomy care  Consult PT to eval and treat  Add PRN hydralazine    ASSESSMENT:  Admit Date: 3/1/2018   4 Days Post-Op  Procedure(s):  OPEN COLON RESECTION WITH COLOSTOMY  CYSTOSCOPY BILATERAL URETERAL STENT INSERTION    Principal Problem:    Rectal cancer ([address] (3/1/2018)       SUBJECTIVE:  Pt awake in bed. Complaining she isn't being ambulated enough. Asking for visit by urology. -flatus/ - ostomy output. Encouraged OOB/ Ambulate. Jeter patent. Family at bedside. AF, NAD. Some hypertension overnight. OBJECTIVE:  Constitutional: Alert, cooperative, no acute distress; appears stated age   Visit Vitals    /75    Pulse 97    Temp 99.1 °F (37.3 °C)    Resp 18    Ht 5' 3\" (1.6 m)    Wt 130 lb 3.2 oz (59.1 kg)    SpO2 97%    BMI 23.06 kg/m2     Eyes:Sclera are clear. ENMT: no external lesions gross hearing normal; no obvious neck masses, no ear or lip lesions  CV: RRR. Normal perfusion  Resp: No JVD.  Breathing is  non-labored; no audible wheezing.    GI: soft, appropriately tender, non-distended, ALEJANDRO drain Serosanguinous - 0 cc documented/24 hours. Colostomy - blood noted in bad, malodorous, stoma with slough, BS active. : Jeter 2900cc/ 24 hours  Musculoskeletal: unremarkable with normal function. No embolic signs or cyanosis.    Neuro:  Oriented; moves all 4; no focal deficits  Psychiatric: normal affect and mood, no memory impairment       Patient Vitals for the past 24 hrs:   BP Temp Pulse Resp SpO2 Weight   03/05/18 0706 146/75 99.1 °F (37.3 °C) 97 18 97 % -   03/05/18 0701 - - - - - 130 lb 3.2 oz (59.1 kg)   03/05/18 0358 144/88 98.8 °F (37.1 °C) 98 18 97 % -   03/04/18 2300 147/84 98.6 °F (37 °C) 90 18 97 % -   03/04/18 1955 (!) 153/91 98.9 °F (37.2 °C) (!) 102 17 97 % -   03/04/18 1510 (!) 147/92 99.3 °F (37.4 °C) 89 16 98 % -   03/04/18 1147 (!) 156/98 99 °F (37.2 °C) 86 16 93 % -     Labs:  No results for input(s): WBC, HGB, PLT, NA, K, CL, CO2, BUN, CREA, GLU, PTP, INR, APTT, TBIL, TBILI, CBIL, SGOT, GPT, ALT, AP, AML, LPSE, LCAD, NH4, TROPT, TROIQ, HGBEXT, PLTEXT, HGBEXT, PLTEXT in the last 72 hours.     No lab exists for component: Fausto Meza    Signed By: Mic Arevalo NP     March 5, 2018

## 2018-03-05 NOTE — PROGRESS NOTES
Problem: Mobility Impaired (Adult and Pediatric)  Goal: *Acute Goals and Plan of Care (Insert Text)  STG:  (1.)Ms. Ar Farrell will move from supine to sit and sit to supine , scoot up and down and roll side to side with STAND BY ASSIST within 3 treatment day(s). (2.)Ms. Ar Farrell will transfer from bed to chair and chair to bed with STAND BY ASSIST using the least restrictive device within 3 treatment day(s). (3.)Ms. Ar Farrell will ambulate with STAND BY ASSIST for 250 feet with the least restrictive device within 3 treatment day(s). (4.)Ms. Sesay will ascend/descend 1 step with CONTACT GUARD ASSIST with one handrail within 3 treatment days. LTG:  (1.)Ms. Ar Farrell will move from supine to sit and sit to supine , scoot up and down and roll side to side in bed with INDEPENDENT within 7 treatment day(s). (2.)Ms. Ar Farrell will transfer from bed to chair and chair to bed with SUPERVISION using the least restrictive device within 7 treatment day(s). (3.)Ms. Ar Farrell will ambulate with SUPERVISION for 500+ feet with the least restrictive device within 7 treatment day(s). (4.)Ms. Ar Farrell will ascend/descend 3 steps with STAND BY ASSIST with one handrail within 7 treatment days. ________________________________________________________________________________________________      PHYSICAL THERAPY: Initial Assessment, Treatment Day: Day of Assessment, PM 3/5/2018  INPATIENT: Hospital Day: 5  Payor: Jayla Daily / Plan: [place] BLUE CROSS [address] / Product Type: PPO /      NAME/AGE/GENDER: Terrie Atkinson is a 62 y.o. female   PRIMARY DIAGNOSIS: Cancer of left colon ([address] [C18.6] Rectal cancer ([address] Rectal cancer ([address]  Procedure(s) (LRB):  OPEN COLON RESECTION WITH COLOSTOMY (N/A)  CYSTOSCOPY BILATERAL URETERAL STENT INSERTION (Bilateral)  4 Days Post-Op  ICD-10: Treatment Diagnosis:   · Generalized Muscle Weakness (M62.81)  · Difficulty in walking, Not elsewhere classified (R26.2)   Precaution/Allergies:  Latex;  Adhesive tape-silicones; Ativan [lorazepam]; Bactrim [sulfamethoprim ds]; Lexapro [escitalopram oxalate]; Macrobid [nitrofurantoin monohyd/m-cryst]; Pcn [penicillins]; and Pyridium [phenazopyridine]      ASSESSMENT:     Ms. Hugo Rain is a 62 y.o. Female who is 4 days post op colostomy and B ureteral stent insertion. Pt is supine in bed upon contact with family at bedside. Pt is A&O x 4 and agreeable to PT evaluation. Pt states she lives with  in single story home with 3 steps in enter with 1 hand rail, and a tub/shower combo. Pt states independent with ADLs, ambulation, and driving at baseline. Pt states no history of falls, and 8/10 incisional pain at colostomy site. Pt transferred supine to sitting EOB with Marquise with verbal cuing for sequencing. Pt Pt performed STS with CGA and RW. Pt ambulated 50 ft with RW and CGA before returning to chair. Pt ambulates with narrowed RENO, decreased step/stride length, and shuffling pattern. Pt left up in bedside chair with all needs met and within reach. Pt will benefit from skilled therapy for duration of hospital stay to address decreased functional mobility and activity tolerance. Pt may benefit from HHPT upon discharge from hospital.    This section established at most recent assessment   PROBLEM LIST (Impairments causing functional limitations):  1. Decreased Strength  2. Decreased ADL/Functional Activities  3. Decreased Transfer Abilities  4. Decreased Ambulation Ability/Technique  5. Decreased Balance  6. Increased Pain  7. Decreased Activity Tolerance  8. Decreased Pacing Skills  9. Increased Fatigue  10. Decreased [place] with Home Exercise Program   INTERVENTIONS PLANNED: (Benefits and precautions of physical therapy have been discussed with the patient.)  1. Balance Exercise  2. Bed Mobility  3. Cold  4. Family Education  5. Gait Training  6. Home Exercise Program (HEP)  7. Manual Therapy  8. Neuromuscular Re-education/Strengthening  9.  Range of Motion (ROM)  10. Therapeutic Activites  11. Therapeutic Exercise/Strengthening  12. Transfer Training  13. Group Therapy     TREATMENT PLAN: Frequency/Duration: 3 times a week for duration of hospital stay  Rehabilitation Potential For Stated Goals: Good     RECOMMENDED REHABILITATION/EQUIPMENT: (at time of discharge pending progress): Due to the probability of continued deficits (see above) this patient will likely need continued skilled physical therapy after discharge. Equipment:    to be determined              HISTORY:   History of Present Injury/Illness (Reason for Referral):  Ms. Avelar an 62 y. o. female who was referred for evaluation and treatment of a mass located in the sigmoid colon and rectum. Mass was identified by colonoscopy, biopsies on endoscopy and CT scan. Current symptoms include pelvic pain and profuse stool drainage throught the vagina . The mass was biopsied. Pathology is positive for adenocarcinoma. The lesion was not tattooed. Colonoscopy Colonoscopy was complete to cecum. .   Past Medical History/Comorbidities:   Ms. Sharda Rousseau  has a past medical history of Arthritis; Colon cancer ([facility] Gastrointestinal disorder; GERD (gastroesophageal reflux disease); Other ill-defined conditions(799.89); and Staph aureus infection (12/29/2017). She also has no past medical history of Adverse effect of anesthesia; Difficult intubation; Malignant hyperthermia due to anesthesia; Nausea & vomiting; or Pseudocholinesterase deficiency. Ms. Sharda Rousseau  has a past surgical history that includes hx lap cholecystectomy; hx gyn; hx tonsillectomy; hx adenoidectomy; reggie biopsy breast stereotactic (Left, 10-8-2014); hx knee arthroscopy (Left, 2014); and hx vascular access.   Social History/Living Environment:   Home Environment: Private residence  # Steps to Enter: 3  Rails to Enter: Yes  Hand Rails : Right  One/Two Story Residence: One story  Living Alone: No  Support Systems: Spouse/Significant Other/Partner  Patient Expects to be Discharged to[de-identified] Private residence  Current DME Used/Available at Home: None  Tub or Shower Type: Tub/Shower combination  Prior Level of Function/Work/Activity:  Independent, no AD, living with , driving     Number of Personal Factors/Comorbidities that affect the Plan of Care: 3+: HIGH COMPLEXITY   EXAMINATION:   Most Recent Physical Functioning:   Gross Assessment:  AROM: Generally decreased, functional  Strength: Generally decreased, functional  Coordination: Generally decreased, functional               Posture:     Balance:  Sitting: Intact; Without support  Standing: Intact; With support Bed Mobility:  Rolling: Minimum assistance  Supine to Sit: Minimum assistance  Scooting: Contact guard assistance  Wheelchair Mobility:     Transfers:  Sit to Stand: Contact guard assistance  Stand to Sit: Contact guard assistance  Gait:     Base of Support: Narrowed  Speed/Kay: Shuffled; Slow  Step Length: Left shortened;Right shortened  Gait Abnormalities: Decreased step clearance  Distance (ft): 50 Feet (ft)  Assistive Device: Gait belt;Walker, rolling  Ambulation - Level of Assistance: Contact guard assistance      Body Structures Involved:  1. Heart  2. Lungs  3. Bones  4. Joints  5. Muscles  6. Ligaments Body Functions Affected:  1. Cardio  2. Respiratory  3. Neuromusculoskeletal  4. Movement Related Activities and Participation Affected:  1. Mobility  2. Self Care  3. Domestic Life  4. Interpersonal Interactions and Relationships  5. Community, Social and [place]   Number of elements that affect the Plan of Care: 4+: HIGH COMPLEXITY   CLINICAL PRESENTATION:   Presentation: Stable and uncomplicated: LOW COMPLEXITY   CLINICAL DECISION MAKIN [facility] Mobility Inpatient Short Form  How much difficulty does the patient currently have. .. Unable A Lot A Little None   1. Turning over in bed (including adjusting bedclothes, sheets and blankets)?    [] 1   [] 2   [x] 3   [] 4 2.  Sitting down on and standing up from a chair with arms ( e.g., wheelchair, bedside commode, etc.)   [] 1   [] 2   [x] 3   [] 4   3. Moving from lying on back to sitting on the side of the bed? [] 1   [] 2   [x] 3   [] 4   How much help from another person does the patient currently need. .. Total A Lot A Little None   4. Moving to and from a bed to a chair (including a wheelchair)? [] 1   [] 2   [x] 3   [] 4   5. Need to walk in hospital room? [] 1   [] 2   [x] 3   [] 4   6. Climbing 3-5 steps with a railing? [] 1   [x] 2   [] 3   [] 4   © 2007, Trustees of [address] [place] [address] [address] [address], under license to [employer]. All rights reserved      Score:  Initial: 17 Most Recent: X (Date: -- )    Interpretation of Tool:  Represents activities that are increasingly more difficult (i.e. Bed mobility, Transfers, Gait). Score 24 23 22-20 19-15 14-10 9-7 6     Modifier CH CI CJ CK CL CM CN      ? Mobility - Walking and Moving Around:     - CURRENT STATUS: CK - 40%-59% impaired, limited or restricted    - GOAL STATUS: CJ - 20%-39% impaired, limited or restricted    - D/C STATUS:  ---------------To be determined---------------  Payor: BLUE CROSS / [place] [address] / Product Type: PPO /      Medical Necessity:     · Patient is expected to demonstrate progress in strength, range of motion, balance and coordination to increase independence with transfers, ambulation, and functional mobility. Reason for Services/Other Comments:  · Patient continues to require skilled intervention due to decreased functional mobility and activity tolerance.    Use of outcome tool(s) and clinical judgement create a POC that gives a: Clear prediction of patient's progress: LOW COMPLEXITY            TREATMENT:   (In addition to Assessment/Re-Assessment sessions the following treatments were rendered)   Pre-treatment Symptoms/Complaints:  Complaints of incisional pain  Pain: Initial:   Pain Intensity 1: 8  Pain Location 1: Abdomen  Pain Orientation 1: Lower  Post Session:  Unchanged with mobility 8/10     In addition to PT Evaluation:  Therapeutic Activity: (    8 minutes): Therapeutic activities including Bed transfers, Chair transfers, Ambulation on level ground and education on safety awareness, pacing, and bed mobility techniques to improve mobility, strength, balance and coordination. Required minimal   to promote dynamic balance in standing. Braces/Orthotics/Lines/Etc:   · IV  · O2 Device: Room air  Treatment/Session Assessment:    · Response to Treatment:  Ambulated 50 ft with RW and CGA  · Interdisciplinary Collaboration:   o Physical Therapist  o Registered Nurse  o Certified Nursing Assistant/Patient Care Technician  o SPT  · After treatment position/precautions:   o Up in chair  o Bed/Chair-wheels locked  o Bed in low position  o Caregiver at bedside  o Call light within reach  o Family at bedside   · Compliance with Program/Exercises: Will assess as treatment progresses. · Recommendations/Intent for next treatment session: \"Next visit will focus on reduction in assistance provided\".   Total Treatment Duration:  PT Patient Time In/Time Out  Time In: [address]  Time Out: [place] [address] Paige Messer

## 2018-03-05 NOTE — PROGRESS NOTES
END OF SHIFT NOTE:    INTAKE/OUTPUT  03/04 0701 - 03/05 0700  In: 2108 [I.V.:2108]  Out: 2900 [Urine:2900]  Voiding: NO  Catheter: YES  Drain:   [place] Drain 03/01/18 Mid;Lower Abdomen (Active)   Site Assessment Clean, dry, & intact 3/4/2018  7:52 PM   Dressing Status Clean, dry, & intact 3/4/2018  7:52 PM   Status Patent; Charged;Draining 3/4/2018  7:52 PM   Drainage Color Serosanguinous 3/4/2018  7:52 PM   Output (ml) 5 ml 3/4/2018  6:33 AM       Colostomy 03/01/18 Left Abdomen (Active)   Drainage Color Serosanguinous 3/4/2018  7:52 PM   Site Assessment Clean, dry, intact;Pink;Red 3/4/2018  7:52 PM   Treatment Other (Comment) 3/4/2018 12:05 AM   Output (ml) 0 mL 3/4/2018  7:52 PM               Flatus: Patient does have flatus present. Stool:  1 occurrences. Characteristics:  Stool Assessment  Stool Amount:  (no stool present)  Stool Source/Status: Colostomy    Emesis: 0 occurrences. Characteristics:        VITAL SIGNS  Patient Vitals for the past 12 hrs:   Temp Pulse Resp BP SpO2   03/05/18 0358 98.8 °F (37.1 °C) 98 18 144/88 97 %   03/04/18 2300 98.6 °F (37 °C) 90 18 147/84 97 %   03/04/18 1955 98.9 °F (37.2 °C) (!) 102 17 (!) 153/91 97 %       Pain Assessment  Pain Intensity 1: 0 (03/05/18 0450)  Pain Location 1: Abdomen  Pain Intervention(s) 1: Medication (see MAR)  Patient Stated Pain Goal: 0    Ambulating  YES   (not during night shift     Shift report given to oncoming nurse at the bedside.     Ivet Wiggins RN

## 2018-03-05 NOTE — WOUND CARE
Colostomy lesson with patient and spouse. Stoma pink/ jessie budded, slough at edges, noted blood in bag with sloughing edges likely from this area, barrier cut big to prevent stoma and sloughing areas from being rubbed by barrier. Patient has natural folds and creases in this area and my benefit from 1 piece pouch or from convex barrier. No output at this time. Aster stomal area is very tender/ painful to touch. Will monitor.

## 2018-03-06 PROBLEM — R41.0 CONFUSION: Status: ACTIVE | Noted: 2018-01-01

## 2018-03-06 NOTE — PROGRESS NOTES
END OF SHIFT NOTE:    INTAKE/OUTPUT  03/05 0701 - 03/06 0700  In: 1872 [I.V.:1872]  Out: 2045 [Urine:2025; Drains:20]  Voiding: YES  Catheter: NO  Drain:   [place] Drain 03/01/18 Mid;Lower Abdomen (Active)   Site Assessment Clean, dry, & intact 3/4/2018  7:52 PM   Dressing Status Old drainage 3/5/2018  9:15 PM   Status Patent;Clamped;Draining 3/5/2018  9:15 PM   Drainage Color Serosanguinous 3/5/2018  9:15 PM   Output (ml) 5 ml 3/5/2018  9:15 PM       Colostomy 03/01/18 Left Abdomen (Active)   Drainage Color Serosanguinous 3/5/2018  9:15 PM   Site Assessment Dark edges;Pale;Pink 3/5/2018  9:15 PM   Treatment Other (Comment) 3/5/2018  9:15 PM   Output (ml) 0 mL 3/5/2018  9:15 PM               Flatus: Patient does not have flatus present. Stool:  0 occurrences. Characteristics:  Stool Assessment  Stool Amount:  (no stool present)  Stool Source/Status: Colostomy    Emesis: 0 occurrences. Characteristics:        VITAL SIGNS  Patient Vitals for the past 12 hrs:   Temp Pulse Resp BP SpO2   03/06/18 0300 98.5 °F (36.9 °C) (!) 110 18 146/83 98 %   03/05/18 2300 98.8 °F (37.1 °C) (!) 104 17 131/85 96 %       Pain Assessment  Pain Intensity 1: 7 (03/06/18 0622)  Pain Location 1: Abdomen  Pain Intervention(s) 1: Medication (see MAR)  Patient Stated Pain Goal: 0    Ambulating  Yes    Shift report given to oncoming nurse at the bedside.     Alexsandra Lozano

## 2018-03-06 NOTE — PROGRESS NOTES
PT Note:  Attempt made to treat patient this afternoon. Patient had just gotten back to bed from mobilizing with CRNA and would like to hold therapy until tomorrow afternoon.   Will attempt tomorrow  Thank you,  JAVIER AlvarezT

## 2018-03-06 NOTE — PROGRESS NOTES
PLAN:  IVF - .45% Sodium Chloride with KCL 20 @ 75cc/hr  Pain/ nausea control  Jeter Cath  Start Clear liquid diet  Waiting return of bowel function; stoma digitally stimulated  SCD/Protonix  OOB/ Ambulate  Wound RN for ostomy care  PT  following  PRN hydralazine    ASSESSMENT:  Admit Date: 3/1/2018   5 Days Post-Op  Procedure(s):  OPEN COLON RESECTION WITH COLOSTOMY  CYSTOSCOPY BILATERAL URETERAL STENT INSERTION    Principal Problem:    Rectal cancer ([address] (3/1/2018)       SUBJECTIVE:  Pt awake in bed. No complaints.  -flatus/ - ostomy output. Encouraged OOB/ Ambulate. Jeter out yesterday--voiding without difficulty. Family at bedside. AF, NAD. Some hypertension overnight and tachy in the low 100s. OBJECTIVE:  Constitutional: Alert, cooperative, no acute distress; appears stated age   Visit Vitals    /82    Pulse (!) 107    Temp 99 °F (37.2 °C)    Resp 18    Ht 5' 3\" (1.6 m)    Wt 130 lb (59 kg)    SpO2 92%    BMI 23.03 kg/m2     Eyes:Sclera are clear. ENMT: no external lesions gross hearing normal; no obvious neck masses, no ear or lip lesions  CV: RRR. Normal perfusion  Resp: No JVD.  Breathing is  non-labored; no audible wheezing.    GI: soft, appropriately tender, non-distended, ALEJANDRO drain Serosanguinous - 20 cc documented/24 hours. Colostomy - blood noted in bad, malodorous, stoma with slough, BS active. Musculoskeletal: unremarkable with normal function. No embolic signs or cyanosis.    Neuro:  Oriented; moves all 4; no focal deficits  Psychiatric: normal affect and mood, no memory impairment       Patient Vitals for the past 24 hrs:   BP Temp Pulse Resp SpO2 Weight   03/06/18 0733 133/82 99 °F (37.2 °C) (!) 107 18 92 % -   03/06/18 0300 146/83 98.5 °F (36.9 °C) (!) 110 18 98 % 130 lb (59 kg)   03/05/18 2300 131/85 98.8 °F (37.1 °C) (!) 104 17 96 % -   03/05/18 1900 139/76 98.7 °F (37.1 °C) 100 18 97 % -   03/05/18 1535 153/84 97.5 °F (36.4 °C) 97 19 98 % -   03/05/18 1118 147/76 98.6 °F (37 °C) 93 16 98 % -     Labs:    Recent Labs      03/06/18   0916   WBC  7.7   HGB  12.5   PLT  364   NA  135*   K  4.4   CL  102   CO2  17*   BUN  9   CREA  0.41*   GLU  85       Signed By: Sandra Boudreaux NP     March 6, 2018

## 2018-03-06 NOTE — WOUND CARE
Colostomy lesson with patient and spouse, demonstration of pouch change on model, patient and spouse performed steps with a great deal of guidance and coaching. Stoma red, budded with sloughing edges, scant amount of blood in pouch, no output. Will monitor.

## 2018-03-06 NOTE — CONSULTS
HOSPITALIST CONSULT      NAME:  Bianca Mackenzie   Age:  62 y.o.  :   1960   MRN:   [id number]    PCP: Ronnie Fernandes MD    Attending MD: Chuy Franks MD    Treatment Team: Attending Provider: Ernst Porter MD; Utilization Review: Luciana Ricci RN; Care Manager: Jacqueline Mahmood RN    HPI:     Bianca Mackenzie is a 60yoF with colon cancer with mets to lung and brain s/p gamma knife (last in 2018) who was admitted to Dr. Aubree Hussein on 3/1 for colectomy and colostomy. Today, she began to have cramping in her L hand with rhythmic motions of the RUE and twitching of R face. She was given IV mag and flexeril. She unfortunately became much more confused with significant garbled speech this afternoon and a rapid response was called. She was initially very confused and a little agitated. However, after a few min, her confusion improved and she was able to answer some orientation questions. She was able to respond appropriately regarding her name. Reported that that year was  and that she was at her house. She has no hx of seizures.      Complete ROS done and is as stated in HPI or otherwise negative    Past Medical History:   Diagnosis Date    Arthritis     RA    Colon cancer ([facility] [address]     Metastatic to Lungs and Brain- chemo and radiation    Gastrointestinal disorder     Reflux    GERD (gastroesophageal reflux disease)     controlled  with med    Other ill-defined conditions(799.89)     Herniated disk  lumbar and cervical    Staph aureus infection 2017    site: port- was adm to [facility] 2017 for sepsis-port was dc'd Oct 2017        Past Surgical History:   Procedure Laterality Date    HX ADENOIDECTOMY      HX GYN      Hysterectomy-partial    HX KNEE ARTHROSCOPY Left     HX LAP CHOLECYSTECTOMY      HX TONSILLECTOMY      HX VASCULAR ACCESS      port inserted 2017- out  (col[place])/then had PICC in to give IV antibiotics then dc'd 12/15/17    LYN BIOPSY BREAST STEREOTACTIC Left 10-8-2014        Prior to Admission Medications   Prescriptions Last Dose Informant Patient Reported? Taking? ALPRAZolam (XANAX XR) 1 mg XR tablet 3/1/2018 at 0500  Yes Yes   Si to 2 po bid, prn   HYDROcodone-acetaminophen (NORCO)  mg tablet 3/1/2018 at 0500  No Yes   Sig: Take 1 Tab by mouth every four (4) hours as needed. Max Daily Amount: 6 Tabs. albuterol (PROVENTIL HFA, VENTOLIN HFA, PROAIR HFA) 90 mcg/actuation inhaler Not Taking at Unknown time  No No   Sig: Take 2 Puffs by inhalation every six (6) hours as needed for Wheezing.   esomeprazole (NEXIUM) 40 mg capsule 3/1/2018 at 0500  Yes Yes   Sig: Take 40 mg by mouth daily. lidocaine-prilocaine (EMLA) topical cream Unknown at Unknown time  No No   Sig: Apply  to affected area as needed for Pain. ondansetron (ZOFRAN ODT) 8 mg disintegrating tablet 2018 at Unknown time  No Yes   Sig: Take 1 Tab by mouth every eight (8) hours as needed for Nausea. Indications: PREVENTION OF POST-OPERATIVE NAUSEA AND VOMITING   promethazine (PHENERGAN) 25 mg tablet 2018 at Unknown time  Yes Yes   Sig: Take 25 mg by mouth every six (6) hours as needed for Nausea. Facility-Administered Medications: None       Allergies   Allergen Reactions    Latex Rash    Adhesive Tape-Silicones Rash     Bad red rash.  Ativan [Lorazepam] Other (comments)     Pt states \"gets hallucinations and confused\"    Bactrim [Sulfamethoprim Ds] Hives     Swelling and itching      Lexapro [Escitalopram Oxalate] Other (comments)    Macrobid [Nitrofurantoin Monohyd/M-Cryst] Other (comments)     Felt  \"out of it\"    Pcn [Penicillins] Hives     Swelling and itching also. Pt has tolerated cephalosporins in the past.    Pyridium [Phenazopyridine] Hives     Also itching and swelling.         Social History   Substance Use Topics    Smoking status: Current Some Day Smoker     Packs/day: 0.25     Years: 10.00     Last attempt to quit: 2017   [address] [place] [address] Smokeless tobacco: Never Used    Alcohol use No        Family History   Problem Relation Age of Onset    Stroke Mother     Hypertension Mother     Diabetes Mother     Heart Disease Father     Hypertension Father     Cancer Brother     Breast Cancer Neg Hx         Objective:       Visit Vitals    BP (!) 153/93    Pulse (!) 114    Temp 97.7 °F (36.5 °C)    Resp 18    Ht 5' 3\" (1.6 m)    Wt 59 kg (130 lb)    SpO2 98%    BMI 23.03 kg/m2        Temp (24hrs), Av.4 °F (36.9 °C), Min:97.7 °F (36.5 °C), Max:99 °F (37.2 °C)      Oxygen Therapy  O2 Sat (%): 98 % (18 1658)  Pulse via Oximetry: 92 beats per minute (18 1259)  O2 Device: Room air (18 0835)  O2 Flow Rate (L/min): 2 l/min (18 1551)      Physical Exam:      General:    Alert, confused but NAD    Eyes:   Anicteric  Head:   Normocephalic, without obvious abnormality, atraumatic. ENT:  MMM  Lungs:   Clear  Heart:   Tachy, regular  Abdomen:   Soft, NTTP  MSK:  Spontaneously moves extremities. No deformities/lesions. Skin:     Texture, turgor normal. Not Jaundiced. Neurologic: Alert and oriented x  1(self only), follows commands 5/5 strength except RUE, which is 4/5  Psychiatry:      No depression/anxiety. Mood congruent for context. Heme/Lymph/Immune: No petechiae, echymoses, overt signs of bleeding or lymphadenopathy.       Data Review:   Recent Results (from the past 24 hour(s))   METABOLIC PANEL, BASIC    Collection Time: 18  9:16 AM   Result Value Ref Range    Sodium 135 (L) 136 - 145 mmol/L    Potassium 4.4 3.5 - 5.1 mmol/L    Chloride 102 98 - 107 mmol/L    CO2 17 (L) 21 - 32 mmol/L    Anion gap 16 7 - 16 mmol/L    Glucose 85 65 - 100 mg/dL    BUN 9 6 - 23 MG/DL    Creatinine 0.41 (L) 0.6 - 1.0 MG/DL    GFR est AA >60 >60 ml/min/1.73m2    GFR est non-AA >60 >60 ml/min/1.73m2    Calcium 9.0 8.3 - 10.4 MG/DL   CBC WITH AUTOMATED DIFF    Collection Time: 18  9:16 AM   Result Value Ref Range    WBC 7.7 4.3 - 11.1 K/uL    RBC 4.35 4.05 - 5.25 M/uL    HGB 12.5 11.7 - 15.4 g/dL    HCT 37.1 35.8 - 46.3 %    MCV 85.3 79.6 - 97.8 FL    MCH 28.7 26.1 - 32.9 PG    MCHC 33.7 31.4 - 35.0 g/dL    RDW 14.8 (H) 11.9 - 14.6 %    PLATELET 782 182 - 740 K/uL    MPV 10.2 (L) 10.8 - 14.1 FL    DF AUTOMATED      NEUTROPHILS 71 43 - 78 %    LYMPHOCYTES 15 13 - 44 %    MONOCYTES 11 4.0 - 12.0 %    EOSINOPHILS 3 0.5 - 7.8 %    BASOPHILS 0 0.0 - 2.0 %    IMMATURE GRANULOCYTES 0 0.0 - 5.0 %    ABS. NEUTROPHILS 5.5 1.7 - 8.2 K/UL    ABS. LYMPHOCYTES 1.2 0.5 - 4.6 K/UL    ABS. MONOCYTES 0.9 0.1 - 1.3 K/UL    ABS. EOSINOPHILS 0.2 0.0 - 0.8 K/UL    ABS. BASOPHILS 0.0 0.0 - 0.2 K/UL    ABS. IMM. GRANS. 0.0 0.0 - 0.5 K/UL       Imaging /Procedures /Studies   CT HEAD WO CONT    (Results Pending)       Assessment and Plan: Active Hospital Problems    Diagnosis Date Noted    Confusion 03/06/2018    [facility] 03/01/2018    Secondary malignant neoplasm of brain ([facility] 08/17/2017       PLAN  - confusion may be secondary to addition of flexeril, will stop  - check labs  - given patient's hx of brain mets, will get STAT CT head and an EEG to r/o seizure  - avoid further sedating meds (but can continue dilaudid as has been tolerating well)  - no WBC, no fever; no indication for possible infectious etiology  - add remote tele given tachycardia  - neuro checks q4h overnight  - if not improved in AM and no etiology evident, will need neuro consult    We will continue to follow for patient's acute confusion. Elle Fischer MD  5:04 PM    * patient with witnessed seizure after returning from CT. Changed EEG to STAT. Discussed with EEG tech who can come in and do procedure tonight, may not get read until AM, which will not . Ativan prn seizures. Load keppra 1000mg BD. CT head reviewed and unchanged. Neuro consult in AM. Will likely need MRI tomorrow as well.      6:49 PM

## 2018-03-06 NOTE — PROGRESS NOTES
responded to a RR. Team was working with pt when  arrived. Pt responded to Team.  Pt was alert and verbal.  Some confusion noted. Pt was taken for tests and is planned to return to this room. Pt's  was present.  spoke with him.  provided spiritual care to  through presence, pastoral conversation, assurance of prayer, affirming him of her care by all the team, and prayer.

## 2018-03-07 NOTE — PROGRESS NOTES
Called to pt's room. Pt c/o 3rd, 4th, and 5th finger of right hand going numb. Cramping and jerking and pt unable to control. Pt has experienced numbness in the past but pt's  reports this is different.  Placed call to Ruddy Casanova NP.

## 2018-03-07 NOTE — PROGRESS NOTES
Problem: Mobility Impaired (Adult and Pediatric)  Goal: *Acute Goals and Plan of Care (Insert Text)  STG:  (1.)Ms. Priscila Bill will move from supine to sit and sit to supine , scoot up and down and roll side to side with STAND BY ASSIST within 3 treatment day(s). (2.)Ms. Priscila Bill will transfer from bed to chair and chair to bed with STAND BY ASSIST using the least restrictive device within 3 treatment day(s). (3.)Ms. Priscila Bill will ambulate with STAND BY ASSIST for 250 feet with the least restrictive device within 3 treatment day(s). (4.)Ms. Sesay will ascend/descend 1 step with CONTACT GUARD ASSIST with one handrail within 3 treatment days. LTG:  (1.)Ms. Priscila Bill will move from supine to sit and sit to supine , scoot up and down and roll side to side in bed with INDEPENDENT within 7 treatment day(s). (2.)Ms. Priscila Bill will transfer from bed to chair and chair to bed with SUPERVISION using the least restrictive device within 7 treatment day(s). (3.)Ms. Priscila Bill will ambulate with SUPERVISION for 500+ feet with the least restrictive device within 7 treatment day(s). (4.)Ms. Priscila Bill will ascend/descend 3 steps with STAND BY ASSIST with one handrail within 7 treatment days. ________________________________________________________________________________________________      PHYSICAL THERAPY: Daily Note, Treatment Day: 1st, PM 3/7/2018  INPATIENT: Hospital Day: 7  Payor: Tate Lara / Plan: [facility] [employer] [facility] / Product Type: PPO /      NAME/AGE/GENDER: Francie Cole is a 62 y.o. female   PRIMARY DIAGNOSIS: Cancer of left colon ([address] [C18.6] Rectal cancer ([address] Rectal cancer ([facility] [address]  Procedure(s) (LRB):  OPEN COLON RESECTION WITH COLOSTOMY (N/A)  CYSTOSCOPY BILATERAL URETERAL STENT INSERTION (Bilateral)  6 Days Post-Op  ICD-10: Treatment Diagnosis:   · Generalized Muscle Weakness (M62.81)  · Difficulty in walking, Not elsewhere classified (R26.2)   Precaution/Allergies:  Latex;  Adhesive tape-silicones; Ativan [lorazepam]; Bactrim [sulfamethoprim ds]; Lexapro [escitalopram oxalate]; Macrobid [nitrofurantoin monohyd/m-cryst]; Pcn [penicillins]; and Pyridium [phenazopyridine]      ASSESSMENT:     Ms. Roby Chavez presents lying supine in bed. She is agreeable to treatment with encouragement. She got to edge of bed with CGA. She sat at edge of bed with SBA. She complained of dizziness with sitting and standing. She did not take any steps this treatment. She returned to supine with CGA. Will continue PT efforts. This section established at most recent assessment   PROBLEM LIST (Impairments causing functional limitations):  1. Decreased Strength  2. Decreased ADL/Functional Activities  3. Decreased Transfer Abilities  4. Decreased Ambulation Ability/Technique  5. Decreased Balance  6. Increased Pain  7. Decreased Activity Tolerance  8. Decreased Pacing Skills  9. Increased Fatigue  10. Decreased [place] with Home Exercise Program   INTERVENTIONS PLANNED: (Benefits and precautions of physical therapy have been discussed with the patient.)  1. Balance Exercise  2. Bed Mobility  3. Cold  4. Family Education  5. Gait Training  6. Home Exercise Program (HEP)  7. Manual Therapy  8. Neuromuscular Re-education/Strengthening  9. Range of Motion (ROM)  10. Therapeutic Activites  11. Therapeutic Exercise/Strengthening  12. Transfer Training  13. Group Therapy     TREATMENT PLAN: Frequency/Duration: 3 times a week for duration of hospital stay  Rehabilitation Potential For Stated Goals: Good     RECOMMENDED REHABILITATION/EQUIPMENT: (at time of discharge pending progress): Due to the probability of continued deficits (see above) this patient will likely need continued skilled physical therapy after discharge. Equipment:    to be determined              HISTORY:   History of Present Injury/Illness (Reason for Referral):  Ms. Avelar an 62 y. o. female who was referred for evaluation and treatment of a mass located in the sigmoid colon and rectum. Mass was identified by colonoscopy, biopsies on endoscopy and CT scan. Current symptoms include pelvic pain and profuse stool drainage throught the vagina . The mass was biopsied. Pathology is positive for adenocarcinoma. The lesion was not tattooed. Colonoscopy Colonoscopy was complete to cecum. .   Past Medical History/Comorbidities:   Ms. Ivan Wang  has a past medical history of Arthritis; Colon cancer ([address] Gastrointestinal disorder; GERD (gastroesophageal reflux disease); Other ill-defined conditions(799.89); and Staph aureus infection (12/29/2017). She also has no past medical history of Adverse effect of anesthesia; Difficult intubation; Malignant hyperthermia due to anesthesia; Nausea & vomiting; or Pseudocholinesterase deficiency. Ms. Ivan Wang  has a past surgical history that includes hx lap cholecystectomy; hx gyn; hx tonsillectomy; hx adenoidectomy; reggie biopsy breast stereotactic (Left, 10-8-2014); hx knee arthroscopy (Left, 2014); and hx vascular access. Social History/Living Environment:   Home Environment: Private residence  # Steps to Enter: 3  Rails to Enter: Yes  Hand Rails : Right  One/Two Story Residence: One story  Living Alone: No  Support Systems: Spouse/Significant Other/Partner  Patient Expects to be Discharged to[de-identified] Private residence  Current DME Used/Available at Home: None  Tub or Shower Type: Tub/Shower combination  Prior Level of Function/Work/Activity:  Independent, no AD, living with , driving     Number of Personal Factors/Comorbidities that affect the Plan of Care: 3+: HIGH COMPLEXITY   EXAMINATION:   Most Recent Physical Functioning:   Gross Assessment:                  Posture:  Posture (WDL): Exceptions to WDL  Posture Assessment:  Forward head  Balance:  Sitting: Intact  Standing: Impaired  Standing - Static: Fair  Standing - Dynamic : Fair Bed Mobility:  Supine to Sit: Contact guard assistance  Sit to Supine: Contact guard assistance  Scooting: Contact guard assistance  Wheelchair Mobility:     Transfers:  Sit to Stand: Contact guard assistance  Stand to Sit: Contact guard assistance  Gait:            Body Structures Involved:  1. Heart  2. Lungs  3. Bones  4. Joints  5. Muscles  6. Ligaments Body Functions Affected:  1. Cardio  2. Respiratory  3. Neuromusculoskeletal  4. Movement Related Activities and Participation Affected:  1. Mobility  2. Self Care  3. Domestic Life  4. Interpersonal Interactions and Relationships  5. Community, Social and [place]   Number of elements that affect the Plan of Care: 4+: HIGH COMPLEXITY   CLINICAL PRESENTATION:   Presentation: Stable and uncomplicated: LOW COMPLEXITY   CLINICAL DECISION MAKIN [facility] Mobility Inpatient Short Form  How much difficulty does the patient currently have. .. Unable A Lot A Little None   1. Turning over in bed (including adjusting bedclothes, sheets and blankets)? [] 1   [] 2   [x] 3   [] 4   2. Sitting down on and standing up from a chair with arms ( e.g., wheelchair, bedside commode, etc.)   [] 1   [] 2   [x] 3   [] 4   3. Moving from lying on back to sitting on the side of the bed? [] 1   [] 2   [x] 3   [] 4   How much help from another person does the patient currently need. .. Total A Lot A Little None   4. Moving to and from a bed to a chair (including a wheelchair)? [] 1   [] 2   [x] 3   [] 4   5. Need to walk in hospital room? [] 1   [] 2   [x] 3   [] 4   6. Climbing 3-5 steps with a railing? [] 1   [x] 2   [] 3   [] 4   © , Trustees of [employer], under license to [employer]. All rights reserved      Score:  Initial: 17 Most Recent: X (Date: -- )    Interpretation of Tool:  Represents activities that are increasingly more difficult (i.e. Bed mobility, Transfers, Gait). Score 24 23 22-20 19-15 14-10 9-7 6     Modifier CH CI CJ CK CL CM CN      ?  Mobility - Walking and Moving Around:     - CURRENT STATUS: CK - 40%-59% impaired, limited or restricted    - GOAL STATUS: CJ - 20%-39% impaired, limited or restricted    - D/C STATUS:  ---------------To be determined---------------  Payor: BLUE CROSS / Plan: [place] [employer] [facility] / Product Type: PPO /      Medical Necessity:     · Patient is expected to demonstrate progress in strength, range of motion, balance and coordination to increase independence with transfers, ambulation, and functional mobility. Reason for Services/Other Comments:  · Patient continues to require skilled intervention due to decreased functional mobility and activity tolerance. Use of outcome tool(s) and clinical judgement create a POC that gives a: Clear prediction of patient's progress: LOW COMPLEXITY            TREATMENT:   (In addition to Assessment/Re-Assessment sessions the following treatments were rendered)   Pre-treatment Symptoms/Complaints:  \"I am dizzy. \"  Pain: Initial:   Pain Intensity 1: 7  Pain Intervention(s) 1: Repositioned, Ambulation/Increased Activity  Post Session: 7/10     Therapeutic Activity: (    15 minutes): Therapeutic activities including Bed transfers on safety awareness, pacing, and bed mobility techniques to improve mobility, strength, balance and coordination. Required minimal   to promote dynamic balance in standing. Braces/Orthotics/Lines/Etc:   · IV  · O2 Device: Room air  Treatment/Session Assessment:    · Response to Treatment:  With fatigue and increased pain  · Interdisciplinary Collaboration:   o Physical Therapy Assistant  o Registered Nurse  o Certified Nursing Assistant/Patient Care Technician  · After treatment position/precautions:   o Supine in bed  o Bed/Chair-wheels locked  o Bed in low position  o Caregiver at bedside  o Call light within reach  o Family at bedside   · Compliance with Program/Exercises: Will assess as treatment progresses. · Recommendations/Intent for next treatment session:   \"Next visit will focus on reduction in assistance provided\".   Total Treatment Duration:  PT Patient Time In/Time Out  Time In: 1505  Time Out: [address], PTA

## 2018-03-07 NOTE — PROGRESS NOTES
PLAN:  IVF - .45% Sodium Chloride with KCL 20 @ 75cc/hr  Pain/ nausea control  Jeter Cath  Clear liquid diet  SCD/Protonix  OOB/ Ambulate  Wound RN for ostomy care  PT  following  PRN hydralazine    Neurology  \"Single seizure, focal with secondary generalization, symptomatic secondary to metastatic adenocarcinoma to brain Agree with starting Keppra 1000 mg bid  Follow up MRI on 3/24 as scheduled  Follow up with me as an outpatient in 4-6 weeks\"    ASSESSMENT:  Admit Date: 3/1/2018   6 Days Post-Op  Procedure(s):  OPEN COLON RESECTION WITH COLOSTOMY  CYSTOSCOPY BILATERAL URETERAL STENT INSERTION    Principal Problem:    Rectal cancer ([facility] [address] (3/1/2018)    Active Problems:    Secondary malignant neoplasm of brain ([facility] [address] (8/17/2017)      Confusion (3/6/2018)       SUBJECTIVE:  Pt resting in bed. Spouse at bedside and reports she has taken quite a bit of medication and has been resting. No complaints.  -flatus/ + ostomy output of hard stool. Called yesterday with reports of upper extremity cramping and muscle spasms which later worsened. The patient had some seizure activity yesterday and was placed on Keppra. Neuro and Hospitalist now following. Her neurologist for her brain mets is with [facility]. CT of head and EEG unremarkable. Placed on tele for tachycardia. AF, NAD. OBJECTIVE:  Constitutional: Lethargic, cooperative, no acute distress; appears stated age   Visit Vitals    /83    Pulse 67    Temp 97.6 °F (36.4 °C)    Resp 18    Ht 5' 3\" (1.6 m)    Wt 130 lb (59 kg)    SpO2 95%    BMI 23.03 kg/m2     Eyes:Sclera are clear. ENMT: no external lesions gross hearing normal; no obvious neck masses, no ear or lip lesions  CV: Sinus Tach. Normal perfusion  Resp: No JVD.  Breathing is  non-labored; no audible wheezing.    GI: soft, appropriately tender, non-distended, ALEJANDRO drain Serosanguinous - 2.5 cc documented/24 hours.  Colostomy - formed stool output, stoma pink and viable; some surrounding DELVIN winn active, incision c/d/i with staples  Musculoskeletal: unremarkable with normal function. No embolic signs or cyanosis.    Neuro:  Oriented; moves all 4; no focal deficits  Psychiatric: normal affect and mood, no memory impairment       Patient Vitals for the past 24 hrs:   BP Temp Pulse Resp SpO2 Weight   03/07/18 0720 126/83 97.6 °F (36.4 °C) 67 18 95 % -   03/07/18 0300 109/57 98.5 °F (36.9 °C) (!) 102 17 95 % -   03/06/18 2300 128/88 98.7 °F (37.1 °C) (!) 116 18 97 % -   03/06/18 2059 - - - - 99 % -   03/06/18 2007 133/82 97.7 °F (36.5 °C) (!) 117 18 97 % -   03/06/18 1745 146/81 - (!) 107 - 98 % -   03/06/18 1724 139/85 97.7 °F (36.5 °C) (!) 109 18 97 % -   03/06/18 1658 (!) 153/93 - (!) 114 18 98 % -   03/06/18 1654 (!) 163/104 97.7 °F (36.5 °C) (!) 136 16 99 % -   03/06/18 1530 150/84 97.8 °F (36.6 °C) (!) 113 18 98 % -   03/06/18 1148 149/87 98.1 °F (36.7 °C) 91 17 97 % -     Labs:    Recent Labs      03/07/18   0437  03/06/18   0916   WBC   --   7.7   HGB   --   12.5   PLT   --   364   NA  136  135*   K  4.2  4.4   CL  104  102   CO2  19*  17*   BUN  9  9   CREA  0.31*  0.41*   GLU  94  85       Signed By: Floridalma Kearney NP     March 7, 2018

## 2018-03-07 NOTE — PROCEDURES
[facility]   Routine Electroencephalogram Report      DATE:  3/6/18     EEG Number:  [id number]    Indication: Seizure    Medications:   Current Facility-Administered Medications   Medication Dose Route Frequency Provider Last Rate Last Dose    0.45% sodium chloride with KCl 20 mEq/L infusion   IntraVENous CONTINUOUS George Epps NP 75 mL/hr at 03/06/18 1616      LORazepam (ATIVAN) injection 1 mg  1 mg IntraVENous Q2H PRN Santa Pollock MD        levETIRAcetam (KEPPRA) 1,000 mg in 0.9% sodium chloride 100 mL IVPB  1,000 mg IntraVENous Q12H Santa Pollock MD   1,000 mg at 03/06/18 1811    magnesium sulfate 1 g/100 ml IVPB (premix or compounded)  1 g IntraVENous ONCE Cameron Rodriguez  mL/hr at 03/06/18 2153 1 g at 03/06/18 2153    hydrALAZINE (APRESOLINE) 20 mg/mL injection 10 mg  10 mg IntraVENous Q6H PRN George Epps NP        albuterol (PROVENTIL HFA, VENTOLIN HFA, PROAIR HFA) inhaler 2 Puff  2 Puff Inhalation Q6H PRN Benedict Fung MD        ondansetron [facility]) injection 4 mg  4 mg IntraVENous Q4H PRN Benedict Fung MD   4 mg at 29/34/79 1341    pantoprazole (PROTONIX) 40 mg in sodium chloride 10 mL injection  40 mg IntraVENous DAILY Benedict Fung MD   40 mg at 03/06/18 3590    HYDROmorphone (PF) (DILAUDID) injection 1 mg  1 mg IntraVENous Q2H PRN George Epps NP   1 mg at 03/06/18 1322    naloxone (NARCAN) injection 0.4 mg  0.4 mg IntraVENous EVERY 2 MINUTES AS NEEDED George Epps NP        HYDROmorphone (PF) (DILAUDID) injection 0.5 mg  0.5 mg IntraVENous Q2H PRN George Epps NP   0.5 mg at 03/06/18 8426       Technique: This EEG was performed using the [employer] 10/20 System. An EKG was monitored. The length of the recording was 30 minutes. State of Consciousness: Unresponsive      Description:  Background showed diffuse slow beta activities mixed with theta and delta activities bilateral hemisphere.   Intermittent sharp wave transients appeared in the left occipital lead at 5-6 Hz lasting 2 seconds most of times. No subclinical seizure was identified. No spike and slow wave complex. Activation Procedures:  Hyperventilation: Not available  Photic Stimulation: No driving    EK/EKL, regular    Interpretation: This is a moderately abnormal EEG due to the presence of diffuse, nonspecific cerebral dysfunction associated with sharp wave transients in the left occipital area. Findings suggest left occipital cortical irritation superimposed on encephalopathy. No subclinical seizure is identified during this 30 minute recording. EKG monitoring shows tachycardia.

## 2018-03-07 NOTE — PROGRESS NOTES
Date of Outreach Update:  Ernestine Paulino was seen and assessed. MEWS Score: 3 (03/06/18 2007)  Vitals:    03/06/18 1745 03/06/18 2007 03/06/18 2059 03/06/18 2300   BP: 146/81 133/82  128/88   Pulse: (!) 107 (!) 117  (!) 116   Resp:  18  18   Temp:  97.7 °F (36.5 °C)  98.7 °F (37.1 °C)   SpO2: 98% 97% 99% 97%   Weight:       Height:             Pain Assessment  Pain Intensity 1: 0 (03/07/18 0035)  Pain Location 1: Abdomen  Pain Intervention(s) 1: Medication (see MAR)  Patient Stated Pain Goal: 0      Previous Outreach assessment has been reviewed. There have been no significant clinical changes since the completion of the last dated Outreach assessment. Will continue to follow up per outreach protocol.     Signed By:   Juliano Cardoso RN    March 7, 2018 1:51 AM

## 2018-03-07 NOTE — PROGRESS NOTES
Problem: Mobility Impaired (Adult and Pediatric)  Goal: *Acute Goals and Plan of Care (Insert Text)  STG:  (1.)Ms. Manju Lowery will move from supine to sit and sit to supine , scoot up and down and roll side to side with STAND BY ASSIST within 3 treatment day(s). (2.)Ms. Manju Lowery will transfer from bed to chair and chair to bed with STAND BY ASSIST using the least restrictive device within 3 treatment day(s). (3.)Ms. Manju Lowery will ambulate with STAND BY ASSIST for 250 feet with the least restrictive device within 3 treatment day(s). (4.)Ms. Sesay will ascend/descend 1 step with CONTACT GUARD ASSIST with one handrail within 3 treatment days. LTG:  (1.)Ms. Manju Lowery will move from supine to sit and sit to supine , scoot up and down and roll side to side in bed with INDEPENDENT within 7 treatment day(s). (2.)Ms. Manju Lowery will transfer from bed to chair and chair to bed with SUPERVISION using the least restrictive device within 7 treatment day(s). (3.)Ms. Manju Lowery will ambulate with SUPERVISION for 500+ feet with the least restrictive device within 7 treatment day(s). (4.)Ms. Manju Lowery will ascend/descend 3 steps with STAND BY ASSIST with one handrail within 7 treatment days. ________________________________________________________________________________________________       Physical Therapy Note:    Patient declined physical therapy this afternoon secondary to being in pain. RN stated she had requested something for pain. Patient appeared very drowsy upon contact also. Will check back on patient later day/time as time and schedule permits. Thank you.      Renetta Morales, PTA

## 2018-03-07 NOTE — PROGRESS NOTES
HOSPITALIST      NAME:  Georgiana Pressley   Age:  62 y.o.  :   1960   MRN:   [id number]    PCP: Forest Suarez MD    Attending MD: Dilma Robledo MD    Treatment Team: Attending Provider: Katerina Brower MD; Utilization Review: Rohan Mcknight RN; Care Manager: Laverne Mead RN; Consulting Provider: Elysia Polk MD    subj     Georgiana Pressley is a 54LCC with colon cancer with mets to lung and brain s/p gamma knife (last in 2018) who was admitted to Dr. Arpan Jones on 3/1 for colectomy and colostomy. Today, she began to have cramping in her L hand with rhythmic motions of the RUE and twitching of R face. She was given IV mag and flexeril. She unfortunately became much more confused with significant garbled speech this afternoon and a rapid response was called. She was initially very confused and a little agitated. However, after a few min, her confusion improved and she was able to answer some orientation questions. She was able to respond appropriately regarding her name. Reported that that year was  and that she was at her house. She has no hx of seizures. Today, awake oriented, pain not well controlled beyond 2 hours     Prior to Admission Medications   Prescriptions Last Dose Informant Patient Reported? Taking? ALPRAZolam (XANAX XR) 1 mg XR tablet 3/1/2018 at 0500  Yes Yes   Si to 2 po bid, prn   HYDROcodone-acetaminophen (NORCO)  mg tablet 3/1/2018 at 0500  No Yes   Sig: Take 1 Tab by mouth every four (4) hours as needed. Max Daily Amount: 6 Tabs. albuterol (PROVENTIL HFA, VENTOLIN HFA, PROAIR HFA) 90 mcg/actuation inhaler Not Taking at Unknown time  No No   Sig: Take 2 Puffs by inhalation every six (6) hours as needed for Wheezing.   esomeprazole (NEXIUM) 40 mg capsule 3/1/2018 at 0500  Yes Yes   Sig: Take 40 mg by mouth daily. lidocaine-prilocaine (EMLA) topical cream Unknown at Unknown time  No No   Sig: Apply  to affected area as needed for Pain. ondansetron (ZOFRAN ODT) 8 mg disintegrating tablet 2018 at Unknown time  No Yes   Sig: Take 1 Tab by mouth every eight (8) hours as needed for Nausea. Indications: PREVENTION OF POST-OPERATIVE NAUSEA AND VOMITING   promethazine (PHENERGAN) 25 mg tablet 2018 at Unknown time  Yes Yes   Sig: Take 25 mg by mouth every six (6) hours as needed for Nausea. Facility-Administered Medications: None        Objective:       Visit Vitals    /83    Pulse 67    Temp 97.6 °F (36.4 °C)    Resp 18    Ht 5' 3\" (1.6 m)    Wt 59 kg (130 lb)    SpO2 95%    BMI 23.03 kg/m2        Temp (24hrs), Av °F (36.7 °C), Min:97.6 °F (36.4 °C), Max:98.7 °F (37.1 °C)      Oxygen Therapy  O2 Sat (%): 95 % (18 0720)  Pulse via Oximetry: 100 beats per minute (18)  O2 Device: Room air (18)  O2 Flow Rate (L/min): 2 l/min (18 155)      Physical Exam:      General:    Alert, moderate distress    Lungs:   Clear, no crackles  Heart:   RRR, S1S2  Abdomen:   Soft, NTTP  Skin:     Texture, turgor normal. Not Jaundiced. Neurologic: Alert and oriented x  1(self only), follows commands 5/5 strength except RUE, which is 4/5  Psychiatry:      No depression/anxiety. Mood congruent for context. Heme/Lymph/Immune: No petechiae, echymoses, overt signs of bleeding or lymphadenopathy.       Data Review:   Recent Results (from the past 24 hour(s))   CALCIUM, IONIZED    Collection Time: 18  4:56 PM   Result Value Ref Range    Calcium, ionized 4.96 4.0 - 5.2 mg/dL   MAGNESIUM    Collection Time: 18  4:56 PM   Result Value Ref Range    Magnesium 1.7 (L) 1.8 - 2.4 mg/dL   METABOLIC PANEL, BASIC    Collection Time: 18  4:37 AM   Result Value Ref Range    Sodium 136 136 - 145 mmol/L    Potassium 4.2 3.5 - 5.1 mmol/L    Chloride 104 98 - 107 mmol/L    CO2 19 (L) 21 - 32 mmol/L    Anion gap 13 7 - 16 mmol/L    Glucose 94 65 - 100 mg/dL    BUN 9 6 - 23 MG/DL    Creatinine 0.31 (L) 0.6 - 1.0 MG/DL GFR est AA >60 >60 ml/min/1.73m2    GFR est non-AA >60 >60 ml/min/1.73m2    Calcium 8.5 8.3 - 10.4 MG/DL   MAGNESIUM    Collection Time: 03/07/18  4:37 AM   Result Value Ref Range    Magnesium 2.2 1.8 - 2.4 mg/dL   PHOSPHORUS    Collection Time: 03/07/18  4:37 AM   Result Value Ref Range    Phosphorus 3.6 2.5 - 4.5 MG/DL       Imaging /Procedures /Studies   CT HEAD WO CONT   Final Result   IMPRESSION:     INTERVAL STABILITY OF INTRACRANIAL METASTATIC DISEASE SINCE   DECEMBER 18 WITH NO ACUTE INTRACRANIAL ABNORMALITY IDENTIFIED AT NONCONTRAST CT. Assessment and Plan: Active Hospital Problems    Diagnosis Date Noted    Confusion 03/06/2018    Rectal cancer ([facility] [address] 03/01/2018    Secondary malignant neoplasm of brain ([facility] [address] 08/17/2017       PLAN  - confusion may be secondary to addition of flexeril, will stop. Resolved   - given patient's hx of brain mets, will get STAT CT head and an EEG to r/o seizure. Negative. Questionable seizure witnessed yesterday. - avoid further sedating meds (but can continue dilaudid as has been tolerating well)  - no WBC, no fever; no indication for possible infectious etiology  - add remote tele given tachycardia. Resolved    Will see pt as needed.     Teodora Schaumann, MD  5:04 PM

## 2018-03-07 NOTE — PROGRESS NOTES
Date of Outreach Update:  Ame Sheppard was seen and assessed. MEWS Score: 3 (03/07/18 0300)  Vitals:    03/06/18 2007 03/06/18 2059 03/06/18 2300 03/07/18 0300   BP: 133/82  128/88 109/57   Pulse: (!) 117  (!) 116 (!) 102   Resp: 18 18 17   Temp: 97.7 °F (36.5 °C)  98.7 °F (37.1 °C) 98.5 °F (36.9 °C)   SpO2: 97% 99% 97% 95%   Weight:       Height:             Pain Assessment  Pain Intensity 1: 0 (03/07/18 0035)  Pain Location 1: Abdomen  Pain Intervention(s) 1: Medication (see MAR)  Patient Stated Pain Goal: 0      Previous Outreach assessment has been reviewed. There have been no significant clinical changes since the completion of the last dated Outreach assessment. Will continue to follow up per outreach protocol.     Signed By:   Miranda Mckeon RN    March 7, 2018 6:20 AM

## 2018-03-07 NOTE — WOUND CARE
Stoma pink, budded with soughing edges, small amount of formed stool in pouch. Patient spouse reports patient had seizure during the night, patient currently is very drowsy. Will hold on colostomy lesson today. Will monitor.

## 2018-03-07 NOTE — PROGRESS NOTES
Orders received. Pt continues to experience jerking and cramping in RUE. Neuro checks completed. Unremarkable except for the RUE.   Pt was alert and oriented at this time

## 2018-03-07 NOTE — PROGRESS NOTES
Pt continued to experience jerking motions and cramping in RUE. Orders were received by Emma Pathak. At 1606, Toradol IV given per order. Flexeril 10mg PO given at 1615. Pt then rested for about 15 minutes. Then it was noticed that pt became increasingly confused. Words were garbled. RUE continued to jerk without pt being able to control. RR called at 1654 and ended 1658. See documentation. At 1700, pt left floor for stat CT of the head and returned at 1720. Pt remains confused. On way to room at [phone] when  called for help. Pt experiencing seizure. Dr Gary Bradford on unit and called to room. Ordered Ativan 1mg IV. Given at [phone]. Pt gradually calmed down and sleeping. Pt placed on heart monitor. Keppra hung at 1811. EEG tech arrived and attempting to do EEG. Pt not very cooperative and not following instructions.   Night shift RN to room and bedside report given

## 2018-03-07 NOTE — PROGRESS NOTES
[facility] CRITICAL CARE OUTREACH NURSE PROGRESS REPORT      SUBJECTIVE: Called to assess patient secondary to rapid response. MEWS Score: 3 (03/06/18 2007)  Vitals:    03/06/18 1724 03/06/18 1745 03/06/18 2007 03/06/18 2059   BP: 139/85 146/81 133/82    Pulse: (!) 109 (!) 107 (!) 117    Resp: 18  18    Temp: 97.7 °F (36.5 °C)  97.7 °F (36.5 °C)    SpO2: 97% 98% 97% 99%   Weight:       Height:          LAB DATA:    Recent Labs      03/06/18   1656  03/06/18   0916   NA   --   135*   K   --   4.4   CL   --   102   CO2   --   17*   AGAP   --   16   GLU   --   85   BUN   --   9   CREA   --   0.41*   GFRAA   --   >60   GFRNA   --   >60   CA   --   9.0   MG  1.7*   --         Recent Labs      03/06/18   0916   WBC  7.7   HGB  12.5   HCT  37.1   PLT  364          OBJECTIVE: On arrival to room, I found patient to be sleeping in bed. General appearance: no distress, confused, lethargic  Neurologic: Mental status: alertness: lethargic, orientation: person, affect: normal       Pain Assessment  Pain Intensity 1: 6 (03/06/18 1419)  Pain Location 1: Abdomen  Pain Intervention(s) 1: Medication (see MAR)  Patient Stated Pain Goal: 0                                 ASSESSMENT:  Pt resting in bed NAD. Respirations even and unlabored. Pt with no complaints at this time. PLAN:  Continue to follow per outreach protocol.

## 2018-03-07 NOTE — PROGRESS NOTES
[facility] [address] CRITICAL CARE OUTREACH NURSE PROGRESS REPORT      SUBJECTIVE: Called to assess patient secondary to Rapid responce. MEWS Score: 6 (03/06/18 1654)  Vitals:    03/06/18 1530 03/06/18 1654 03/06/18 1658 03/06/18 1724   BP: 150/84 (!) 163/104 (!) 153/93 139/85   Pulse: (!) 113 (!) 136 (!) 114 (!) 109   Resp: 18 16 18 18   Temp: 97.8 °F (36.6 °C) 97.7 °F (36.5 °C)  97.7 °F (36.5 °C)   SpO2: 98% 99% 98% 97%   Weight:       Height:              LAB DATA:    Recent Labs      03/06/18   1656  03/06/18   0916   NA   --   135*   K   --   4.4   CL   --   102   CO2   --   17*   AGAP   --   16   GLU   --   85   BUN   --   9   CREA   --   0.41*   GFRAA   --   >60   GFRNA   --   >60   CA   --   9.0   MG  1.7*   --         Recent Labs      03/06/18   0916   WBC  7.7   HGB  12.5   HCT  37.1   PLT  364          OBJECTIVE: On arrival to room, I found patient to be having generalized twitching with cramping of upper extermities. Confused         Pain Assessment  Pain Intensity 1: 7 (03/06/18 1322)  Pain Location 1: Abdomen  Pain Intervention(s) 1: Medication (see MAR)  Patient Stated Pain Goal: 0                                 ASSESSMENT:  Assisted with transporting pt to CT. Twitching resolved, pt more alert and able to answer simple questions. resp even unlabored. Follow commands. PLAN:  Will follow up as directed.

## 2018-03-07 NOTE — CONSULTS
Impression:    Single seizure, focal with secondary generalization, symptomatic secondary to metastatic adenocarcinoma to brain    Recommendation:    Agree with starting Keppra 1000 mg bid  Follow up MRI on 3/24 as scheduled  Follow up with me as an outpatient in 4-6 weeks    CC: seizure    The patient is a 62year old female with stage 4 adenocarcinoma of the colon diagnosed by detection of bilateral brain mets in August 2017 when she experienced focal left hand numbness and weakness and was thought to be having a stroke. The patient has undergone a colectomy and is receiving chemotherapy by Dr. Gerald Hicks and has undergone radioneurosurgical treatment (Cyberknife) in [address] at the [facility] by Dr. Bang Kendrick. She has been doing well neurologically. Her  who is the main source of this history states that she has not been complaining of headache numbness weakness or subtle seizure signs such as staring spells, jerking in sleep enuresis or tongue biting in sleep.     Past Medical History:   Diagnosis Date    Arthritis     RA    Colon cancer ([address]     Metastatic to Lungs and Brain- chemo and radiation    Gastrointestinal disorder     Reflux    GERD (gastroesophageal reflux disease)     controlled  with med    Other ill-defined conditions(799.89)     Herniated disk  lumbar and cervical    Staph aureus infection 12/29/2017    site: port- was adm to [facility] Sept 2017 for sepsis-port was dc'd Oct 2017     Past Surgical History:   Procedure Laterality Date    HX ADENOIDECTOMY      HX GYN      Hysterectomy-partial    HX KNEE ARTHROSCOPY Left 2014    HX LAP CHOLECYSTECTOMY      HX TONSILLECTOMY      HX VASCULAR ACCESS      port inserted Sept 2017- out 33/5/80 ([facility])/then had PICC in to give IV antibiotics then dc'd 12/15/17    LYN BIOPSY BREAST STEREOTACTIC Left 10-8-2014     Social History     Social History    Marital status:      Spouse name: N/A    Number of children: N/A    Years of education: N/A     Occupational History    Not on file.      Social History Main Topics    Smoking status: Current Some Day Smoker     Packs/day: 0.25     Years: 10.00     Last attempt to quit: 8/13/2017    Smokeless tobacco: Never Used    Alcohol use No    Drug use: No    Sexual activity: Yes     Birth control/ protection: Surgical     Other Topics Concern    Not on file     Social History Narrative     Family History   Problem Relation Age of Onset    Stroke Mother     Hypertension Mother     Diabetes Mother     Heart Disease Father     Hypertension Father     Cancer Brother     Breast Cancer Neg Hx        Current Facility-Administered Medications:     0.45% sodium chloride with KCl 20 mEq/L infusion, , IntraVENous, CONTINUOUS, Billy Apple NP, Last Rate: 75 mL/hr at 03/07/18 0926    LORazepam (ATIVAN) injection 1 mg, 1 mg, IntraVENous, Q2H PRN, Kemi Paris MD    levETIRAcetam (KEPPRA) 1,000 mg in 0.9% sodium chloride 100 mL IVPB, 1,000 mg, IntraVENous, Q12H, Juan Howell MD, 1,000 mg at 03/07/18 0629    hydrALAZINE (APRESOLINE) 20 mg/mL injection 10 mg, 10 mg, IntraVENous, Q6H PRN, Arabella Bah NP    albuterol (PROVENTIL HFA, VENTOLIN HFA, PROAIR HFA) inhaler 2 Puff, 2 Puff, Inhalation, Q6H PRN, Char Tilley MD    ondansetron [facility]) injection 4 mg, 4 mg, IntraVENous, Q4H PRN, Char Tilley MD, 4 mg at 87/43/83 1341    pantoprazole (PROTONIX) 40 mg in sodium chloride 10 mL injection, 40 mg, IntraVENous, DAILY, Char Tilley MD, 40 mg at 30/23/90 0813    HYDROmorphone (PF) (DILAUDID) injection 1 mg, 1 mg, IntraVENous, Q2H PRN, Arabella Bah NP, 1 mg at 03/07/18 0927    naloxone (NARCAN) injection 0.4 mg, 0.4 mg, IntraVENous, EVERY 2 MINUTES AS NEEDED, Arabella Bah NP    HYDROmorphone (PF) (DILAUDID) injection 0.5 mg, 0.5 mg, IntraVENous, Q2H PRN, Arabella Bah NP, 0.5 mg at 03/06/18 0839    Allergies   Allergen Reactions    Latex Rash    Adhesive Tape-Silicones Rash     Bad red rash.  Ativan [Lorazepam] Other (comments)     Pt states \"gets hallucinations and confused\"    Bactrim [Sulfamethoprim Ds] Hives     Swelling and itching      Lexapro [Escitalopram Oxalate] Other (comments)    Macrobid [Nitrofurantoin Monohyd/M-Cryst] Other (comments)     Felt  \"out of it\"    Pcn [Penicillins] Hives     Swelling and itching also. Pt has tolerated cephalosporins in the past.    Pyridium [Phenazopyridine] Hives     Also itching and swelling. Review of Systems - History obtained from mother and spouse. Having some pain related to recent surgery. Muscles are sore. Very sleepy. Otherwise 12 point ROS negative. Visit Vitals    /83    Pulse 67    Temp 97.6 °F (36.4 °C)    Resp 18    Ht 5' 3\" (1.6 m)    Wt 59 kg (130 lb)    SpO2 95%    BMI 23.03 kg/m2       General: ill appearing somnolent    Eyes: no proptosis or exophthalmos; conjunctivae clear, sclerae non-icteric    Chest: clear to auscultation    Cardiac: normal S1 S2; no murmurs gallop or rubs    Neurological:    MSE: somnolent and inattentive. Arouses to gentle shaking. Speech fluent. Difficulty following more complex commands    CN 2: visual fields full; no afferent pupillary defect; VA not checked; fundoscopic exam not performed . .. CN 3,4,6: Pupils symmetrical in size, reactive to light directly and consensually; no ptosis; full versions and ductions  CN 5: facial sensation intact to light touch and pin prick. Corneal reflex . .. CN 7: Symmetrical facial tone and movements  CN 8 responds to spoken voice  CN 9,10; palate symmetrical gag intact  CN 11: head turn and shoulder shrug intact  CN 12: tongue midline without atrophy or fasiculations    Motor:  Pronator drift on the right. Unable to fully cooperate with manual motor testing.       Cerebellar:  Finger to nose intact      Sensory  Grossly intact    Reflexes    Symmetrical and normally active at 2+ in UE and LE  Plantar response flexor bilaterally    Recent Results (from the past 24 hour(s))   CALCIUM, IONIZED    Collection Time: 18  4:56 PM   Result Value Ref Range    Calcium, ionized 4.96 4.0 - 5.2 mg/dL   MAGNESIUM    Collection Time: 18  4:56 PM   Result Value Ref Range    Magnesium 1.7 (L) 1.8 - 2.4 mg/dL   METABOLIC PANEL, BASIC    Collection Time: 18  4:37 AM   Result Value Ref Range    Sodium 136 136 - 145 mmol/L    Potassium 4.2 3.5 - 5.1 mmol/L    Chloride 104 98 - 107 mmol/L    CO2 19 (L) 21 - 32 mmol/L    Anion gap 13 7 - 16 mmol/L    Glucose 94 65 - 100 mg/dL    BUN 9 6 - 23 MG/DL    Creatinine 0.31 (L) 0.6 - 1.0 MG/DL    GFR est AA >60 >60 ml/min/1.73m2    GFR est non-AA >60 >60 ml/min/1.73m2    Calcium 8.5 8.3 - 10.4 MG/DL   MAGNESIUM    Collection Time: 18  4:37 AM   Result Value Ref Range    Magnesium 2.2 1.8 - 2.4 mg/dL   PHOSPHORUS    Collection Time: 18  4:37 AM   Result Value Ref Range    Phosphorus 3.6 2.5 - 4.5 MG/DL   EEG  Technique: This EEG was performed using the [employer] 10/20 System. An EKG was monitored. The length of the recording was 30 minutes.     State of Consciousness: Unresponsive        Description:  Background showed diffuse slow beta activities mixed with theta and delta activities bilateral hemisphere. Intermittent sharp wave transients appeared in the left occipital lead at 5-6 Hz lasting 2 seconds most of times. No subclinical seizure was identified. No spike and slow wave complex.     Activation Procedures:  Hyperventilation: Not available  Photic Stimulation: No driving     EK/PAJ, regular    Interpretation: This is a moderately abnormal EEG due to the presence of diffuse, nonspecific cerebral dysfunction associated with sharp wave transients in the left occipital area. Findings suggest left occipital cortical irritation superimposed on encephalopathy. No subclinical seizure is identified during this 30 minute recording.   EKG monitoring shows tachycardia. MRI BRAIN WITHOUT AND WITH CONTRAST 12/18/2017     HISTORY: Colon cancer with brain metastases. CyberKnife therapy 4 months ago.     TECHNIQUE: Sagittal and axial T1-weighted, axial T2-weighted, axial FLAIR, axial  T2-weighted gradient-echo, axial diffusion weighted images with ADC maps and  postcontrast axial and coronal T1-weighted images of the brain. 15 August 15,  2017cc of MultiHance gadolinium contrast was administered intravenously without  adverse event to evaluate for pathological leptomeningeal or parenchymal  enhancement.     COMPARISON: August 15, 2017     FINDINGS: Again noted are peripherally enhancing intracranial metastases within  the superior parietal lobes. The metastasis on the left side measures 1.9 cm AP  (image 22 axial post) compared to 2.3 cm on the previous study. The lesion on  the right measures 7 mm AP diameter (axial image 22) compared to 12 mm AP on the  previous study. There is stable edema like signal on the FLAIR sequence around  these metastases.     There is a new 5 mm enhancing intra-axial lesion in the lateral left cerebellar  hemisphere (axial post image 7).    There is stable scattered hyperintense white matter lesions on the T2-weighted  and FLAIR sequences. This pattern may be present with chronic small vessel  ischemic disease or with migraine headaches.     There is no acute infarction, acute parenchymal hemorrhage, hydrocephalus or  abnormal extra-axial fluid collection.        IMPRESSION  IMPRESSION:     1. New 5 mm suspected metastasis in the peripheral left cerebellar.     2.  Interval decrease in size of bilateral parietal metastases.        689       Imaging      MRI BRAIN W WO CONT (Order #[id number]) on 12/18/2017 - Imaging Information         Status Provider Status         Final result (Exam End: 3/6/2018  5:11 PM) Open        Study Result      NONCONTRAST HEAD CT     CLINICAL HISTORY:  Confusion with seizure activity with history of metastatic  colon cancer.     COMPARISON:  CT of August 15, 2017 N MRI of December 18, 2017.     REPORT:   Standard non contrast head CT demonstrates the left parietal  metastasis now partially calcified with AP diameter of 1.8 cm today, unchanged  from December 18. Adjacent vasogenic edema without significant mass effect is  likewise unchanged. The right parietal paramedian metastasis is also partially  calcified today with maximal diameter 7 mm, unchanged. The tiny left lateral  cerebellar metastasis seen by MRI is not confidently identified by CT. No new  intracranial mass effect, hemorrhage, or evidence of geographic infarction is  seen. The ventricles are normal in size and configuration, accounting for the  patient's age. Orbits and  paranasal sinuses are clear where imaged.  Bone  windows demonstrate no definite fracture or destruction.     IMPRESSION  IMPRESSION:     INTERVAL STABILITY OF INTRACRANIAL METASTATIC DISEASE SINCE  DECEMBER 18 WITH NO ACUTE INTRACRANIAL ABNORMALITY IDENTIFIED AT NONCONTRAST CT.                      .

## 2018-03-08 NOTE — PROGRESS NOTES
[facility] CRITICAL CARE OUTREACH NURSE PROGRESS REPORT      SUBJECTIVE: Called to assess patient secondary to outreach program.      MEWS Score: 1 (03/08/18 0423)  Vitals:    03/08/18 0423 03/08/18 0720 03/08/18 1103 03/08/18 1120   BP: 117/67 122/76  115/71   Pulse: 76 91  89   Resp: 18 18 18   Temp: 99.5 °F (37.5 °C) 98.8 °F (37.1 °C)  98 °F (36.7 °C)   SpO2: 97% 96% 96% 96%   Weight:       Height:          LAB DATA:    Recent Labs      03/07/18   0437  03/06/18   1656  03/06/18   0916   NA  136   --   135*   K  4.2   --   4.4   CL  104   --   102   CO2  19*   --   17*   AGAP  13   --   16   GLU  94   --   85   BUN  9   --   9   CREA  0.31*   --   0.41*   GFRAA  >60   --   >60   GFRNA  >60   --   >60   CA  8.5   --   9.0   MG  2.2  1.7*   --    PHOS  3.6   --    --         Recent Labs      03/06/18   0916   WBC  7.7   HGB  12.5   HCT  37.1   PLT  364          OBJECTIVE: On arrival to room, I found patient to be lying in bed. Pain Assessment  Pain Intensity 1: 0 (03/08/18 1530)  Pain Location 1: Abdomen  Pain Intervention(s) 1: Medication (see MAR)  Patient Stated Pain Goal: 0                                 ASSESSMENT:  Patient is alert and oriented to person and place. States she has pain at incision site. Vinod drain in place. Ostomy pink with bag in place. Lung sounds diminished on RA. Abdomen tender and semi soft. Denies shortness of breath. VSS. PLAN:  Will discharge from program per protocol.

## 2018-03-08 NOTE — WOUND CARE
Patient shown steps for colostomy emptying and pouch changing. Patient is having dexterity issues with right hand secondary to numbness and may always need assistance with pouch cares, patient's spouse understands the steps and feels he can both empty and change the pouch especially with practice. Stoma is pink with sloughing from 2-10 o'clock at edges. Small amount of brown output. Peristomal areas painful to touch. Will continue to monitor.

## 2018-03-08 NOTE — PROGRESS NOTES
Hospitalist Progress Note    3/8/2018  Admit Date: 3/1/2018  5:36 AM   NAME: Will Gomez   :  1960   MRN:  [id number]   Attending: Robert Adams MD  PCP:  Gio Dobbs MD    SUBJECTIVE:     Pt is a 63 yo female with stage 4 rectal cancer, mets to brain, who was admitted for surgical colon resection with ostomy placement, underwent procedure . Post op pt with sudden onset left hand cramping and rhythmic motions of RUE and twitching of right face. Pt has no seizure history. Hospitalist have been consulted for seizure work up. EEG with slowed conduction. CT head wihtout acute findings. Pt was evaluated by neurology. She was bolused with keppra and started on BID. She has had no other seizure like activity. This morning pt is resting in bed. States feeling well and requesting solid foods. Abdomen somewhat tender. Hemodynamically stable. Review of Systems negative with exception of pertinent positives noted above  PHYSICAL EXAM     Visit Vitals    /76    Pulse 91    Temp 98.8 °F (37.1 °C)    Resp 18    Ht 5' 3\" (1.6 m)    Wt 59 kg (130 lb)    SpO2 96%    BMI 23.03 kg/m2      Temp (24hrs), Av.6 °F (37 °C), Min:97.5 °F (36.4 °C), Max:99.5 °F (37.5 °C)    Oxygen Therapy  O2 Sat (%): 96 % (18)  Pulse via Oximetry: 100 beats per minute (18)  O2 Device: Room air (18)  O2 Flow Rate (L/min): 2 l/min (18 1551)    Intake/Output Summary (Last 24 hours) at 18 0903  Last data filed at 18 0720   Gross per 24 hour   Intake             1543 ml   Output              680 ml   Net              863 ml      General: No acute distress    Lungs:  CTA Bilaterally.    Heart:  Regular rate and rhythm,  No murmur, rub, or gallop  Abdomen: Soft, Non distended, Non tender, Positive bowel sounds  Extremities: No cyanosis, clubbing or edema  Neurologic:  No focal deficits    ASSESSMENT      Active Hospital Problems    Diagnosis Date Noted    Confusion 03/06/2018    Rectal cancer ([facility] [address] 03/01/2018    Secondary malignant neoplasm of brain ([facility] [address] 08/17/2017     A/P:    Stage 4 rectal cancer- s/p colon resection with ostomy placement. Pt tolerating full liquid, requesting solids. Orders for surgery. New onset seizures- EEG completed. CT Head without acute findings. Seen by neurology with recommendations to cont Keppra BID, change to PO. Pt to follow up with neuro in 4-6 weeks and outpatient MRI 03/24. DVT Prophylaxis: SCDs     Hospitalist will sign off. Please re consult if needed. Thank you!          Signed By: Leia Loredo NP     March 8, 2018

## 2018-03-08 NOTE — PROGRESS NOTES
PLAN:  IVF - .45% Sodium Chloride with KCL 20 @ 50cc/hr  Pain/ nausea control  D/C mccarty  Advance to full liquid diet  SCD/Protonix  OOB/ Ambulate  Wound RN for ostomy care  PT  following  PRN hydralazine  Changed Keppra to PO  They already have follow-up scheduled with their neurologist--will not need any follow-up scheduled on our end  Anticipate D/C in next 1-2 days    Hospitalist has signed off-->keep on Keppra 1000 mg BID     Neurology  \"Single seizure, focal with secondary generalization, symptomatic secondary to metastatic adenocarcinoma to brain Agree with starting Keppra 1000 mg bid  Follow up MRI on 3/24 as scheduled  Follow up with me as an outpatient in 4-6 weeks\"    ASSESSMENT:  Admit Date: 3/1/2018   7 Days Post-Op  Procedure(s):  OPEN COLON RESECTION WITH COLOSTOMY  CYSTOSCOPY BILATERAL URETERAL STENT INSERTION    Principal Problem:    Rectal cancer ([facility] [address] (3/1/2018)    Active Problems:    Secondary malignant neoplasm of brain ([facility] [address] (8/17/2017)      Confusion (3/6/2018)       SUBJECTIVE:  Pt resting in bed. Doing much better today. Tolerating diet. No complaints. Continues to have formed but looser output in ostomy. Tachycardia has resolved. Off telemetry. AF, NAD. OBJECTIVE:  Constitutional: Alert, cooperative, no acute distress; appears stated age   Visit Vitals    /76    Pulse 91    Temp 98.8 °F (37.1 °C)    Resp 18    Ht 5' 3\" (1.6 m)    Wt 130 lb (59 kg)    SpO2 96%    BMI 23.03 kg/m2     Eyes:Sclera are clear. ENMT: no external lesions gross hearing normal; no obvious neck masses, no ear or lip lesions  CV: Sinus Tach. Normal perfusion  Resp: No JVD.  Breathing is  non-labored; no audible wheezing.    GI: soft, appropriately tender, non-distended, ALEJANDRO drain Serosanguinous - 0 cc documented/24 hours. Colostomy - formed stool output, stoma pink and viable, BS active, incision c/d/i with staples  Musculoskeletal: unremarkable with normal function. No embolic signs or cyanosis. Neuro:  Oriented; moves all 4; no focal deficits  Psychiatric: flat affect and mood, no memory impairment       Patient Vitals for the past 24 hrs:   BP Temp Pulse Resp SpO2   03/08/18 0720 122/76 98.8 °F (37.1 °C) 91 18 96 %   03/08/18 0423 117/67 99.5 °F (37.5 °C) 76 18 97 %   03/07/18 2300 113/73 98.9 °F (37.2 °C) 92 18 97 %   03/07/18 1900 122/74 97.5 °F (36.4 °C) 96 18 99 %   03/07/18 1525 110/69 98.1 °F (36.7 °C) 93 18 96 %   03/07/18 1125 122/69 98.8 °F (37.1 °C) 74 18 98 %     Labs:    Recent Labs      03/07/18   0437  03/06/18   0916   WBC   --   7.7   HGB   --   12.5   PLT   --   364   NA  136  135*   K  4.2  4.4   CL  104  102   CO2  19*  17*   BUN  9  9   CREA  0.31*  0.41*   GLU  94  85       Signed By: Tonya Gill NP     March 8, 2018

## 2018-03-08 NOTE — PROGRESS NOTES
Dr. Syed Reed notified that patient asking for mccarty to be taken out. Only 250 output so far. Encouraging patient to drink. Fluids running at 75.  Orders received

## 2018-03-08 NOTE — PROGRESS NOTES
Date of Outreach Update:  Cindy Shane was seen and assessed. MEWS Score: 1 (03/07/18 1900)  Vitals:    03/07/18 1125 03/07/18 1525 03/07/18 1900 03/07/18 2300   BP: 122/69 110/69 122/74 113/73   Pulse: 74 93 96 92   Resp: 18 18 18 18   Temp: 98.8 °F (37.1 °C) 98.1 °F (36.7 °C) 97.5 °F (36.4 °C) 98.9 °F (37.2 °C)   SpO2: 98% 96% 99% 97%   Weight:       Height:             Pain Assessment  Pain Intensity 1: 6 (03/07/18 2345)  Pain Location 1: Abdomen  Pain Intervention(s) 1: Medication (see MAR)  Patient Stated Pain Goal: 0      Previous Outreach assessment has been reviewed. There have been no significant clinical changes since the completion of the last dated Outreach assessment. Will continue to follow up per outreach protocol.     Signed By:   Chapito Heard RN    March 8, 2018 1:58 AM

## 2018-03-08 NOTE — PROGRESS NOTES
Problem: Mobility Impaired (Adult and Pediatric)  Goal: *Acute Goals and Plan of Care (Insert Text)  STG:  (1.)Ms. Luis Fernando Eldridge will move from supine to sit and sit to supine , scoot up and down and roll side to side with STAND BY ASSIST within 3 treatment day(s). (2.)Ms. Luis Fernando Eldridge will transfer from bed to chair and chair to bed with STAND BY ASSIST using the least restrictive device within 3 treatment day(s). (3.)Ms. Luis Fernando Eldridge will ambulate with STAND BY ASSIST for 250 feet with the least restrictive device within 3 treatment day(s). (4.)Ms. Sesay will ascend/descend 1 step with CONTACT GUARD ASSIST with one handrail within 3 treatment days. LTG:  (1.)Ms. Luis Fernando Eldridge will move from supine to sit and sit to supine , scoot up and down and roll side to side in bed with INDEPENDENT within 7 treatment day(s). (2.)Ms. Luis Fernando Eldridge will transfer from bed to chair and chair to bed with SUPERVISION using the least restrictive device within 7 treatment day(s). (3.)Ms. Luis Fernando Eldridge will ambulate with SUPERVISION for 500+ feet with the least restrictive device within 7 treatment day(s). (4.)Ms. Luis Fernando Eldridge will ascend/descend 3 steps with STAND BY ASSIST with one handrail within 7 treatment days. ________________________________________________________________________________________________      PHYSICAL THERAPY: Daily Note, Treatment Day: 2nd, PM 3/8/2018  INPATIENT: Hospital Day: 8  Payor: Truong Ruano / Plan: [address] / Product Type: PPO /      NAME/AGE/GENDER: Axel Chavez is a 62 y.o. female   PRIMARY DIAGNOSIS: Cancer of left colon ([facility] [address] [C18.6] Rectal cancer ([address] Rectal cancer ([facility] [address]  Procedure(s) (LRB):  OPEN COLON RESECTION WITH COLOSTOMY (N/A)  CYSTOSCOPY BILATERAL URETERAL STENT INSERTION (Bilateral)  7 Days Post-Op  ICD-10: Treatment Diagnosis:   · Generalized Muscle Weakness (M62.81)  · Difficulty in walking, Not elsewhere classified (R26.2)   Precaution/Allergies:  Latex;  Adhesive tape-silicones; Ativan [lorazepam]; Bactrim [sulfamethoprim ds]; Lexapro [escitalopram oxalate]; Macrobid [nitrofurantoin monohyd/m-cryst]; Pcn [penicillins]; and Pyridium [phenazopyridine]      ASSESSMENT:     Ms. Emanuel Wright presents lying supine in bed. She is agreeable to treatment and stated she would like to attempt walking a little. Pt required min assistance to roll in bed and transfer from supine to sit. Pt required CGA for all other activities. She got to edge of bed with CGA and sat for a few minutes to let dizziness subside. She sat at edge of bed with SBA. Pt ambulated slowly to just outside door way with RW and SBA. Pt paused at doorway and stated she was experiencing 9/10 pain in her R abdominal region. Pt performed turning with walker well and required minimal verbal cueing. Pt returned to bed and sat EOB with SBA. Pt required SBA to return to supine from sitting. Pt left supine in bed with all needs by bedside and nursing notified. This section established at most recent assessment   PROBLEM LIST (Impairments causing functional limitations):  1. Decreased Strength  2. Decreased ADL/Functional Activities  3. Decreased Transfer Abilities  4. Decreased Ambulation Ability/Technique  5. Decreased Balance  6. Increased Pain  7. Decreased Activity Tolerance  8. Decreased Pacing Skills  9. Increased Fatigue  10. Decreased [place] with Home Exercise Program   INTERVENTIONS PLANNED: (Benefits and precautions of physical therapy have been discussed with the patient.)  1. Balance Exercise  2. Bed Mobility  3. Cold  4. Family Education  5. Gait Training  6. Home Exercise Program (HEP)  7. Manual Therapy  8. Neuromuscular Re-education/Strengthening  9. Range of Motion (ROM)  10. Therapeutic Activites  11. Therapeutic Exercise/Strengthening  12. Transfer Training  13.  Group Therapy     TREATMENT PLAN: Frequency/Duration: 3 times a week for duration of hospital stay  Rehabilitation Potential For Stated Goals: Good     RECOMMENDED REHABILITATION/EQUIPMENT: (at time of discharge pending progress): Due to the probability of continued deficits (see above) this patient will likely need continued skilled physical therapy after discharge. Equipment:    to be determined              HISTORY:   History of Present Injury/Illness (Reason for Referral):  Ms. Avelar an 62 y. o. female who was referred for evaluation and treatment of a mass located in the sigmoid colon and rectum. Mass was identified by colonoscopy, biopsies on endoscopy and CT scan. Current symptoms include pelvic pain and profuse stool drainage throught the vagina . The mass was biopsied. Pathology is positive for adenocarcinoma. The lesion was not tattooed. Colonoscopy Colonoscopy was complete to cecum. .   Past Medical History/Comorbidities:   Ms. Diego Abreu  has a past medical history of Arthritis; Colon cancer ([facility] [address] Gastrointestinal disorder; GERD (gastroesophageal reflux disease); Other ill-defined conditions(799.89); and Staph aureus infection (12/29/2017). She also has no past medical history of Adverse effect of anesthesia; Difficult intubation; Malignant hyperthermia due to anesthesia; Nausea & vomiting; or Pseudocholinesterase deficiency. Ms. Diego Abreu  has a past surgical history that includes hx lap cholecystectomy; hx gyn; hx tonsillectomy; hx adenoidectomy; reggie biopsy breast stereotactic (Left, 10-8-2014); hx knee arthroscopy (Left, 2014); and hx vascular access.   Social History/Living Environment:   Home Environment: Private residence  # Steps to Enter: 3  Rails to Enter: Yes  Hand Rails : Right  One/Two Story Residence: One story  Living Alone: No  Support Systems: Spouse/Significant Other/Partner  Patient Expects to be Discharged to[de-identified] Private residence  Current DME Used/Available at Home: None  Tub or Shower Type: Tub/Shower combination  Prior Level of Function/Work/Activity:  Independent, no AD, living with , driving     Number of Personal Factors/Comorbidities that affect the Plan of Care: 3+: HIGH COMPLEXITY   EXAMINATION:   Most Recent Physical Functioning:   Gross Assessment:                  Posture:  Posture (WDL): Exceptions to WDL  Posture Assessment: Forward head, Rounded shoulders  Balance:  Sitting: Without support  Standing: Impaired  Standing - Static: Good  Standing - Dynamic : Poor Bed Mobility:  Rolling: Minimum assistance  Supine to Sit: Minimum assistance  Sit to Supine: Stand-by assistance  Scooting: Stand-by assistance  Wheelchair Mobility:     Transfers:  Sit to Stand: Stand-by assistance  Stand to Sit: Stand-by assistance  Gait:     Base of Support: Widened  Speed/Kay: Slow  Step Length: Left shortened;Right shortened  Gait Abnormalities: Antalgic  Distance (ft): 20 Feet (ft)  Assistive Device: Walker, rolling  Ambulation - Level of Assistance: Stand-by assistance      Body Structures Involved:  1. Heart  2. Lungs  3. Bones  4. Joints  5. Muscles  6. Ligaments Body Functions Affected:  1. Cardio  2. Respiratory  3. Neuromusculoskeletal  4. Movement Related Activities and Participation Affected:  1. Mobility  2. Self Care  3. Domestic Life  4. Interpersonal Interactions and Relationships  5. Community, Social and [place]   Number of elements that affect the Plan of Care: 4+: HIGH COMPLEXITY   CLINICAL PRESENTATION:   Presentation: Stable and uncomplicated: LOW COMPLEXITY   CLINICAL DECISION MAKIN [facility] Short Form  How much difficulty does the patient currently have. .. Unable A Lot A Little None   1. Turning over in bed (including adjusting bedclothes, sheets and blankets)? [] 1   [] 2   [x] 3   [] 4   2. Sitting down on and standing up from a chair with arms ( e.g., wheelchair, bedside commode, etc.)   [] 1   [] 2   [x] 3   [] 4   3. Moving from lying on back to sitting on the side of the bed?    [] 1   [] 2   [x] 3   [] 4   How much help from another person does the patient currently need... Total A Lot A Little None   4. Moving to and from a bed to a chair (including a wheelchair)? [] 1   [] 2   [x] 3   [] 4   5. Need to walk in hospital room? [] 1   [] 2   [x] 3   [] 4   6. Climbing 3-5 steps with a railing? [] 1   [x] 2   [] 3   [] 4   © 2007, Trustees of [facility] [employer], under license to [employer]. All rights reserved      Score:  Initial: 17 Most Recent: X (Date: -- )    Interpretation of Tool:  Represents activities that are increasingly more difficult (i.e. Bed mobility, Transfers, Gait). Score 24 23 22-20 19-15 14-10 9-7 6     Modifier CH CI CJ CK CL CM CN      ? Mobility - Walking and Moving Around:     - CURRENT STATUS: CK - 40%-59% impaired, limited or restricted    - GOAL STATUS: CJ - 20%-39% impaired, limited or restricted    - D/C STATUS:  ---------------To be determined---------------  Payor: BLUE CROSS / Plan: [place] [employer] / Product Type: PPO /      Medical Necessity:     · Patient is expected to demonstrate progress in strength, range of motion, balance and coordination to increase independence with transfers, ambulation, and functional mobility. Reason for Services/Other Comments:  · Patient continues to require skilled intervention due to decreased functional mobility and activity tolerance. Use of outcome tool(s) and clinical judgement create a POC that gives a: Clear prediction of patient's progress: LOW COMPLEXITY            TREATMENT:   (In addition to Assessment/Re-Assessment sessions the following treatments were rendered)   Pre-treatment Symptoms/Complaints: \"My stomach hurts. \"  Pain: Initial: 9/10     Post Session: 7/10     Therapeutic Activity: (    15 minutes): Therapeutic activities including Bed transfers on safety awareness, pacing, and bed mobility techniques to improve mobility, strength, balance and coordination. Required minimal   to promote dynamic balance in standing.      Braces/Orthotics/Lines/Etc: · IV  · O2 Device: Room air  Treatment/Session Assessment:    · Response to Treatment:  With fatigue and increased pain  · Interdisciplinary Collaboration:   o Physical Therapy Assistant  o Registered Nurse  o Certified Nursing Assistant/Patient Care Technician  · After treatment position/precautions:   o Supine in bed  o Bed/Chair-wheels locked  o Bed in low position  o Call light within reach  o RN notified   · Compliance with Program/Exercises: Will assess as treatment progresses. · Recommendations/Intent for next treatment session: \"Next visit will focus on reduction in assistance provided\".   Total Treatment Duration:  PT Patient Time In/Time Out  Time In: 1102  Time Out: [facility] PT, DPT

## 2018-03-08 NOTE — PROGRESS NOTES
[facility]  Face to Face Encounter    Patients Name: Estelle Bronson    YOB: 1960    Ordering Physician: Dr. Tal Varma MD     Primary Diagnosis: Cancer of left colon [facility]) [C18.6]    Date of Face to Face:   3/8/2018                                  Face to Face Encounter findings are related to primary reason for home care:   yes. 1. I certify that the patient needs intermittent care as follows: skilled nursing care:  teaching/training of colostomy management    2. I certify that this patient is homebound, that is: 1) patient requires the use of a no assistive device required device, special transportation, or assistance of another to leave the home; or 2) patient's condition makes leaving the home medically contraindicated; and 3) patient has a normal inability to leave the home and leaving the home requires considerable and taxing effort. Patient may leave the home for infrequent and short duration for medical reasons, and occasional absences for non-medical reasons. Homebound status is due to the following functional limitations: Patient currently under activity restrictions secondary to recent surgical procedure, this hinders their ability to safely leave the home. 3. I certify that this patient is under my care and that I, or a nurse practitioner or [facility], or clinical nurse specialist, or certified nurse midwife, working with me, had a Face-to-Face Encounter that meets the physician Face-to-Face Encounter requirements. The following are the clinical findings from the [address] [place] [address] encounter that support the need for skilled services and is a summary of the encounter: see hospital chart    See hospital chart      Gilda Guadalupe RN  3/8/2018      THE FOLLOWING TO BE COMPLETED BY THE COMMUNITY PHYSICIAN:    I concur with the findings described above from the [facility] encounter that this patient is homebound and in need of a skilled service.     Certifying Physician: _____________________________________      Printed Certifying Physician Name: _____________________________________    Date: _________________

## 2018-03-08 NOTE — PROGRESS NOTES
Patient seen and examined. No further seizures. Some pain from rib Fx and chest tube. No other CO    Visit Vitals    /71    Pulse 89    Temp 98 °F (36.7 °C)    Resp 18    Ht 5' 3\" (1.6 m)    Wt 59 kg (130 lb)    SpO2 96%    BMI 23.03 kg/m2     Patient sitting up in chair family at bedside. CN 2-12 intact  No weakness    Assessment     Intractable focal epilepsy symptomatic  Breakthrough seizure with injury    Plan;  Continue Vimpat and Keppra at current doses  Continue study drug  Ativan IV prn seizures  Do not change maintenance AED's without clearing with AED  at [address] as this will likely be a protocol violation and would mean the patient would not be able to continue the study. There may be some \"safety\" escapes built into the protocol but these would require confirmation and coordination. Fourunatley the patient is stable neurologically.

## 2018-03-08 NOTE — PROGRESS NOTES
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 Pt reports decreased intake to a little <50% for about 1 week pre-op because she was nervous about the surgery. At one point she was drinking Boost but then it started to make her sick. She feels like she did well with her lunch today and reprots she ate a little over 50% of soup and pudding. Denies nausea or abdominal pain. Pt receptive to trying supplemental ice cream.  Anthropometrics:Height: 5' 3\" (160 cm),    Last 3 Recorded Weights in this Encounter    03/03/18 0645 03/05/18 0701 03/06/18 0300   Weight: 62.1 kg (137 lb) 59.1 kg (130 lb 3.2 oz) 59 kg (130 lb)    Body mass index is 23.03 kg/(m^2). BMI class of normal weight. Pt says she was surprised when she weighed 130# at her pre-op assessment appointment as she did not notice that she was losing weight. She wonders if the scale was accurate. She reports UBW of 145#.  Weight hx per EMR ( Based on connect care functionality, RD cannot know if these weight are actual versus stated):  WT / BMI WEIGHT   2/8/2018 142 lb   1/29/2018 146 lb   1/26/2018 146 lb 3.2 oz   1/24/2018 140 lb 14.4 oz   1/24/2018 140 lb 14.4 oz   1/12/2018 146 lb   1/10/2018 147 lb   1/5/2018 151 lb   1/4/2018 151 lb 9.6 oz   1/3/2018 152 lb   1/2/2018 152 lb   12/29/2017 155 lb   12/28/2017 155 lb 3.2 oz   12/22/2017 153 lb   12/18/2017 154 lb   12/8/2017 152 lb   12/7/2017 149 lb 6.4 oz   11/21/2017 155 lb   11/2/2017 158 lb 3.2 oz   10/27/2017 154 lb   10/27/2017 156 lb 9.6 oz   10/20/2017 154 lb 3.2 oz   10/8/2017 154 lb 12.8 oz   10/6/2017 152 lb   10/3/2017 152 lb 6.4 oz   9/22/2017 151 lb   9/20/2017 150 lb   9/15/2017 149 lb 1.6 oz   9/12/2017 149 lb   9/8/2017 149 lb   9/7/2017 147 lb   8/30/2017 147 lb   8/29/2017 149 lb   8/18/2017 144 lb   8/17/2017 151 lb   8/16/2017 151 lb   8/15/2017 152 lb   If 130# is an accurate weight, this represents a 13.4% change in weight in < 6 months which would be significant. Macronutrient needs:  EER:  0116-6417 kcal /day (25-30 kcal/kg listed BW)  EPR:  63-78 grams protein/day (1.2-1.5 grams/kg IBW)  Intake/Comparative Standards: Deferred. Pt has not yet had solid food to eat. Nutrition Diagnosis: Inadequate oral intake r/t decreased ability to consume sufficient oral intake as evidenced by diet limited to NPO or clear liquid diet status for 5 out of 7 day LOS    Intervention:  Meals and snacks: Continue current diet. Nutrition Supplement Therapy: Magic cup bid. Discharge nutrition plan: Too soon to determine.     Indra Cavazos, [address], [facility] [address]

## 2018-03-08 NOTE — PROGRESS NOTES
Spoke to Ms. Sesay in room 210 about discharge planning. She lives in [place], [place] with her . Agrees to home health for continued colostomy teaching. Order, face to face, and referral placed for Nevae[facility]  13..

## 2018-03-09 NOTE — PROGRESS NOTES
PLAN:  D/C IVF - .45% Sodium Chloride with KCL 20 @ 50cc/hr  Pain/ nausea control  Advance GI soft diet  SCD/Protonix  OOB/ Ambulate  Wound RN for ostomy care  PT  following  PRN hydralazine  PO Keppra  Drain is out  They already have follow-up scheduled with their neurologist--will not need any follow-up scheduled on our end  Anticipate d/c today. Takes [place] 10 at home. Plan to go home with [place] and Ultram.  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 Doing much better today. Tolerating diet. No complaints. Continues to have formed but looser output in ostomy. AF, VSS, oxygenating well on room air. 800ml/24hr UOP. OBJECTIVE:  Constitutional: Alert, cooperative, no acute distress; appears stated age   Visit Vitals    /78    Pulse 85    Temp 98.1 °F (36.7 °C)    Resp 18    Ht 5' 3\" (1.6 m)    Wt 130 lb (59 kg)    SpO2 96%    BMI 23.03 kg/m2     Eyes:Sclera are clear. ENMT: no external lesions gross hearing normal; no obvious neck masses, no ear or lip lesions  CV: Sinus Tach. Normal perfusion  Resp: No JVD.  Breathing is  non-labored; no audible wheezing.  CTAB. GI: soft, appropriately tender, non-distended,  Colostomy - formed stool output, stoma pink and viable, BS active, incision c/d/i with staples  Musculoskeletal: unremarkable with normal function. No embolic signs or cyanosis. Neuro:  Oriented; moves all 4; no focal deficits  Psychiatric: flat affect and mood, no memory impairment       Patient Vitals for the past 24 hrs:   BP Temp Pulse Resp SpO2 Weight   03/09/18 0749 120/78 98.1 °F (36.7 °C) 85 18 96 % -   03/09/18 0651 - - - - - 130 lb (59 kg)   03/09/18 0411 106/72 98.9 °F (37.2 °C) 87 18 98 % -   03/08/18 2335 115/70 98.7 °F (37.1 °C) 93 18 97 % -   03/08/18 2015 125/82 97.7 °F (36.5 °C) 81 18 98 % -   03/08/18 1520 117/72 98.5 °F (36.9 °C) 89 18 95 % -   03/08/18 1120 115/71 98 °F (36.7 °C) 89 18 96 % -   03/08/18 1103 - - - - 96 % -     Labs:    Recent Labs      03/07/18   0437   NA  136   K  4.2   CL  104   CO2  19*   BUN  9   CREA  0.31*   GLU  94       Signed By: ELGIN Bradshaw     March 9, 2018      I have seen and examined the patient with the nurse practitioner and agree with the above assessment and plan.     Roc Willis MD

## 2018-03-09 NOTE — DISCHARGE INSTRUCTIONS
Discharge Instructions/Follow-up Plans:   MD Instructions:     Follow-up with Dr. Alm Osgood in 1 week. Keep incisions clean and dry, may remain uncovered. Do not apply lotions, creams or ointments to incisions.     Diet - as tolerated - Soft foods diet. Activity - ambulate - as tolerated - no heavy lifting >10lb. May shower - no tub baths or soaking/submerging.     No driving while taking narcotics. Do not drink alcohol while taking narcotics. Resume other home medications. Resume home [place].  Add Ultram for pain control as needed.     If problems or questions arise, please call our office at [phone].     Greater than 30 minutes were spent discharging the patient          DISCHARGE SUMMARY from Nurse    PATIENT INSTRUCTIONS:    After general anesthesia or intravenous sedation, for 24 hours or while taking prescription Narcotics:  · Limit your activities  · Do not drive and operate hazardous machinery  · Do not make important personal or business decisions  · Do  not drink alcoholic beverages  · If you have not urinated within 8 hours after discharge, please contact your surgeon on call. Report the following to your surgeon:  · Excessive pain, swelling, redness or odor of or around the surgical area  · Temperature over 100.5  · Nausea and vomiting lasting longer than 4 hours or if unable to take medications  · Any signs of decreased circulation or nerve impairment to extremity: change in color, persistent  numbness, tingling, coldness or increase pain  · Any questions    What to do at Home:  Recommended activity: Activity as tolerated, per MD instructions    If you experience any of the following symptoms fever > 100.5, nausea, vomiting, pain, chest pain and/or shortness of breath please follow up with MD.    *  Please give a list of your current medications to your Primary Care Provider.     *  Please update this list whenever your medications are discontinued, doses are      changed, or new medications (including over-the-counter products) are added. *  Please carry medication information at all times in case of emergency situations. These are general instructions for a healthy lifestyle:    No smoking/ No tobacco products/ Avoid exposure to second hand smoke  Surgeon General's Warning:  Quitting smoking now greatly reduces serious risk to your health. Obesity, smoking, and sedentary lifestyle greatly increases your risk for illness    A healthy diet, regular physical exercise & weight monitoring are important for maintaining a healthy lifestyle    You may be retaining fluid if you have a history of heart failure or if you experience any of the following symptoms:  Weight gain of 3 pounds or more overnight or 5 pounds in a week, increased swelling in our hands or feet or shortness of breath while lying flat in bed. Please call your doctor as soon as you notice any of these symptoms; do not wait until your next office visit. Recognize signs and symptoms of STROKE:    F-face looks uneven    A-arms unable to move or move unevenly    S-speech slurred or non-existent    T-time-call 911 as soon as signs and symptoms begin-DO NOT go       Back to bed or wait to see if you get better-TIME IS BRAIN. Warning Signs of HEART ATTACK     Call 911 if you have these symptoms:   Chest discomfort. Most heart attacks involve discomfort in the center of the chest that lasts more than a few minutes, or that goes away and comes back. It can feel like uncomfortable pressure, squeezing, fullness, or pain.  Discomfort in other areas of the upper body. Symptoms can include pain or discomfort in one or both arms, the back, neck, jaw, or stomach.  Shortness of breath with or without chest discomfort.  Other signs may include breaking out in a cold sweat, nausea, or lightheadedness. Don't wait more than five minutes to call 911 - MINUTES MATTER! Fast action can save your life.  Calling 911 is almost always the fastest way to get lifesaving treatment. Emergency Medical Services staff can begin treatment when they arrive -- up to an hour sooner than if someone gets to the hospital by car. The discharge information has been reviewed with the patient. The patient verbalized understanding. Discharge medications reviewed with the patient and appropriate educational materials and side effects teaching were provided. ___________________________________________________________________________________________________________________________________         Cystoscopy: What to Expect at [address] [facility]    A cystoscopy is a procedure that lets a doctor look inside of the bladder and the urethra. The urethra is the tube that carries urine from the bladder to outside the body. The doctor uses a thin, lighted tool called a cystoscope. Your bladder is filled with fluid. This stretches the bladder so that your doctor can look closely at the inside of your bladder. After the cystoscopy, your urethra may be sore at first, and it may burn when you urinate for the first few days after the procedure. You may feel the need to urinate more often, and your urine may be pink. These symptoms should get better in 1 or 2 days. You will probably be able to go back to most of your usual activities in 1 or 2 days. This care sheet gives you a general idea about how long it will take for you to recover. But each person recovers at a different pace. Follow the steps below to get better as quickly as possible. How can you care for yourself at home? Activity  ? · Rest when you feel tired. Getting enough sleep will help you recover. ? · Try to walk each day. Start by walking a little more than you did the day before. Bit by bit, increase the amount you walk. Walking boosts blood flow and helps prevent pneumonia and constipation.    ? · Avoid strenuous activities, such as bicycle riding, jogging, weight lifting, or aerobic exercise, until your doctor says it is okay. ? · Ask your doctor when you can drive again. ? · Most people are able to return to work within 1 or 2 days after the procedure. ? · You may shower and take baths as usual.   ? · Ask your doctor when it is okay for you to have sex. Diet  ? · You can eat your normal diet. If your stomach is upset, try bland, low-fat foods like plain rice, broiled chicken, toast, and yogurt. ? · Drink plenty of fluids (unless your doctor tells you not to). Medicines  ? · Take pain medicines exactly as directed. ¨ If the doctor gave you a prescription medicine for pain, take it as prescribed. ¨ If you are not taking a prescription pain medicine, ask your doctor if you can take an over-the-counter medicine. ? · If you think your pain medicine is making you sick to your stomach:  ¨ Take your medicine after meals (unless your doctor has told you not to). ¨ Ask your doctor for a different pain medicine. ? · If your doctor prescribed antibiotics, take them as directed. Do not stop taking them just because you feel better. You need to take the full course of antibiotics. Follow-up care is a key part of your treatment and safety. Be sure to make and go to all appointments, and call your doctor if you are having problems. It's also a good idea to know your test results and keep a list of the medicines you take. When should you call for help? Call 911 anytime you think you may need emergency care. For example, call if:  ? · You passed out (lost consciousness). ? · You have severe trouble breathing. ? · You have sudden chest pain and shortness of breath, or you cough up blood. ? · You have severe belly pain. ?Call your doctor now or seek immediate medical care if:  ? · You are sick to your stomach or cannot keep fluids down. ? · Your urine is still red or you see blood clots after you have urinated several times.    ? · You have trouble passing urine or stool, especially if you have pain or swelling in your lower belly. ? · You have signs of a blood clot, such as:  ¨ Pain in your calf, back of the knee, thigh, or groin. ¨ Redness and swelling in your leg or groin. ? · You develop a fever or severe chills. ? · You have pain in your back just below your rib cage. This is called flank pain. ? Watch closely for changes in your health, and be sure to contact your doctor if:  ? · You have pain or burning when you urinate. A burning feeling is normal for a day or two after the test, but call if it does not get better. ? · You have a frequent urge to urinate but can pass only small amounts of urine. ? · Your urine is pink, red, or cloudy, or smells bad. It is normal for the urine to have a pinkish color for a few days after the test, but call if it does not get better. Where can you learn more? Go to http://marta-aishwarya.info/. Enter Y719 in the search box to learn more about \"Cystoscopy: What to Expect at Home. \"  Current as of: May 12, 2017  Content Version: 11.4  © 8998-5073 [employer]. Care instructions adapted under license by [employer] (which disclaims liability or warranty for this information). If you have questions about a medical condition or this instruction, always ask your healthcare professional. [place] [address] any warranty or liability for your use of this information. Open Bowel Resection: What to Expect at [address] [place] [address] are likely to have pain that comes and goes for the next few days after bowel surgery. You may have bowel cramps, and your cut (incision) may hurt. You may also feel like you have the flu. You may have a low fever and feel tired and nauseated. This is common. You should feel better after a week and will probably be back to normal in 2 to 3 weeks. This care sheet gives you a general idea about how long it will take for you to recover.  But each person recovers at a different pace. Follow the steps below to get better as quickly as possible. How can you care for yourself at home? Activity  ? · Rest when you feel tired. Getting enough sleep will help you recover. ? · Try to walk each day. Start by walking a little more than you did the day before. Bit by bit, increase the amount you walk. Walking boosts blood flow and helps prevent pneumonia and constipation. ? · Avoid strenuous activities, such as biking, jogging, weight lifting, or aerobic exercise, until your doctor says it is okay. ? · Ask your doctor when you can drive again. ? · You will probably need to take 3 to 4 weeks off from work. It depends on the type of work you do and how you feel. You may need to take off 4 to 6 weeks if you lift heavy objects in your job. ? · You may shower 24 to 48 hours after surgery, if your doctor says it is okay. Pat the cut (incision) dry. Do not take a bath for the first 2 weeks, or until your doctor tells you it is okay. ? · Ask your doctor when it is okay for you to have sex. Diet  ? · You may not have much appetite after the surgery. But try to eat a healthy diet. Your doctor will tell you about any foods you should not eat. ? · Eat a low-fiber diet for several weeks after surgery. Eat many small meals throughout the day. Add high-fiber foods a little at a time. ? · Eat yogurt. It puts good bacteria into your colon and helps prevent diarrhea. ? · Try to avoid nuts, seeds, and corn for a while. They may be hard to digest.   ? · You may need to take vitamins that contain sodium and potassium. Ask your doctor. ? · Drink plenty of fluids to avoid becoming dehydrated. Medicines  ? · Your doctor will tell you if and when you can restart your medicines. He or she will also give you instructions about taking any new medicines. ? · If you take blood thinners, such as warfarin (Coumadin), clopidogrel (Plavix), or aspirin, be sure to talk to your doctor.  He or she will tell you if and when to start taking those medicines again. Make sure that you understand exactly what your doctor wants you to do. ? · Take pain medicines exactly as directed. ¨ If the doctor gave you a prescription medicine for pain, take it as prescribed. ¨ If you are not taking a prescription pain medicine, ask your doctor if you can take an over-the-counter medicine. ¨ Do not take two or more pain medicines at the same time unless the doctor told you to. Many pain medicines have acetaminophen, which is Tylenol. Too much acetaminophen (Tylenol) can be harmful. ? · If you think your pain medicine is making you sick to your stomach:  ¨ Take your medicine after meals (unless your doctor tells you not to). ¨ Ask your doctor for a different pain medicine. ? · If your doctor prescribed antibiotics, take them as directed. Do not stop taking them just because you feel better. You need to take the full course of antibiotics. ? · You may need to take some medicines in a different form. You will be told whether to crush pills or take a liquid form of the medicine. ? · If your doctor gives you a stool softener, take it as directed. Incision care  ? · If you have strips of tape on the incision, leave the tape on for a week or until it falls off.   ? · Wash the area daily with warm, soapy water, and pat it dry. Follow-up care is a key part of your treatment and safety. Be sure to make and go to all appointments, and call your doctor if you are having problems. It's also a good idea to know your test results and keep a list of the medicines you take. When should you call for help? Call 911 anytime you think you may need emergency care. For example, call if:  ? · You passed out (lost consciousness). ? · You are short of breath. ?Call your doctor now or seek immediate medical care if:  ? · You are sick to your stomach and cannot drink fluids or keep them down.    ? · You have signs of a blood clot in your leg (called a deep vein thrombosis), such as:  ¨ Pain in your calf, back of the knee, thigh, or groin. ¨ Redness and swelling in your leg or groin. ? · You have signs of infection, such as:  ¨ Increased pain, swelling, warmth, or redness. ¨ Red streaks leading from the incision. ¨ Pus draining from the incision. ¨ A fever. ? · You have pain that does not get better after you take pain medicine. ? · You have loose stitches, or your incision comes open. ? · Bright red blood has soaked through the bandage. ? · You cannot pass stools or gas. ? Watch closely for any changes in your health, and be sure to contact your doctor if you have any problems. Where can you learn more? Go to http://marta-aishwarya.info/. Enter 930 1556 in the search box to learn more about \"Open Bowel Resection: What to Expect at Home. \"  Current as of: May 12, 2017  Content Version: 11.4  © 4625-8845 [employer]. Care instructions adapted under license by [employer] (which disclaims liability or warranty for this information). If you have questions about a medical condition or this instruction, always ask your healthcare professional. [place] [address] any warranty or liability for your use of this information.

## 2018-03-09 NOTE — PROGRESS NOTES
Pt's D/C instructions completed. Verbalized understanding of all instructions including diet, activity, s/sx to alert MD, medications, wound care, and f/u appointment. Family at [facility].

## 2018-03-09 NOTE — OP NOTES
[address]  MR#: [id number]  : 1960  ACCOUNT #: [de-identified]   DATE OF SERVICE: 2018    PREPROCEDURE DIAGNOSIS:  Rectal cancer with a rectovaginal fistula. POSTOPERATIVE DIAGNOSIS:  Rectal cancer with a rectovaginal fistula. PROCEDURE:  Low anterior resection and end colostomy creation, resection of colovaginal fistula. SURGEON:  Tameka Rome MD    ASSISTANT:  NONE. ANESTHESIA:  General anesthetic. ESTIMATED BLOOD LOSS:  200 mL. CONDITION:  Patient was stable. INDICATIONS:  The patient is a 41-year-old white female with a known diagnosis of metastatic rectal cancer to the upper rectal segment. The patient has known brain metastasis and pulmonary metastasis. Unfortunately, the patient developed a rather large colovaginal fistula, which was to the patient unbearable. The patient continued to complain of increasing pain and increasing fecal drainage per vagina. She discussed this in detail with both myself and her oncologist and palliative resection was recommended. The risks, benefits and alternatives of that procedure were clearly discussed in detail with her and her , the need for end colostomy was also discussed and understood. PROCEDURE IN DETAIL:  The patient underwent general endotracheal anesthetic with no difficulty. She was placed in modified lithotomy position. Jeter catheter was placed for bladder decompression. IV antibiotics were given upon induction. Timeout was performed identifying the patient and procedure to be performed. Midline incision was made from the umbilicus to the pubis. Careful dissection through the subcutaneous midline was performed with electrocautery instrument. Peritoneal cavity was gained with no difficulty. Bookwalter retractor was placed for proper visualization. The patient was placed in Trendelenburg position.   Once the small bowel was packed out of the field, the colon was identified and traced distally to the rectal segment with a fistula that was present. This was easily identified. Dissection was begun at the mid descending colon and the white line of Toldt was taken down carefully mobilizing the colon on its mesentery from lateral to medial and this was continued into the pelvis. Ureteral stents were placed by urology prior to incision. Please see those dictated notes for details. Both cannulated ureters were easily identified and preserved. The colon was divided at the sigmoid descending junction with a CARYN-75 stapler. The mesentery was then carefully taken down in stepwise fashion into the pelvis to the point of the fistula. The fistula was carefully dissected out disconnecting the rectum from the posterior vaginal wall. The fistula itself measured approximately 2.5 cm in diameter. This was irrigated and closure of the posterior vaginal wall was not performed but left to drain. The rectum was then divided distal to the fistula with a Contour stapling device. The specimen was passed off the table and sent to pathology for review. Of note, no other intra-abdominal or pelvic sign of malignancy was found. The pelvis was copiously irrigated with warm saline and all fluid suctioned out. A #19 ALEJANDRO drain was brought in through a stab incision in the right lower quadrant, secured to the skin with 0 silk. This was placed to bulb suction at the end of the procedure. The stapled end of the descending colon was then brought up as an end colostomy to the mid abdomen on the left side. Appropriate size circular skin incision was made and the subcutaneous tissues removed. The fascia was scored longitudinally and the muscle fibers spread to the appropriate width to allow passage of the colon with no impingement. The colon was brought up past skin level for at least 2 cm. The midline fascia was then closed with looped PDS suture.   Skin and subcutaneous were irrigated and closed with skin staples and covered. The ostomy was then matured by resecting the staple line and rolling the mucosa outward and suturing to the skin level with 3-0 Vicryl suture. Ostomy appliance was applied. The patient tolerated the procedure well with no complications, was awakened, extubated, and taken to recovery in good condition.       MD Kiki Dickens /   D: 03/08/2018 19:06     T: 03/09/2018 00:23  JOB #: [id number]

## 2018-03-09 NOTE — DISCHARGE SUMMARY
[facility]  [place], [address] [facility]  [phone]   Discharge Summary     Catalina Lopez  MRN: [id number]     : 1960     Age: 62 y.o. Admit date: 3/1/2018     Discharge date: Attending Physician: Dr. Kiran Guillaume  Primary Discharge Diagnosis:   Principal Problem:    Rectal cancer ([facility] [address] (3/1/2018)    Active Problems:    Secondary malignant neoplasm of brain ([facility] [address] (2017)      Confusion (3/6/2018)      Primary Operations or Procedures Performed :  Procedure(s):  OPEN COLON RESECTION WITH COLOSTOMY  CYSTOSCOPY BILATERAL URETERAL STENT INSERTION     Brief History and Reason for Admission: Catalina Lopez was admitted with the following history of present illness. Ms. Avelar an 62 y. o. female who was referred for evaluation and treatment of a mass located in the sigmoid colon and rectum. Mass was identified by colonoscopy, biopsies on endoscopy and CT scan. Current symptoms include pelvic pain and profuse stool drainage throught the vagina . The mass was biopsied. Pathology is positive for adenocarcinoma. The lesion was not tattooed. Colonoscopy Colonoscopy was complete to cecum. [facility] Course:  On 88, Dr. Kiran Guillaume performed Low anterior resection and end colostomy creation, resection of colovaginal fistula. Pt had seizure on 3/5 and was seen by Neuro and started on Keppra. Pt had tachycardia post operatively which resolved prior to discharge. On day of discharge, pain was under control. Colostomy was functioning with good production of stool. Pt was urinating without issue. Pt was educated and discharged on POD #8 in stable condition. Condition at Discharge: good    Discharge Medications:   Current Discharge Medication List      START taking these medications    Details   levETIRAcetam 1,000 mg tablet Take 1 Tab by mouth two (2) times a day. Qty: 60 Tab, Refills: 0    Associated Diagnoses:  Mass of colon      traMADol (ULTRAM) 50 mg tablet Take 2 Tabs by mouth every six (6) hours as needed. Max Daily Amount: 400 mg. Qty: 40 Tab, Refills: 0    Associated Diagnoses: Mass of colon         CONTINUE these medications which have NOT CHANGED    Details   HYDROcodone-acetaminophen (NORCO)  mg tablet Take 1 Tab by mouth every four (4) hours as needed. Max Daily Amount: 6 Tabs. Qty: 180 Tab, Refills: 0    Associated Diagnoses: Secondary malignant neoplasm of brain ([address] Mass of colon; Malignant neoplasm metastatic to lung, unspecified laterality (HCC)      ondansetron (ZOFRAN ODT) 8 mg disintegrating tablet Take 1 Tab by mouth every eight (8) hours as needed for Nausea. Indications: PREVENTION OF POST-OPERATIVE NAUSEA AND VOMITING  Qty: 90 Tab, Refills: 2    Associated Diagnoses: Colon cancer metastasized to brain (HCC)      promethazine (PHENERGAN) 25 mg tablet Take 25 mg by mouth every six (6) hours as needed for Nausea. ALPRAZolam (XANAX XR) 1 mg XR tablet 1 to 2 po bid, prn      esomeprazole (NEXIUM) 40 mg capsule Take 40 mg by mouth daily. lidocaine-prilocaine (EMLA) topical cream Apply  to affected area as needed for Pain. Qty: 30 g, Refills: 3      albuterol (PROVENTIL HFA, VENTOLIN HFA, PROAIR HFA) 90 mcg/actuation inhaler Take 2 Puffs by inhalation every six (6) hours as needed for Wheezing. Qty: 1 Inhaler, Refills: 1               Disposition/Discharge Instructions/Follow-up Care:      Discharge Instructions/Follow-up Plans:   MD Instructions:     Follow-up with Dr. Radha Rodriguez in 1 week. Keep incisions clean and dry, may remain uncovered. Do not apply lotions, creams or ointments to incisions.     Diet - as tolerated - Soft foods diet. Activity - ambulate - as tolerated - no heavy lifting >10lb. May shower - no tub baths or soaking/submerging.     No driving while taking narcotics. Do not drink alcohol while taking narcotics. Resume other home medications.    Resume home [place].  Add Ultram for pain control as needed.     If problems or questions arise, please call our office at [phone].     Greater than 30 minutes were spent discharging the patient  Signed:  ELGIN Cabrera   3/9/2018  10:40 AM    I have seen and examined the patient with the nurse practitioner and agree with the above assessment and plan.     Ileana Watson MD

## 2018-03-13 NOTE — PROGRESS NOTES
Transition of Care Discharge Follow-up Questionnaire   Date/Time of Call:  3/13/18 1:30 pm   What was the patient hospitalized for? Evaluation/Treatment of mass in colon and rectum   Does the patient understand his/her diagnosis and/or treatment and what happened during the hospitalization? Yes patients  verbalized understanding of treatments during hospitalization. Did the patient receive discharge instructions? yes   Review any discharge instructions (see notes in [facility]). Ask patient if they understand these. Do they have any questions? Patients husbandverbalized understanding of all discharge orders   Were home services ordered (nursing, PT, OT, ST, etc.)? Yes-  nursing once weekly   If so, has the first visit occurred? If not, why? (Assist with coordination of services if necessary.) Yes- 3/13/18    Was any DME ordered? No   If so, has it been received? If not, why?  (Assist with coordination of arranging DME orders if necessary.) N/A   Complete a review of all medications (new, continued and discontinued meds per the D/C instructions and medication tab in [facility]). Yes    Were all new prescriptions filled? If not, why?  (Assist with obtainment of medications if necessary.) Yes   Does the patient understand the purpose and dosing instructions for all medications? (If patient has questions, provide explanation and education.) Yes  patients  verbalized the purpose and dosing instructions of medications. Does the patient have any problems in performing ADLs? (If patient is unable to perform ADLs  what is the limiting factor(s)? Do they have a support system that can assist? If no support system is present, discuss possible assistance that they may be able to obtain.)             Patient independent with ADLs.  available to assist when needed                   Does the patient have all follow-up appointments scheduled?   Has transportation been arranged? [facility] [place] [facility] Pulmonary follow-up should be within 7 days of discharge; all others should have PCP follow-up within 7 days of discharge; follow-ups with other specialists as appropriate or ordered.) Oncologist-3/16/18  Surgeon 3/15/18  Neurologist 3/26/18  Patients  prefers to discuss PCP follow up with oncologist.  Transportation arranged   Any other questions or concerns expressed by the patient? Patients  had no questions or concerns at time of call. Schedule next appointment with [facility] Coordinator or refer to RN Case Manager/  as appropriate. CM will follow up with patient per [facility]ELIA protocol. Contact information provided. [facility]ELIA Call Completed By:  Home Visit   Freddie Phan RN       This note will not be viewable in [address]

## 2018-03-15 PROBLEM — Z93.3 STATUS POST COLOSTOMY (HCC): Status: ACTIVE | Noted: 2018-01-01

## 2018-03-20 NOTE — PROGRESS NOTES
[facility]ELIA follow up call. Unable to reach patient. Message left with contact information requesting a return call. Per chart patient did attend follow up with surgeon as scheduled and receiving [facility] services. Will continue to outreach to patient if  No return call received. This note will not be viewable in [address]

## 2018-03-27 NOTE — PROGRESS NOTES
AMLEIA follow up call unable to reach patient, no answer. Per chart, patient scheduled to follow up with Oncologist 3/27/18 at 3:30 pm. Initial appointment on 3/16/18 cancelled and rescheduled. [facility] This note will not be viewable in [address]

## 2018-04-10 NOTE — PROGRESS NOTES
AMELIA follow up call. Unable to reach patient. Another message left requesting a return call. Per chart patient continues with [facility] HH services. Scheduled with Hematology/Oncology 4/19/18. Will resolved episode at this time and if return call received open new episode. This note will not be viewable in [address]

## 2018-04-30 PROBLEM — R51.9 NONINTRACTABLE HEADACHE: Status: ACTIVE | Noted: 2018-01-01

## 2018-04-30 PROBLEM — E87.6 HYPOKALEMIA: Status: ACTIVE | Noted: 2018-01-01

## 2018-04-30 PROBLEM — R11.15 NON-INTRACTABLE CYCLICAL VOMITING WITH NAUSEA: Status: ACTIVE | Noted: 2018-01-01

## 2018-04-30 NOTE — PROGRESS NOTES
Arrived to the [facility]. 2 L NS, 40 mEq IV Potassium, Decadron, Zofran, and Phenergan completed. Patient tolerated well. Any issues or concerns during appointment: none. Patient aware of next infusion appointment on 5/2/18 at 1000. Discharged via wheelchair accompanied by spouse.       Bernadette Carpenter RN

## 2018-05-04 NOTE — CONSULTS
Patient: Navin Ruano MRN: [id number]  SSN: [id number]    YOB: 1960  Age: 62 y.o. Sex: female      Other Providers:  Efren Cárdenas MD    CHIEF COMPLAINT: Brain metastases     DIAGNOSIS: Presumed metastatic colorectal adenocarcinoma    SITE TREATED AND DOSE DELIVERED:   1. Brain lesions in the right and left parietal lobes received 22 Gy each in a single fraction of CyberKnife SRS using 6 MV photons prescribed to the 81% isodose line. The plan required 79 beams and had an estimated treatment time of 32 minutes. Treatment was delivered on 09/12/17.  2. 22 Gy to a single brain metastasis in the peripheral left cerebellum, 01/18/2018    HISTORY OF PRESENT ILLNESS:  Navin Ruano is a 62 y.o. female who I initially saw on 08/29/17 at the request of Dr. Pascual Hoffman regarding the possible radiation therapy and treatment of her brain metastases. On 08/15/17 she was noted to have numbness in her right hand and around her lips on the right side of her face. She also noted diminished  strength on the right. CT scan of the head on 08/15/17 showed 2 hyperdense masses in the parietal lobes bilaterally. These measured 2.3 x 1.4 cm in the left parietal lobe and 1.4 x 1.1 cm in the right parietal lobe. Both were associated with edema, greater on the left. These were felt to be suspicious for metastatic disease. MRI of the brain on 08/15/17 demonstrated intra-axial lesions in the superior bilateral parietal lobes measuring up to 2.3 cm on the left side and 1.2 cm on the right side. CT scan of the chest, abdomen and pelvis on 08/16/17 a suspected sigmoid carcinoma with adjacent metastatic adenopathy. Numerous pulmonary metastatic nodules were seen. In addition, left adrenal mass measuring 3.2 cm was felt to possibly represent further metastatic disease. CEA was found to be elevated at a level of 59.1.  She was evaluated by neurosurgery and felt not to be a good surgical candidate based on the location of the larger metastasis near the motor strip on the left. PET/CT scan on 08/24/17 showed metastatic disease as evidenced by bilateral pulmonary nodules, left adrenal metastatic focus and small bowel mesenteric FDG avid lymph node. A primary site was seen at the rectosigmoid junction with adjacent regional metastatic adenopathy. The pattern of the disease was felt to be somewhat atypical for a metastatic colon cancer considering the involvement of the left adrenal and mediastinal lymph nodes. Brain lesions in the right and left parietal lobes received 22 Gy each in a single fraction of CyberKnife SRS using 6 MV photons prescribed to the 81% isodose line. The plan required 79 beams and had an estimated treatment time of 32 minutes. Treatment was delivered on 09/12/17. She tolerated treatment without acute incident. She had a few days with moderate fatigue. 3 weeks following treatment she began to have some patchy loss of hair in the treatment area. Following SRS, she was placed on FOLFOX and bevacizumab which she tolerated reasonably well. CT scan of the chest, abdomen and pelvis on 10/27/17 showed decreased size of the sigmoid colon mass and pericolic adenopathy. Metastatic disease in the lungs was overall slightly improved. Left adrenal metastatic lesion was stable. A rectovaginal fistula was noted. However, she developed MSSA endocarditis and her chemotherapy was held. MRI of the brain on 12/18/17 showed good response to treatment in the 2 treated brain metastases, but also showed a new 5 mm suspected metastasis in the peripheral left cerebellum. On 01/18/18 she received CyberKnife SRS delivering 22 Gy to the metastasis in a single fraction. In the time since [facility], she has had worsening difficulty with a rectovaginal fistula. She underwent colostomy placement and removal of the colon tumor and the associated fistula.  On 03/06/18 she underwent CT scan of the head full workup of confusion with seizure activity. This demonstrated interval stability of her intracranial metastatic disease since 12/18/17. She was seen by Dr. Jeremy Warren on 05/02/18 for consideration of further chemotherapy. However,  Over the 1-2 weeks prior to her visit, she was noted to have decreased appetite, headaches and balance difficulties. MRI of the brain on 05/01/18 showed a new 8.5 mm angie-aqueduct or metastasis, a new 13 mm left brachium pontis lesion with mild mass effect upon the fourth ventricle, and a new 5.5 mm enhancing lesion in the left frontal cortex. The metastasis in the left posterior occipital white matter previously measure 14 x 16.6 mm, now 15 x 20 mm with peritumoral edema. He was placed on dexamethasone 2 mg  Daily to address her intracranial edema and improve appetite. She has had some improvement in performance status since initiation of dexamethasone. She has been sent to me for consideration of further radiotherapy for her new brain metastases.     At this time, Ms. Fernando Kendrick is feeling reasonably well. Alissa Munoz has had no cough or shortness of breath.  She has had no fevers, chills or night sweats.  She has noted no change in cognitive function.  She has no vision changes.  She does have significant gait instability with occasional falls. She has also had photophobia for the past couple of weeks.        PAST MEDICAL HISTORY:    Past Medical History:   Diagnosis Date    Arthritis     RA    Colon cancer ([address]     Metastatic to Lungs and Brain- chemo and radiation    Gastrointestinal disorder     Reflux    GERD (gastroesophageal reflux disease)     controlled  with med    Non-intractable cyclical vomiting with nausea 4/30/2018    Other ill-defined conditions(799.89)     Herniated disk  lumbar and cervical    Staph aureus infection 12/29/2017    site: port- was adm to [facility] Sept 2017 for sepsis-port was dc'd Oct 2017       PAST SURGICAL HISTORY:   Past Surgical History:   Procedure Laterality Date    HX ADENOIDECTOMY      HX GYN      Hysterectomy-partial    HX KNEE ARTHROSCOPY Left 2014    HX LAP CHOLECYSTECTOMY      HX TONSILLECTOMY      HX VASCULAR ACCESS      port inserted Sept 2017- out 07/2/21 ([place])/then had PICC in to give IV antibiotics then dc'd 12/15/17    LYN BIOPSY BREAST STEREOTACTIC Left 10-8-2014       MEDICATIONS:     Current Outpatient Prescriptions:     dexamethasone (DECADRON) 4 mg tablet, Take 1 Tab by mouth three (3) times daily. Indications: appetite stimulant, Disp: 90 Tab, Rfl: 2    levETIRAcetam 1,000 mg tablet, TAKE 1 TABLET BY MOUTH 2 TIMES A DAY., Disp: 60 Tab, Rfl: 0    HYDROcodone-acetaminophen (NORCO)  mg tablet, Take 1 Tab by mouth every four (4) hours as needed. Max Daily Amount: 6 Tabs., Disp: 180 Tab, Rfl: 0    FLUoxetine (PROZAC) 10 mg tablet, Take 1 Tab by mouth daily. Indications: ANXIETY WITH DEPRESSION, Disp: 30 Tab, Rfl: 2    potassium chloride SR (K-TAB) 20 mEq tablet, Take 1 Tab by mouth two (2) times a day. Indications: hypokalemia, Disp: 60 Tab, Rfl: 2    ondansetron (ZOFRAN ODT) 8 mg disintegrating tablet, Take 1 Tab by mouth every eight (8) hours as needed for Nausea. Indications: PREVENTION OF POST-OPERATIVE NAUSEA AND VOMITING, Disp: 90 Tab, Rfl: 2    lidocaine-prilocaine (EMLA) topical cream, Apply  to affected area as needed for Pain., Disp: 30 g, Rfl: 3    promethazine (PHENERGAN) 25 mg tablet, Take 25 mg by mouth every six (6) hours as needed for Nausea., Disp: , Rfl:     albuterol (PROVENTIL HFA, VENTOLIN HFA, PROAIR HFA) 90 mcg/actuation inhaler, Take 2 Puffs by inhalation every six (6) hours as needed for Wheezing., Disp: 1 Inhaler, Rfl: 1    ALPRAZolam (XANAX XR) 1 mg XR tablet, 1 to 2 po bid, prn, Disp: , Rfl:     esomeprazole (NEXIUM) 40 mg capsule, Take 40 mg by mouth daily. , Disp: , Rfl:     ALLERGIES:   Allergies   Allergen Reactions    Latex Rash    Adhesive Tape-Silicones Rash     Bad red rash.      Ativan [Lorazepam] Other (comments)     Pt states \"gets hallucinations and confused\"    Bactrim [Sulfamethoprim Ds] Hives     Swelling and itching      Lexapro [Escitalopram Oxalate] Other (comments)    Macrobid [Nitrofurantoin Monohyd/M-Cryst] Other (comments)     Felt  \"out of it\"    Pcn [Penicillins] Hives     Swelling and itching also. Pt has tolerated cephalosporins in the past.    Pyridium [Phenazopyridine] Hives     Also itching and swelling. SOCIAL HISTORY:   Social History     Social History    Marital status:      Spouse name: N/A    Number of children: N/A    Years of education: N/A     Occupational History    Not on file. Social History Main Topics    Smoking status: Current Some Day Smoker     Packs/day: 0.25     Years: 10.00     Last attempt to quit: 8/13/2017    Smokeless tobacco: Never Used    Alcohol use No    Drug use: No    Sexual activity: Yes     Birth control/ protection: Surgical     Other Topics Concern    Not on file     Social History Narrative       FAMILY HISTORY:   Family History   Problem Relation Age of Onset   [facility] Stroke Mother     Hypertension Mother     Diabetes Mother     Heart Disease Father     Hypertension Father     Cancer Brother     Breast Cancer Neg Hx        REVIEW OF SYSTEMS: Please see the completed review of systems sheet in the chart that I have reviewed today. PHYSICAL EXAMINATION:   ECOG Performance status 1-2. VITAL SIGNS:   Visit Vitals    /73 (BP 1 Location: Right arm, BP Patient Position: Sitting)    Pulse 88    SpO2 100%        GENERAL: The patient is well-developed, ambulatory, alert and in no acute distress. HEENT: Head is normocephalic, atraumatic. Pupils are equal, round and reactive to light and accommodation. Extraocular movement intact. Hearing is intact bilaterally to finger rub. Oral cavity reveals no lesions. Mucous membranes are moist. NECK: Neck is supple with no masses.  CARDIOVASCULAR: Heart has regular rate and rhythm. There are no murmurs, rubs or gallops. Radial pulses are 2+ RESPIRATORY: Lungs are clear to auscultation and percussion. There is normal respiratory effort. LYMPHATIC: There is no cervical or supraclavicular lymphadenopathy bilaterally. MUSCULOSKELETAL: Extremities reveal no cyanosis, clubbing or edema.  is 5+/5. NEURO:  Cranial nerves II-XII grossly intact. Muscular strength and sensation are intact throughout all four extremities. She is able to perform finger to nose test without difficulty. She is unable to attempt tandem gait. PATHOLOGY:      03/01/18:    DIAGNOSIS   A: LOW ANTERIOR COLON RESECTION: BENIGN COLONIC TISSUE. NO TUMOR IDENTIFIED. SIXTEEN BENIGN LYMPH NODES, ALL NEGATIVE FOR METASTATIC CARCINOMA (0/16). B: RECTUM: ADENOCARCINOMA, 2.1 CM IN GREATEST DIMENSION, EXTENDING THROUGH THE MUSCULARIS PROPRIA INTO THE PERIRECTAL ADIPOSE, MARGINS UNINVOLVED. TWO BENIGN LYMPH NODES, BOTH NEGATIVE FOR METASTATIC   CARCINOMA (0/2).   jcr/3/3/2018   Electronically signed out on 3/6/2018 10:53 by Jesus Simon. Yeyo Puentes MD   Procedures/Addenda   Microscopic Description   ANATOMIC SITE: Sigmoid colon and rectum. PROCEDURE: Low anterior resection. HISTOLOGIC TYPE (WHO CLASSIFICATION): Adenocarcinoma. LOCATION OF TUMOR: Rectum. SIZE: 2.1 cm in greatest dimension. MACROSCOPIC TUMOR PERFORATION: Not identified. MESORECTUM INTACTNESS: Appears complete. GRADE: Moderately differentiated. LYMPHOVASCULAR SPACE INVASION: Not identified. PERINEURAL INVASION: Not identified. PRESENCE OF MESENTERIC DEPOSITS: Not identified. DEPTH OF INVASION: Through muscularis propria into perirectal fat. OTHER FINDINGS: No other significant findings identified. SURGICAL MARGIN STATUS:   PROXIMAL MARGIN: Uninvolved. DISTAL MARGIN: Uninvolved. RADIAL/MESENTERIC MARGIN: Uninvolved. CLOSEST MARGIN OF TUMOR: Indeterminate longitudinal margin, part B unoriented.    DISTANCE OF TUMOR FROM CLOSEST MARGIN:   LYMPH NODE STATUS:   TOTAL NUMBER OF LYMPH NODES EXAMINED: 18. TOTAL NUMBER OF LYMPH NODES CONTAINING METASTATIC CARCINOMA: 0.   TREATMENT EFFECT: No prior treatment reported. TNM CLASSIFICATION: pT3 pN0.     08/23/17:    DIAGNOSIS   RECTOSIGMOID MASS BIOPSIES: AT LEAST ADENOCARCINOMA IN SITU. LABORATORY:   Lab Results   Component Value Date/Time    Sodium 133 (L) 04/30/2018 11:15 AM    Potassium 2.8 (LL) 04/30/2018 11:15 AM    Chloride 92 (L) 04/30/2018 11:15 AM    CO2 30 04/30/2018 11:15 AM    Anion gap 11 04/30/2018 11:15 AM    Glucose 124 (H) 04/30/2018 11:15 AM    BUN 14 04/30/2018 11:15 AM    Creatinine 0.46 (L) 04/30/2018 11:15 AM    GFR est AA >60 04/30/2018 11:15 AM    GFR est non-AA >60 04/30/2018 11:15 AM    Calcium 9.1 04/30/2018 11:15 AM    Magnesium 2.2 03/07/2018 04:37 AM    Phosphorus 3.6 03/07/2018 04:37 AM    Albumin 2.3 (L) 04/30/2018 11:15 AM    Protein, total 8.2 04/30/2018 11:15 AM    Globulin 5.9 (H) 04/30/2018 11:15 AM    A-G Ratio 0.4 (L) 04/30/2018 11:15 AM    AST (SGOT) 14 (L) 04/30/2018 11:15 AM    ALT (SGPT) 11 (L) 04/30/2018 11:15 AM     Lab Results   Component Value Date/Time    WBC 8.4 04/30/2018 11:15 AM    HGB 12.8 04/30/2018 11:15 AM    HCT 38.7 04/30/2018 11:15 AM    PLATELET 313 (H) 58/13/5087 11:15 AM       RADIOLOGY:      05/01/18: MRI of the Brain with contrast: 5/1/2018  History: Brain metastasis  Sequences: Axial pre and post contrast T1, FLAIR, T2, diffusion, coronal post  contrast T1 and sagittal precontrast T1-weighted images of the brain. Comparison: MRI 12/18/2017  10 cc IV MultiHance was injected to aid in the detection of intracranial  pathology. Findings: There are no restricted diffusion abnormalities. The ventricles and basilar  vascular flow voids are unremarkable.  There is a new 8.5 mm periaqueductal  metastasis, new 13 mm left brachium pontis lesion with mild mass effect upon the  fourth ventricle, and a new 5.5 mm enhancing lesion in the left frontal cortex. A 14 mm x 16.6 mm enhancing lesion in the left posterior occipital white matter  lobe now measures 15 mm x 20 mm with minimal peritumoral edema. The pituitary,  7th and 8th nerves and orbits, and sinuses are unremarkable. There are few  stable scattered foci of T2 prolongation within the subcortical and  periventricular white matter. Bifrontal cortical atrophy is noted. There is no  hemorrhage.     IMPRESSION  IMPRESSION: Interval increase in size of a superior left occipital metastasis,  new midbrain, left frontal, and left brachium pontis metastasis with mild mass  effect. 04/26/18: CT Chest, Abdomen, and Pelvis WITH CONTRAST 4/26/2018 4:06 PM     Indication: Colon cancer with nausea and vomiting today. Recent colon surgery  with colon cancer resection done 3/1/2018.     Comparison: CT chest, abdomen and pelvis 12/27/2017.     Technique: Multiple contiguous axial images are obtained through the chest,  abdomen and pelvis following administration of 100 cc Isovue-370 non-ionic iv  contrast by power injector. Dilute oral contrast is also given. For this CT scanner at least one of the following techniques is utilized to  decrease patient radiation dose: Automatic exposure control, KVP and mA  modulation based on patient weight, and iterative reconstruction.     Findings: CT chest-again evident scattered in both lungs are multiple  noncalcified nodules, many of which show more prominent central cavitation now. Of the ones that are more cavitary 1 at the left upper lobe now measures 13 mm  compared to previously 11 mm. The other ones with cavitary change are similar in  appearance as well. The ones that remain solid appearing are stable or very  slightly smaller. There are however additionally a few new scattered tiny  subcentimeter noncalcified nodules. There is no new consolidation or effusion. The heart, aorta and other great vessels are stable.  Trachea and main bronchi  are stable. Thyroid and esophagus are unremarkable. There are small bilateral  hilar lymph nodes, these hilar nodes appear to be mildly larger with one on the  right now measuring 7 mm compared to 5 mm. A right hilar nodule seen before now  measures 14 x 10 mm compared to 17 x 10 mm and an adjacent right precarinal  lower paratracheal lymph node now measures 17 x 11 mm compared to previously 13  x 14 mm. No aggressive osseous lesion.     CT abdomen-patient has undergone a left hemicolectomy with colonic diversion at  the left lower quadrant. The remaining colon and small bowel loops are  nondistended with no obvious inflammation. Stomach and duodenum are  unremarkable. In the liver there are stable changes of focal fatty change  adjacent to the falciform ligament. There are now 3 tiny hypodense nodules  scattered elsewhere in the liver though which were not evident before. Cannot  exclude that these represent metastases. Left adrenal metastasis previously  measured 4.4 x 4.2 cm and now measures 4.9 x 3.7 cm. There is more prominent  nodular enlargement of the right adrenal gland which now measures 1.8 x 1.2 cm. The spleen, kidneys and pancreas are unremarkable. There is no enlarging abdomen  adenopathy. Aorta is stable with no acute vascular lesion. No aggressive osseous  lesion.     CT pelvis-the rectal stump and its surgical margin are unremarkable. Anteriorly  adjacent to this at the cephalad margin of the vaginal cuff there is a more  prominent enhancing nodule that measures 2.7 x 2.1 cm. The bladder is  incompletely distended and otherwise not remarkable. Pelvic small bowel loops  are nondistended. In the midline pelvic bowel mesentery now there is a 9 mm  enhancing nodule. No clearly enlarged adenopathy elsewhere. No vascular lesion. No aggressive osseous lesion is seen.     IMPRESSION  IMPRESSION:  1.  Patient is now status post left hemicolectomy with a colonic diversion at the  left lower quadrant. In the pelvis there are 2 enlarging peritoneal foci, one at  the cephalad aspect of the vaginal cuff. These are suspicious for peritoneal  tumor deposits. In the liver there are 3 new tiny subcentimeter hypodense  nodules which are indeterminate but worrisome for metastases. The large right  adrenal metastasis may be slightly smaller but there is the suggestion of a  larger right adrenal nodule now-worrisome for metastasis. 2. There appears to be mixed treatment response in the chest. Some mediastinal  and hilar lymph nodes appear slightly larger. At at the pulmonary nodules there  is mixed change-with some of the nodules stable, some smaller, some are more  cystic which can be treatment change. No clearly new pulmonary nodule is  present. A few new tiny nodules are seen. 03/06/18: NONCONTRAST HEAD CT     CLINICAL HISTORY:  Confusion with seizure activity with history of metastatic  colon cancer.     COMPARISON:  CT of August 15, 2017 N MRI of December 18, 2017.     REPORT:   Standard non contrast head CT demonstrates the left parietal  metastasis now partially calcified with AP diameter of 1.8 cm today, unchanged  from December 18. Adjacent vasogenic edema without significant mass effect is  likewise unchanged. The right parietal paramedian metastasis is also partially  calcified today with maximal diameter 7 mm, unchanged. The tiny left lateral  cerebellar metastasis seen by MRI is not confidently identified by CT. No new  intracranial mass effect, hemorrhage, or evidence of geographic infarction is  seen. The ventricles are normal in size and configuration, accounting for the  patient's age. Orbits and  paranasal sinuses are clear where imaged.  Bone  windows demonstrate no definite fracture or destruction.     IMPRESSION  IMPRESSION:     INTERVAL STABILITY OF INTRACRANIAL METASTATIC DISEASE SINCE  DECEMBER 18 WITH NO ACUTE INTRACRANIAL ABNORMALITY IDENTIFIED AT NONCONTRAST CT.      12/18/17: MRI BRAIN WITHOUT AND WITH CONTRAST 12/18/2017     HISTORY: Colon cancer with brain metastases. CyberKnife therapy 4 months ago.     TECHNIQUE: Sagittal and axial T1-weighted, axial T2-weighted, axial FLAIR, axial  T2-weighted gradient-echo, axial diffusion weighted images with ADC maps and  postcontrast axial and coronal T1-weighted images of the brain. 15 August 15,  2017cc of MultiHance gadolinium contrast was administered intravenously without  adverse event to evaluate for pathological leptomeningeal or parenchymal  enhancement.     COMPARISON: August 15, 2017     FINDINGS: Again noted are peripherally enhancing intracranial metastases within  the superior parietal lobes. The metastasis on the left side measures 1.9 cm AP  (image 22 axial post) compared to 2.3 cm on the previous study. The lesion on  the right measures 7 mm AP diameter (axial image 22) compared to 12 mm AP on the  previous study. There is stable edema like signal on the FLAIR sequence around  these metastases.     There is a new 5 mm enhancing intra-axial lesion in the lateral left cerebellar  hemisphere (axial post image 7).    There is stable scattered hyperintense white matter lesions on the T2-weighted  and FLAIR sequences. This pattern may be present with chronic small vessel  ischemic disease or with migraine headaches.     There is no acute infarction, acute parenchymal hemorrhage, hydrocephalus or  abnormal extra-axial fluid collection.        IMPRESSION  IMPRESSION:     1. New 5 mm suspected metastasis in the peripheral left cerebellar.     2. Interval decrease in size of bilateral parietal metastases. 10/27/17: CT of the Chest, Abdomen, and Pelvis     INDICATION:  Metastatic colon cancer, vaginal discharge concerning for  rectovaginal fistula     Multiple axial images were obtained through the chest, abdomen, and pelvis after  intravenous injection of 125cc of optiray 350.   Radiation dose reduction  techniques were used for this study: All CT scans performed at this facility  use one or all of the following: Automated exposure control, adjustment of the  mA and/or kVp according to patient's size, iterative reconstruction. Compared  with 08/16/2017.     FINDINGS:  Chest CT: Again noted are bilateral pulmonary nodules consistent with metastatic  disease. Nodules are stable to slightly smaller. There is a stable 10 mm  nodule in the anterior right upper lobe. 8 mm nodule in the posterior left  upper lobe measured 10 mm on the prior exam.  No new nodules are seen. There  are no developing infiltrates. There is no effusion. There is no significant  adenopathy. There are no bony lesions.     Abdomen CT:  There are no significant lesions in the liver or spleen. Gallbladder is absent. The left adrenal mass is stable in size, 3.2 cm. It  does show less enhancement than on the prior study. Right adrenal gland and  pancreas are unremarkable. There is normal enhancement of the kidneys. There is  no hydronephrosis. There is no adenopathy. There are no inflammatory changes  or fluid collections in the abdomen.     Pelvis CT: Patient is post hysterectomy. While the sigmoid colon mass is mostly  resolved, there is now a rectovaginal fistula. There is a tract between the mid  sigmoid colon and vagina. There is extensive air in the vagina. The pericolic  lymph node mass is smaller, 18 x 16 mm. There are no bony lesions.     IMPRESSION  IMPRESSION:    1. Rectovaginal fistula. 2.  Decreased size of the sigmoid colon mass and pericolic adenopathy. 3.  Metastatic disease in the lungs is overall slightly improved. 4.  Stable left adrenal metastatic lesion. Date: 8/16/2017: [facility] Brain W Wo Cont  MRI BRAIN WITHOUT AND WITH CONTRAST 8/15/2017 HISTORY: Numbness in right arm for several hours.  TECHNIQUE: Sagittal and axial T1-weighted, axial T2-weighted, axial FLAIR, axial T2-weighted gradient-echo, axial diffusion weighted images with ADC maps and postcontrast axial and coronal T1-weighted images of the brain. 14cc of MultiHance gadolinium contrast was administered intravenously without adverse event to evaluate for pathological leptomeningeal or parenchymal enhancement. COMPARISON: Head CT from the same day FINDINGS: As demonstrated on the head CT, there are enhancing lesions in the superior parietal lobes, measuring 2.3 cm in diameter on the left side and measuring 1.2 cm in diameter on the right side. There is a small amount of surrounding edema and mild local mass effect. There is no acute intracranial hemorrhage, acute infarction, hydrocephalus, or abnormal extra-axial fluid collection. On the T2-weighted and FLAIR sequences, there are a few scattered punctate hyperintense supratentorial white matter lesions. This is not an uncommon finding which may be present with asymptomatic patients, with migraine headaches or with mild chronic small vessel ischemic disease. IMPRESSION: Enhancing intra-axial lesions in the superior bilateral parietal lobes measuring up to 2.3 cm on the left side. Findings are suggestive of intracranial metastases. Ct Head Wo Cont    Result Date: 8/15/2017  CT Brain dated 8/15/2017  Comparison: None Clinical Information:  Right upper extremity weakness  5 mm axial images were obtained from skull base to vertex without contrast. Radiation dose reduction techniques were used for this study. Our scanners use one or all of the following:  Automated exposure control, adjustment of the mA and/or kV according to patient size, iterative reconstruction. Findings: Ventricles are normal in size. No midline shift. There are 2 hyperdense mass is noted. These measure 2.3 x 1.4 cm and the left parietal lobe and 1.4 x 1.1 cm medial right parietal lobe at the vertex. Both of these are associated with edema, greater on the left. This is suspicious for metastatic disease.  Small foci of hemorrhage could have a similar appearance. Follow-up MRI is suggested. No extra-axial abnormality. No skull fracture. No destructive lesion in the skull. Mastoid air cells and visualized paranasal sinuses are aerated and unremarkable. Impression: Hyperdense masses in the parietal lobes bilaterally. This is suspicious for metastatic disease. Preliminary results were called to Dr. Amy Baumann in the emergency department 8/15/2017 at 1620 hours     Ct Chest Abd Pelv W Cont    Result Date: 8/16/2017  CT of the chest, abdomen, and pelvis with contrast Comparison:  Noncontrast CT abdomen and pelvis 1/23/2010, MR brain 8/15/2015. Indication:  Metastatic brain lesions, evaluate for primary neoplasm. Technique:  CT imaging was performed of the chest, abdomen, and pelvis following the uncomplicated administration of intravenous contrast (Isovue 370, 100 mL). Oral contrast was given. Iodinated contrast was used due to the indications for the examination. If IV contrast material had not been administered, the likelihood of detecting abnormalities relevant to the patients condition would have been substantially decreased. Radiation dose reduction techniques were used for this study:  Our CT scanners use one or all of the following: Automated exposure control, adjustment of the mA and/or kVp according to patient's size, iterative reconstruction. Findings: CHEST CT: The heart size is normal. Right paratracheal lymph node 1.1 cm image 20; right hilar node 1.2 cm image 26. No pleural or pericardial effusion. The lung parenchyma is unremarkable. Diffuse scattered pulmonary metastatic nodules measuring up to 1.3 cm medial right upper lobe image 21. ABDOMEN CT: The liver is normal in appearance, with no focal abnormalities. Status post cholecystectomy. The right adrenal gland, pancreas, spleen, kidneys unremarkable. Left adrenal indeterminate nodule 3.2 cm series 2 image 54. There is no intra or extrahepatic biliary ductal dilatation.  PELVIS CT: No bowel obstruction. There is short segment circumference wall thickening the sigmoid colon series 2 image 105 with adjacent matted adenopathy measuring up to 2.3 x 1.8 cm. There is no free air or free fluid in the abdomen or pelvis. The bladder is unremarkable. There are no aggressive appearing osseous lesions. IMPRESSION: 1. Suspect sigmoid carcinoma with adjacent metastatic adenopathy. 2. Numerous pulmonary metastatic nodules. 3. Left adrenal mass is indeterminate and additional site of metastasis is possible. Pet/ct Tumor Image Skull Thigh W (ini)    Result Date: 8/24/2017  PET/CT  Indication: Brain lesion, pulmonary nodules, colonic mass initial evaluation Radiopharmaceutical: 19.6 mCi F18-FDG, intravenously. Technique: Imaging was performed from the skull through the proximal thighs using routine PET/CT acquisition protocol. Imaging was performed approximately 60 minutes post injection. Oral contrast was administered. Radiation dose reduction techniques were used for this study:  Our CT scanners use one or all of the following: Automated exposure control, adjustment of the mA and/or kVp according to patient's size, iterative reconstruction. Serum glucose: 84 mg/dL prior to injection. Comparison studies: CT chest abdomen and pelvis 8/16/2017, MR brain 8/15/2017 Findings: Head and Neck: No lymphadenopathy or abnormal FDG uptake. Chest: Subcentimeter right hilar and right paratracheal lymph node with elevated FDG activity, image 60. Bilateral upper lobe pulmonary nodules some with central punctate focus of cavitation showed FDG uptake, Max SUV image 65 3.2. Abdomen/Pelvis: Small bowel mesenteric 9 mm lymph node image 176 with elevated FDG activity. Slim conglomerate adjacent to the rectosigmoid junction similar in size to previous examination with marked elevated FDG activity, max SUV 5.3 image 200. Adjacent rectosigmoid mass with Max SUV 9.9 image 208. No bowel obstruction.  Left adrenal mass with elevated FDG activity, max SUV 7.2 image 113. IMPRESSION: 1. Metastatic disease as evidenced by bilateral pulmonary nodules, left adrenal metastatic focus, small bowel mesenteric FDG avid lymph node. 2. Primary adenocarcinoma rectosigmoid junction with adjacent regional metastatic adenopathy. 3. Pattern of disease could all represent metastatic colon carcinoma although left adrenal gland and mediastinal lymph node involvement is somewhat atypical.       IMPRESSION:  Gianni Theodore is a 62 y.o. female with presumed metastatic colorectal cancer with brain metastases. COUNSELING AND COORDINATION OF CARE: I have had an 80 minute consultation with Ms. Maru Voss and her family of which greater than half is been spent counseling them about management options for her new brain metastases. She tolerated prior SRS without significant difficulty and has had good response to treatment. The natural history of brain metastases was again discussed with the patient and her . Prognostic factors including stage, performance status, and number of intracranial metastases were discussed. Various treatment options including whole brain radiation therapy, surgery, radiosurgery and supportive care alone were compared and contrasted with regard to outcome and quality of life. The indications, benefits, side effects, and complications of potential stereotactic radiosurgery were reviewed. Fatigue, alopecia, headache, nausea and vomiting, as well as neurocognitive decline were among the complications highlighted. We have again discussed that [address] does not treat subclinical disease whereas whole brain radiation therapy does. For this reason, we will have to be vigilant in obtaining follow-up imaging of the brain. We will plan to treat her new metastases with CyberKnife SRS. The lesions in the periaqueductal area and left brachium pontis will be need to be fractionated in order to protect the brainstem.  They will receive 30 Gy in 5 fractions of 6 Gy each. The small left frontal metastasis will receive 24 Gy in a single fraction. She will undergo CT simulation next week with the intent of delivering treatment 1-2 weeks later. All of the patient's questions were answered to her satisfaction and she has signed a written informed consent. She will undergo CT simulation at [facility] today with the intent of beginning treatment next week. I appreciate the opportunity to participate in Ms. Sesay's care. Christoph Benedict MD   May 4, 2018        Portions of this note were copied from prior encounters and reviewed for accuracy, currency, and represent documentation and tasks completed during this encounter. I verify and attest these portions to be unchanged from prior visits.     Christoph Benedict MD  05/04/18

## 2018-05-04 NOTE — PROGRESS NOTES
Consult for new brain mets. Recent colostomy. Decadron 4mg tid. MRI brain 5-1-18. CT C/A/P 4-26-18. BANSAL CONSENTS SIGNED FOR CYBERKNIFE SRS BRAIN. CT SIM AT [facility] TODAY.     Erum Wiggins RN

## 2018-05-17 NOTE — TELEPHONE ENCOUNTER
SW attempted to follow up with pt's  via phone to assess needs and offer support. DANIKA left voicemail requesting return call on 's home number listed. DANIKA intends to follow up PRN.

## 2018-06-26 NOTE — PROGRESS NOTES
Gigi Vidal A Core 1942 male MRN: [id number]    Family Medicine Follow-up Visit    ASSESSMENT/PLAN  Massiel Simpson is a 68 y o  male presents to the office for     1  Uncontrolled hypertension  -current blood pressure is above goal   SBP ranging between 180-200 at home log   -significant education given on the patient to not take losartan 100 mg b i d   Losartan  maximum dose is 100 mg daily this could affect his kidneys if he continues taking the medication as not prescribed  Patient seemed to understand my concern with him self medicating   -at this time started the patient on metoprolol 50 mg twice a day with goal of increasing to 100 mg twice a day if needed   -adjusted his losartan and hydrochlorothiazide to a combination medication  , medication to be taken 1st thing in the morning   -with upcoming appointment with Nephrology as well as a recheck BP in 2 weeks here at our office       2  Hx of BPH s/p prostectomy  - Currently taking flomax, would like to discontinue given that I do not believe the patient should be taking this since he has a history of a removal prostate  Encouraged him to ask his urologist if this was okay to discontinue this time     Disposition: Return to the clinic in 2 weeks  Long appointment needed      Future Appointments  Date Time Provider Treva Nolan   7/13/2018 4:00 PM John Curiel MD [facility]          SUBJECTIVE  CC: Follow-up      HPI:  Massiel iSmpson is a 68 y o  male presents to the office given worsening hypertension  Patient has a history of being controlled on amlodipine, hydrochlorothiazide, losartan  He has a significant history of chronic kidney disease being managed by Nephrology  Amlodipine was discontinued secondary to bilateral edema  The patient states that he has been taking losartan 100 mg in the morning and 100 mg at bedtime to self medicate himself  He states that his SBP has been as high as 200 at home without any alarming symptoms    The Hospitalist Progress Note     Admit Date:  2017 11:59 AM   Name:  Susy Novoa   Age:  64 y.o.  :  1960   MRN:  [id number]   PCP:  Francisca Miles MD  Treatment Team: Attending Provider: Tavia Matthews MD    Subjective:           Ms. Patsy Faria is a 63 yo female with PMH of metastatic colon cancer followed by Dr. Harris Bravo (brain/lung/left adrenal) with colo-vaginal fistula per 10-27-17 CT admitted with sepsis. BC 2/2 MSSA from port, UA mildly dirty, CXR shows known mets. Day 3 vancomycin-stopped /day 5 ancef. TTE no vegetation/ EF 40-45%, seen by ID, cardio consulted for BENTLEY that shows AV with small vegetation. Seen by surgery and port removed at bedside 11-3, no intervention needed for fistula at present until current issues cleared. Plans for home at discharge with home antibiotic infusions pending course.      17  at bedside, diet tolerant but was NPO for BENTLEY, had BM, has generalized pain though manageable, some cough but is current smoker-      Objective:     Patient Vitals for the past 24 hrs:   Pulse Resp BP SpO2   17 1501 73 13 160/87 96 %   17 1447 76 22 151/86 95 %   17 1432 74 12 156/89 97 %   17 1422 74 21 (!) 157/91 98 %   17 1417 76 22 (!) 158/93 99 %   17 1412 77 21 158/87 99 %   17 1409 78 22 154/84 99 %   17 1407 78 20 159/83 99 %   17 1404 78 17 (!) 160/97 98 %   17 1403 80 21 (!) 162/96 99 %   17 1400 77 22 (!) 167/96 99 %   17 1358 77 18 161/90 99 %   17 1356 74 16 158/87 99 %   17 1354 72 18 164/89 99 %   17 1352 75 13 161/87 99 %   17 1350 79 11 154/85 99 %   17 1348 94 17 158/84 98 %   17 1347 73 18 165/85 99 %   17 1344 70 17 158/88 99 %     Oxygen Therapy  O2 Sat (%): 96 % (17 1501)  O2 Device: Nasal cannula (17 1432)  O2 Flow Rate (L/min): 2 l/min (17 1432)  No intake or output data in the 24 hours ending 17 1514      General: Well nourished. Alert    CV:   RRR. No murmur, rub, or gallop. Lungs:   CTAB. No wheezing, rhonchi, or rales. Abdomen:   Soft, nontender, nondistended. Normal BS  Extremities: Warm and dry. No cyanosis or edema. Skin:     No rashes or jaundice. Data Review:  I have reviewed all labs, meds, telemetry events, and studies from the last 24 hours. Recent Results (from the past 24 hour(s))   HGB & HCT    Collection Time: 11/06/17  6:16 AM   Result Value Ref Range    HGB 8.9 (L) 11.7 - 15.4 g/dL    HCT 27.1 (L) 35.8 - 46.3 %        All Micro Results     None          Current Meds:  Current Facility-Administered Medications   Medication Dose Route Frequency    midazolam (VERSED) injection 1-2 mg  1-2 mg IntraVENous Multiple    fentaNYL citrate (PF) injection 25-50 mcg  25-50 mcg IntraVENous Multiple    sodium chloride (NS) flush 20 mL  20 mL InterCATHeter Q8H    heparin (porcine) pf 600 Units  600 Units InterCATHeter Q8H    sodium chloride (NS) flush 20 mL  20 mL InterCATHeter PRN    heparin (porcine) pf 600 Units  600 Units InterCATHeter PRN       Other Studies (last 24 hours):  No results found.     Assessment and Plan:     Hospital Problems as of 11/6/2017  Date Reviewed: 11/1/2017    None          PLAN:    · Continue ancef,  followup repeat BC NGTD, PICC ordered as ok with ID, will need ongoing ID discussions regarding EOT due to AV endocarditis and arrangement of home IV antibiotics   · followup anemia, likely some dilutional component with known colonic malignancy , stopped NSAIDS, continue PPI  · Oncology has evaluated, will need ongoing goals of care discussions  · Reinforced tobacco use cessation, weaned to RA off O2   · PT/OT eval    DC planning/Dispo:    DVT ppx:  SCD    Signed:  Brook Scheuermann, MD patient denies any chest pain, shortness of breath, edema at this time  The patient states he has an upcoming appointment with his nephrologist in July  Patient continued take his Flomax even after his prostatectomy  States since since his removal as prostate history and has been within normal limits  He has no nocturia    Review of Systems   Constitutional: Negative for activity change and appetite change  HENT: Negative for congestion and sore throat  Respiratory: Negative for cough, chest tightness and shortness of breath  Cardiovascular: Negative for chest pain  Gastrointestinal: Negative for abdominal distention and abdominal pain  Musculoskeletal: Negative for arthralgias and back pain  Skin: Negative for rash  Neurological: Negative for dizziness  All other systems reviewed and are negative        Historical Information   The patient history was reviewed as follows:    Past Medical History:   Diagnosis Date    Arthritis     BPH without urinary obstruction     CKD (chronic kidney disease), stage III     baseline 1 6-1 7    GERD (gastroesophageal reflux disease)     Hyperlipidemia     Hypertension      Past Surgical History:   Procedure Laterality Date    APPENDECTOMY      COLONOSCOPY  2017    FRACTURE SURGERY      AR RECONSTR TOTAL SHOULDER IMPLANT Right 12/5/2017    Procedure: ARTHROPLASTY SHOULDER REVERSE with OPEN CYST EXCISION;  Surgeon: Lyle Moffett MD;  Location: [facility];  Service: Orthopedics    SHOULDER SURGERY      WRIST SURGERY       Family History   Problem Relation Age of Onset    No Known Problems Mother     Hypertension Father     Arthritis Family       Social History   History   Alcohol Use No     History   Drug Use No     History   Smoking Status    Former Smoker    Packs/day: 1 00    Quit date: 1980   Smokeless Tobacco    Never Used       Medications:     Current Outpatient Prescriptions:     hydrochlorothiazide (HYDRODIURIL) 25 mg tablet, Take 1 tablet (25 mg total) by mouth daily in the early morning, Disp: 90 tablet, Rfl: 2    omeprazole (PriLOSEC) 20 mg delayed release capsule, take 1 capsule by mouth daily at bedtime, Disp: 90 capsule, Rfl: 2    oxyCODONE (ROXICODONE) 5 mg immediate release tablet, 1 tablets every 4 hours as needed for pain , Disp: 30 tablet, Rfl: 0    tamsulosin (FLOMAX) 0 4 mg, Take 0 4 mg by mouth daily with dinner, Disp: , Rfl:     losartan-hydrochlorothiazide (HYZAAR) 100-25 MG per tablet, Take 1 tablet by mouth every morning, Disp: 90 tablet, Rfl: 3    metoprolol tartrate (LOPRESSOR) 50 mg tablet, Take 1 tablet (50 mg total) by mouth every 12 (twelve) hours, Disp: 60 tablet, Rfl: 3  No Known Allergies    OBJECTIVE    Vitals:   Vitals:    06/26/18 1416   BP: 160/90   BP Location: Left arm   Patient Position: Sitting   Cuff Size: Large   Pulse: 80   Resp: 16   Temp: (!) 97 4 °F (36 3 °C)   TempSrc: Tympanic   Weight: 83 1 kg (183 lb 3 2 oz)   Height: 5' 8" (1 727 m)           Physical Exam   Constitutional: He is oriented to person, place, and time  He appears well-developed and well-nourished  HENT:   Head: Normocephalic and atraumatic  Eyes: Conjunctivae and EOM are normal  Pupils are equal, round, and reactive to light  Neck: Normal range of motion  Neck supple  Cardiovascular: Normal rate, regular rhythm, normal heart sounds and intact distal pulses  No murmur heard  Pulmonary/Chest: Effort normal and breath sounds normal  No respiratory distress  He has no wheezes  Abdominal: Soft  Bowel sounds are normal  He exhibits no distension  There is no tenderness  Musculoskeletal: Normal range of motion  He exhibits no edema  Neurological: He is alert and oriented to person, place, and time  Skin: Skin is warm and dry  No rash noted  Psychiatric: He has a normal mood and affect  His behavior is normal  Judgment and thought content normal    Vitals reviewed           Yasmin Wong MD, PGY-3  [facility] Medicine

## 2018-07-07 PROBLEM — C20 RECTAL CANCER (HCC): Status: RESOLVED | Noted: 2018-01-01 | Resolved: 2018-01-01

## 2018-08-06 PROBLEM — M86.9 OSTEOMYELITIS (HCC): Status: ACTIVE | Noted: 2018-01-01

## 2018-08-06 NOTE — H&P
[facility]        Inpatient Hematology / Oncology History and Physical    Reason for Asmission:  osteomyelitis    History of Present Illness:  Ms. Levi Figueroa is a 62 y.o. 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 She is coming to us from [facility] as a direct admit for osteomyelitis right knee requiring IV antibiotics and for evaluation for rehab placement. At [facility] she underwent open biopsy right femur consistent with acute inflammation and necrosis. Cultures and intraoperative findings were consistent with osteomyelitis and septic arthritis. Full irrigation and debridement was done of her distal femur as well as her right knee joint. Antibiotic laden cement was left within the distal femur. Blood cultures ultimately were positive as well and MSSA was isolated as well as staph lugdunensis. MSSA was also isolated from the knee fluid and distal femoral bone. Repeat blood cultures were negative. Dr. Ulises Olivares was consulted with infectious diseases and the patient has been on Ancef 2 g IV every 8 hours. Per care everywhere it was recommended 6 weeks of IV antibiotic. Oncology history copied from chart: Ms. Levi Figueroa has a metastatic colon cancer to regional lymph nodes, left adrenal, brain, and lung. After initial cranial irradiation, she was placed on FOLFOX and bevacizumab which she tolerated well with an apparent response. 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 This  resulted in progression of disease in all of the above sites. 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   She was unable to resume systemic therapy because of the development of recurrent brain metastases treated with CyberKnife. She now has right knee pain due to AVN likely associated with her steroid use. MSSA endocarditis-resolved  Colovaginal fistula-resolved as above     PLAN:  She has been referred to Dr. Kat Kang for evaluation of the right knee. Unfortunately she has been unable to resume systemic therapy and there certainly is the possibility of visceral disease progression which might temper enthusiasm for surgical repair. At present, she feels she is too ill for chemotherapy. We will try again next week. She was offered a [facility] palliative approach to her care without chemotherapy but she is resistant to that consideration. Review of Systems:  Constitutional Denies fever, chills, weight loss, appetite changes, fatigue,    HEENT Denies trauma, blurry vision, hearing loss, ear pain, nosebleeds, sore throat, neck pain     Skin Denies lesions or rashes. Lungs Denies dyspnea, cough, sputum production or hemoptysis. Cardiovascular Denies chest pain, palpitations, or lower extremity edema. Neuro Denies headaches, visual changes or ataxia. Denies dizziness, tingling, tremors, sensory change, speech change, focal weakness or headaches. MSK Right knee pain     Psychiatric/Behavioral  The patient is not nervous/anxious. Allergies   Allergen Reactions    Latex Rash    Adhesive Tape-Silicones Rash     Bad red rash.  Ativan [Lorazepam] Other (comments)     Pt states \"gets hallucinations and confused\"    Bactrim [Sulfamethoprim Ds] Hives     Swelling and itching      Lexapro [Escitalopram Oxalate] Other (comments)    Macrobid [Nitrofurantoin Monohyd/M-Cryst] Other (comments)     Felt  \"out of it\"    Pcn [Penicillins] Hives     Swelling and itching also. Pt has tolerated cephalosporins in the past.    Pyridium [Phenazopyridine] Hives     Also itching and swelling.      Past Medical History:   Diagnosis Date    Arthritis     RA    Colon cancer ([facility] [address] Metastatic to Lungs and Brain- chemo and radiation    Gastrointestinal disorder     Reflux    GERD (gastroesophageal reflux disease)     controlled  with med    Non-intractable cyclical vomiting with nausea 4/30/2018    Other ill-defined conditions(799.89)     Herniated disk  lumbar and cervical    Staph aureus infection 12/29/2017    site: port- was adm to [facility] Sept 2017 for sepsis-port was dc'd Oct 2017     Past Surgical History:   Procedure Laterality Date    HX ADENOIDECTOMY      HX GYN      Hysterectomy-partial    HX KNEE ARTHROSCOPY Left 2014    HX LAP CHOLECYSTECTOMY      HX TONSILLECTOMY      HX VASCULAR ACCESS      port inserted Sept 2017- out 94/5/20 ([place])/then had PICC in to give IV antibiotics then dc'd 12/15/17    LYN BIOPSY BREAST STEREOTACTIC Left 10-8-2014     Family History   Problem Relation Age of Onset    Stroke Mother     Hypertension Mother     Diabetes Mother     Heart Disease Father     Hypertension Father     Cancer Brother     Breast Cancer Neg Hx      Social History     Social History    Marital status:      Spouse name: N/A    Number of children: N/A    Years of education: N/A     Occupational History    Not on file. Social History Main Topics    Smoking status: Current Some Day Smoker     Packs/day: 0.25     Years: 10.00     Last attempt to quit: 8/13/2017    Smokeless tobacco: Never Used    Alcohol use No    Drug use: No    Sexual activity: Yes     Birth control/ protection: Surgical     Other Topics Concern    Not on file     Social History Narrative     Current Outpatient Prescriptions   Medication Sig Dispense Refill    levETIRAcetam 1,000 mg tablet TAKE 1 TABLET BY MOUTH 2 TIMES A DAY. 60 Tab 0    HYDROcodone-acetaminophen (NORCO)  mg tablet Take 1 Tab by mouth every four (4) hours as needed. Max Daily Amount: 6 Tabs. 180 Tab 0    potassium chloride (KLOR-CON) 10 mEq tablet Take 2 Tabs by mouth two (2) times a day.  120 Tab 1  HYDROcodone-acetaminophen (NORCO)  mg tablet Take 1 Tab by mouth every four (4) hours as needed for Pain.  dexamethasone (DECADRON) 4 mg tablet Take 1 Tab by mouth three (3) times daily. Indications: appetite stimulant 90 Tab 2    FLUoxetine (PROZAC) 10 mg tablet Take 1 Tab by mouth daily. Indications: ANXIETY WITH DEPRESSION 30 Tab 2    ondansetron (ZOFRAN ODT) 8 mg disintegrating tablet Take 1 Tab by mouth every eight (8) hours as needed for Nausea. Indications: PREVENTION OF POST-OPERATIVE NAUSEA AND VOMITING 90 Tab 2    lidocaine-prilocaine (EMLA) topical cream Apply  to affected area as needed for Pain. 30 g 3    promethazine (PHENERGAN) 25 mg tablet Take 25 mg by mouth every six (6) hours as needed for Nausea.  albuterol (PROVENTIL HFA, VENTOLIN HFA, PROAIR HFA) 90 mcg/actuation inhaler Take 2 Puffs by inhalation every six (6) hours as needed for Wheezing. 1 Inhaler 1    ALPRAZolam (XANAX XR) 1 mg XR tablet 1 to 2 po bid, prn      esomeprazole (NEXIUM) 40 mg capsule Take 40 mg by mouth daily. OBJECTIVE:  No data found. Temp (24hrs), Av °F (-17.8 °C), Min:, Max:         Physical Exam:  Constitutional: Well developed, well nourished female in no acute distress, sitting comfortably in the hospital bed. HEENT: Normocephalic and atraumatic. Oropharynx is clear, mucous membranes are moist. Extraocular muscles are intact. Sclerae anicteric. Skin Warm and dry. No bruising and no rash noted. No erythema. No pallor. Respiratory Lungs are clear to auscultation bilaterally without wheezes, rales or rhonchi, normal air exchange without accessory muscle use. CVS Normal rate, regular rhythm and normal S1 and S2. No murmurs, gallops, or rubs. Abdomen Soft, nontender and nondistended, normoactive bowel sounds. No palpable mass. No hepatosplenomegaly. Neuro Grossly nonfocal with no obvious sensory or motor deficits.    MSK Right knee pain   Psych Appropriate mood and affect. Labs:    No results found for this or any previous visit (from the past 24 hour(s)). Imaging:  [unfilled]    ASSESSMENT:  Problem List  Date Reviewed: 7/7/2018          Codes Class Noted    Non-intractable cyclical vomiting with nausea ICD-10-CM: G43. A0  ICD-9-CM: 536.2  4/30/2018        Nonintractable headache ICD-10-CM: R51  ICD-9-CM: 784.0  4/30/2018        Hypokalemia ICD-10-CM: E87.6  ICD-9-CM: 276.8  4/30/2018        Status post colostomy ([facility] ICD-10-CM: Z93.3  ICD-9-CM: V44.3  3/15/2018        Confusion ICD-10-CM: R41.0  ICD-9-CM: 298.9  3/6/2018        Colovaginal fistula ICD-10-CM: N82.4  ICD-9-CM: 619.1  1/29/2018        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 (MSSA) ICD-10-CM: I33.0, B95.61  ICD-9-CM: 421.0, 041.11  23/15/0089        Systolic congestive heart failure (HCC) ICD-10-CM: I50.20  ICD-9-CM: 428.20, 428.0  11/6/2017        Severe sepsis ([facility] ICD-10-CM: A41.9, R65.20  ICD-9-CM: 038.9, 995.92  11/1/2017        Colon cancer metastasized to [facility]) ICD-10-CM: C18.9, C79.31  ICD-9-CM: 153.9, 198.3  8/30/2017        Malignant neoplasm of descending colon ([facility] ICD-10-CM: C18.6  ICD-9-CM: 153.2  8/30/2017        Pulmonary metastases ([facility] ICD-10-CM: C78.00  ICD-9-CM: 197.0  8/17/2017        Secondary malignant neoplasm of [facility]) ICD-10-CM: C79.31  ICD-9-CM: 198.3  8/17/2017        Mass of colon ICD-10-CM: K63.9  ICD-9-CM: 569.89  8/17/2017        Breast cancer screening ICD-10-CM: Z12.31  ICD-9-CM: V76.10  8/17/2017                PLAN:  Osteomyelitis- continue ancef for 6 weeks per ID. Metastatic colon cancer-last treatment FOLFOX and bevacizumab 1/24/2018.      PT/OT consulted    CM working on rehab placement    Nathalie 53, NP    [facility]  [address] [place] [address]  Office : [phone]  Fax : [phone]       Attending Addendum:  I personally evaluated the patient with Ruby Banks N.P.,  and agree with the assessment, findings and plan as documented. Appears stable, heart regular without murmur, lungs clear, abdomen benign. Middle aged lady h/o metastatic colon cancer including brain mets, now admitted w RT knee osteomyelitis: ID opinion, on ancef. Will need rehab on discharge. Systemic chemotherapy on hold currently.                Chelsey Castillo MD  [employer] [address]  Office : [phone]  Fax : [phone]

## 2018-08-06 NOTE — IP AVS SNAPSHOT
Summary of Care Report The Summary of Care report has been created to help improve care coordination. Users with access to [employer] or [employer] [facility] (Web-based application) may access additional patient information including the Discharge Summary. If you are not currently a [address] user and need more information, please call the number listed below in the [place] [address] section and ask to be connected with Medical Records. Facility Information Name Address Phone [phone] [address] [address] [phone] Patient Information Patient Name Sex  Ashanti Chavez ([id number]) Female 1960 Discharge Information Admitting Provider Service Area Unit Jerson Mccallum MD / [address] [facility] [address] / [phone] Discharge Provider Discharge Date/Time Discharge Disposition Destination (none) 2018 Afternoon (Pending) SNF (none) Patient Language Language ENGLISH [13] Hospital Problems as of 2018  Reviewed: 2018 12:22 AM by Marlyn Chavez MD  
  
  
  
 Class Noted - Resolved Last Modified POA Active Problems Osteomyelitis ([facility] [address]  2018 - Present 2018 by Jocelyne Monterroso NP Unknown Entered by Jocelyne Monterroso NP Non-Hospital Problems as of 2018  Reviewed: 2018 12:22 AM by Marlyn Chavez MD  
  
  
  
 Class Noted - Resolved Last Modified Active Problems   Pulmonary metastases ([address]  2017 - Present 2017 by Anna Dennis MD  
  Entered by Marlyn Chavez MD  
  Secondary malignant neoplasm of brain [facility])  2017 - Present 3/6/2018 by Cecilio Vieyra MD  
  Entered by Marlyn Chavez MD  
  Mass of colon  2017 - Present 2017 by Marlyn Chavez MD  
  Entered by Marlyn Chavez MD  
 Breast cancer screening  8/17/2017 - Present 8/18/2017 by Jaleel Lemon MD  
  Entered by Jaleel Lemon MD  
  Colon cancer metastasized to brain [facility])  8/30/2017 - Present 11/1/2017 by Brigette Lynne MD  
  Entered by Jaleel Lemon MD  
  Malignant neoplasm of descending colon [facility])  8/30/2017 - Present 8/30/2017 by Jaleel Lemon MD  
  Entered by Jaleel Lemon MD  
  Severe sepsis [facility])  11/1/2017 - Present 11/1/2017 by Brigette Lynne MD  
  Entered by Brigette Lynne MD  
  Systolic congestive heart failure ([address]  11/6/2017 - Present 11/6/2017 by ELGIN Nolasco Entered by ELGIN Nolasco 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 (MSSA)  11/29/2017 - Present 12/7/2017 by Jaleel Lemon MD  
  Entered by Jaleel Lemon MD  
  Colovaginal fistula  1/29/2018 - Present 1/54/5099 by Macho Sawyer MD  
  Entered by Macho Sawyer MD  
  Confusion  3/6/2018 - Present 3/6/2018 by Lazaro Patel MD  
  Entered by Lazaro Patel MD  
  Status post colostomy [facility])  3/15/2018 - Present 3/15/2018 by Jocelyne Nick NP Entered by Jocelyne Nick, MAKSIM Non-intractable cyclical vomiting with nausea  4/30/2018 - Present 4/30/2018 by Adriana Amaya, NP Entered by Adriana Amaya, NP Nonintractable headache  4/30/2018 - Present 4/30/2018 by Adriana Amaya, NP Entered by Adriana Amaya, NP Hypokalemia  4/30/2018 - Present 4/30/2018 by Adriana Amaya, NP Entered by Adriana Amaya, NP You are allergic to the following Allergen Reactions Latex Rash Adhesive Tape-Silicones Rash Bad red rash. Ativan (Lorazepam) Other (comments) Pt states \"gets hallucinations and confused\" Bactrim (Sulfamethoprim Ds) Hives Swelling and itching Lexapro (Escitalopram Oxalate) Other (comments) Macrobid (Nitrofurantoin Monohyd/M-Cryst) Other (comments) Felt  \"out of it\" Pcn (Penicillins) Hives Swelling and itching also. Pt has tolerated cephalosporins in the past.  
    
 Pyridium (Phenazopyridine) Hives Also itching and swelling. Current Discharge Medication List  
  
START taking these medications Dose & Instructions Dispensing Information Comments ceFAZolin (ANCEF) in sterile water 2 gram/20 mL IV syringe Dose:  2 g  
20 mL by IntraVENous route every eight (8) hours for 29 days. Quantity:  1740 mL Refills:  0 CONTINUE these medications which have CHANGED Dose & Instructions Dispensing Information Comments * ALPRAZolam 1 mg XR tablet Commonly known as:  XANAX XR What changed:  Another medication with the same name was added. Make sure you understand how and when to take each. 1 to 2 po bid, prn Refills:  0  
 for anxiety * ALPRAZolam 1 mg tablet Commonly known as:  Pixie Oz What changed: You were already taking a medication with the same name, and this prescription was added. Make sure you understand how and when to take each. Dose:  1 mg Take 1 Tab by mouth two (2) times daily as needed for Anxiety. Max Daily Amount: 2 mg. Quantity:  60 Tab Refills:  0  
   
 * Notice: This list has 2 medication(s) that are the same as other medications prescribed for you. Read the directions carefully, and ask your doctor or other care provider to review them with you. CONTINUE these medications which have NOT CHANGED Dose & Instructions Dispensing Information Comments  
 albuterol 90 mcg/actuation inhaler Commonly known as:  PROVENTIL HFA, VENTOLIN HFA, PROAIR HFA Dose:  2 Puff Take 2 Puffs by inhalation every six (6) hours as needed for Wheezing. Quantity:  1 Inhaler Refills:  1  
   
 dexamethasone 4 mg tablet Commonly known as:  DECADRON Dose:  4 mg Take 1 Tab by mouth three (3) times daily. Indications: appetite stimulant Quantity:  90 Tab Refills:  2 FLUoxetine 10 mg tablet Commonly known as:  PROzac Dose:  10 mg Take 1 Tab by mouth daily. Indications: ANXIETY WITH DEPRESSION Quantity:  30 Tab Refills:  2  
   
 * HYDROcodone-acetaminophen  mg tablet Commonly known as:  Tyler Minor Dose:  1 Tab Take 1 Tab by mouth every four (4) hours as needed for Pain. Refills:  0  
   
 * HYDROcodone-acetaminophen  mg tablet Commonly known as:  Tyler Minor Dose:  1 Tab Take 1 Tab by mouth every four (4) hours as needed. Max Daily Amount: 6 Tabs. Quantity:  90 Tab Refills:  0  
   
 levETIRAcetam 1,000 mg tablet TAKE 1 TABLET BY MOUTH 2 TIMES A DAY. Quantity:  60 Tab Refills:  0  
   
 lidocaine-prilocaine topical cream  
Commonly known as:  EMLA Apply  to affected area as needed for Pain. Quantity:  30 g Refills:  3 NexIUM 40 mg capsule Generic drug:  esomeprazole Dose:  40 mg Take 40 mg by mouth daily. Refills:  0  
   
 ondansetron 8 mg disintegrating tablet Commonly known as:  ZOFRAN ODT Dose:  8 mg Take 1 Tab by mouth every eight (8) hours as needed for Nausea. Indications: PREVENTION OF POST-OPERATIVE NAUSEA AND VOMITING Quantity:  90 Tab Refills:  2  
   
 promethazine 25 mg tablet Commonly known as:  PHENERGAN Dose:  25 mg Take 25 mg by mouth every six (6) hours as needed for Nausea. Refills:  0  
   
 * Notice: This list has 2 medication(s) that are the same as other medications prescribed for you. Read the directions carefully, and ask your doctor or other care provider to review them with you. STOP taking these medications Comments  
 potassium chloride 10 mEq tablet Commonly known as:  KLOR-CON Current Immunizations Name Date  
 TB Skin Test (PPD) Intradermal 8/7/2018 Follow-up Information Follow up With Details Comments Contact Info RYDERJANICE Seneca ([facility])   [address] [facility] [place] [address] [address] [phone] Rohan Patel MD On 8/21/2018 LABS 7:30 AM AND APPT TIME 8:00 AM PER MINNIE SPAIN. [address] [facility] [address] [facility] [address] 
[phone] Samina Lopez, [address] [facility] [address] [facility] [address] 
[phone] Discharge Instructions None Chart Review Routing History Recipient Method Report Sent By Cony Alex MD  
Fax: [phone] Phone: [phone] Fax [facility] IP AMB RESULT REPORT IMAGING Dana Gill [[id number]] 10/7/2014  9:08 AM 9/26/2014 Janna Alex MD  
Fax: [phone] Phone: [phone] Fax [facility] IP AMB RESULT REPORT IMAGING Dana Jairo [[id number]] 10/7/2014  9:08 AM 10/6/2014

## 2018-08-06 NOTE — PROGRESS NOTES
1423-TRANSFER - IN REPORT:  Verbal report received from Sarah RN(name) on Melly Ayala  being received from [facility](unit) for urgent transfer    Report consisted of patients Situation, Background, Assessment and   Recommendations(SBAR). Information from the following report(s) SBAR, Kardex, [facility] and Recent Results was reviewed with the receiving nurse. Opportunity for questions and clarification was provided. Assessment will be completed upon patients arrival to unit and care assumed.

## 2018-08-06 NOTE — IP AVS SNAPSHOT
Afsaneh Marseilles 
 
 
 [address] [facility] [address] [facility] 
[phone] Patient: Brinda Mendez MRN: [id number] :1960 A check kenia indicates which time of day the medication should be taken. My Medications START taking these medications Instructions Each Dose to Equal  
 Morning Noon Evening Bedtime ceFAZolin (ANCEF) in sterile water 2 gram/20 mL IV syringe Your last dose was: Your next dose is:    
   
   
 20 mL by IntraVENous route every eight (8) hours for 29 days. 2 g CHANGE how you take these medications Instructions Each Dose to Equal  
 Morning Noon Evening Bedtime * ALPRAZolam 1 mg XR tablet Commonly known as:  XANAX XR What changed:  Another medication with the same name was added. Make sure you understand how and when to take each. Your last dose was: Your next dose is:    
   
   
 1 to 2 po bid, prn  
     
   
   
   
  
 * ALPRAZolam 1 mg tablet Commonly known as:  Baldev Fine What changed: You were already taking a medication with the same name, and this prescription was added. Make sure you understand how and when to take each. Your last dose was: Your next dose is: Take 1 Tab by mouth two (2) times daily as needed for Anxiety. Max Daily Amount: 2 mg.  
 1 mg * Notice: This list has 2 medication(s) that are the same as other medications prescribed for you. Read the directions carefully, and ask your doctor or other care provider to review them with you. CONTINUE taking these medications Instructions Each Dose to Equal  
 Morning Noon Evening Bedtime  
 albuterol 90 mcg/actuation inhaler Commonly known as:  PROVENTIL HFA, VENTOLIN HFA, PROAIR HFA Your last dose was: Your next dose is: Take 2 Puffs by inhalation every six (6) hours as needed for Wheezing. 2 Puff dexamethasone 4 mg tablet Commonly known as:  DECADRON Your last dose was: Your next dose is: Take 1 Tab by mouth three (3) times daily. Indications: appetite stimulant 4 mg FLUoxetine 10 mg tablet Commonly known as:  PROzac Your last dose was: Your next dose is: Take 1 Tab by mouth daily. Indications: ANXIETY WITH DEPRESSION 10 mg  
    
   
   
   
  
 * HYDROcodone-acetaminophen  mg tablet Commonly known as:  Hawa Morel Your last dose was: Your next dose is: Take 1 Tab by mouth every four (4) hours as needed for Pain. 1 Tab  
    
   
   
   
  
 * HYDROcodone-acetaminophen  mg tablet Commonly known as:  Hawa Morel Your last dose was: Your next dose is: Take 1 Tab by mouth every four (4) hours as needed. Max Daily Amount: 6 Tabs. 1 Tab  
    
   
   
   
  
 levETIRAcetam 1,000 mg tablet Your last dose was: Your next dose is: TAKE 1 TABLET BY MOUTH 2 TIMES A DAY. lidocaine-prilocaine topical cream  
Commonly known as:  EMLA Your last dose was: Your next dose is:    
   
   
 Apply  to affected area as needed for Pain. NexIUM 40 mg capsule Generic drug:  esomeprazole Your last dose was: Your next dose is: Take 40 mg by mouth daily. 40 mg  
    
   
   
   
  
 ondansetron 8 mg disintegrating tablet Commonly known as:  ZOFRAN ODT Your last dose was: Your next dose is: Take 1 Tab by mouth every eight (8) hours as needed for Nausea. Indications: PREVENTION OF POST-OPERATIVE NAUSEA AND VOMITING  
 8 mg  
    
   
   
   
  
 promethazine 25 mg tablet Commonly known as:  PHENERGAN Your last dose was: Your next dose is: Take 25 mg by mouth every six (6) hours as needed for Nausea. 25 mg  
    
   
   
   
  
 * Notice: This list has 2 medication(s) that are the same as other medications prescribed for you. Read the directions carefully, and ask your doctor or other care provider to review them with you. STOP taking these medications   
 potassium chloride 10 mEq tablet Commonly known as:  KLOR-CON Where to Get Your Medications Information on where to get these meds will be given to you by the nurse or doctor. ! Ask your nurse or doctor about these medications ALPRAZolam 1 mg tablet ceFAZolin (ANCEF) in sterile water 2 gram/20 mL IV syringe HYDROcodone-acetaminophen  mg tablet

## 2018-08-06 NOTE — IP AVS SNAPSHOT
[address] [facility] 
 
 
 [address] [facility] 322 [facility] 
[phone] Patient: Morales Negrete MRN: [id number] :1960 About your hospitalization You were admitted on:  2018 You last received care in the:  [address] You were discharged on:  2018 Why you were hospitalized Your primary diagnosis was:  Not on File Your diagnoses also included:  Osteomyelitis (Hcc) Follow-up Information Follow up With Details Comments Contact Info AIDEN CHAHAL ([facility]   [address] [facility]  [id number] [phone] Rohan Patel MD On 2018 LABS 7:30 AM AND APPT TIME 8:00 AM PER MINNIE SPAIN. [address] [facility] [address] 
[phone] Samina Lopez, [address] [facility] [address] [facility] [address] 
[phone] Your Scheduled Appointments 2018  7:30 AM EDT  
LAB with [address]  
1808 [facility] OUTREACH INSURANCE [facility] Adam Dye [address] [place] [address]  
[phone] 2018  8:00 AM EDT Follow Up with Rohan Patel MD  
[facility] Hematology and Oncology [facility] AFUA/ Inder King [address] [facility] [address]  
[phone] Discharge Orders None A check kenia indicates which time of day the medication should be taken. My Medications START taking these medications Instructions Each Dose to Equal  
 Morning Noon Evening Bedtime ceFAZolin (ANCEF) in sterile water 2 gram/20 mL IV syringe Your last dose was: Your next dose is:    
   
   
 20 mL by IntraVENous route every eight (8) hours for 29 days. 2 g CHANGE how you take these medications Instructions Each Dose to Equal  
 Morning Noon Evening Bedtime * ALPRAZolam 1 mg XR tablet Commonly known as:  XANAX XR What changed:  Another medication with the same name was added. Make sure you understand how and when to take each. Your last dose was: Your next dose is:    
   
   
 1 to 2 po bid, prn  
     
   
   
   
  
 * ALPRAZolam 1 mg tablet Commonly known as:  Baldev Noonan What changed: You were already taking a medication with the same name, and this prescription was added. Make sure you understand how and when to take each. Your last dose was: Your next dose is: Take 1 Tab by mouth two (2) times daily as needed for Anxiety. Max Daily Amount: 2 mg.  
 1 mg * Notice: This list has 2 medication(s) that are the same as other medications prescribed for you. Read the directions carefully, and ask your doctor or other care provider to review them with you. CONTINUE taking these medications Instructions Each Dose to Equal  
 Morning Noon Evening Bedtime  
 albuterol 90 mcg/actuation inhaler Commonly known as:  PROVENTIL HFA, VENTOLIN HFA, PROAIR HFA Your last dose was: Your next dose is: Take 2 Puffs by inhalation every six (6) hours as needed for Wheezing. 2 Puff  
    
   
   
   
  
 dexamethasone 4 mg tablet Commonly known as:  DECADRON Your last dose was: Your next dose is: Take 1 Tab by mouth three (3) times daily. Indications: appetite stimulant 4 mg FLUoxetine 10 mg tablet Commonly known as:  PROzac Your last dose was: Your next dose is: Take 1 Tab by mouth daily. Indications: ANXIETY WITH DEPRESSION 10 mg  
    
   
   
   
  
 * HYDROcodone-acetaminophen  mg tablet Commonly known as:  March Castles Your last dose was: Your next dose is: Take 1 Tab by mouth every four (4) hours as needed for Pain. 1 Tab * HYDROcodone-acetaminophen  mg tablet Commonly known as:  Tyree Gonzales Your last dose was: Your next dose is: Take 1 Tab by mouth every four (4) hours as needed. Max Daily Amount: 6 Tabs. 1 Tab  
    
   
   
   
  
 levETIRAcetam 1,000 mg tablet Your last dose was: Your next dose is: TAKE 1 TABLET BY MOUTH 2 TIMES A DAY. lidocaine-prilocaine topical cream  
Commonly known as:  EMLA Your last dose was: Your next dose is:    
   
   
 Apply  to affected area as needed for Pain. NexIUM 40 mg capsule Generic drug:  esomeprazole Your last dose was: Your next dose is: Take 40 mg by mouth daily. 40 mg  
    
   
   
   
  
 ondansetron 8 mg disintegrating tablet Commonly known as:  ZOFRAN ODT Your last dose was: Your next dose is: Take 1 Tab by mouth every eight (8) hours as needed for Nausea. Indications: PREVENTION OF POST-OPERATIVE NAUSEA AND VOMITING  
 8 mg  
    
   
   
   
  
 promethazine 25 mg tablet Commonly known as:  PHENERGAN Your last dose was: Your next dose is: Take 25 mg by mouth every six (6) hours as needed for Nausea. 25 mg  
    
   
   
   
  
 * Notice: This list has 2 medication(s) that are the same as other medications prescribed for you. Read the directions carefully, and ask your doctor or other care provider to review them with you. STOP taking these medications   
 potassium chloride 10 mEq tablet Commonly known as:  KLOR-CON Where to Get Your Medications Information on where to get these meds will be given to you by the nurse or doctor. ! Ask your nurse or doctor about these medications ALPRAZolam 1 mg tablet ceFAZolin (ANCEF) in sterile water 2 gram/20 mL IV syringe HYDROcodone-acetaminophen  mg tablet Opioid Education Prescription Opioids: What You Need to Know: 
 
Prescription opioids can be used to help relieve moderate-to-severe pain and are often prescribed following a surgery or injury, or for certain health conditions. These medications can be an important part of treatment but also come with serious risks. Opioids are strong pain medicines. Examples include hydrocodone, oxycodone, fentanyl, and morphine. Heroin is an example of an illegal opioid. It is important to work with your health care provider to make sure you are getting the safest, most effective care. WHAT ARE THE RISKS AND SIDE EFFECTS OF OPIOID USE? Prescription opioids carry serious risks of addiction and overdose, especially with prolonged use. An opioid overdose, often marked by slow breathing, can cause sudden death. The use of prescription opioids can have a number of side effects as well, even when taken as directed. · Tolerance-meaning you might need to take more of a medication for the same pain relief · Physical dependence-meaning you have symptoms of withdrawal when the medication is stopped. Withdrawal symptoms can include nausea, sweating, chills, diarrhea, stomach cramps, and muscle aches. Withdrawal can last up to several weeks, depending on which drug you took and how long you took it. · Increased sensitivity to pain · Constipation · Nausea, vomiting, and dry mouth · Sleepiness and dizziness · Confusion · Depression · Low levels of testosterone that can result in lower sex drive, energy, and strength · Itching and sweating RISKS ARE GREATER WITH:      
· History of drug misuse, substance use disorder, or overdose · Mental health conditions (such as depression or anxiety) · Sleep apnea · Older age (72 years or older) · Pregnancy Avoid alcohol while taking prescription opioids. Also, unless specifically advised by your health care provider, medications to avoid include: · Benzodiazepines (such as Xanax or Valium) · Muscle relaxants (such as Soma or Flexeril) · Hypnotics (such as Ambien or Lunesta) · Other prescription opioids KNOW YOUR OPTIONS Talk to your health care provider about ways to manage your pain that don't involve prescription opioids. Some of these options may actually work better and have fewer risks and side effects. Options may include: 
· Pain relievers such as acetaminophen, ibuprofen, and naproxen · Some medications that are also used for depression or seizures · Physical therapy and exercise · Counseling to help patients learn how to cope better with triggers of pain and stress. · Application of heat or cold compress · Massage therapy · Relaxation techniques Be Informed Make sure you know the name of your medication, how much and how often to take it, and its potential risks & side effects. IF YOU ARE PRESCRIBED OPIOIDS FOR PAIN: 
· Never take opioids in greater amounts or more often than prescribed. Remember the goal is not to be pain-free but to manage your pain at a tolerable level. · Follow up with your primary care provider to: · Work together to create a plan on how to manage your pain. · Talk about ways to help manage your pain that don't involve prescription opioids. · Talk about any and all concerns and side effects. · Help prevent misuse and abuse. · Never sell or share prescription opioids · Help prevent misuse and abuse. · Store prescription opioids in a secure place and out of reach of others (this may include visitors, children, friends, and family). · Safely dispose of unused/unwanted prescription opioids: Find your community drug take-back program or your pharmacy mail-back program, or flush them down the toilet, following guidance from the Food and Drug Administration (www.fda.gov/Drugs/ResourcesForYou). · Visit www.cdc.gov/drugoverdose to learn about the risks of opioid abuse and overdose. · If you believe you may be struggling with addiction, tell your health care provider and ask for guidance or call [address] at 2-169-205-ONCW. Discharge Instructions None VocalIQ Announcement We are excited to announce that we are making your provider's discharge notes available to you in VocalIQ. You will see these notes when they are completed and signed by the physician that discharged you from your recent hospital stay. If you have any questions or concerns about any information you see in VocalIQ, please call the Health Information Department where you were seen or reach out to your Primary Care Provider for more information about your plan of care. Introducing [facility] HE[facility]! Dear Ryne Jiménez: 
Thank you for requesting a VocalIQ account. Our records indicate that you already have an active VocalIQ account. You can access your account anytime at [email or web address] [employer]VisiKard Did you know that you can access your hospital and ER discharge instructions at any time in VocalIQ? You can also review all of your test results from your hospital stay or ER visit. Additional Information If you have questions, please visit the Frequently Asked Questions section of the VocalIQ website at [email or web address] [employer][email or web address]/. Remember, VocalIQ is NOT to be used for urgent needs. For medical emergencies, dial 911. Now available from your iPhone and Android! Introducing Gerald Valle As a [employer] patient, I wanted to make you aware of our electronic visit tool called Gerald Valle. [employer] 24/7 allows you to connect within minutes with a medical provider 24 hours a day, seven days a week via a mobile device or tablet or logging into a secure website from your computer. You can access Gerald Valle from anywhere in the [place]. A virtual visit might be right for you when you have a simple condition and feel like you just dont want to get out of bed, or cant get away from work for an appointment, when your regular [facility] provider is not available (evenings, weekends or holidays), or when youre out of town and need minor care. Electronic visits cost only $49 and if the [employer] provider determines a prescription is needed to treat your condition, one can be electronically transmitted to a nearby pharmacy*. Please take a moment to enroll today if you have not already done so. The enrollment process is free and takes just a few minutes. To enroll, please download the [employer] jemma to your tablet or phone, or visit [email or web address] to enroll on your computer. And, as an [address] [place] [address] patient with a [employer] account, the results of your visits will be scanned into your electronic medical record and your primary care provider will be able to view the scanned results. We urge you to continue to see your regular [facility] provider for your ongoing medical care. And while your primary care provider may not be the one available when you seek a Gerald Metac[employer]charliefin virtual visit, the peace of mind you get from getting a real diagnosis real time can be priceless. For more information on Gerald Metacafemreary, view our Frequently Asked Questions (FAQs) at [email or web address]. Sincerely, 
 
Purvi Richardson MD 
Chief Medical Officer [facility] Lisa Aamir *:  certain medications cannot be prescribed via Gerald Metacafemerary Providers Seen During Your Hospitalization Provider Specialty Primary office phone Ya Reyes MD Hematology [phone] Tamy Paredes MD Hematology and Oncology [phone] Immunizations Administered for This Admission Name Date  
 TB Skin Test (PPD) Intradermal 8/7/2018 Your Primary Care Physician (PCP) Primary Care Physician Office Phone Office Fax [address] [facility] [phone] You are allergic to the following Allergen Reactions Latex Rash Adhesive Tape-Silicones Rash Bad red rash. Ativan (Lorazepam) Other (comments) Pt states \"gets hallucinations and confused\" Bactrim (Sulfamethoprim Ds) Hives Swelling and itching Lexapro (Escitalopram Oxalate) Other (comments) Macrobid (Nitrofurantoin Monohyd/M-Cryst) Other (comments) Felt  \"out of it\" Pcn (Penicillins) Hives Swelling and itching also. Pt has tolerated cephalosporins in the past.  
    
 Pyridium (Phenazopyridine) Hives Also itching and swelling. Recent Documentation Weight BMI OB Status Smoking Status 57.7 kg 22.55 kg/m2 Hysterectomy Current Some Day Smoker Emergency Contacts Name Discharge Info Relation Home Work Mobile Wale Sesay DISCHARGE CAREGIVER [3] Spouse [3] [phone] Patient Belongings The following personal items are in your possession at time of discharge: 
  Dental Appliances: None         Home Medications: None   Jewelry: None  Clothing: None    Other Valuables: None Please provide this summary of care documentation to your next provider. Signatures-by signing, you are acknowledging that this After Visit Summary has been reviewed with you and you have received a copy. Patient Signature:  ____________________________________________________________ Date:  ____________________________________________________________  
  
[facility] Provider Signature:  ____________________________________________________________ Date:  ____________________________________________________________

## 2018-08-07 NOTE — PROGRESS NOTES
08/06/18 2019   Dual Skin Pressure Injury Assessment   Dual Skin Pressure Injury Assessment WDL   Second Care Provider (Based on [address] [place] [address]) Cecilia Taylor RN   Bruising and abrasions on BLE, blanchable area on sacrum, incision rt knee

## 2018-08-07 NOTE — CONSULTS
Infectious Disease Consult    Today's Date: 8/7/2018   Admit Date: 8/6/2018    Impression:   · MSSA R femur/knee OM and MSSA/staph lugdunensis (O-suscept) bacteremia s/p debridement (7/31), PORT removal (8/1). TTE NG. Repeat BCs NG as of 8/2  · Hx of MSSA AVIE, source PORT infection (11/2017) s/p treatment. · Metastatic adenocarcinoma  (brain, lung, L adrenal)-no chemotherapy  · Bactrim and PCN allergies     Plan:   ·  Continue Ancef 2g IV q 8h. Duration 6 weeks, EOT 9/13/18  · Place PICC     Anti-infectives:   · Ancef (8/2-  -Ancef 6g IV q 24h (stopped 12/7 due to nausea), Daptomycin (12/8-12/15/17)    Subjective:   Date of Consultation:  August 7, 2018  Referring Physician: Cassi Goff    Patient is a 62 y.o. female admitted on 8/6 to transfer care to oncology after admission at [facility] for R knee OM/septic arthritis. She states that she fell ~11 weeks ago and presented to ortho for R knee cortisone injection ~ 5 weeks ago. Pain continued to worsened and MRI performed showing AVN with ? Infection vs metastatic disease. She was admitted to [facility] from 7/31-8/6 for open bx and surgical intervention. Bx showing OM/septic arthritis. Debridement was performed on femur and knee (7/31). OP cx + MSSA. Infectious disease at [facility] placed patient on Ancef 2g q8h. Also, BCs  On 7/31 + MSSA and staph lugdunensis. PORT removed and repeat BCs NG (8/2). TTE NG. She has remained afebrile, BCx1 NGTD. 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 MSSA bacteremia/AVIE (11/2017) related to R chest PORT s/p removal. She was treated to Ancef---then Daptomycin (changed due to poor GI tolerance-nausea). Last seen in ID office in 12/2017 by me.      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 A0    Nonintractable headache R51    Hypokalemia E87.6    Osteomyelitis (HCC) M86.9     Past Medical History:   Diagnosis Date    Arthritis     RA    Colon cancer ([facility]     Metastatic to Lungs and Brain- chemo and radiation    Gastrointestinal disorder     Reflux    GERD (gastroesophageal reflux disease)     controlled  with med    Non-intractable cyclical vomiting with nausea 4/30/2018    Other ill-defined conditions(799.89)     Herniated disk  lumbar and cervical    Staph aureus infection 12/29/2017    site: port- was adm to [facility] Sept 2017 for sepsis-port was dc'd Oct 2017      Family History   Problem Relation Age of Onset    Stroke Mother     Hypertension Mother     Diabetes Mother     Heart Disease Father     Hypertension Father     Cancer Brother     Breast Cancer Neg Hx       Social History   Substance Use Topics    Smoking status: Current Some Day Smoker     Packs/day: 0.25     Years: 10.00     Last attempt to quit: 8/13/2017    Smokeless tobacco: Never Used    Alcohol use No     Past Surgical History:   Procedure Laterality Date    HX ADENOIDECTOMY      HX GYN      Hysterectomy-partial    HX KNEE ARTHROSCOPY Left 2014    HX LAP CHOLECYSTECTOMY      HX TONSILLECTOMY      HX VASCULAR ACCESS      port inserted Sept 2017- out 78/6/60 ([facility])/then had PICC in to give IV antibiotics then dc'd 12/15/17    LYN BIOPSY BREAST STEREOTACTIC Left 10-8-2014      Prior to Admission medications    Medication Sig Start Date End Date Taking?  Authorizing Provider   potassium chloride (KLOR-CON) 10 mEq tablet Take 2 Tabs by mouth two (2) times a day. 6/7/18  Yes Nai Godfrey MD   dexamethasone (DECADRON) 4 mg tablet Take 1 Tab by mouth three (3) times daily. Indications: appetite stimulant 5/2/18  Yes Nai Godfrey MD   ALPRAZolam (XANAX XR) 1 mg XR tablet 1 to 2 po bid, prn 10/2/17  Yes Historical Provider   esomeprazole (NEXIUM) 40 mg capsule Take 40 mg by mouth daily. Yes Historical Provider   levETIRAcetam 1,000 mg tablet TAKE 1 TABLET BY MOUTH 2 TIMES A DAY. 1[birth year]   Kasia Oneill MD   HYDROcodone-acetaminophen [facility])  mg tablet Take 1 Tab by mouth every four (4) hours as needed. Max Daily Amount: 6 Tabs. 6/28/18   Nai Godfrey MD   HYDROcodone-acetaminophen [facility])  mg tablet Take 1 Tab by mouth every four (4) hours as needed for Pain. Historical Provider   FLUoxetine (PROZAC) 10 mg tablet Take 1 Tab by mouth daily. Indications: ANXIETY WITH DEPRESSION 4/30/18   Felisa Purdy NP   ondansetron (ZOFRAN ODT) 8 mg disintegrating tablet Take 1 Tab by mouth every eight (8) hours as needed for Nausea. Indications: PREVENTION OF POST-OPERATIVE NAUSEA AND VOMITING 1/24/18   Daniel Virk NP   lidocaine-prilocaine (EMLA) topical cream Apply  to affected area as needed for Pain. 1/24/18   Daniel Virk NP   promethazine (PHENERGAN) 25 mg tablet Take 25 mg by mouth every six (6) hours as needed for Nausea. Historical Provider   albuterol (PROVENTIL HFA, VENTOLIN HFA, PROAIR HFA) 90 mcg/actuation inhaler Take 2 Puffs by inhalation every six (6) hours as needed for Wheezing. 10/20/17   Jigna Nance NP       Allergies   Allergen Reactions    Latex Rash    Adhesive Tape-Silicones Rash     Bad red rash.      Ativan [Lorazepam] Other (comments)     Pt states \"gets hallucinations and confused\"    Bactrim [Sulfamethoprim Ds] Hives     Swelling and itching      Lexapro [Escitalopram Oxalate] Other (comments)    Macrobid [Nitrofurantoin Monohyd/M-Cryst] Other (comments)     Sprakers  \"out of it\"    Pcn [Penicillins] Hives     Swelling and itching also. Pt has tolerated cephalosporins in the past.    Pyridium [Phenazopyridine] Hives     Also itching and swelling. Review of Systems:  A comprehensive review of systems was negative except for that written in the History of Present Illness. Objective:     Visit Vitals    /81 (BP 1 Location: Right arm, BP Patient Position: At rest;Supine)    Pulse (!) 106    Temp 98.1 °F (36.7 °C)    Resp 18    Wt 57.9 kg (127 lb 9.6 oz)    SpO2 97%    BMI 22.6 kg/m2     Temp (24hrs), Av.9 °F (36.6 °C), Min:97.6 °F (36.4 °C), Max:98.1 °F (36.7 °C)       Lines: none           Physical Exam:    General:  Alert, cooperative, appears chronically ill   Eyes:  Sclera anicteric. Pupils equally round and reactive to light. Mouth/Throat: Mucous membranes normal, oral pharynx clear. Charles facies    Neck: Supple   Lungs:   Clear to auscultation bilaterally, good effort   CV:  Regular rate and rhythm,no murmur, click, rub or gallop   Abdomen:   Soft, non-tender. bowel sounds normal. non-distended, colostomy    Extremities: No cyanosis or edema   Skin: No rash, L chest PORT site healing. Skin bruising with tears. Lymph nodes: Cervical and supraclavicular normal   Musculoskeletal: R knee incision-sutures in place. NO erythema.     Lines/Devices:  Intact, no erythema, drainage or tenderness   Psych: Alert and oriented, normal mood affect given the setting       Data Review:     CBC:  Recent Labs      18   WBC  6.3  6.6   GRANS  60  64   MONOS  7  8   EOS  0*  0*   ANEU  3.7  4.2   ABL  1.9  1.7   HGB  9.5*  10.1*   HCT  29.1*  30.4*   PLT  216  234       BMP:  Recent Labs      18   CREA  0.29*  0.39*   BUN  18  20   NA  139  138   K  4.0  3.6   CL  104  102   CO2  24  25   AGAP  11  11   GLU  82  204*       LFTS:  Recent Labs      18   0352  08/06/18   2138   TBILI  0.5  0.4   ALT  25  27 SGOT  37  31   AP  159*  169*   TP  5.9*  6.1*   ALB  2.1*  2.0*       Microbiology:     All Micro Results     Procedure Component Value Units Date/Time    CULTURE, BLOOD [[id number]] Collected:  08/06/18 2138    Order Status:  Completed Specimen:  Blood from Blood Updated:  08/07/18 0829     Special Requests: --        RIGHT  HAND       Culture result: NO GROWTH AFTER 10 HOURS       CULTURE, BLOOD [[id number]]     Order Status:  Canceled Specimen:  Blood from Blood     CULTURE, BLOOD [[id number]]     Order Status:  Canceled Specimen:  Blood from Blood           Imaging:   See care everywhere and HPI    Signed By: Susan Madison NP     August 7, 2018

## 2018-08-07 NOTE — PROGRESS NOTES
END OF SHIFT NOTE:    Intake/Output      Voiding: YES  Catheter: NO  Drain:   Colostomy 03/13/18 Mid Abdomen (Active)               Stool:  0 occurrences. Emesis:  0 occurrences. VITAL SIGNS  Patient Vitals for the past 12 hrs:   Temp Pulse Resp BP SpO2   08/07/18 0302 97.6 °F (36.4 °C) 77 20 137/76 95 %   08/06/18 2325 98.1 °F (36.7 °C) 89 20 130/85 99 %   08/06/18 1959 97.7 °F (36.5 °C) 84 20 104/85 94 %       Pain Assessment  Pain 1  Pain Scale 1: Visual (08/07/18 0310)  Pain Intensity 1: 0 (08/07/18 0310)  Patient Stated Pain Goal: 0 (08/07/18 0310)  Pain Reassessment 1: Patient sleeping (08/07/18 0310)  Pain Location 1: Knee (08/07/18 0234)  Pain Intervention(s) 1: Medication (see MAR) (08/07/18 0234)    Ambulating No    Additional Information: Pt came from [facility], arrived on unit with spouse. Pain medication given 1x during shift. No needs voiced at this time. Shift report given to oncoming nurse, Linh SANABRIA, at the bedside.     Josephine Mast

## 2018-08-07 NOTE — PROGRESS NOTES
Problem: Falls - Risk of  Goal: *Absence of Falls  Document Anant Fall Risk and appropriate interventions in the flowsheet. Outcome: Progressing Towards Goal  Fall Risk Interventions:            Medication Interventions: Patient to call before getting OOB, Teach patient to arise slowly    Elimination Interventions: Patient to call for help with toileting needs, Call light in reach    History of Falls Interventions: Door open when patient unattended, Room close to nurse's station        Problem: Pressure Injury - Risk of  Goal: *Prevention of pressure injury  Document Dominguez Scale and appropriate interventions in the flowsheet.    Outcome: Progressing Towards Goal  Pressure Injury Interventions:       Moisture Interventions: Absorbent underpads    Activity Interventions: Assess need for specialty bed, Pressure redistribution bed/mattress(bed type)    Mobility Interventions: Assess need for specialty bed, Pressure redistribution bed/mattress (bed type)    Nutrition Interventions: Document food/fluid/supplement intake    Friction and Shear Interventions: Apply protective barrier, creams and emollients, Foam dressings/transparent film/skin sealants

## 2018-08-07 NOTE — PROGRESS NOTES
Problem: Self Care Deficits Care Plan (Adult)  Goal: *Acute Goals and Plan of Care (Insert Text)  1. Patient will complete grooming with setup. 2. Patient will complete toileting with minimal assistance. 3. Patient will tolerate 20 minutes of OT treatment with as needed rest breaks to increase activity tolerance for ADLs. 4. Patient will complete functional transfers with supervision and adaptive equipment as needed. Timeframe: 7 visits       OCCUPATIONAL THERAPY: Initial Assessment 8/7/2018  INPATIENT: Hospital Day: 2  Payor: BLUE CROSS / [place]: [place] [employer] [place] / Product Type: PPO /      NAME/AGE/GENDER: Bettie Swanson is a 62 y.o. female   PRIMARY DIAGNOSIS:  osteomyelitis  Osteomyelitis ([facility] [address] <principal problem not specified> <principal problem not specified>        ICD-10: Treatment Diagnosis:    · Pain in Right Knee (M25.561)  · Stiffness of Right Knee, Not elsewhere classified (M25.661)  · Repeated Falls (R29.6)   Precautions/Allergies:    WBAT RLE   Latex; Adhesive tape-silicones; Ativan [lorazepam]; Bactrim [sulfamethoprim ds]; Lexapro [escitalopram oxalate]; Macrobid [nitrofurantoin monohyd/m-cryst]; Pcn [penicillins]; and Pyridium [phenazopyridine]      ASSESSMENT:     Ms. Bronson Sutton is a 62year old female admitted from [facility] where she underwent debridement of femur/ knee on 7/31 by Dr. Mamta Rock for osteomyelitis and septic arthritis. Patient has history of metastatic cancer to brain and lung. Per Dr. Bk Reece office, patient is WBAT In RLE. Patient lvies with spouse who helps her with all ADLs at home. She is very sedentary and is only able to walk short distances. Patient agreeable to OT evaluation. Oriented to person, place, time. Reports pain 8/10. RN aware. Appears anxious throughout session and needs some redirection. Required maximal assistance to don socks but able to remove them with supervision. Demonstrates impaired balance in both sitting and standing. Required CGA for bed mobility. She is likely functioning close to her baseline but would benefit from continued occupational therapy to increase independence and safety. Will follow. This section established at most recent assessment   PROBLEM LIST (Impairments causing functional limitations):  1. Decreased Strength  2. Decreased ADL/Functional Activities  3. Decreased Transfer Abilities  4. Decreased Ambulation Ability/Technique  5. Decreased Balance  6. Increased Pain  7. Decreased Flexibility/Joint Mobility  8. Edema/Girth   INTERVENTIONS PLANNED: (Benefits and precautions of occupational therapy have been discussed with the patient.)  1. Activities of daily living training  2. Adaptive equipment training  3. Group therapy  4. Therapeutic activity  5. Therapeutic exercise     TREATMENT PLAN: Frequency/Duration: Follow patient 3x/ week to address above goals. Rehabilitation Potential For Stated Goals: Good     RECOMMENDED REHABILITATION/EQUIPMENT: (at time of discharge pending progress): Due to the probability of continued deficits (see above) this patient will likely need continued skilled occupational therapy after discharge. Equipment:    None at this time              OCCUPATIONAL PROFILE AND HISTORY:   History of Present Injury/Illness (Reason for Referral):  See H&P   Past Medical History/Comorbidities:   Ms. Geovany Kelsey  has a past medical history of Arthritis; Colon cancer ([facility] [address] Gastrointestinal disorder; GERD (gastroesophageal reflux disease); Non-intractable cyclical vomiting with nausea (4/30/2018); Other ill-defined conditions(799.89); and Staph aureus infection (12/29/2017). She also has no past medical history of Adverse effect of anesthesia; Difficult intubation; Malignant hyperthermia due to anesthesia; or Pseudocholinesterase deficiency.   Ms. Geovany Kelsey  has a past surgical history that includes hx lap cholecystectomy; hx gyn; hx tonsillectomy; hx adenoidectomy; reggie biopsy breast stereotactic (Left, 10-8-2014); hx knee arthroscopy (Left, 2014); and hx vascular access. Social History/Living Environment:   Home Environment: Private residence  # Steps to Enter: 2  Rails to Enter: Yes  Hand Rails : Right  Wheelchair Ramp: No  One/Two Story Residence: One story  Living Alone: No  Support Systems: Spouse/Significant Other/Partner  Patient Expects to be Discharged to[de-identified] Private residence  Current DME Used/Available at Home: Walker, rollator, Wheelchair  Prior Level of Function/Work/Activity:  Lives with spouse. He helps with bathing, dressing, and toileting. Can feed herself. Sedentary lifestyle. Was having [facility] PT to work on walking with walker. Otherwise uses the wheelchair. Number of Personal Factors/Comorbidities that affect the Plan of Care: Expanded review of therapy/medical records (1-2):  MODERATE COMPLEXITY   ASSESSMENT OF OCCUPATIONAL PERFORMANCE[de-identified]   Activities of Daily Living:   Basic ADLs (From Assessment) Complex ADLs (From Assessment)   Feeding: Setup  Oral Facial Hygiene/Grooming: Setup  Bathing: Maximum assistance  Upper Body Dressing: Minimum assistance  Lower Body Dressing: Maximum assistance  Toileting: Maximum assistance     Grooming/Bathing/Dressing Activities of Daily Living     Cognitive Retraining  Safety/Judgement: Fall prevention                       Bed/Mat Mobility  Rolling: Minimum assistance  Supine to Sit: Moderate assistance  Sit to Supine: Contact guard assistance  Sit to Stand: Contact guard assistance  Scooting:  Moderate assistance       Most Recent Physical Functioning:   Gross Assessment:  AROM: Generally decreased, functional  Strength: Generally decreased, functional  Coordination: Generally decreased, functional               Posture:     Balance:  Sitting: Impaired  Sitting - Static: Fair (occasional)  Sitting - Dynamic: Fair (occasional)  Standing: Impaired  Standing - Static: Fair  Standing - Dynamic : Fair Bed Mobility:  Rolling: Minimum assistance  Supine to Sit: Moderate assistance  Sit to Supine: Contact guard assistance  Scooting: Moderate assistance  Wheelchair Mobility:     Transfers:  Sit to Stand: Contact guard assistance  Stand to Sit: Contact guard assistance            Patient Vitals for the past 6 hrs:   BP BP Patient Position SpO2 Pulse   08/07/18 1106 127/73 At rest;Supine 96 % (!) 109       Mental Status  Neurologic State: Alert  Orientation Level: Oriented to place, Oriented to situation, Oriented to person, Oriented to time  Cognition: Follows commands  Perception: Cues to maintain midline in sitting, Cues to maintain midline in standing  Perseveration: No perseveration noted  Safety/Judgement: Fall prevention                          Physical Skills Involved:  1. Range of Motion  2. Balance  3. Strength  4. Activity Tolerance  5. Pain (acute) Cognitive Skills Affected (resulting in the inability to perform in a timely and safe manner):  1. None Psychosocial Skills Affected:  1. Habits/Routines   Number of elements that affect the Plan of Care: 5+:  HIGH COMPLEXITY   CLINICAL DECISION MAKING:   [facility] AM-PAC 6 Clicks   Daily Activity Inpatient Short Form  How much help from another person does the patient currently need. .. Total A Lot A Little None   1. Putting on and taking off regular lower body clothing? [] 1   [x] 2   [] 3   [] 4   2. Bathing (including washing, rinsing, drying)? [] 1   [x] 2   [] 3   [] 4   3. Toileting, which includes using toilet, bedpan or urinal?   [] 1   [x] 2   [] 3   [] 4   4. Putting on and taking off regular upper body clothing? [] 1   [] 2   [x] 3   [] 4   5. Taking care of personal grooming such as brushing teeth? [] 1   [] 2   [x] 3   [] 4   6. Eating meals? [] 1   [] 2   [x] 3   [] 4   © 2007, Trustees of [facility], under license to [employer].  All rights reserved      Score:  Initial: 15 Most Recent: X (Date: -- )    Interpretation of Tool:  Represents activities that are increasingly more difficult (i.e. Bed mobility, Transfers, Gait). Score 24 23 22-20 19-15 14-10 9-7 6     Modifier CH CI CJ CK CL CM CN      ? Self Care:     - CURRENT STATUS: CK - 40%-59% impaired, limited or restricted    - GOAL STATUS: CJ - 20%-39% impaired, limited or restricted    - D/C STATUS:  ---------------To be determined---------------  Payor: BLUE CROSS / [place] [employer] [employer] [address] / Product Type: PPO /      Medical Necessity:     · Patient demonstrates good rehab potential due to higher previous functional level. Reason for Services/Other Comments:  · Patient continues to require present interventions due to patient's inability to care for self at baseline level of function. Use of outcome tool(s) and clinical judgement create a POC that gives a: MODERATE COMPLEXITY         TREATMENT:   (In addition to Assessment/Re-Assessment sessions the following treatments were rendered)     Pre-treatment Symptoms/Complaints:    Pain: Initial:   Pain Intensity 1: 8  Pain Location 1: Knee  Pain Orientation 1: Right  Pain Intervention(s) 1: Nurse notified  Post Session:  same     Assessment/Reassessment only, no treatment provided today    Braces/Orthotics/Lines/Etc:   · IV  Treatment/Session Assessment:    · Response to Treatment:  Tolerated well   · Interdisciplinary Collaboration:   o Occupational Therapist  o Registered Nurse  o Rehabilitation Attendant  · After treatment position/precautions:   o Supine in bed  o Bed/Chair-wheels locked  o Bed in low position  o Call light within reach  o RN notified   · Compliance with Program/Exercises: Will assess as treatment progresses. · Recommendations/Intent for next treatment session: \"Next visit will focus on advancements to more challenging activities and reduction in assistance provided\".   Total Treatment Duration:  OT Patient Time In/Time Out  Time In: 1123  Time Out: [address] OTR/CHAITANYA

## 2018-08-07 NOTE — PROGRESS NOTES
Problem: Mobility Impaired (Adult and Pediatric)  Goal: *Acute Goals and Plan of Care (Insert Text)  STG:  (1.)Ms. Dariela Small will move from supine to sit and sit to supine , scoot up and down and roll side to side with CONTACT GUARD ASSIST within 3 treatment day(s). (2.)Ms. Dariela Small will transfer from bed to chair and chair to bed with CONTACT GUARD ASSIST using the least restrictive device within 3 treatment day(s). (3.)Ms. Dariela Small will ambulate with CONTACT GUARD ASSIST for 30 feet with the least restrictive device within 3 treatment day(s). LTG:  (1.)Ms. Dariela Small will move from supine to sit and sit to supine , scoot up and down and roll side to side in bed with MODIFIED INDEPENDENCE within 7 treatment day(s). (2.)Ms. Dariela Small will transfer from bed to chair and chair to bed with MODIFIED INDEPENDENCE using the least restrictive device within 7 treatment day(s). (3.)Ms. Dariela Small will ambulate with MODIFIED INDEPENDENCE for 150+ feet with the least restrictive device within 7 treatment day(s). (4.)Ms. Dariela Small will ascend/descend 2 steps with one hand rail with STAND BY ASSIST within 7 treatment days. ________________________________________________________________________________________________      PHYSICAL THERAPY: Initial Assessment, Treatment Day: Day of Assessment, AM 8/7/2018  INPATIENT: Hospital Day: 2  Payor: Claudeen Lao / Plan: [id number] / Product Type: PPO /      NAME/AGE/GENDER: Paty Nguyen is a 62 y.o. female   PRIMARY DIAGNOSIS: osteomyelitis  [facility] <principal problem not specified> <principal problem not specified>        ICD-10: Treatment Diagnosis:    · Generalized Muscle Weakness (M62.81)  · Difficulty in walking, Not elsewhere classified (R26.2)  · History of falling (Z91.81)   Precaution/Allergies:  Latex; Adhesive tape-silicones; Ativan [lorazepam]; Bactrim [sulfamethoprim ds]; Lexapro [escitalopram oxalate]; Macrobid [nitrofurantoin monohyd/m-cryst];  Pcn [penicillins]; and Pyridium [phenazopyridine]     R LE WBAT   ASSESSMENT:     Ms. Glenny Mcclelland presents with primary diagnosis of osteomyelitis of R knee joint/distal R femur with history of metastatic cancer as well. Pt is supine in bed upon contact, A&O x 4, and agreeable to PT Evaluation with encouragement. According to chart and pt, she fell ~2 months ago resulting in a knee injury that has continued to get worse until it became infected. Pt states she lives with her  in single story home with 2 steps to enter with a hand rail. Pt states her  helps her with all ADLs and transfers, and has been using a WC for all mobility since her fall. Pt states sedentary lifestyle prior to fall, ambulating only from chair to bathroom. Pt reports 7/10 pain currently in R knee. No recent falls. Pt refuses to attempt ambulating to bedside chair, but agreeable to stand at bedside. Pt required modA to transfer from supine to sitting EOB. Educated pt on [address] status prior to mobility. Pt constantly leans to R in sitting, requiring verbal and visual cuing to maintain midline. Pt performed STS with Marquise and RW, demonstrating fair standing balance with support, and requiring verbal and tactile cuing for upright posture. Pt sidestepped 2ft to [facility] with RW and Marquise, with verbal and manual cuing for RW technique. Pt left with OT at bedside with all needs met and within reach. Cyrus Kennedy will benefit from skilled PT (medically necessary) to address decreased strength, decreased balance, decreased functional tolerance, decreased cardiopulmonary endurance affecting participation in basic ADLs and functional tasks. Pt may benefit from skilled therapy upon discharge. This section established at most recent assessment   PROBLEM LIST (Impairments causing functional limitations):  1. Decreased Strength  2. Decreased ADL/Functional Activities  3. Decreased Transfer Abilities  4. Decreased Ambulation Ability/Technique  5.  Decreased Balance  6. Increased Pain  7. Decreased Activity Tolerance  8. Decreased Pacing Skills  9. Decreased Work Simplification/Energy Conservation Techniques  10. Decreased Knowledge of Precautions  11. Decreased [place] with Home Exercise Program   INTERVENTIONS PLANNED: (Benefits and precautions of physical therapy have been discussed with the patient.)  1. Balance Exercise  2. Bed Mobility  3. Family Education  4. Gait Training  5. Home Exercise Program (HEP)  6. Manual Therapy  7. Neuromuscular Re-education/Strengthening  8. Range of Motion (ROM)  9. Therapeutic Activites  10. Therapeutic Exercise/Strengthening  11. Transfer Training  12. Group Therapy     TREATMENT PLAN: Frequency/Duration: 3 times a week for duration of hospital stay  Rehabilitation Potential For Stated Goals: Good     RECOMMENDED REHABILITATION/EQUIPMENT: (at time of discharge pending progress): Due to the probability of continued deficits (see above) this patient will likely need continued skilled physical therapy after discharge. Equipment:    None at this time              HISTORY:   History of Present Injury/Illness (Reason for Referral):  Patient is a 62 y.o. female admitted on 8/6 to transfer care to oncology after admission at [facility] for R knee OM/septic arthritis. She states that she fell ~11 weeks ago and presented to ortho for R knee cortisone injection ~ 5 weeks ago. Pain continued to worsened and MRI performed showing AVN with ? Infection vs metastatic disease. She was admitted to [facility] from 7/31-8/6 for open bx and surgical intervention. Bx showing OM/septic arthritis. Debridement was performed on femur and knee (7/31). OP cx + MSSA. Infectious disease at [facility] placed patient on Ancef 2g q8h. Also, BCs  On 7/31 + MSSA and staph lugdunensis. PORT removed and repeat BCs NG (8/2). TTE NG. She has remained afebrile, BCx1 NGTD. 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  MSSA bacteremia/AVIE (11/2017) related to R chest PORT s/p removal. She was treated to Ancef---then Daptomycin (changed due to poor GI tolerance-nausea). Last seen in ID office in 12/2017 by me. Past Medical History/Comorbidities:   Ms. Deepak Swartz  has a past medical history of Arthritis; Colon cancer ([facility] Gastrointestinal disorder; GERD (gastroesophageal reflux disease); Non-intractable cyclical vomiting with nausea (4/30/2018); Other ill-defined conditions(799.89); and Staph aureus infection (12/29/2017). She also has no past medical history of Adverse effect of anesthesia; Difficult intubation; Malignant hyperthermia due to anesthesia; or Pseudocholinesterase deficiency. Ms. Deepak Swartz  has a past surgical history that includes hx lap cholecystectomy; hx gyn; hx tonsillectomy; hx adenoidectomy; reggie biopsy breast stereotactic (Left, 10-8-2014); hx knee arthroscopy (Left, 2014); and hx vascular access.   Social History/Living Environment:   Home Environment: Private residence  # Steps to Enter: 2  Rails to Enter: Yes  Hand Rails : Right  Wheelchair Ramp: No  One/Two Story Residence: One story  Living Alone: No  Support Systems: Spouse/Significant Other/Partner  Patient Expects to be Discharged to[de-identified] Private residence  Current DME Used/Available at Home: Vonna Papa, rollator, Wheelchair  Prior Level of Function/Work/Activity:  Uses WC for mobility, lives with  who helps with all transfers and ADLs, doesn't drive, used rollator for limited household ambulation prior to fall, one fall ~2 months ago     Number of Personal Factors/Comorbidities that affect the Plan of Care: 3+: HIGH COMPLEXITY   EXAMINATION:   Most Recent Physical Functioning:   Gross Assessment:  AROM: Generally decreased, functional (R LE limited due to pain)  Strength: Generally decreased, functional (R LE limited due to pain)  Coordination: Within functional limits  Tone: Normal               Posture:     Balance:  Sitting: Impaired  Sitting - Static: Good (unsupported)  Sitting - Dynamic: Fair (occasional)  Standing: Impaired  Standing - Static: Fair  Standing - Dynamic : Fair Bed Mobility:  Rolling: Minimum assistance  Supine to Sit: Moderate assistance  Scooting: Moderate assistance  Wheelchair Mobility:     Transfers:  Sit to Stand: Contact guard assistance  Gait:  Right Side Weight Bearing: As tolerated  Base of Support: Center of gravity altered  Speed/Kay: Slow;Shuffled  Step Length: Left shortened;Right shortened  Stance: Right decreased  Gait Abnormalities: Antalgic;Decreased step clearance  Distance (ft): 2 Feet (ft)  Assistive Device: Walker, rolling  Ambulation - Level of Assistance: Minimal assistance      Body Structures Involved:  1. Heart  2. Lungs  3. Bones  4. Joints  5. Muscles  6. Ligaments Body Functions Affected:  1. Sensory/Pain  2. Cardio  3. Respiratory  4. Neuromusculoskeletal  5. Movement Related Activities and Participation Affected:  1. Mobility  2. Self Care  3. Domestic Life  4. Interpersonal Interactions and Relationships   Number of elements that affect the Plan of Care: 4+: HIGH COMPLEXITY   CLINICAL PRESENTATION:   Presentation: Evolving clinical presentation with changing clinical characteristics: MODERATE COMPLEXITY   CLINICAL DECISION MAKIN [facility] Inpatient Short Form  How much difficulty does the patient currently have. .. Unable A Lot A Little None   1. Turning over in bed (including adjusting bedclothes, sheets and blankets)? [] 1   [] 2   [x] 3   [] 4   2. Sitting down on and standing up from a chair with arms ( e.g., wheelchair, bedside commode, etc.)   [] 1   [x] 2   [] 3   [] 4   3. Moving from lying on back to sitting on the side of the bed? [] 1   [x] 2   [] 3   [] 4   How much help from another person does the patient currently need. .. Total A Lot A Little None   4. Moving to and from a bed to a chair (including a wheelchair)? [x] 1   [] 2   [] 3   [] 4   5. Need to walk in hospital room? [x] 1   [] 2   [] 3   [] 4   6. Climbing 3-5 steps with a railing? [x] 1   [] 2   [] 3   [] 4   © 2007, Trustees of [address] [place] [address] [address] [address], under license to [employer]. All rights reserved      Score:  Initial: 10 Most Recent: X (Date: -- )    Interpretation of Tool:  Represents activities that are increasingly more difficult (i.e. Bed mobility, Transfers, Gait). Score 24 23 22-20 19-15 14-10 9-7 6     Modifier CH CI CJ CK CL CM CN      ? Mobility - Walking and Moving Around:     - CURRENT STATUS: CL - 60%-79% impaired, limited or restricted    - GOAL STATUS: CK - 40%-59% impaired, limited or restricted    - D/C STATUS:  ---------------To be determined---------------  Payor: BLUE CROSS / Plan: [place]OSS [facility] / Product Type: PPO /      Medical Necessity:     · Patient is expected to demonstrate progress in strength, range of motion, balance, coordination and functional technique to decrease assistance required with transfers and functional mobility. Reason for Services/Other Comments:  · Patient continues to require skilled intervention due to impaired functional mobility and activity tolerance.    Use of outcome tool(s) and clinical judgement create a POC that gives a: Questionable prediction of patient's progress: MODERATE COMPLEXITY            TREATMENT:   (In addition to Assessment/Re-Assessment sessions the following treatments were rendered)   Pre-treatment Symptoms/Complaints:  \"I'm not getting up to the chair today\"  Pain: Initial:   Pain Intensity 1: 7  Pain Location 1: Knee  Pain Orientation 1: Right  Post Session:  Increased with mobility, 7/10 after     Assessment/Reassessment only, no treatment provided today    Braces/Orthotics/Lines/Etc:   · none  ·    Treatment/Session Assessment:    · Response to Treatment:  See above  · Interdisciplinary Collaboration:   o Physical Therapist  o Occupational Therapist  o Registered Nurse  · After treatment position/precautions:   o Bed/Chair-wheels locked  o Bed in low position  o working with OT at bedside   · Compliance with Program/Exercises: Will assess as treatment progresses. · Recommendations/Intent for next treatment session: \"Next visit will focus on reduction in assistance provided\".   Total Treatment Duration:  PT Patient Time In/Time Out  Time In: 1114  Time Out: [facility],

## 2018-08-07 NOTE — PROGRESS NOTES
PICC Placement Note    PRE-PROCEDURE VERIFICATION  Correct Procedure: yes. Time out completed with assistant Mayra Romero rn and all persons present in agreement with time out. Correct Site:  yes  Temperature: Temp: 98.9 °F (37.2 °C), Temperature Source: Temp Source: Oral  Recent Labs      08/07/18   0352   BUN  18   CREA  0.29*   PLT  216   WBC  6.3     Allergies: Latex; Adhesive tape-silicones; Ativan [lorazepam]; Bactrim [sulfamethoprim ds]; Lexapro [escitalopram oxalate]; Macrobid [nitrofurantoin monohyd/m-cryst]; Pcn [penicillins]; and Pyridium [phenazopyridine]  Education materials for PICC Care given to patient or family. PROCEDURE DETAIL  A single lumen PICC line was started for antibiotic therapy. The following documentation is in addition to the PICC properties in the lines/airways flowsheet :  Lot #: [id number]  xylocaine used: yes  Mid-Arm Circumference: 26 (cm)  Internal Catheter Length: 37 (cm)  Internal Catheter Total Length: 40 (cm)  Vein Selection for PICC:right basilic  Central Line Bundle followed yes  Complication Related to Insertion: none  Both the insertion guidewire and sherlock guidewire were removed intact all ports have positive blood return and were flush well with normal saline. The placement was verified by sapiens ecg. The ECG results state the tip overlies the lower superior vena cava.              Line is okay to use: yes    Cesar Piedra RN

## 2018-08-07 NOTE — PROGRESS NOTES
END OF SHIFT NOTE:    Intake/Output  08/07 0701 - 08/07 1900  In: 170 [P.O.:170]  Out: -    Voiding: YES  Catheter: NO  Drain:   Colostomy 03/13/18 Mid Abdomen (Active)   Drainage Color Brown 8/7/2018  3:55 PM   Site Assessment Intact 8/7/2018  3:55 PM               Stool:  0 occurrences. Emesis:  0 occurrences. VITAL SIGNS  Patient Vitals for the past 12 hrs:   Temp Pulse Resp BP SpO2   08/07/18 1618 97.9 °F (36.6 °C) 98 18 124/85 97 %   08/07/18 1106 98.9 °F (37.2 °C) (!) 109 18 127/73 96 %   08/07/18 0810 98.1 °F (36.7 °C) (!) 106 18 138/81 97 %       Pain Assessment  Pain 1  Pain Scale 1: Visual (08/07/18 1640)  Pain Intensity 1: 0 (08/07/18 1640)  Patient Stated Pain Goal: 0 (08/07/18 1640)  Pain Reassessment 1: Patient sleeping (08/07/18 1640)  Pain Location 1: Knee;Leg;Head (08/07/18 1238)  Pain Orientation 1: Upper (08/07/18 1238)  Pain Intervention(s) 1: Medication (see MAR) (08/07/18 1238)    Ambulating  No    Additional Information: Pt got up standing with PT today but did not feel like ambulating. Complained of pain in knee consistently through out shift. Pt had poor PO intake during shift. PICC placed and ready to use. Dressing is clean, dry, and intact. No other needs stated at this time. Shift report given to oncoming nurse at the bedside.     Param Go

## 2018-08-07 NOTE — PROGRESS NOTES
08/06/18 2019   Dual Skin Pressure Injury Assessment   Dual Skin Pressure Injury Assessment X   Second Care Provider (Based on [address] [place] [address]) Jolynn Blue RN   Knee Right   abrasions/bruises BLE, blanchable area over sacrum, incision rt knee

## 2018-08-07 NOTE — ADT AUTH CERT NOTES
Facility Name: [address], [place] [address]  TJT:[id number]                  Patient Demographics      Patient Name Sex  Address Phone     Ashanti Chavez Female 1960 [address] [employer] [phone] (Home) *Preferred*  [phone] [facility] Account      Name Acct ID Class Status Primary Coverage     Ashanti Chavez [id number] INPATIENT Open BLUE CROSS - [address]              Guarantor Account (for Hospital Account [de-identified])      Name Relation to Pt Service Area Active? Acct Type     Ashanti Chavez Self [facility] Yes Personal/Family     Address Phone           [facility], [address] [place] [address] [phone])                Coverage Information (for Hospital Account [de-identified])      1. BLUE CROSS/SC BLUE [facility]      F/O Payor/Plan Subscriber  Subscriber Sex Precert #     BLUE CROSS/SC [place] CROSS [facility] [place] [birth year] M      Subscriber Subscriber #     Ambreen Slade [id number]     Grp # Group Name     [id number] [facility] CROSS     Address Phone     [address] [facility], [address] [place] [address]      Policy Number Status Effective Date Benefits Phone     [id number] -  -     Auth/Cert                 2. [place] [address] [facility] MEDICARE PART A ONLY      F/O Payor/Plan Subscriber  Subscriber Sex Precert #     [place] [address] [facility] MEDICARE PART A ONLY 60 F      Subscriber Subscriber #     Ashanti Chavez [id number]     Grp # Group Name      [place]  MEDICARE     Address Phone     [address] [facility], [address] [facility]      Policy Number Status Effective Date Benefits Phone     [id number] -  -     Auth/Cert                          Admission Information - Patient Record Only      Arrival Date/Time:  Admit Date/Time: 2018 IP Adm.  Date/Time: 2018     Admission Type: Elective Point of Origin: Clinic Or Physician Office Admit Category:      Means of Arrival:  Primary Service: Oncology Secondary Service: N/A     Transfer Source:  Service Area: [address] [place] [address] Unit: [facility] Provider: Jeny Meadows MD Attending Provider: Drew Galvez MD Referring Provider:        Admission Information      Attending Provider Admission Dx Admitted On     Drew Galvez MD  18     Service Isolation Code Status     ONCOLOGY  Full Code     Allergies           Latex, Adhesive Tape-silicones, Ativan [Lorazepam], Bactrim [Sulfamethoprim Ds], Lexapro [Escitalopram Oxalate], Macrobid [Nitrofurantoin Monohyd/m-cryst], Pcn [Penicillins], Pyridium [Phenazopyridine]         Admission Information      Unit/Bed: [address] CANCER Service: ONCOLOGY     Admitting provider: Jeny Meadows MD Phone: [phone]     Attending provider: Drew Galvez MD Phone: [phone]     PCP: Hina Adrian MD Phone: [phone]     Admission dx: osteomyelitis Patient class: I     Admission type: EL         Patient Demographics      Patient Name [address] Sex  Address Phone     Di Duvall [id number] Female 1960 [address] [facility] [address] [address] [phone] (Home) *Preferred*  [phone] (Mobile)       H&P Notes      H&P by Elieser Lang NP at 18 9675 documented on Admission (Current) from 2018 in [address]      Author: Elieser Lang NP Author Type: Nurse Practitioner Filed: 18 [phone]     Note Status: Cosign Needed Cosign: Cosign Required Date of Service: 18 1348     : Elieser Lang NP (Nurse Practitioner)     Prior Versions: 1. Elieser Lang NP (Nurse Practitioner) at 18 8201 - Shared      2. Elieser Lang NP (Nurse Practitioner) at 18 0562 - Shared      3.  Elieser Lang NP (Nurse Practitioner) at 18 3948 - Shared              Expand All [facility] All         [facility] Hematology & Oncology         Inpatient Hematology / Oncology History and Physical     Reason for Asmission:  osteomyelitis     History of Present Illness:  Ms. Duane Phillips is a 62 y.o. 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 She is coming to us from [facility] as a direct admit for osteomyelitis right knee requiring IV antibiotics and for evaluation for rehab placement. At Christmas she underwent open biopsy right femur consistent with acute inflammation and necrosis. Cultures and intraoperative findings were consistent with osteomyelitis and septic arthritis. Full irrigation and debridement was done of her distal femur as well as her right knee joint. Antibiotic laden cement was left within the distal femur. Blood cultures ultimately were positive as well and MSSA was isolated as well as staph lugdunensis. MSSA was also isolated from the knee fluid and distal femoral bone. Repeat blood cultures were negative. Dr. Mick Dennis was consulted with infectious diseases and the patient has been on Ancef 2 g IV every 8 hours. Per care everywhere it was recommended 6 weeks of IV antibiotic.         Oncology history copied from chart: Ms. Duane Phillips has a metastatic colon cancer to regional lymph nodes, left adrenal, brain, and lung.  After initial cranial irradiation, she was placed on FOLFOX and bevacizumab which she tolerated well with an apparent response.  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  This  resulted in progression of disease in all of the above sites. 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  She was unable to resume systemic therapy because of the development of recurrent brain metastases treated with CyberKnife.  She now has right knee pain due to AVN likely associated with her steroid use.    MSSA endocarditis-resolved  Colovaginal fistula-resolved as above      PLAN:  She has been referred to Dr. Jane Bergman for evaluation of the right knee. Unfortunately she has been unable to resume systemic therapy and there certainly is the possibility of visceral disease progression which might temper enthusiasm for surgical repair. At present, she feels she is too ill for chemotherapy. We will try again next week. She was offered a [facility] palliative approach to her care without chemotherapy but she is resistant to that consideration.         Review of Systems:  Constitutional Denies fever, chills, weight loss, appetite changes, fatigue,    HEENT Denies trauma, blurry vision, hearing loss, ear pain, nosebleeds, sore throat, neck pain     Skin Denies lesions or rashes. Lungs Denies dyspnea, cough, sputum production or hemoptysis. Cardiovascular Denies chest pain, palpitations, or lower extremity edema. Neuro Denies headaches, visual changes or ataxia. Denies dizziness, tingling, tremors, sensory change, speech change, focal weakness or headaches. MSK Right knee pain   Psychiatric/Behavioral  The patient is not nervous/anxious.               Allergies   Allergen Reactions    Latex Rash    Adhesive Tape-Silicones Rash       Bad red rash.      Ativan [Lorazepam] Other (comments)       Pt states \"gets hallucinations and confused\"    Bactrim [Sulfamethoprim Ds] Hives       Swelling and itching    Lexapro [Escitalopram Oxalate] Other (comments)    Macrobid [Nitrofurantoin Monohyd/M-Cryst] Other (comments)       Felt  \"out of it\"    Pcn [Penicillins] Hives       Swelling and itching also.     Pt has tolerated cephalosporins in the past.    Pyridium [Phenazopyridine] Hives       Also itching and swelling.           Past Medical History:   Diagnosis Date    Arthritis       RA    Colon cancer ([facility] [address]       Metastatic to Lungs and Brain- chemo and radiation    Gastrointestinal disorder       Reflux    GERD (gastroesophageal reflux disease)       controlled  with med    Non-intractable cyclical vomiting with nausea 4/30/2018    Other ill-defined conditions(799.89)       Herniated disk  lumbar and cervical    Staph aureus infection 12/29/2017     site: port- was adm to [facility] Sept 2017 for sepsis-port was dc'd Oct 2017            Past Surgical History:   Procedure Laterality Date    HX ADENOIDECTOMY        HX GYN         Hysterectomy-partial    HX KNEE ARTHROSCOPY Left 2014    HX LAP CHOLECYSTECTOMY        HX TONSILLECTOMY        HX VASCULAR ACCESS         port inserted Sept 2017- out 25/3/81 ([facility])/then had PICC in to give IV antibiotics then dc'd 12/15/17    LYN BIOPSY BREAST STEREOTACTIC Left 10-8-2014            Family History   Problem Relation Age of Onset    Stroke Mother      Hypertension Mother      Diabetes Mother      Heart Disease Father      Hypertension Father      Cancer Brother      Breast Cancer Neg Hx        Social History            Social History    Marital status:        Spouse name: N/A    Number of children: N/A    Years of education: N/A          Occupational History    Not on file.            Social History Main Topics    Smoking status: Current Some Day Smoker       Packs/day: 0.25       Years: 10.00       Last attempt to quit: 8/13/2017    Smokeless tobacco: Never Used    Alcohol use No    Drug use: No    Sexual activity: Yes       Birth control/ protection: Surgical           Other Topics Concern    Not on file      Social History Narrative             Current Outpatient Prescriptions   Medication Sig Dispense Refill    levETIRAcetam 1,000 mg tablet TAKE 1 TABLET BY MOUTH 2 TIMES A DAY. 60 Tab 0    HYDROcodone-acetaminophen (NORCO)  mg tablet Take 1 Tab by mouth every four (4) hours as needed. Max Daily Amount: 6 Tabs. 180 Tab 0    potassium chloride (KLOR-CON) 10 mEq tablet Take 2 Tabs by mouth two (2) times a day.  120 Tab 1    HYDROcodone-acetaminophen (NORCO)  mg tablet Take 1 Tab by mouth every four (4) hours as needed for Pain.        dexamethasone (DECADRON) 4 mg tablet Take 1 Tab by mouth three (3) times daily. Indications: appetite stimulant 90 Tab 2    FLUoxetine (PROZAC) 10 mg tablet Take 1 Tab by mouth daily. Indications: ANXIETY WITH DEPRESSION 30 Tab 2    ondansetron (ZOFRAN ODT) 8 mg disintegrating tablet Take 1 Tab by mouth every eight (8) hours as needed for Nausea. Indications: PREVENTION OF POST-OPERATIVE NAUSEA AND VOMITING 90 Tab 2    lidocaine-prilocaine (EMLA) topical cream Apply  to affected area as needed for Pain. 30 g 3    promethazine (PHENERGAN) 25 mg tablet Take 25 mg by mouth every six (6) hours as needed for Nausea.        albuterol (PROVENTIL HFA, VENTOLIN HFA, PROAIR HFA) 90 mcg/actuation inhaler Take 2 Puffs by inhalation every six (6) hours as needed for Wheezing. 1 Inhaler 1    ALPRAZolam (XANAX XR) 1 mg XR tablet 1 to 2 po bid, prn        esomeprazole (NEXIUM) 40 mg capsule Take 40 mg by mouth daily.               OBJECTIVE:  No data found.     Temp (24hrs), Av °F (-17.8 °C), Min:, Max:        Physical Exam:  Constitutional: Well developed, well nourished female in no acute distress, sitting comfortably in the hospital bed. HEENT: Normocephalic and atraumatic. Oropharynx is clear, mucous membranes are moist. Extraocular muscles are intact. Sclerae anicteric. Skin Warm and dry. No bruising and no rash noted. No erythema. No pallor. Respiratory Lungs are clear to auscultation bilaterally without wheezes, rales or rhonchi, normal air exchange without accessory muscle use. CVS Normal rate, regular rhythm and normal S1 and S2. No murmurs, gallops, or rubs. Abdomen Soft, nontender and nondistended, normoactive bowel sounds. No palpable mass. No hepatosplenomegaly. Neuro Grossly nonfocal with no obvious sensory or motor deficits.    MSK Right knee pain   Psych Appropriate mood and affect.          Labs:     Recent Results    No results found for this or any previous visit (from the past 24 hour(s)).    Imaging:  [unfilled]     ASSESSMENT:  Problem List  Date Reviewed: 7/7/2018             Codes Class Noted     Non-intractable cyclical vomiting with nausea ICD-10-CM: G43. A0  ICD-9-CM: 536.2   4/30/2018           Nonintractable headache ICD-10-CM: R51  ICD-9-CM: 441. 0   4/30/2018           Hypokalemia ICD-10-CM: E87.6  ICD-9-CM: 276.8   4/30/2018           Status post colostomy ([facility] ICD-10-CM: Z93.3  ICD-9-CM: V44.3   3/15/2018           Confusion ICD-10-CM: R41.0  ICD-9-CM: 746. 9   3/6/2018           Colovaginal fistula ICD-10-CM: N82.4  ICD-9-CM: 619.1   1/29/2018           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 (MSSA) ICD-10-CM: I33.0, B95.61  ICD-9-CM: 421.0, 041.11   58/63/8613           Systolic congestive heart failure (HCC) ICD-10-CM: I50.20  ICD-9-CM: 428.20, 428.0   11/6/2017           Severe sepsis ([facility] ICD-10-CM: A41.9, R65.20  ICD-9-CM: 038.9, 995.92   11/1/2017           Colon cancer metastasized to brain [facility]) ICD-10-CM: C18.9, C79.31  ICD-9-CM: 153.9, 198.3   8/30/2017           Malignant neoplasm of descending colon ([facility] ICD-10-CM: C18.6  ICD-9-CM: 153. 2   8/30/2017           Pulmonary metastases (HCC) ICD-10-CM: C78.00  ICD-9-CM: 197.0   8/17/2017           Secondary malignant neoplasm of brain ([facility] ICD-10-CM: C79.31  ICD-9-CM: 198. 3   8/17/2017           Mass of colon ICD-10-CM: K63.9  ICD-9-CM: 569.89   8/17/2017           Breast cancer screening ICD-10-CM: Z12.31  ICD-9-CM: V76.10   8/17/2017                     PLAN:  Osteomyelitis- continue ancef for 6 weeks per ID.     Metastatic colon cancer-last treatment FOLFOX and bevacizumab 1/24/2018.      PT/OT consulted     CM working on rehab placement     Stephanie Barahona, [address] Hematology & Oncology  [address] [address] : [phone]  Fax : [phone]                      Patient Demographics      Patient Name [address] Sex  Address Phone     Maritza Kelly [id number] Female 1960 [address] [phone] (Home) *Preferred*  [phone] (Mobile)         CSN:     [id number]         Admit Date: Admit Time Room Bed     Aug 6, 2018  7:49 PM [phone]       Attending Providers      Provider Pager From To     Ya Reyes MD  18        Emergency Contact(s)      Name Relation Home Work Mobile     Wale Sesay Spouse [phone]       Utilization Review         Osteomyelitis - Care Day 2 (2018) by Alejandra Muñoz RN      Review Entered Review Status     2018 Completed     Details          Care Day: 2 Care Date: 2018 Level of Care: Inpatient Floor     Guideline Day 2      Level Of Care     (X) Floor     2018 2:14 PM EDT by Griselda Grant       ONCO               Clinical Status     (X) * Fever absent or reduced     2018 2:14 PM EDT by Griselda Grant       T 98.6, , /73, SAT 96% RA, PAIN 8/10          (X) * Tolerates oral intake     2018 2:14 PM EDT by Griselda Grant       REG DIET               Activity     (X) As tolerated     2018 2:14 PM EDT by Griselda Grant       AMB/ASSIST               Routes     (X) * Oral hydration     2018 2:14 PM EDT by Griselda Grant       REG DIET          (X) Parenteral medications     2018 2:14 PM EDT by Griselda Grant       LOVENOX SQ QD, NORCO PO, DILAUDID IV, PROTONIX PO,          (X) Usual diet     2018 2:14 PM EDT by Griselda Grant       REG               Interventions     (X) Possible physical therapy     2018 2:14 PM EDT by Griselda Grant       PT/OT consulted          (X) Possible WBC, ESR, C-reactive protein     2018 2:14 PM EDT by Griselda Grant       WBC: 6.3   RBC: 2.97 (L)   HGB: 9.5 (L)   HCT: 29.1 (L)    Calcium: 8.1 (L)   Protein, total: 5.9 (L)   Albumin: 2.1 (L)   Alk. phosphatase: 159 (H)               Medications     (X) Parenteral antibiotics     8/7/2018 2:14 PM EDT by Danette Leavitt       ANCEF 2 GMS IV Q 8,               8/7/2018 2:14 PM EDT by Danette Leavitt     Subject: Additional Clinical Information     PLAN:Osteomyelitis- continue ancef for 6 weeks per ID.  Metastatic colon cancer-last treatment FOLFOX and bevacizumab 1/24/2018.      CM working on rehab placement [facility] SOP[facility]                         * Milestone            Osteomyelitis - Care Day 1 (8/6/2018) by Claudeen Curl, RN      Review Entered Review Status     8/7/2018 Completed     Details          Care Day: 1 Care Date: 8/6/2018 Level of Care: Inpatient Floor     Guideline Day 1      Level Of Care     (X) Floor     8/7/2018 2:08 PM EDT by Danette Leavitt       ONCO               Clinical Status     (X) * Clinical Indications met [B]     8/7/2018 2:08 PM EDT by Danette Leavitt       Osteomyelitis               Activity     (X) As tolerated     8/7/2018 2:08 PM EDT by Danette Leavitt       OUT OF BED WITH ASSISTANCE               Routes     ( ) Parenteral medications     8/7/2018 2:08 PM EDT by YOVANI Flores          (X) Diet as tolerated     8/7/2018 2:08 PM EDT by Danette Leavitt       REG               Interventions     (X) WBC, C-reactive protein, ESR     8/7/2018 2:08 PM EDT by Danette Leavitt       WBC: 6.6   RBC: 3.15 (L)   HGB: 10.1 (L)    HCT: 30.4 (L)    Glucose: 204 (H)   Calcium: 8.2 (L)   Protein, total: 6.1 (L)   Albumin: 2.0 (L)   Alk.  phosphatase: 169 (H)          (X) Blood culture     8/7/2018 2:08 PM EDT by Vandana Martin PENDING               Medications     (X) Parenteral antibiotics     8/7/2018 2:08 PM EDT by Danette Leavitt       ANCEF 2 GMS IV Q 12               8/7/2018 2:08 PM EDT by Danette Leavitt     Subject: Additional Clinical Information     ID-Patient is a 62 y.o. female admitted on 8/6 to transfer care to oncology after admission at [facility] R knee OM/septic arthritis. She states that she fell ~11 weeks ago and presented to ortho for R knee cortisone injection ~ 5 weeks ago. Pain continued to worsened and MRI performed showing AVN with ? Infection vs metastatic disease. She was admitted to [place] from 7/31-8/6 for open bx and surgical intervention. Bx showing OM/septic arthritis. Debridement was performed on femur and knee (7/31). OP cx + MSSA. Infectious disease at [facility] placed patient on Ancef 2g q8h. Also, BCs   On 7/31 + MSSA and staph lugdunensis. PORT removed and repeat BCs NG (8/2). TTE NG. She has remained afebrile, BCx1 NGTD. 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 MSSA bacteremia/AVIE (11/2017) related to R chest PORT s/p removal. She was treated to Ancef---then Daptomycin (changed due to poor GI tolerance-nausea). Last seen in ID office in 12/2017 by me. Impression:MSSA R femur/knee OM and MSSA/staph lugdunensis (O-suscept) bacteremia s/p debridement (7/31), PORT removal (8/1). TTE NG. Repeat BCs NG as of 8/2Hx of MSSA AVIE, source PORT infection (11/2017) s/p treatment. Metastatic adenocarcinoma   (brain, lung, L adrenal)-no chemotherapyBactrim and PCN allergies    Plan:Continue Ancef 2g IV q 8h.  Duration 6 weeks, EOT 9/13/18Place PICC   /85, HR 84, SAT 94% RA PAIN 8/10                         * Milestone            Osteomyelitis - Clinical Indications for Admission to Inpatient Care by Bertell Libman, RN      Review Entered Review Status     8/7/2018 Completed     Details          Clinical Indications for Admission to Inpatient Care     Most Recent : Azael Enriquez Most Recent Date: 8/7/2018 1:57 PM EDT     (X) Admission is indicated by 1 or more of the following  (1) (2) (3) (4) (5) (6):        (X) Bacteremia        8/7/2018 1:57 PM EDT by Azael Enriquez          bacteremia s/p debridement             (X) Appropriate monitoring and therapy (IV antibiotics) cannot be immediately arranged for home         or outpatient setting        8/7/2018 1:57 PM EDT by Delfin Funk          Continue Ancef 2g IV q 8h

## 2018-08-07 NOTE — PROGRESS NOTES
made initial visit. Pt was asleep appearing comfortable with no pain level expressed or observed.  said prayer for pt and left spiritual care card so pt would know that  had dropped by and prayed for her.  provided spiritual care through presence, prayer, and leaving spiritual care card.

## 2018-08-08 NOTE — PROGRESS NOTES
Problem: Mobility Impaired (Adult and Pediatric)  Goal: *Acute Goals and Plan of Care (Insert Text)  STG:  (1.)Ms. Katelyn Castro will move from supine to sit and sit to supine , scoot up and down and roll side to side with CONTACT GUARD ASSIST within 3 treatment day(s). (2.)Ms. Katelyn Castro will transfer from bed to chair and chair to bed with CONTACT GUARD ASSIST using the least restrictive device within 3 treatment day(s). (3.)Ms. Katelyn Castro will ambulate with CONTACT GUARD ASSIST for 30 feet with the least restrictive device within 3 treatment day(s). LTG:  (1.)Ms. Katelyn Castro will move from supine to sit and sit to supine , scoot up and down and roll side to side in bed with MODIFIED INDEPENDENCE within 7 treatment day(s). (2.)Ms. Katelyn Castro will transfer from bed to chair and chair to bed with MODIFIED INDEPENDENCE using the least restrictive device within 7 treatment day(s). (3.)Ms. Katelyn Castro will ambulate with MODIFIED INDEPENDENCE for 150+ feet with the least restrictive device within 7 treatment day(s). (4.)Ms. Katelyn Castro will ascend/descend 2 steps with one hand rail with STAND BY ASSIST within 7 treatment days. ________________________________________________________________________________________________      PHYSICAL THERAPY: Daily Note, Treatment Day: 1st, PM 8/8/2018  INPATIENT: Hospital Day: 3  Payor: Cathleen Dennison / Plan: [address] / Product Type: PPO /      NAME/AGE/GENDER: Arnaldo Allen is a 62 y.o. female   PRIMARY DIAGNOSIS: osteomyelitis  O[facility]el[facility] ([facility] <principal problem not specified> <principal problem not specified>        ICD-10: Treatment Diagnosis:    · Generalized Muscle Weakness (M62.81)  · Difficulty in walking, Not elsewhere classified (R26.2)  · History of falling (Z91.81)   Precaution/Allergies:  Latex; Adhesive tape-silicones; Ativan [lorazepam]; Bactrim [sulfamethoprim ds]; Lexapro [escitalopram oxalate]; Macrobid [nitrofurantoin monohyd/m-cryst];  Pcn [penicillins]; and Pyridium [phenazopyridine]     R LE WBAT   ASSESSMENT:     Ms. Levi Figueroa presents with primary diagnosis of osteomyelitis of R knee joint/distal R femur with history of metastatic cancer as well. Pt is supine in bed upon contac and agreeable to PT with encouragement. Pt refuses to attempt ambulating but agreeable to stand at bedside. Pt required min a to transfer from supine to sitting EOB. Pt constantly leans to R in sitting, requiring verbal and visual cuing to maintain midline. Pt attempted STS to RW x 2 without success. After the first attempt while being encouraged to try again she stated \"Okay, one more time but after that I'm laying back down. \" She returned supine. She performed bilateral LE ex as listed below. Ms. Levi Figueroa will benefit from skilled PT (medically necessary) to address decreased strength, decreased balance, decreased functional tolerance, decreased cardiopulmonary endurance affecting participation in basic ADLs and functional tasks. Pt may benefit from skilled therapy upon discharge. This section established at most recent assessment   PROBLEM LIST (Impairments causing functional limitations):  1. Decreased Strength  2. Decreased ADL/Functional Activities  3. Decreased Transfer Abilities  4. Decreased Ambulation Ability/Technique  5. Decreased Balance  6. Increased Pain  7. Decreased Activity Tolerance  8. Decreased Pacing Skills  9. Decreased Work Simplification/Energy Conservation Techniques  10. Decreased Knowledge of Precautions  11. Decreased [place] with Home Exercise Program   INTERVENTIONS PLANNED: (Benefits and precautions of physical therapy have been discussed with the patient.)  1. Balance Exercise  2. Bed Mobility  3. Family Education  4. Gait Training  5. Home Exercise Program (HEP)  6. Manual Therapy  7. Neuromuscular Re-education/Strengthening  8. Range of Motion (ROM)  9. Therapeutic Activites  10. Therapeutic Exercise/Strengthening  11. Transfer Training  12.  Group Therapy TREATMENT PLAN: Frequency/Duration: 3 times a week for duration of hospital stay  Rehabilitation Potential For Stated Goals: Good     RECOMMENDED REHABILITATION/EQUIPMENT: (at time of discharge pending progress): Due to the probability of continued deficits (see above) this patient will likely need continued skilled physical therapy after discharge. Equipment:    None at this time              HISTORY:   History of Present Injury/Illness (Reason for Referral):  Patient is a 62 y.o. female admitted on 8/6 to transfer care to oncology after admission at [facility] for R knee OM/septic arthritis. She states that she fell ~11 weeks ago and presented to ortho for R knee cortisone injection ~ 5 weeks ago. Pain continued to worsened and MRI performed showing AVN with ? Infection vs metastatic disease. She was admitted to [facility] from 7/31-8/6 for open bx and surgical intervention. Bx showing OM/septic arthritis. Debridement was performed on femur and knee (7/31). OP cx + MSSA. Infectious disease at [facility] placed patient on Ancef 2g q8h. Also, BCs  On 7/31 + MSSA and staph lugdunensis. PORT removed and repeat BCs NG (8/2). TTE NG. She has remained afebrile, BCx1 NGTD. 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 MSSA bacteremia/AVIE (11/2017) related to R chest PORT s/p removal. She was treated to Ancef---then Daptomycin (changed due to poor GI tolerance-nausea). Last seen in ID office in 12/2017 by me. Past Medical History/Comorbidities:   Ms. Belén Olivas  has a past medical history of Arthritis; Colon cancer ([facility] Gastrointestinal disorder; GERD (gastroesophageal reflux disease); Non-intractable cyclical vomiting with nausea (4/30/2018); Other ill-defined conditions(799.89); and Staph aureus infection (12/29/2017). She also has no past medical history of Adverse effect of anesthesia;  Difficult intubation; Malignant hyperthermia due to anesthesia; or Pseudocholinesterase deficiency. Ms. Fernie Joya  has a past surgical history that includes hx lap cholecystectomy; hx gyn; hx tonsillectomy; hx adenoidectomy; reggie biopsy breast stereotactic (Left, 10-8-2014); hx knee arthroscopy (Left, 2014); and hx vascular access. Social History/Living Environment:   Home Environment: Private residence  # Steps to Enter: 2  Rails to Enter: Yes  Hand Rails : Right  Wheelchair Ramp: No  One/Two Story Residence: One story  Living Alone: No  Support Systems: Spouse/Significant Other/Partner  Patient Expects to be Discharged to[de-identified] Private residence  Current DME Used/Available at Home: Walker, rollator, Wheelchair  Prior Level of Function/Work/Activity:  Uses WC for mobility, lives with  who helps with all transfers and ADLs, doesn't drive, used rollator for limited household ambulation prior to fall, one fall ~2 months ago     Number of Personal Factors/Comorbidities that affect the Plan of Care: 3+: HIGH COMPLEXITY   EXAMINATION:   Most Recent Physical Functioning:   Gross Assessment:                  Posture:     Balance:  Sitting: Impaired  Sitting - Static: Fair (occasional)  Sitting - Dynamic: Fair (occasional)  Standing: Impaired  Standing - Static: Fair  Standing - Dynamic : Fair Bed Mobility:  Supine to Sit: Minimum assistance  Sit to Supine: Contact guard assistance  Wheelchair Mobility:     Transfers:  Sit to Stand:  (attempted x 2. )  Gait:  Right Side Weight Bearing: As tolerated         Body Structures Involved:  1. Heart  2. Lungs  3. Bones  4. Joints  5. Muscles  6. Ligaments Body Functions Affected:  1. Sensory/Pain  2. Cardio  3. Respiratory  4. Neuromusculoskeletal  5. Movement Related Activities and Participation Affected:  1. Mobility  2. Self Care  3. Domestic Life  4.  Interpersonal Interactions and Relationships   Number of elements that affect the Plan of Care: 4+: HIGH COMPLEXITY   CLINICAL PRESENTATION:   Presentation: Evolving clinical presentation with changing clinical characteristics: MODERATE COMPLEXITY   CLINICAL DECISION MAKIN[address] [place] [id number] AM-PAC 6 Clicks   Basic Mobility Inpatient Short Form  How much difficulty does the patient currently have. .. Unable A Lot A Little None   1. Turning over in bed (including adjusting bedclothes, sheets and blankets)? [] 1   [] 2   [x] 3   [] 4   2. Sitting down on and standing up from a chair with arms ( e.g., wheelchair, bedside commode, etc.)   [] 1   [x] 2   [] 3   [] 4   3. Moving from lying on back to sitting on the side of the bed? [] 1   [x] 2   [] 3   [] 4   How much help from another person does the patient currently need. .. Total A Lot A Little None   4. Moving to and from a bed to a chair (including a wheelchair)? [x] 1   [] 2   [] 3   [] 4   5. Need to walk in hospital room? [x] 1   [] 2   [] 3   [] 4   6. Climbing 3-5 steps with a railing? [x] 1   [] 2   [] 3   [] 4   © 2007, Trustees of [address] [place] [id number], under license to [employer]. All rights reserved      Score:  Initial: 10 Most Recent: X (Date: -- )    Interpretation of Tool:  Represents activities that are increasingly more difficult (i.e. Bed mobility, Transfers, Gait). Score 24 23 22-20 19-15 14-10 9-7 6     Modifier CH CI CJ CK CL CM CN      ? Mobility - Walking and Moving Around:     - CURRENT STATUS: CL - 60%-79% impaired, limited or restricted    - GOAL STATUS: CK - 40%-59% impaired, limited or restricted    - D/C STATUS:  ---------------To be determined---------------  Payor: BLUE CROSS / Plan: [place] BLUE CROSS [facility] / Product Type: PPO /      Medical Necessity:     · Patient is expected to demonstrate progress in strength, range of motion, balance, coordination and functional technique to decrease assistance required with transfers and functional mobility. Reason for Services/Other Comments:  · Patient continues to require skilled intervention due to impaired functional mobility and activity tolerance.    Use of outcome tool(s) and clinical judgement create a POC that gives a: Questionable prediction of patient's progress: MODERATE COMPLEXITY            TREATMENT:   (In addition to Assessment/Re-Assessment sessions the following treatments were rendered)   Pre-treatment Symptoms/Complaints:    Pain: Initial:      Post Session:       Therapeutic Activity: (    12 min): Therapeutic activities including bed mobility and sitting balance to improve mobility, strength and balance. Required min    to promote dynamic balance in sitting. Therapeutic Exercise: ( 11 min):  Exercises per grid below to improve mobility, strength and balance. Required mod visual and verbal cues to promote proper body alignment, promote proper body posture and promote proper body mechanics. Braces/Orthotics/Lines/Etc:   · none  ·    Treatment/Session Assessment:    · Response to Treatment:  See above  · Interdisciplinary Collaboration:   o Physical Therapy Assistant  o Registered Nurse  · After treatment position/precautions:   o Bed/Chair-wheels locked  o Bed in low position   · Compliance with Program/Exercises: Will assess as treatment progresses. · Recommendations/Intent for next treatment session: \"Next visit will focus on reduction in assistance provided\".   Total Treatment Duration:  PT Patient Time In/Time Out  Time In: 1403  Time Out: 34 Royce Shelton, DELISA

## 2018-08-08 NOTE — PROGRESS NOTES
2nd attempt to meet with patient / family. Patient with eyes closed / lights out / no family at bedside.

## 2018-08-08 NOTE — PROGRESS NOTES
Infectious Disease Progress Note    Today's Date: 2018   Admit Date: 2018    Impression:   · MSSA R femur/knee OM and MSSA/staph lugdunensis (O-suscept) bacteremia s/p debridement (), PORT removal (). TTE NG. Repeat BCs NG as of   · Hx of MSSA AVIE, source PORT infection (2017) s/p treatment. · Metastatic adenocarcinoma  (brain, lung, L adrenal)-no chemotherapy  · Bactrim and PCN allergies     Plan:   ·  Continue Ancef 2g IV q 8h. Duration 6 weeks, EOT 18  · Dispo: STR. Chemo on hold for now. Anti-infectives:   · Ancef (-  -Ancef 6g IV q 24h (stopped  due to nausea), Daptomycin (-12/15/17)    Subjective:   Lethargic,  in room. NO appetite otherwise no other GI sx. Seems to be tolerating abx for now. Allergies   Allergen Reactions    Latex Rash    Adhesive Tape-Silicones Rash     Bad red rash.  Ativan [Lorazepam] Other (comments)     Pt states \"gets hallucinations and confused\"    Bactrim [Sulfamethoprim Ds] Hives     Swelling and itching      Lexapro [Escitalopram Oxalate] Other (comments)    Macrobid [Nitrofurantoin Monohyd/M-Cryst] Other (comments)     Felt  \"out of it\"    Pcn [Penicillins] Hives     Swelling and itching also. Pt has tolerated cephalosporins in the past.    Pyridium [Phenazopyridine] Hives     Also itching and swelling. Review of Systems:  A comprehensive review of systems was negative except for that written in the History of Present Illness. Objective:     Visit Vitals    /76 (BP 1 Location: Left arm, BP Patient Position: At rest)    Pulse 99    Temp 98 °F (36.7 °C)    Resp 18    Wt 57.9 kg (127 lb 9.6 oz)    SpO2 94%    BMI 22.6 kg/m2     Temp (24hrs), Av.9 °F (37.2 °C), Min:97.9 °F (36.6 °C), Max:100.3 °F (37.9 °C)       Lines: PICC          Physical Exam:    General:  Alert, cooperative, appears chronically ill   Eyes:  Sclera anicteric. Pupils equally round and reactive to light.    Mouth/Throat: Mucous membranes normal, oral pharynx clear. Charles facies    Neck: Supple   Lungs:   Clear to auscultation bilaterally, good effort   CV:  Regular rate and rhythm,no murmur, click, rub or gallop   Abdomen:   Soft, non-tender. bowel sounds normal. non-distended, colostomy    Extremities: No cyanosis or edema   Skin: No rash, L chest PORT site healing. Skin bruising with tears. Lymph nodes: Cervical and supraclavicular normal   Musculoskeletal: R knee incision-sutures in place. NO erythema.     Lines/Devices:  Intact, no erythema, drainage or tenderness   Psych: Alert and oriented, normal mood affect given the setting       Data Review:     CBC:  Recent Labs      08/08/18 0438 08/07/18 0352  08/06/18 2138   WBC  7.1  6.3  6.6   GRANS  70  60  64   MONOS  9  7  8   EOS  0*  0*  0*   ANEU  5.0  3.7  4.2   ABL  1.2  1.9  1.7   HGB  9.5*  9.5*  10.1*   HCT  28.8*  29.1*  30.4*   PLT  247  216  234       BMP:  Recent Labs      08/08/18 0438 08/07/18   0352  08/06/18 2138   CREA  0.30*  0.29*  0.39*   BUN  14  18  20   NA  137  139  138   K  3.6  4.0  3.6   CL  99  104  102   CO2  28  24  25   AGAP  10  11  11   GLU  84  82  204*       LFTS:  Recent Labs      08/08/18 0438 08/07/18   0352  08/06/18 2138   TBILI  1.0  0.5  0.4   ALT  22  25  27   SGOT  64*  37  31   AP  170*  159*  169*   TP  6.1*  5.9*  6.1*   ALB  2.0*  2.1*  2.0*       Microbiology:     All Micro Results     Procedure Component Value Units Date/Time    CULTURE, BLOOD [[id number]] Collected:  08/06/18 2138    Order Status:  Completed Specimen:  Blood from Blood Updated:  08/08/18 1210     Special Requests: --        RIGHT  HAND       Culture result: NO GROWTH 2 DAYS       CULTURE, BLOOD [[id number]]     Order Status:  Canceled Specimen:  Blood from Blood     CULTURE, BLOOD [[id number]]     Order Status:  Canceled Specimen:  Blood from Blood           Imaging:   See care everywhere and HPI    Signed By: Mildred Bradshaw NP     August 8, 2018

## 2018-08-08 NOTE — PROGRESS NOTES
END OF SHIFT NOTE:    Intake/Output      Voiding: YES  Catheter: NO  Drain:   Colostomy 03/13/18 Mid Abdomen (Active)   Drainage Color Brown 8/7/2018  3:55 PM   Site Assessment Intact 8/7/2018  3:55 PM               Stool:  1 occurrences. Emesis:  0 occurrences. VITAL SIGNS  Patient Vitals for the past 12 hrs:   Temp Pulse Resp BP SpO2   08/08/18 0330 99.5 °F (37.5 °C) (!) 111 18 116/79 94 %   08/08/18 0008 99.2 °F (37.3 °C) - - - -   08/07/18 2302 100.3 °F (37.9 °C) (!) 112 18 116/83 93 %   08/07/18 1946 98.9 °F (37.2 °C) 96 18 145/87 95 %       Pain Assessment  Pain 1  Pain Scale 1: Visual (08/08/18 0008)  Pain Intensity 1: 0 (08/08/18 0008)  Patient Stated Pain Goal: 0 (08/08/18 0008)  Pain Reassessment 1: Patient sleeping (08/08/18 0008)  Pain Location 1: Knee (08/07/18 2029)  Pain Orientation 1: Upper (08/07/18 1238)  Pain Intervention(s) 1: Medication (see MAR) (08/07/18 2316)    Ambulating  No    Additional Information: Pt slept most of shift, required iv pain medication 1x and po pain med 1x. No needs voiced at this time. Shift report given to oncoming nurse, Bang Sahni RN,  at the bedside.     Lian Horton

## 2018-08-08 NOTE — PROGRESS NOTES
END OF SHIFT NOTE:    Intake/Output  08/08 0701 - 08/08 1900  In: 0   Out: 300 [Urine:300]   Voiding: YES  Catheter: NO  Drain:   Colostomy 03/13/18 Mid Abdomen (Active)   Drainage Color Willye Rashid 8/8/2018  8:37 AM   Site Assessment Other (Comment); Intact 8/8/2018  8:37 AM               Stool:  ? occurrences. Ostomy       Emesis:  0 occurrences. VITAL SIGNS  Patient Vitals for the past 12 hrs:   Temp Pulse Resp BP SpO2   08/08/18 1548 97.8 °F (36.6 °C) 95 18 118/76 97 %   08/08/18 1121 98 °F (36.7 °C) 99 18 118/76 94 %   08/08/18 0729 98.3 °F (36.8 °C) (!) 103 18 130/88 95 %       Pain Assessment  Pain 1  Pain Scale 1: Numeric (0 - 10) (08/08/18 1358)  Pain Intensity 1: 8 (08/08/18 1358)  Patient Stated Pain Goal: 0 (08/08/18 1358)  Pain Reassessment 1: Yes (08/08/18 0837)  Pain Location 1: Head;Knee (08/08/18 4118)  Pain Orientation 1: Upper (08/07/18 1238)  Pain Intervention(s) 1: Medication (see MAR) (08/08/18 6128)    Ambulating  No    Additional Information: Pt up to side of bed but refused to stand with Pt today. Consistently complained of pain in knee during shift. Pt received IV dilaudid x 2 and norco x 2 during shift. Pt slept most of the day. PICC line has been flushed with heparin during shift. No other needs at this time. Shift report given to oncoming nurse at the bedside.     Laura Robison

## 2018-08-08 NOTE — PROGRESS NOTES
[facility] Hematology & Oncology        Inpatient Hematology / Oncology Progress Note      Admission Date: 2018  7:49 PM  Reason for Admission/Hospital Course: osteomyelitis  Osteomyelitis ([address]      24 Hour Events:  Headache  Drowsy after IV pain med  Spouse at bedside. ROS:  Constitutional: negative for fever, chills, weakness, malaise,+ fatigue. CV: negative for chest pain, palpitations, edema. Respiratory: negative for dyspnea, cough, wheezing. GI: negative for nausea, abdominal pain, diarrhea. 10 point review of systems is otherwise negative with the exception of the elements mentioned above in the HPI. Allergies   Allergen Reactions    Latex Rash    Adhesive Tape-Silicones Rash     Bad red rash.  Ativan [Lorazepam] Other (comments)     Pt states \"gets hallucinations and confused\"    Bactrim [Sulfamethoprim Ds] Hives     Swelling and itching      Lexapro [Escitalopram Oxalate] Other (comments)    Macrobid [Nitrofurantoin Monohyd/M-Cryst] Other (comments)     Felt  \"out of it\"    Pcn [Penicillins] Hives     Swelling and itching also. Pt has tolerated cephalosporins in the past.    Pyridium [Phenazopyridine] Hives     Also itching and swelling. OBJECTIVE:  Patient Vitals for the past 8 hrs:   BP Temp Pulse Resp SpO2   18 1121 118/76 98 °F (36.7 °C) 99 18 94 %   18 0729 130/88 98.3 °F (36.8 °C) (!) 103 18 95 %     Temp (24hrs), Av.9 °F (37.2 °C), Min:97.9 °F (36.6 °C), Max:100.3 °F (37.9 °C)         Physical Exam:  Constitutional: Well developed, well nourished female in no acute distress, lying comfortably in the hospital bed. HEENT: Normocephalic and atraumatic. Oropharynx is clear, mucous membranes are moist. Extraocular muscles are intact. Sclerae anicteric. Skin Warm and dry. No bruising and no rash noted. No erythema. No pallor.     Respiratory Lungs are clear to auscultation bilaterally without wheezes, rales or rhonchi, normal air exchange without accessory muscle use. CVS Normal rate, regular rhythm and normal S1 and S2. No murmurs, gallops, or rubs. Abdomen Soft, nontender and nondistended, normoactive bowel sounds. No palpable mass. No hepatosplenomegaly. Neuro Grossly nonfocal with no obvious sensory or motor deficits. MSK Normal range of motion in general. Right knee pain. Psych Appropriate mood and affect. Labs:    Recent Labs      08/08/18 0438  08/07/18   0352  08/06/18   2138   WBC  7.1  6.3  6.6   RBC  2.92*  2.97*  3.15*   HGB  9.5*  9.5*  10.1*   HCT  28.8*  29.1*  30.4*   MCV  98.6*  98.0*  96.5   MCH  32.5  32.0  32.1   MCHC  33.0  32.6  33.2   RDW  17.0  17.4*  17.0*   PLT  247  216  234   GRANS  70  60  64   LYMPH  17  29  25   MONOS  9  7  8   EOS  0*  0*  0*   BASOS  0  0  0   IG  4  4  3   DF  AUTOMATED  AUTOMATED  AUTOMATED   ANEU  5.0  3.7  4.2   ABL  1.2  1.9  1.7   ABM  0.6  0.4  0.5   RICARDO  0.0  0.0  0.0   ABB  0.0  0.0  0.0   AIG  0.3  0.3  0.2      Recent Labs      08/08/18 0438 08/07/18   0352  08/06/18   2138   NA  137  139  138   K  3.6  4.0  3.6   CL  99  104  102   CO2  28  24  25   AGAP  10  11  11   GLU  84  82  204*   BUN  14  18  20   CREA  0.30*  0.29*  0.39*   GFRAA  >60  >60  >60   GFRNA  >60  >60  >60   CA  8.3  8.1*  8.2*   SGOT  64*  37  31   AP  170*  159*  169*   TP  6.1*  5.9*  6.1*   ALB  2.0*  2.1*  2.0*   GLOB  4.1*  3.8*  4.1*   AGRAT  0.5*  0.6*  0.5*   MG  1.8  1.9  1.8         Imaging:      ASSESSMENT:    Problem List  Date Reviewed: 7/7/2018          Codes Class Noted    Osteomyelitis ([facility] ICD-10-CM: M86.9  ICD-9-CM: 730.20  8/6/2018        Non-intractable cyclical vomiting with nausea ICD-10-CM: G43. 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  ICD-9-CM: 619.1  1/29/2018        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 (MSSA) ICD-10-CM: I33.0, B95.61  ICD-9-CM: 421.0, 041.11  36/20/7623        Systolic congestive heart failure (HCC) ICD-10-CM: I50.20  ICD-9-CM: 428.20, 428.0  11/6/2017        Severe sepsis ([facility] ICD-10-CM: A41.9, R65.20  ICD-9-CM: 038.9, 995.92  11/1/2017        Colon cancer metastasized to [facility]) ICD-10-CM: C18.9, C79.31  ICD-9-CM: 153.9, 198.3  8/30/2017        Malignant neoplasm of descending colon ([facility] ICD-10-CM: C18.6  ICD-9-CM: 153.2  8/30/2017        Pulmonary metastases ([facility] ICD-10-CM: C78.00  ICD-9-CM: 197.0  8/17/2017        Secondary malignant neoplasm of [facility]) ICD-10-CM: C79.31  ICD-9-CM: 198.3  8/17/2017        Mass of colon ICD-10-CM: K63.9  ICD-9-CM: 569.89  8/17/2017        Breast cancer screening ICD-10-CM: Z12.31  ICD-9-CM: V76.10  8/17/2017                PLAN:  Osteomyelitis- continue ancef for 6 weeks per ID.     Metastatic colon cancer with mets to brain-last treatment FOLFOX and bevacizumab 1/24/2018.   8/8 Resume PTA keppra. On decadron 2 mg q 12 hours will start to taper. Headache  8/8 if continues consider brain scan     PT/OT consulted     CM working on rehab placement     Stephanie Banks NP   [facility] Hematology & Oncology  [id number] [place] [address]  Office : [phone]  Fax : [phone]       Attending Addendum:  I personally evaluated the patient with Ruby Banks N.P.,  and agree with the assessment, findings and plan as documented. Appears stable, heart regular without murmur, lungs clear, abdomen benign. Taper down dex if ok w rad onc. Continue ancef. Await rehab placement.                Chelsey Castillo MD  [address]  Office : [phone]  Fax : [phone]

## 2018-08-08 NOTE — PROGRESS NOTES
agreeable to STR / prefers [address] as a first choice r/t closer to where they live. CM will place referral in CC link.

## 2018-08-08 NOTE — PROGRESS NOTES
Problem: Falls - Risk of  Goal: *Absence of Falls  Document Anant Fall Risk and appropriate interventions in the flowsheet. Outcome: Progressing Towards Goal  Fall Risk Interventions:            Medication Interventions: Evaluate medications/consider consulting pharmacy, Patient to call before getting OOB, Teach patient to arise slowly    Elimination Interventions: Call light in reach, Elevated toilet seat, Patient to call for help with toileting needs    History of Falls Interventions: Door open when patient unattended        Problem: Pressure Injury - Risk of  Goal: *Prevention of pressure injury  Document Dominguez Scale and appropriate interventions in the flowsheet.    Outcome: Progressing Towards Goal  Pressure Injury Interventions:       Moisture Interventions: Assess need for specialty bed, Maintain skin hydration (lotion/cream), Absorbent underpads    Activity Interventions: Assess need for specialty bed, Increase time out of bed    Mobility Interventions: HOB 30 degrees or less, Pressure redistribution bed/mattress (bed type)    Nutrition Interventions: Document food/fluid/supplement intake    Friction and Shear Interventions: Apply protective barrier, creams and emollients

## 2018-08-09 NOTE — INTERDISCIPLINARY ROUNDS
Interdisciplinary Rounds completed. Charge RN, Provider and  present. Will order CT if headaches continue. Referral sent to [facility]. Can discharge per Oncology when bed available.

## 2018-08-09 NOTE — PROGRESS NOTES
END OF SHIFT NOTE:    Intake/Output      Voiding: YES  Catheter: NO  Drain:   Colostomy 03/13/18 Mid Abdomen (Active)   Drainage Color Ajit Antes 8/8/2018  8:37 AM   Site Assessment Other (Comment); Intact 8/8/2018  8:37 AM               Stool:  1 occurrences. Emesis:  0 occurrences. VITAL SIGNS  Patient Vitals for the past 12 hrs:   Temp Pulse Resp BP SpO2   08/09/18 0256 98.6 °F (37 °C) 89 16 121/81 97 %   08/08/18 2249 98.5 °F (36.9 °C) (!) 101 17 112/72 95 %   08/08/18 1945 98.1 °F (36.7 °C) 97 18 123/83 97 %       Pain Assessment  Pain 1  Pain Scale 1: Numeric (0 - 10) (08/09/18 0400)  Pain Intensity 1: 0 (08/09/18 0400)  Patient Stated Pain Goal: 0 (08/09/18 0400)  Pain Reassessment 1: Yes (08/09/18 0400)  Pain Location 1: Head (08/09/18 0038)  Pain Orientation 1: Upper (08/07/18 1238)  Pain Intervention(s) 1: Medication (see MAR) (08/09/18 0306)    Ambulating  No    Additional Information: IV pain medication given 1x, po pain medication given 1x. Pt was drowsy first half of shift, stated she was tired from events of the last two days. No needs voiced at this time. Shift report given to oncoming nurse, Elizabeth Isaacs RN,  at the bedside.     Jocelyne Aparicio

## 2018-08-09 NOTE — PROGRESS NOTES
Infectious Disease Progress Note    Today's Date: 2018   Admit Date: 2018    Impression:   · MSSA R femur/knee OM and MSSA/staph lugdunensis (O-suscept) bacteremia s/p debridement (), PORT removal (). TTE NG. Repeat BCs NG as of   · Hx of MSSA AVIE, source PORT infection (2017) s/p treatment. · Metastatic adenocarcinoma  (brain, lung, L adrenal)-no chemotherapy  · Bactrim and PCN allergies     Plan:   ·  Continue Ancef 2g IV q 8h. Duration 6 weeks, EOT 18  · Dispo:[place] tomorrow. Chemo on hold for now. ID appt on  140PM with Dr Yuridia Tovar. Anti-infectives:   · Ancef (-  -Ancef 6g IV q 24h (stopped  due to nausea), Daptomycin (-12/15/17)    Subjective: Tolerating abx well. No fevers and repeat BC NGTD. Allergies   Allergen Reactions    Latex Rash    Adhesive Tape-Silicones Rash     Bad red rash.  Ativan [Lorazepam] Other (comments)     Pt states \"gets hallucinations and confused\"    Bactrim [Sulfamethoprim Ds] Hives     Swelling and itching      Lexapro [Escitalopram Oxalate] Other (comments)    Macrobid [Nitrofurantoin Monohyd/M-Cryst] Other (comments)     Felt  \"out of it\"    Pcn [Penicillins] Hives     Swelling and itching also. Pt has tolerated cephalosporins in the past.    Pyridium [Phenazopyridine] Hives     Also itching and swelling. Review of Systems:  A comprehensive review of systems was negative except for that written in the History of Present Illness. Objective:     Visit Vitals    /87 (BP 1 Location: Left arm, BP Patient Position: At rest)    Pulse 94    Temp 97.9 °F (36.6 °C)    Resp 17    Wt 55.1 kg (121 lb 8 oz)    SpO2 99%    BMI 21.52 kg/m2     Temp (24hrs), Av.1 °F (36.7 °C), Min:97.5 °F (36.4 °C), Max:98.6 °F (37 °C)       Lines: PICC          Physical Exam:    General:  Alert, cooperative, appears chronically ill   Eyes:  Sclera anicteric. Pupils equally round and reactive to light. Mouth/Throat: Mucous membranes normal, oral pharynx clear. Charles facies    Neck: Supple   Lungs:   Clear to auscultation bilaterally, good effort   CV:  Regular rate and rhythm,no murmur, click, rub or gallop   Abdomen:   Soft, non-tender. bowel sounds normal. non-distended, colostomy    Extremities: No cyanosis or edema   Skin: No rash, L chest PORT site healing. Skin bruising with tears. Lymph nodes: Cervical and supraclavicular normal   Musculoskeletal: R knee incision-sutures in place. NO erythema.     Lines/Devices:  Intact, no erythema, drainage or tenderness   Psych: Alert and oriented, normal mood affect given the setting       Data Review:     CBC:  Recent Labs      08/09/18 0317 08/08/18 0438 08/07/18   0352   WBC  7.8  7.1  6.3   GRANS  79*  70  60   MONOS  7  9  7   EOS  0*  0*  0*   ANEU  6.1  5.0  3.7   ABL  0.9  1.2  1.9   HGB  9.0*  9.5*  9.5*   HCT  27.0*  28.8*  29.1*   PLT  271  247  216       BMP:  Recent Labs      08/09/18 0317 08/08/18 0438  08/07/18   0352   CREA  0.32*  0.30*  0.29*   BUN  14  14  18   NA  138  137  139   K  3.5  3.6  4.0   CL  100  99  104   CO2  28  28  24   AGAP  10  10  11   GLU  138*  84  82       LFTS:  Recent Labs      08/09/18 0317 08/08/18 0438  08/07/18   0352   TBILI  0.7  1.0  0.5   ALT  16  22  25   SGOT  43*  64*  37   AP  170*  170*  159*   TP  6.4  6.1*  5.9*   ALB  2.1*  2.0*  2.1*       Microbiology:     All Micro Results     Procedure Component Value Units Date/Time    CULTURE, BLOOD [[id number]] Collected:  08/06/18 2138    Order Status:  Completed Specimen:  Blood from Blood Updated:  08/09/18 1203     Special Requests: --        RIGHT  HAND       Culture result: NO GROWTH 3 DAYS       CULTURE, BLOOD [[id number]]     Order Status:  Canceled Specimen:  Blood from Blood     CULTURE, BLOOD [[id number]]     Order Status:  Canceled Specimen:  Blood from Blood           Imaging:   See care everywhere and HPI    Signed By: Liliane Ty NP August 9, 2018

## 2018-08-09 NOTE — PROGRESS NOTES
Problem: Mobility Impaired (Adult and Pediatric)  Goal: *Acute Goals and Plan of Care (Insert Text)  STG:  (1.)Ms. Alphonso Gamble will move from supine to sit and sit to supine , scoot up and down and roll side to side with CONTACT GUARD ASSIST within 3 treatment day(s). (2.)Ms. Alphonso Gamble will transfer from bed to chair and chair to bed with CONTACT GUARD ASSIST using the least restrictive device within 3 treatment day(s). (3.)Ms. Alphonso Gamble will ambulate with CONTACT GUARD ASSIST for 30 feet with the least restrictive device within 3 treatment day(s). LTG:  (1.)Ms. Alphonso Gamble will move from supine to sit and sit to supine , scoot up and down and roll side to side in bed with MODIFIED INDEPENDENCE within 7 treatment day(s). (2.)Ms. Alphonso Gamble will transfer from bed to chair and chair to bed with MODIFIED INDEPENDENCE using the least restrictive device within 7 treatment day(s). (3.)Ms. Alphonso Gamble will ambulate with MODIFIED INDEPENDENCE for 150+ feet with the least restrictive device within 7 treatment day(s). (4.)Ms. Alphonso Gamble will ascend/descend 2 steps with one hand rail with STAND BY ASSIST within 7 treatment days. ________________________________________________________________________________________________      PHYSICAL THERAPY: Daily Note, Treatment Day: 2nd, AM 8/9/2018  INPATIENT: Hospital Day: 4  Payor: Shen Willoughby / Plan: [facility] / Product Type: PPO /      NAME/AGE/GENDER: Amina Kennedy is a 62 y.o. female   PRIMARY DIAGNOSIS: osteomyelitis  Osteomyelitis ([facility] [address] <principal problem not specified> <principal problem not specified>        ICD-10: Treatment Diagnosis:    · Generalized Muscle Weakness (M62.81)  · Difficulty in walking, Not elsewhere classified (R26.2)  · History of falling (Z91.81)   Precaution/Allergies:  Latex; Adhesive tape-silicones; Ativan [lorazepam]; Bactrim [sulfamethoprim ds]; Lexapro [escitalopram oxalate]; Macrobid [nitrofurantoin monohyd/m-cryst];  Pcn [penicillins]; and Pyridium [phenazopyridine]     R LE WBAT   ASSESSMENT:     Ms. Jayant Garcia presents with primary diagnosis of osteomyelitis of R knee joint/distal R femur with history of metastatic cancer as well. Pt is supine in bed upon contact and agreeable to PT with encouragement. More alert today. Pt agreeable to stand at bedside. Pt required min a to transfer from supine to sitting EOB. Pt stood x 2. Second time she was able to take a few side steps up toward [facility]. She sat back to EOB and immediately returned supine. She rested a couple minutes. She performed bilateral LE ex as listed below. Ms. Jayant Garcia will benefit from skilled PT (medically necessary) to address decreased strength, decreased balance, decreased functional tolerance, decreased cardiopulmonary endurance affecting participation in basic ADLs and functional tasks. Pt may benefit from skilled therapy upon discharge. This section established at most recent assessment   PROBLEM LIST (Impairments causing functional limitations):  1. Decreased Strength  2. Decreased ADL/Functional Activities  3. Decreased Transfer Abilities  4. Decreased Ambulation Ability/Technique  5. Decreased Balance  6. Increased Pain  7. Decreased Activity Tolerance  8. Decreased Pacing Skills  9. Decreased Work Simplification/Energy Conservation Techniques  10. Decreased Knowledge of Precautions  11. Decreased [place] with Home Exercise Program   INTERVENTIONS PLANNED: (Benefits and precautions of physical therapy have been discussed with the patient.)  1. Balance Exercise  2. Bed Mobility  3. Family Education  4. Gait Training  5. Home Exercise Program (HEP)  6. Manual Therapy  7. Neuromuscular Re-education/Strengthening  8. Range of Motion (ROM)  9. Therapeutic Activites  10. Therapeutic Exercise/Strengthening  11. Transfer Training  12.  Group Therapy     TREATMENT PLAN: Frequency/Duration: 3 times a week for duration of hospital stay  Rehabilitation Potential For Stated Goals: Good RECOMMENDED REHABILITATION/EQUIPMENT: (at time of discharge pending progress): Due to the probability of continued deficits (see above) this patient will likely need continued skilled physical therapy after discharge. Equipment:    None at this time              HISTORY:   History of Present Injury/Illness (Reason for Referral):  Patient is a 62 y.o. female admitted on 8/6 to transfer care to oncology after admission at [facility] for R knee OM/septic arthritis. She states that she fell ~11 weeks ago and presented to ortho for R knee cortisone injection ~ 5 weeks ago. Pain continued to worsened and MRI performed showing AVN with ? Infection vs metastatic disease. She was admitted to [facility] from 7/31-8/6 for open bx and surgical intervention. Bx showing OM/septic arthritis. Debridement was performed on femur and knee (7/31). OP cx + MSSA. Infectious disease at [facility] placed patient on Ancef 2g q8h. Also, BCs  On 7/31 + MSSA and staph lugdunensis. PORT removed and repeat BCs NG (8/2). TTE NG. She has remained afebrile, BCx1 NGTD. 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 MSSA bacteremia/AVIE (11/2017) related to R chest PORT s/p removal. She was treated to Ancef---then Daptomycin (changed due to poor GI tolerance-nausea). Last seen in ID office in 12/2017 by me. Past Medical History/Comorbidities:   Ms. Umesh Omalley  has a past medical history of Arthritis; Colon cancer ([facility] [address] Gastrointestinal disorder; GERD (gastroesophageal reflux disease); Non-intractable cyclical vomiting with nausea (4/30/2018); Other ill-defined conditions(799.89); and Staph aureus infection (12/29/2017). She also has no past medical history of Adverse effect of anesthesia; Difficult intubation; Malignant hyperthermia due to anesthesia; or Pseudocholinesterase deficiency.   Ms. Umesh Omalley  has a past surgical history that includes hx lap cholecystectomy; hx gyn; hx tonsillectomy; hx adenoidectomy; reggie biopsy breast stereotactic (Left, 10-8-2014); hx knee arthroscopy (Left, 2014); and hx vascular access. Social History/Living Environment:   Home Environment: Private residence  # Steps to Enter: 2  Rails to Enter: Yes  Hand Rails : Right  Wheelchair Ramp: No  One/Two Story Residence: One story  Living Alone: No  Support Systems: Spouse/Significant Other/Partner  Patient Expects to be Discharged to[de-identified] Private residence  Current DME Used/Available at Home: Walker, rollator, Wheelchair  Prior Level of Function/Work/Activity:  Uses WC for mobility, lives with  who helps with all transfers and ADLs, doesn't drive, used rollator for limited household ambulation prior to fall, one fall ~2 months ago     Number of Personal Factors/Comorbidities that affect the Plan of Care: 3+: HIGH COMPLEXITY   EXAMINATION:   Most Recent Physical Functioning:   Gross Assessment:                  Posture:     Balance:  Sitting: Impaired  Sitting - Static: Fair (occasional)  Sitting - Dynamic: Fair (occasional)  Standing: Impaired  Standing - Static: Fair  Standing - Dynamic : Fair Bed Mobility:  Supine to Sit: Minimum assistance  Sit to Supine: Contact guard assistance  Wheelchair Mobility:     Transfers:  Sit to Stand: Minimum assistance  Stand to Sit: Contact guard assistance  Gait:  Right Side Weight Bearing: As tolerated  Base of Support: Center of gravity altered;Shift to left  Speed/Kay: Shuffled; Slow  Gait Abnormalities: Antalgic;Decreased step clearance  Distance (ft): 2 Feet (ft)  Assistive Device: Walker, rolling  Ambulation - Level of Assistance: Minimal assistance      Body Structures Involved:  1. Heart  2. Lungs  3. Bones  4. Joints  5. Muscles  6. Ligaments Body Functions Affected:  1. Sensory/Pain  2. Cardio  3. Respiratory  4. Neuromusculoskeletal  5. Movement Related Activities and Participation Affected:  1. Mobility  2. Self Care  3. Domestic Life  4.  Interpersonal Interactions and Relationships   Number of elements that affect the Plan of Care: 4+: HIGH COMPLEXITY   CLINICAL PRESENTATION:   Presentation: Evolving clinical presentation with changing clinical characteristics: MODERATE COMPLEXITY   CLINICAL DECISION MAKIN [facility] Mobility Inpatient Short Form  How much difficulty does the patient currently have. .. Unable A Lot A Little None   1. Turning over in bed (including adjusting bedclothes, sheets and blankets)? [] 1   [] 2   [x] 3   [] 4   2. Sitting down on and standing up from a chair with arms ( e.g., wheelchair, bedside commode, etc.)   [] 1   [x] 2   [] 3   [] 4   3. Moving from lying on back to sitting on the side of the bed? [] 1   [x] 2   [] 3   [] 4   How much help from another person does the patient currently need. .. Total A Lot A Little None   4. Moving to and from a bed to a chair (including a wheelchair)? [x] 1   [] 2   [] 3   [] 4   5. Need to walk in hospital room? [x] 1   [] 2   [] 3   [] 4   6. Climbing 3-5 steps with a railing? [x] 1   [] 2   [] 3   [] 4   © , Trustees of [address] [place] [address], under license to [employer]. All rights reserved      Score:  Initial: 10 Most Recent: X (Date: -- )    Interpretation of Tool:  Represents activities that are increasingly more difficult (i.e. Bed mobility, Transfers, Gait). Score 24 23 22-20 19-15 14-10 9-7 6     Modifier CH CI CJ CK CL CM CN      ?  Mobility - Walking and Moving Around:     - CURRENT STATUS: CL - 60%-79% impaired, limited or restricted    - GOAL STATUS: CK - 40%-59% impaired, limited or restricted    - D/C STATUS:  ---------------To be determined---------------  Payor: BLUE CROSS / Plan: [place] [employer] [facility] / Product Type: PPO /      Medical Necessity:     · Patient is expected to demonstrate progress in strength, range of motion, balance, coordination and functional technique to decrease assistance required with transfers and functional mobility. Reason for Services/Other Comments:  · Patient continues to require skilled intervention due to impaired functional mobility and activity tolerance. Use of outcome tool(s) and clinical judgement create a POC that gives a: Questionable prediction of patient's progress: MODERATE COMPLEXITY            TREATMENT:   (In addition to Assessment/Re-Assessment sessions the following treatments were rendered)   Pre-treatment Symptoms/Complaints:  6 after the morphine  Pain: Initial: 6/10     Post Session:  No change     Therapeutic Activity: (    12 min): Therapeutic activities including bed mobility, standing, gt on level surface and sitting balance to improve mobility, strength and balance. Required min    to promote static and dynamic balance in standing. Therapeutic Exercise: ( 11 min):  Exercises per grid below to improve mobility, strength and balance. Required mod visual and verbal cues to promote proper body alignment, promote proper body posture and promote proper body mechanics. Date:  8/9/18 Date:   Date:     Activity/Exercise Parameters Parameters Parameters   AP's X 20 B     Heel slides X 10 B     SLR X 10 B, AA to R     ABD/ADD X 10 B, AA to R                           Braces/Orthotics/Lines/Etc:   · none  ·    Treatment/Session Assessment:    · Response to Treatment:  See above  · Interdisciplinary Collaboration:   o Physical Therapy Assistant  o Registered Nurse  · After treatment position/precautions:   o Bed/Chair-wheels locked  o Bed in low position   · Compliance with Program/Exercises: Will assess as treatment progresses. · Recommendations/Intent for next treatment session: \"Next visit will focus on reduction in assistance provided\".   Total Treatment Duration:  PT Patient Time In/Time Out  Time In: 0924  Time Out: [address] [facility] Vanesa Shelton PTA

## 2018-08-09 NOTE — INTERDISCIPLINARY ROUNDS
1442-Interdisciplinary rounds with charge nurse, provider, and . Presenting Problem: Osteomyelitis  Daily Goal IV abx  Expected Discharge Date: 08/10/2018  Expected Discharge Needs: Placement at [facility] pending.   Patient/Family Concerns addressed

## 2018-08-09 NOTE — PROGRESS NOTES
END OF SHIFT NOTE:    Intake/Output  08/09 0701 - 08/09 1900  In: 480 [P.O.:480]  Out: 600 [Urine:600]   Voiding: YES  Catheter: NO  Drain:   Colostomy 03/13/18 Mid Abdomen (Active)   Drainage Color Alba Jim 8/8/2018  8:37 AM   Site Assessment Other (Comment); Intact 8/8/2018  8:37 AM               Stool:  0 occurrences. Emesis:  0 occurrences. VITAL SIGNS  Patient Vitals for the past 12 hrs:   Temp Pulse Resp BP SpO2   08/09/18 1113 97.9 °F (36.6 °C) 94 17 132/87 99 %   08/09/18 0732 97.5 °F (36.4 °C) 99 17 111/74 96 %       Pain Assessment  Pain 1  Pain Scale 1: Numeric (0 - 10) (08/09/18 1835)  Pain Intensity 1: 6 (08/09/18 1835)  Patient Stated Pain Goal: 0 (08/09/18 1835)  Pain Reassessment 1: Yes (08/09/18 1625)  Pain Location 1: Leg (08/09/18 1835)  Pain Orientation 1: Right (08/09/18 1029)  Pain Intervention(s) 1: Medication (see MAR) (08/09/18 1835)    Ambulating  Yes    Additional Information:     Shift report given to oncoming nurse at the bedside.     Harini Carmen

## 2018-08-09 NOTE — PROGRESS NOTES
Problem: Self Care Deficits Care Plan (Adult)  Goal: *Acute Goals and Plan of Care (Insert Text)  1. Patient will complete grooming with setup. 2. Patient will complete toileting with minimal assistance. 3. Patient will tolerate 20 minutes of OT treatment with as needed rest breaks to increase activity tolerance for ADLs. 4. Patient will complete functional transfers with supervision and adaptive equipment as needed. 5. Pt will demonstrate independence with HEP to promote > BUE strength and overall endurance for ADLs and mobility with ADLs    Timeframe: 7 visits       OCCUPATIONAL THERAPY: Daily Note, Treatment Day: 1st and AM 8/9/2018  INPATIENT: Hospital Day: 4  Payor: Elif Rader / Plan: [address] / Product Type: PPO /      NAME/AGE/GENDER: Joel Tomlinson is a 62 y.o. female   PRIMARY DIAGNOSIS:  osteomyelitis  Osteomyelitis ([facility] [address] <principal problem not specified> <principal problem not specified>        ICD-10: Treatment Diagnosis:    · Pain in Right Knee (M25.561)  · Stiffness of Right Knee, Not elsewhere classified (M25.661)  · Repeated Falls (R29.6)   Precautions/Allergies:    WBAT RLE   Latex; Adhesive tape-silicones; Ativan [lorazepam]; Bactrim [sulfamethoprim ds]; Lexapro [escitalopram oxalate]; Macrobid [nitrofurantoin monohyd/m-cryst]; Pcn [penicillins]; and Pyridium [phenazopyridine]      ASSESSMENT:     Ms. Christoph Bocanegra is a 62year old female admitted from [facility] where she underwent debridement of femur/ knee on 7/31 by Dr. Lila Hinton for osteomyelitis and septic arthritis. Patient has history of metastatic cancer to brain and lung. Per Dr. Vivienne Nice office, patient is WBAT In RLE. Patient lvies with spouse who helps her with all ADLs at home. She is very sedentary and is only able to walk short distances. Patient agreeable to OT evaluation. Pt greeted reclined in bed, alert but minimally interactive with therapist with withdrawn, flat, depressed affect.  Pt completed bed mobility and scooted to EOB with min assist. Pt educated on UE strengthening HEP and issued red theraband,  educated on HEP to assist with carryover. Pt demonstrated impaired attention, processing, and problem solving and required mod multi modal cues for carryover and to correctly complete exercises. Pt continually lost her balance and required repeated cues for safety and to adjust posture to address. Pt stood and took sidesteps towards [facility] with min assist, then impulsively flopped back into bed and required min assist for safety to avoid hitting her head on rails. Pt is progressing towards goals, continue POC. This section established at most recent assessment   PROBLEM LIST (Impairments causing functional limitations):  1. Decreased Strength  2. Decreased ADL/Functional Activities  3. Decreased Transfer Abilities  4. Decreased Ambulation Ability/Technique  5. Decreased Balance  6. Increased Pain  7. Decreased Flexibility/Joint Mobility  8. Edema/Girth   INTERVENTIONS PLANNED: (Benefits and precautions of occupational therapy have been discussed with the patient.)  1. Activities of daily living training  2. Adaptive equipment training  3. Group therapy  4. Therapeutic activity  5. Therapeutic exercise     TREATMENT PLAN: Frequency/Duration: Follow patient 3x/ week to address above goals. Rehabilitation Potential For Stated Goals: Good     RECOMMENDED REHABILITATION/EQUIPMENT: (at time of discharge pending progress): Due to the probability of continued deficits (see above) this patient will likely need continued skilled occupational therapy after discharge. Equipment:    None at this time              OCCUPATIONAL PROFILE AND HISTORY:   History of Present Injury/Illness (Reason for Referral):  See H&P   Past Medical History/Comorbidities:   Ms. Amita Gonzáles  has a past medical history of Arthritis; Colon cancer ([facility] [address] Gastrointestinal disorder; GERD (gastroesophageal reflux disease);  Non-intractable cyclical vomiting with nausea (4/30/2018); Other ill-defined conditions(799.89); and Staph aureus infection (12/29/2017). She also has no past medical history of Adverse effect of anesthesia; Difficult intubation; Malignant hyperthermia due to anesthesia; or Pseudocholinesterase deficiency. Ms. Edgar Choudhury  has a past surgical history that includes hx lap cholecystectomy; hx gyn; hx tonsillectomy; hx adenoidectomy; reggie biopsy breast stereotactic (Left, 10-8-2014); hx knee arthroscopy (Left, 2014); and hx vascular access. Social History/Living Environment:   Home Environment: Private residence  # Steps to Enter: 2  Rails to Enter: Yes  Hand Rails : Right  Wheelchair Ramp: No  One/Two Story Residence: One story  Living Alone: No  Support Systems: Spouse/Significant Other/Partner  Patient Expects to be Discharged to[de-identified] Private residence  Current DME Used/Available at Home: Walker, rollator, Wheelchair  Prior Level of Function/Work/Activity:  Lives with spouse. He helps with bathing, dressing, and toileting. Can feed herself. Sedentary lifestyle. Was having [facility] to work on walking with walker. Otherwise uses the wheelchair.       Number of Personal Factors/Comorbidities that affect the Plan of Care: Expanded review of therapy/medical records (1-2):  MODERATE COMPLEXITY   ASSESSMENT OF OCCUPATIONAL PERFORMANCE[de-identified]   Activities of Daily Living:   Basic ADLs (From Assessment) Complex ADLs (From Assessment)   Feeding: Setup  Oral Facial Hygiene/Grooming: Setup  Bathing: Maximum assistance  Upper Body Dressing: Minimum assistance  Lower Body Dressing: Maximum assistance  Toileting: Maximum assistance     Grooming/Bathing/Dressing Activities of Daily Living     Cognitive Retraining  Safety/Judgement: Fall prevention                       Bed/Mat Mobility  Supine to Sit: Minimum assistance  Sit to Supine: Minimum assistance  Sit to Stand: Minimum assistance  Scooting: Minimum assistance       Most Recent Physical Functioning: Gross Assessment:                  Posture:     Balance:  Sitting: Impaired  Sitting - Static: Fair (occasional)  Sitting - Dynamic: Fair (occasional)  Standing: Impaired  Standing - Static: Fair  Standing - Dynamic : Fair Bed Mobility:  Supine to Sit: Minimum assistance  Sit to Supine: Minimum assistance  Scooting: Minimum assistance  Wheelchair Mobility:     Transfers:  Sit to Stand: Minimum assistance  Stand to Sit: Contact guard assistance            Patient Vitals for the past 6 hrs:   BP BP Patient Position SpO2 Pulse   18 0732 111/74 At rest 96 % 99       Mental Status  Neurologic State: Alert  Orientation Level: Oriented X4  Cognition: Follows commands, Decreased attention/concentration  Perception: Cues to maintain midline in sitting  Perseveration: No perseveration noted  Safety/Judgement: Fall prevention                          Physical Skills Involved:  1. Range of Motion  2. Balance  3. Strength  4. Activity Tolerance  5. Pain (acute) Cognitive Skills Affected (resulting in the inability to perform in a timely and safe manner):  1. None Psychosocial Skills Affected:  1. Habits/Routines   Number of elements that affect the Plan of Care: 5+:  HIGH COMPLEXITY   CLINICAL DECISION MAKIN[address] [place] [id number] AM-PAC 6 Clicks   Daily Activity Inpatient Short Form  How much help from another person does the patient currently need. .. Total A Lot A Little None   1. Putting on and taking off regular lower body clothing? [] 1   [x] 2   [] 3   [] 4   2. Bathing (including washing, rinsing, drying)? [] 1   [x] 2   [] 3   [] 4   3. Toileting, which includes using toilet, bedpan or urinal?   [] 1   [x] 2   [] 3   [] 4   4. Putting on and taking off regular upper body clothing? [] 1   [] 2   [x] 3   [] 4   5. Taking care of personal grooming such as brushing teeth? [] 1   [] 2   [x] 3   [] 4   6. Eating meals?    [] 1   [] 2   [x] 3   [] 4   © , Trustees of [address] [place] [id number], under license to [employer]. All rights reserved      Score:  Initial: 15 Most Recent: X (Date: -- )    Interpretation of Tool:  Represents activities that are increasingly more difficult (i.e. Bed mobility, Transfers, Gait). Score 24 23 22-20 19-15 14-10 9-7 6     Modifier CH CI CJ CK CL CM CN      ? Self Care:     - CURRENT STATUS: CK - 40%-59% impaired, limited or restricted    - GOAL STATUS: CJ - 20%-39% impaired, limited or restricted    - D/C STATUS:  ---------------To be determined---------------  Payor: BLUE CROSS / Plan: [place]OSS [address] / Product Type: PPO /      Medical Necessity:     · Patient demonstrates good rehab potential due to higher previous functional level. Reason for Services/Other Comments:  · Patient continues to require present interventions due to patient's inability to care for self at baseline level of function. Use of outcome tool(s) and clinical judgement create a POC that gives a: MODERATE COMPLEXITY         TREATMENT:   (In addition to Assessment/Re-Assessment sessions the following treatments were rendered)     Pre-treatment Symptoms/Complaints:    Pain: Initial:   Pain Intensity 1: 6  Pain Location 1: Leg  Pain Orientation 1: Right  Pain Intervention(s) 1:  (pre-medicated by nursing)  Post Session:  same     Assessment/Reassessment only, no treatment provided today    Braces/Orthotics/Lines/Etc:   · IV  Treatment/Session Assessment:    · Response to Treatment:  Tolerated well   · Interdisciplinary Collaboration:   o Occupational Therapist  o Registered Nurse  · After treatment position/precautions:   o Supine in bed  o Bed/Chair-wheels locked  o Bed in low position  o Call light within reach  o RN notified   · Compliance with Program/Exercises: Will assess as treatment progresses. · Recommendations/Intent for next treatment session: \"Next visit will focus on advancements to more challenging activities and reduction in assistance provided\".   Total Treatment Duration:  OT Patient Time In/Time Out  Time In: 1001  Time Out: [address] 52 Kathy Odonnell

## 2018-08-09 NOTE — PROGRESS NOTES
[facility] Hematology & Oncology        Inpatient Hematology / Oncology Progress Note      Admission Date: 2018  7:49 PM  Reason for Admission/Hospital Course: osteomyelitis  Osteomyelitis ([address]      24 Hour Events:  Headache resolved  Spouse at bedside  Rehab placement pending      ROS:  Constitutional: negative for fever, chills, weakness, malaise,+ fatigue. CV: negative for chest pain, palpitations, edema. Respiratory: negative for dyspnea, cough, wheezing. GI: negative for nausea, abdominal pain, diarrhea. 10 point review of systems is otherwise negative with the exception of the elements mentioned above in the HPI. Allergies   Allergen Reactions    Latex Rash    Adhesive Tape-Silicones Rash     Bad red rash.  Ativan [Lorazepam] Other (comments)     Pt states \"gets hallucinations and confused\"    Bactrim [Sulfamethoprim Ds] Hives     Swelling and itching      Lexapro [Escitalopram Oxalate] Other (comments)    Macrobid [Nitrofurantoin Monohyd/M-Cryst] Other (comments)     Felt  \"out of it\"    Pcn [Penicillins] Hives     Swelling and itching also. Pt has tolerated cephalosporins in the past.    Pyridium [Phenazopyridine] Hives     Also itching and swelling. OBJECTIVE:  Patient Vitals for the past 8 hrs:   BP Temp Pulse Resp SpO2   18 1113 132/87 97.9 °F (36.6 °C) 94 17 99 %   18 0732 111/74 97.5 °F (36.4 °C) 99 17 96 %     Temp (24hrs), Av.1 °F (36.7 °C), Min:97.5 °F (36.4 °C), Max:98.6 °F (37 °C)     07 -  1900  In: 240 [P.O.:240]  Out: 300 [Urine:300]    Physical Exam:  Constitutional: Well developed, well nourished female in no acute distress, lying comfortably in the hospital bed. HEENT: Normocephalic and atraumatic. Oropharynx is clear, mucous membranes are moist. Extraocular muscles are intact. Sclerae anicteric. Skin Warm and dry. No bruising and no rash noted. No erythema. No pallor.     Respiratory Lungs are clear to auscultation bilaterally without wheezes, rales or rhonchi, normal air exchange without accessory muscle use. CVS Normal rate, regular rhythm and normal S1 and S2. No murmurs, gallops, or rubs. Abdomen Soft, nontender and nondistended, normoactive bowel sounds. No palpable mass. No hepatosplenomegaly. Neuro Grossly nonfocal with no obvious sensory or motor deficits. MSK Normal range of motion in general. Right knee pain. Psych Appropriate mood and affect. Labs:      Recent Labs      08/09/18 0317 08/08/18 0438  08/07/18   0352   WBC  7.8  7.1  6.3   RBC  2.74*  2.92*  2.97*   HGB  9.0*  9.5*  9.5*   HCT  27.0*  28.8*  29.1*   MCV  98.5*  98.6*  98.0*   MCH  32.8  32.5  32.0   MCHC  33.3  33.0  32.6   RDW  16.0  17.0  17.4*   PLT  271  247  216   GRANS  79*  70  60   LYMPH  12*  17  29   MONOS  7  9  7   EOS  0*  0*  0*   BASOS  0  0  0   IG  2  4  4   DF  AUTOMATED  AUTOMATED  AUTOMATED   ANEU  6.1  5.0  3.7   ABL  0.9  1.2  1.9   ABM  0.6  0.6  0.4   RICARDO  0.0  0.0  0.0   ABB  0.0  0.0  0.0   AIG  0.2  0.3  0.3        Recent Labs      08/09/18 0317 08/08/18   0438  08/07/18   0352   NA  138  137  139   K  3.5  3.6  4.0   CL  100  99  104   CO2  28  28  24   AGAP  10  10  11   GLU  138*  84  82   BUN  14  14  18   CREA  0.32*  0.30*  0.29*   GFRAA  >60  >60  >60   GFRNA  >60  >60  >60   CA  8.6  8.3  8.1*   SGOT  43*  64*  37   AP  170*  170*  159*   TP  6.4  6.1*  5.9*   ALB  2.1*  2.0*  2.1*   GLOB  4.3*  4.1*  3.8*   AGRAT  0.5*  0.5*  0.6*   MG  2.1  1.8  1.9         Imaging:      ASSESSMENT:    Problem List  Date Reviewed: 7/7/2018          Codes Class Noted    Osteomyelitis ([facility] ICD-10-CM: M86.9  ICD-9-CM: 730.20  8/6/2018        Non-intractable cyclical vomiting with nausea ICD-10-CM: G43. 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  ICD-9-CM: 619.1  1/29/2018        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 (MSSA) ICD-10-CM: I33.0, B95.61  ICD-9-CM: 421.0, 041.11  86/25/7606        Systolic congestive heart failure (HCC) ICD-10-CM: I50.20  ICD-9-CM: 428.20, 428.0  11/6/2017        Severe sepsis ([facility] ICD-10-CM: A41.9, R65.20  ICD-9-CM: 038.9, 995.92  11/1/2017        Colon cancer metastasized to brain [facility]) ICD-10-CM: C18.9, C79.31  ICD-9-CM: 153.9, 198.3  8/30/2017        Malignant neoplasm of descending colon ([facility] ICD-10-CM: C18.6  ICD-9-CM: 153.2  8/30/2017        Pulmonary metastases ([facility] ICD-10-CM: C78.00  ICD-9-CM: 197.0  8/17/2017        Secondary malignant neoplasm of brain [facility]) ICD-10-CM: C79.31  ICD-9-CM: 198.3  8/17/2017        Mass of colon ICD-10-CM: K63.9  ICD-9-CM: 569.89  8/17/2017        Breast cancer screening ICD-10-CM: Z12.31  ICD-9-CM: V76.10  8/17/2017                PLAN:  Osteomyelitis- continue ancef for 6 weeks per ID.     Metastatic colon cancer with mets to brain-last treatment FOLFOX and bevacizumab 1/24/2018.   8/8 Resume PTA keppra. On decadron 2 mg q 12 hours    Headache  8/8 if continues consider brain scan  8/9 Resolved.      PT/OT consulted     CM working on rehab placement     Stephanie OCHOAs    Patient's spouse was under the impression that we would start chemo will inpatient. Dr. Sundeep Brand plans to clarify with Dr. Gael Kong of care. [facility]  [address] [address]  Office : [phone]  Fax : [phone]         Attending Addendum:  I personally evaluated the patient with Josey Castellano N.PSirisha,  and agree with the assessment, findings and plan as documented. Appears stable, heart regular without murmur, lungs clear, abdomen benign.   Will get repeat brain MRI to assess CNS met disease. If not significantly progressed then systemic 5FU based regimen while inhouse.                Keya Fatima MD  [address]  Office : [phone]  Fax : [phone]

## 2018-08-10 NOTE — PROGRESS NOTES
Per Faustino Angelucci RN [facility] liaison), Maggie Loredo received for patient to go to [facility] / NP aware.

## 2018-08-10 NOTE — PROGRESS NOTES
Infectious Disease Progress Note    Today's Date: 8/10/2018   Admit Date: 2018    Impression:   · MSSA R femur/knee OM and MSSA/staph lugdunensis (O-suscept) bacteremia s/p debridement (), PORT removal (). TTE NG. Repeat BCs NG as of   · Hx of MSSA AVIE, source PORT infection (2017) s/p treatment. · Metastatic adenocarcinoma  (brain, lung, L adrenal)-no chemotherapy  · Bactrim and PCN allergies     Plan:   ·  Continue Ancef 2g IV q 8h. Duration 6 weeks, EOT 18  · Per oncology, she will begin inpt chemo. DC to [facility] cancelled. ·   ID appt on  140PM with Dr Lashaun Zavala. Anti-infectives:   · Ancef (-  -Ancef 6g IV q 24h (stopped  due to nausea), Daptomycin (-12/15/17)    Subjective:   MRI head today with enlarging lesions but no new. Vasogenic edema worse. Allergies   Allergen Reactions    Latex Rash    Adhesive Tape-Silicones Rash     Bad red rash.  Ativan [Lorazepam] Other (comments)     Pt states \"gets hallucinations and confused\"    Bactrim [Sulfamethoprim Ds] Hives     Swelling and itching      Lexapro [Escitalopram Oxalate] Other (comments)    Macrobid [Nitrofurantoin Monohyd/M-Cryst] Other (comments)     Felt  \"out of it\"    Pcn [Penicillins] Hives     Swelling and itching also. Pt has tolerated cephalosporins in the past.    Pyridium [Phenazopyridine] Hives     Also itching and swelling. Review of Systems:  A comprehensive review of systems was negative except for that written in the History of Present Illness. Objective:     Visit Vitals    /78 (BP 1 Location: Left arm, BP Patient Position: At rest)    Pulse 71    Temp 98.2 °F (36.8 °C)    Resp 16    Wt 58.6 kg (129 lb 1.6 oz)    SpO2 99%    BMI 22.87 kg/m2     Temp (24hrs), Av.7 °F (36.5 °C), Min:97.4 °F (36.3 °C), Max:98.2 °F (36.8 °C)       Lines: PICC          Physical Exam:    General:  Alert, cooperative, appears chronically ill   Eyes:  Sclera anicteric.  Pupils equally round and reactive to light. Mouth/Throat: Mucous membranes normal, oral pharynx clear. Charles facies    Neck: Supple   Lungs:   Clear to auscultation bilaterally, good effort   CV:  Regular rate and rhythm,no murmur, click, rub or gallop   Abdomen:   Soft, non-tender. bowel sounds normal. non-distended, colostomy    Extremities: No cyanosis or edema   Skin: No rash, L chest PORT site healing. Skin bruising with tears. Lymph nodes: Cervical and supraclavicular normal   Musculoskeletal: R knee incision-sutures in place. NO erythema.     Lines/Devices:  Intact, no erythema, drainage or tenderness   Psych: Alert and oriented, normal mood affect given the setting       Data Review:     CBC:  Recent Labs      08/10/18   0254  08/09/18   0317  08/08/18   0438   WBC  6.3  7.8  7.1   GRANS  75  79*  70   MONOS  7  7  9   EOS  0*  0*  0*   ANEU  4.7  6.1  5.0   ABL  1.0  0.9  1.2   HGB  8.3*  9.0*  9.5*   HCT  25.5*  27.0*  28.8*   PLT  268  271  247       BMP:  Recent Labs      08/10/18   0254  08/09/18   0317  08/08/18   0438   CREA  0.35*  0.32*  0.30*   BUN  13  14  14   NA  140  138  137   K  3.8  3.5  3.6   CL  101  100  99   CO2  28  28  28   AGAP  11  10  10   GLU  146*  138*  84       LFTS:  Recent Labs      08/10/18   0254  08/09/18   0317  08/08/18   0438   TBILI  0.3  0.7  1.0   ALT  17  16  22   SGOT  29  43*  64*   AP  155*  170*  170*   TP  6.0*  6.4  6.1*   ALB  2.0*  2.1*  2.0*       Microbiology:     All Micro Results     Procedure Component Value Units Date/Time    CULTURE, BLOOD [[id number]] Collected:  08/06/18 2138    Order Status:  Completed Specimen:  Blood from Blood Updated:  08/10/18 0702     Special Requests: --        RIGHT  HAND       Culture result: NO GROWTH 4 DAYS       CULTURE, BLOOD [[id number]]     Order Status:  Canceled Specimen:  Blood from Blood     CULTURE, BLOOD [[id number]]     Order Status:  Canceled Specimen:  Blood from Blood           Imaging:   See care everywhere and HPI    Signed By: Justina Nash NP     August 10, 2018

## 2018-08-10 NOTE — PROGRESS NOTES
Per NP, cancelled [facility] / now states patient to get chemo inpt. Demarco Valdes RN [facility] liaison) aware.

## 2018-08-10 NOTE — PROGRESS NOTES
Problem: Falls - Risk of  Goal: *Absence of Falls  Document Anant Fall Risk and appropriate interventions in the flowsheet. Outcome: Progressing Towards Goal  Fall Risk Interventions:            Medication Interventions: Evaluate medications/consider consulting pharmacy, Patient to call before getting OOB, Teach patient to arise slowly    Elimination Interventions: Call light in reach, Patient to call for help with toileting needs, Toilet paper/wipes in reach    History of Falls Interventions: Consult care management for discharge planning, Evaluate medications/consider consulting pharmacy, Investigate reason for fall, Room close to nurse's station        Problem: Pressure Injury - Risk of  Goal: *Prevention of pressure injury  Document Dominguez Scale and appropriate interventions in the flowsheet.    Outcome: Progressing Towards Goal  Pressure Injury Interventions:       Moisture Interventions: Maintain skin hydration (lotion/cream)    Activity Interventions: Increase time out of bed, Pressure redistribution bed/mattress(bed type)    Mobility Interventions: HOB 30 degrees or less, Pressure redistribution bed/mattress (bed type)    Nutrition Interventions: Document food/fluid/supplement intake    Friction and Shear Interventions: HOB 30 degrees or less, Foam dressings/transparent film/skin sealants

## 2018-08-10 NOTE — PROGRESS NOTES
END OF SHIFT NOTE:    Intake/Output      Voiding: YES  Catheter: NO  Drain:   Colostomy 03/13/18 Mid Abdomen (Active)   Drainage Color Leandrew Fly 8/8/2018  8:37 AM   Site Assessment Clean, dry, intact 8/10/2018  7:15 AM               Stool:  0 occurrences. Emesis:  0 occurrences. VITAL SIGNS  Patient Vitals for the past 12 hrs:   Temp Pulse Resp BP SpO2   08/10/18 1920 98 °F (36.7 °C) 69 17 123/88 97 %   08/10/18 1523 98 °F (36.7 °C) 87 16 120/81 98 %   08/10/18 1145 98.2 °F (36.8 °C) 71 16 141/78 99 %       Pain Assessment  Pain 1  Pain Scale 1: Numeric (0 - 10) (08/10/18 1828)  Pain Intensity 1: 6 (08/10/18 1828)  Patient Stated Pain Goal: 0 (08/10/18 1828)  Pain Reassessment 1: Yes (08/10/18 1610)  Pain Onset 1: PTA (08/10/18 0714)  Pain Location 1: Leg (08/10/18 1828)  Pain Orientation 1: Right (08/10/18 0714)  Pain Description 1: Throbbing;Aching (08/10/18 0714)  Pain Intervention(s) 1: Medication (see MAR) (08/10/18 1828)    Ambulating  No    Additional Information:     Shift report given to oncoming nurse at the bedside.     Karo Fang

## 2018-08-10 NOTE — REHAB NOTE
Physical therapy note: Attempted PT this AM and again in PM . Pt having MRI this AM. This PM pt and family members sleeping on contact. Pt's spouse asked if treatment could be postponed for now. Pt did not uncover her head. Will continue to treat as pt is able and time/schedule permit.

## 2018-08-10 NOTE — PROGRESS NOTES
END OF SHIFT NOTE:    Intake/Output  08/09 1901 - 08/10 0700  In: -   Out: 200 [Urine:200]   Voiding: YES  Catheter: NO  Drain:   Colostomy 03/13/18 Mid Abdomen (Active)   Drainage Color Kadi Moralesn 8/8/2018  8:37 AM   Site Assessment Other (Comment); Intact 8/8/2018  8:37 AM               Stool:  0 occurrences. Emesis:  0 occurrences. VITAL SIGNS  Patient Vitals for the past 12 hrs:   Temp Pulse Resp BP SpO2   08/10/18 0242 97.4 °F (36.3 °C) 77 17 106/76 99 %   08/09/18 2303 97.4 °F (36.3 °C) 83 18 117/75 95 %   08/09/18 1925 97.5 °F (36.4 °C) 82 18 114/77 98 %       Pain Assessment  Pain 1  Pain Scale 1: Visual (08/10/18 0408)  Pain Intensity 1: 0 (08/10/18 0408)  Patient Stated Pain Goal: 0 (08/10/18 0332)  Pain Reassessment 1: Patient sleeping (08/10/18 0408)  Pain Onset 1: PTA (08/10/18 0332)  Pain Location 1: Leg (08/10/18 0332)  Pain Orientation 1: Right (08/10/18 0332)  Pain Description 1: Throbbing;Aching (08/10/18 0332)  Pain Intervention(s) 1: Medication (see MAR) (08/10/18 0332)    Ambulating  No    Additional Information:   IV dilaudid given for right knee pain as requested by pt. Pt resting quietly. No visible s/sx of distress. No needs/concern voiced at this time. Pt's  is at bedside. Shift report given to oncoming nurse at the bedside.     Matt Ward RN

## 2018-08-10 NOTE — PROGRESS NOTES
[facility]        Inpatient Hematology / Oncology Progress Note      Admission Date: 2018  7:49 PM  Reason for Admission/Hospital Course: osteomyelitis  Osteomyelitis ([address]      24 Hour Events:  CT CAP for restaging  Brain MRI completed  Planning FOLFOX after scans  Check CEA and CA 19.9      ROS:  Constitutional: negative for fever, chills, +weakness, +malaise,+ fatigue. CV: negative for chest pain, palpitations, edema. Respiratory: negative for dyspnea, cough, wheezing. GI: negative for nausea, abdominal pain, diarrhea. 10 point review of systems is otherwise negative with the exception of the elements mentioned above in the HPI. Allergies   Allergen Reactions    Latex Rash    Adhesive Tape-Silicones Rash     Bad red rash.  Ativan [Lorazepam] Other (comments)     Pt states \"gets hallucinations and confused\"    Bactrim [Sulfamethoprim Ds] Hives     Swelling and itching      Lexapro [Escitalopram Oxalate] Other (comments)    Macrobid [Nitrofurantoin Monohyd/M-Cryst] Other (comments)     Felt  \"out of it\"    Pcn [Penicillins] Hives     Swelling and itching also. Pt has tolerated cephalosporins in the past.    Pyridium [Phenazopyridine] Hives     Also itching and swelling. OBJECTIVE:  Patient Vitals for the past 8 hrs:   BP Temp Pulse Resp SpO2   08/10/18 0733 137/82 98.1 °F (36.7 °C) 70 17 97 %   08/10/18 0242 106/76 97.4 °F (36.3 °C) 77 17 99 %     Temp (24hrs), Av.7 °F (36.5 °C), Min:97.4 °F (36.3 °C), Max:98.1 °F (36.7 °C)    08/10 07 - 08/10 1900  In: 358 [P.O.:358]  Out: -     Physical Exam:  Constitutional: Well developed, well nourished female in no acute distress, lying comfortably in the hospital bed. HEENT: Normocephalic and atraumatic. Oropharynx is clear, mucous membranes are moist. Extraocular muscles are intact. Sclerae anicteric. Skin Warm and dry. No bruising and no rash noted. No erythema. No pallor.     Respiratory Lungs are clear to auscultation bilaterally without wheezes, rales or rhonchi, normal air exchange without accessory muscle use. CVS Normal rate, regular rhythm and normal S1 and S2. No murmurs, gallops, or rubs. Abdomen Soft, nontender and nondistended, normoactive bowel sounds. No palpable mass. No hepatosplenomegaly. Neuro Grossly nonfocal with no obvious sensory or motor deficits. MSK Normal range of motion in general. Right knee pain. Psych Appropriate mood and affect. Labs:      Recent Labs      08/10/18   0254  08/09/18   0317  08/08/18   0438   WBC  6.3  7.8  7.1   RBC  2.56*  2.74*  2.92*   HGB  8.3*  9.0*  9.5*   HCT  25.5*  27.0*  28.8*   MCV  99.6*  98.5*  98.6*   MCH  32.4  32.8  32.5   MCHC  32.5  33.3  33.0   RDW  15.9  16.0  17.0   PLT  268  271  247   GRANS  75  79*  70   LYMPH  15  12*  17   MONOS  7  7  9   EOS  0*  0*  0*   BASOS  0  0  0   IG  3  2  4   DF  AUTOMATED  AUTOMATED  AUTOMATED   ANEU  4.7  6.1  5.0   ABL  1.0  0.9  1.2   ABM  0.4  0.6  0.6   RICARDO  0.0  0.0  0.0   ABB  0.0  0.0  0.0   AIG  0.2  0.2  0.3        Recent Labs      08/10/18   0254  08/09/18   0317  08/08/18   0438   NA  140  138  137   K  3.8  3.5  3.6   CL  101  100  99   CO2  28  28  28   AGAP  11  10  10   GLU  146*  138*  84   BUN  13  14  14   CREA  0.35*  0.32*  0.30*   GFRAA  >60  >60  >60   GFRNA  >60  >60  >60   CA  8.7  8.6  8.3   SGOT  29  43*  64*   AP  155*  170*  170*   TP  6.0*  6.4  6.1*   ALB  2.0*  2.1*  2.0*   GLOB  4.0*  4.3*  4.1*   AGRAT  0.5*  0.5*  0.5*   MG  1.9  2.1  1.8         Imaging:      ASSESSMENT:    Problem List  Date Reviewed: 7/7/2018          Codes Class Noted    O[facility]omyelitis ([facility] ICD-10-CM: M86.9  ICD-9-CM: 730.20  8/6/2018        Non-intractable cyclical vomiting with nausea ICD-10-CM: G43. A0  ICD-9-CM: 536.2  4/30/2018        Nonintractable headache ICD-10-CM: R51  ICD-9-CM: 784.0  4/30/2018        Hypokalemia ICD-10-CM: E87.6  ICD-9-CM: 276.8  4/30/2018        Status post colostomy [facility]) ICD-10-CM: Z93.3  ICD-9-CM: V44.3  3/15/2018        Confusion ICD-10-CM: R41.0  ICD-9-CM: 298.9  3/6/2018        Colovaginal fistula ICD-10-CM: N82.4  ICD-9-CM: 619.1  1/29/2018        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 (MSSA) ICD-10-CM: I33.0, B95.61  ICD-9-CM: 421.0, 041.11  02/30/9637        Systolic congestive heart failure (HCC) ICD-10-CM: I50.20  ICD-9-CM: 428.20, 428.0  11/6/2017        Se[facility] sepsis ([facility] ICD-10-CM: A41.9, R65.20  ICD-9-CM: 038.9, 995.92  11/1/2017        Colon cancer metastasized to brain [facility]) ICD-10-CM: C18.9, C79.31  ICD-9-CM: 153.9, 198.3  8/30/2017        Malignant neoplasm of descending colon ([facility] ICD-10-CM: C18.6  ICD-9-CM: 153.2  8/30/2017        Pulmonary metastases ([facility] ICD-10-CM: C78.00  ICD-9-CM: 197.0  8/17/2017        Secondary malignant neoplasm of brain [facility]) ICD-10-CM: C79.31  ICD-9-CM: 198.3  8/17/2017        Mass of colon ICD-10-CM: K63.9  ICD-9-CM: 569.89  8/17/2017        Breast cancer screening ICD-10-CM: Z12.31  ICD-9-CM: V76.10  8/17/2017                PLAN:  Osteomyelitis- continue ancef for 6 weeks per ID.     Metastatic colon cancer with mets to brain-last treatment FOLFOX and bevacizumab 1/24/2018.   8/8 Resume PTA keppra. On decadron 2 mg q 12 hours  8/10 MRI brain: Mixed response to therapy with enlargement of several previously indexed metastases and reduction in size of other  previously seen lesions. There is no evidence of new intracranial metastatic disease. Of note, lesions within the brachium pontis on the left  and within the periaqueductal region have enlarged. Surrounding vasogenic edema has substantially worsened.  The combination of these findings results in partial effacement of the fourth ventricle, worsened in comparison to the prior exam. There is very subtle enlargement of the lateral and third ventricles in comparison to the prior exam. This raises the possibility of the very early manifestations of obstructive hydrocephalus. Ordered CT CAP for restaging. CEA and CA 19.9. Planning for dose reduced FOLFOX without avastin tomorrow    Headache  8/8 if continues consider brain scan  8/9 Resolved.      PT/OT consulted     Stephanie Mckinney NP   [address]  Office : [phone]  Fax : [phone]         Attending Addendum:  I personally evaluated the patient with Chacorta Mckinney N.P.,  and agree with the assessment, findings and plan as documented. Appears stable, heart regular without murmur, lungs clear, abdomen benign. Case d/w primary oncologist, and patient and her significant other: Await brain MRI report. Will also get CT CAP, if now significant progression in brain than will consider dose reduced FOLFOX in house.                Niel Hodgkins, MD  [facility]  [address] [address]  Office : [phone]  Fax : [phone]

## 2018-08-11 NOTE — PROGRESS NOTES
Problem: Falls - Risk of  Goal: *Absence of Falls  Document Anant Fall Risk and appropriate interventions in the flowsheet. Outcome: Progressing Towards Goal  Fall Risk Interventions:            Medication Interventions: Evaluate medications/consider consulting pharmacy, Patient to call before getting OOB    Elimination Interventions: Call light in reach    History of Falls Interventions: Consult care management for discharge planning        Problem: Pressure Injury - Risk of  Goal: *Prevention of pressure injury  Document Dominguez Scale and appropriate interventions in the flowsheet.    Outcome: Progressing Towards Goal  Pressure Injury Interventions:       Moisture Interventions: Maintain skin hydration (lotion/cream)    Activity Interventions: Increase time out of bed    Mobility Interventions: HOB 30 degrees or less    Nutrition Interventions: Document food/fluid/supplement intake    Friction and Shear Interventions: Foam dressings/transparent film/skin sealants, HOB 30 degrees or less

## 2018-08-11 NOTE — PROGRESS NOTES
Problem: Falls - Risk of  Goal: *Absence of Falls  Document Anant Fall Risk and appropriate interventions in the flowsheet. Outcome: Progressing Towards Goal  Fall Risk Interventions:            Medication Interventions: Evaluate medications/consider consulting pharmacy    Elimination Interventions: Call light in reach    History of Falls Interventions: Consult care management for discharge planning        Problem: Pressure Injury - Risk of  Goal: *Prevention of pressure injury  Document Dominguez Scale and appropriate interventions in the flowsheet.    Outcome: Progressing Towards Goal  Pressure Injury Interventions:       Moisture Interventions: Maintain skin hydration (lotion/cream)    Activity Interventions: Increase time out of bed    Mobility Interventions: HOB 30 degrees or less    Nutrition Interventions: Document food/fluid/supplement intake    Friction and Shear Interventions: Foam dressings/transparent film/skin sealants, HOB 30 degrees or less

## 2018-08-11 NOTE — INTERDISCIPLINARY ROUNDS
0958-Interdisciplinary rounds with charge nurse, provider, and .     Presenting Problem: osteomyelitis  Daily Goal restart FOLFOX  Expected Discharge Date: next week  Expected Discharge Needs: Joe Zamora for PT rehab  Patient/Family Concerns addressed

## 2018-08-11 NOTE — PROGRESS NOTES
1220-Nurse spoke with Lizzy Padilla NP and she stated to have pharmacist adjust chemo dose to reflect current weight and BSA. Nurse spoke with Arnav Tiwari in pharmacy and he stated he would adjust dose.

## 2018-08-11 NOTE — PROGRESS NOTES
[facility]        Inpatient Hematology / Oncology Progress Note      Admission Date: 2018  7:49 PM  Reason for Admission/Hospital Course: osteomyelitis  Osteomyelitis ([address]      24 Hour Events:  FOLFOX today  Reviewed imaging with patient  Tearful at findings  Consult Dr. Luciana Stark on Monday      ROS:  Constitutional: negative for fever, chills, +weakness, +malaise,+ fatigue. CV: negative for chest pain, palpitations, edema. Respiratory: negative for dyspnea, cough, wheezing. GI: negative for nausea, abdominal pain, diarrhea. 10 point review of systems is otherwise negative with the exception of the elements mentioned above in the HPI. Allergies   Allergen Reactions    Latex Rash    Adhesive Tape-Silicones Rash     Bad red rash.  Ativan [Lorazepam] Other (comments)     Pt states \"gets hallucinations and confused\"    Bactrim [Sulfamethoprim Ds] Hives     Swelling and itching      Lexapro [Escitalopram Oxalate] Other (comments)    Macrobid [Nitrofurantoin Monohyd/M-Cryst] Other (comments)     Felt  \"out of it\"    Pcn [Penicillins] Hives     Swelling and itching also. Pt has tolerated cephalosporins in the past.    Pyridium [Phenazopyridine] Hives     Also itching and swelling. OBJECTIVE:  Patient Vitals for the past 8 hrs:   BP Temp Pulse Resp SpO2   18 0740 (!) 140/93 97.8 °F (36.6 °C) 71 17 97 %     Temp (24hrs), Av °F (36.7 °C), Min:97.8 °F (36.6 °C), Max:98.3 °F (36.8 °C)     0701 -  1900  In: -   Out: 200 [Urine:200]    Physical Exam:  Constitutional: Well developed, well nourished female in no acute distress, lying comfortably in the hospital bed. HEENT: Normocephalic and atraumatic. Oropharynx is clear, mucous membranes are moist. Extraocular muscles are intact. Sclerae anicteric. Skin Warm and dry. No bruising and no rash noted. No erythema. No pallor.     Respiratory Lungs are clear to auscultation bilaterally without wheezes, rales or rhonchi, normal air exchange without accessory muscle use. CVS Normal rate, regular rhythm and normal S1 and S2. No murmurs, gallops, or rubs. Abdomen Soft, nontender and nondistended, normoactive bowel sounds. No palpable mass. No hepatosplenomegaly. Neuro Grossly nonfocal with no obvious sensory or motor deficits. MSK Normal range of motion in general. Right knee pain. Psych Appropriate mood and affect. Labs:      Recent Labs      08/11/18   0247  08/10/18   0254  08/09/18 0317   WBC  5.4  6.3  7.8   RBC  2.45*  2.56*  2.74*   HGB  8.1*  8.3*  9.0*   HCT  24.5*  25.5*  27.0*   MCV  100.0*  99.6*  98.5*   MCH  33.1*  32.4  32.8   MCHC  33.1  32.5  33.3   RDW  15.8  15.9  16.0   PLT  225  268  271   GRANS  77  75  79*   LYMPH  14  15  12*   MONOS  7  7  7   EOS  0*  0*  0*   BASOS  0  0  0   IG  2  3  2   DF  AUTOMATED  AUTOMATED  AUTOMATED   ANEU  4.2  4.7  6.1   ABL  0.7  1.0  0.9   ABM  0.4  0.4  0.6   RICARDO  0.0  0.0  0.0   ABB  0.0  0.0  0.0   AIG  0.1  0.2  0.2        Recent Labs      08/11/18   0247  08/10/18   0254  08/09/18   0317   NA  141  140  138   K  3.8  3.8  3.5   CL  102  101  100   CO2  28  28  28   AGAP  11  11  10   GLU  112*  146*  138*   BUN  13  13  14   CREA  0.69  0.35*  0.32*   GFRAA  >60  >60  >60   GFRNA  >60  >60  >60   CA  7.9*  8.7  8.6   SGOT  33  29  43*   AP  154*  155*  170*   TP  5.8*  6.0*  6.4   ALB  2.0*  2.0*  2.1*   GLOB  3.8*  4.0*  4.3*   AGRAT  0.5*  0.5*  0.5*   MG  1.8  1.9  2.1         Imaging:      ASSESSMENT:    Problem List  Date Reviewed: 7/7/2018          Codes Class Noted    Osteomyelitis ([address] ICD-10-CM: M86.9  ICD-9-CM: 730.20  8/6/2018        Non-intractable cyclical vomiting with nausea ICD-10-CM: G43. 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.4  ICD-9-CM: 619.1  1/29/2018        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 (MSSA) ICD-10-CM: I33.0, B95.61  ICD-9-CM: 421.0, 041.11  45/71/9843        Systolic congestive heart failure (HCC) ICD-10-CM: I50.20  ICD-9-CM: 428.20, 428.0  11/6/2017        Severe sepsis ([facility] ICD-10-CM: A41.9, R65.20  ICD-9-CM: 038.9, 995.92  11/1/2017        Colon cancer metastasized to brain [facility]) ICD-10-CM: C18.9, C79.31  ICD-9-CM: 153.9, 198.3  8/30/2017        Malignant neoplasm of descending colon ([facility] ICD-10-CM: C18.6  ICD-9-CM: 153.2  8/30/2017        Pulmonary metastases ([facility] ICD-10-CM: C78.00  ICD-9-CM: 197.0  8/17/2017        Secondary malignant neoplasm of brain [facility]) ICD-10-CM: C79.31  ICD-9-CM: 198.3  8/17/2017        Mass of colon ICD-10-CM: K63.9  ICD-9-CM: 569.89  8/17/2017        Breast cancer screening ICD-10-CM: Z12.31  ICD-9-CM: V76.10  8/17/2017                PLAN:  Osteomyelitis- continue ancef for 6 weeks per ID.     Metastatic colon cancer with mets to brain-last treatment FOLFOX and bevacizumab 1/24/2018.   8/8 Resume PTA keppra. On decadron 2 mg q 12 hours  8/10 MRI brain: Mixed response to therapy with enlargement of several previously indexed metastases and reduction in size of other  previously seen lesions. There is no evidence of new intracranial metastatic disease. Of note, lesions within the brachium pontis on the left  and within the periaqueductal region have enlarged. Surrounding vasogenic edema has substantially worsened.  The combination of these findings results in partial effacement of the fourth ventricle, worsened in comparison to the prior exam. There is very subtle enlargement of the lateral and third ventricles in comparison to the prior exam. This raises the possibility of the very early manifestations of obstructive hydrocephalus. Ordered CT CAP for restaging. CEA and CA 19.9. Planning for dose reduced FOLFOX without avastin tomorrow  8/11 FOLFOX today. Avastin if available. Increased dex to 4 q 8 hours    Headache  8/8 if continues consider brain scan  8/9 Resolved.      PT/OT consulted     Stephanie Kearney, MAKSIM   [facility]  [address] [address]  Office : [phone]  Fax : [phone]         Attending Addendum:  I personally evaluated the patient with Viola Kearney NRENAE,  and agree with the assessment, findings and plan as documented. Appears stable, heart regular without murmur, lungs clear, abdomen benign. Brain MRI and CT CAP results reviewed. Increased Dex to 4 tid. Consult rad-onc Monday. In interim proceed w systemic chemotherapy (clinically appears non-toxic). Family understands increased risks of systemic chemotherapy given debility and osteomyelitis but would like to proceed given underlying disease progression that needs to be controlled.                Merissa Espinoza MD  [facility]  [address] [id number] [facility]  Office : [phone]  Fax : [phone]

## 2018-08-12 NOTE — PROGRESS NOTES
END OF SHIFT NOTE:    Intake/Output  08/11 1901 - 08/12 0700  In: 511 [I.V.:511]  Out: 200 [Urine:200]   Voiding: YES  Catheter: NO  Drain:   Colostomy 03/13/18 Mid Abdomen (Active)   Drainage Color Brown 8/12/2018  2:15 AM   Site Assessment Clean, dry, intact 8/12/2018  2:15 AM   Treatment Bag change 8/11/2018  8:11 PM               Stool:   Ostomy bag. Emesis:  0 occurrences. VITAL SIGNS  Patient Vitals for the past 12 hrs:   Temp Pulse Resp BP SpO2   08/12/18 0355 97.5 °F (36.4 °C) (!) 58 18 112/74 98 %   08/11/18 2315 97.6 °F (36.4 °C) (!) 58 18 138/62 97 %   08/11/18 1940 98.1 °F (36.7 °C) 64 18 155/64 97 %       Pain Assessment  Pain 1  Pain Scale 1: Numeric (0 - 10) (08/12/18 0559)  Pain Intensity 1: 7 (08/12/18 0559)  Patient Stated Pain Goal: 0 (08/12/18 0559)  Pain Reassessment 1: Patient sleeping (08/12/18 0100)  Pain Onset 1: PTA (08/10/18 0714)  Pain Location 1: Back;Head;Knee (08/11/18 2011)  Pain Orientation 1: Right (08/11/18 1014)  Pain Description 1: Aching (08/11/18 2011)  Pain Intervention(s) 1: Medication (see MAR) (08/12/18 0559)    Ambulating  No    Additional Information: Patient given pain medication 3 times. Rested well. Uneventful night. Shift report given to oncoming nurse at the bedside.     Emmanuel Pickett

## 2018-08-12 NOTE — PROGRESS NOTES
[facility] [employer] [facility]        Inpatient Hematology / Oncology Progress Note      Admission Date: 2018  7:49 PM  Reason for Admission/Hospital Course: osteomyelitis  Osteomyelitis ([facility] [address]      24 Hour Events:  FOLFOX yesterday-tolerated well  Neuro consult for left eye droop  Consult Dr. Shelby Thompsno on Monday      ROS:  Constitutional: negative for fever, chills, +weakness, +malaise,+ fatigue. CV: negative for chest pain, palpitations, edema. Respiratory: negative for dyspnea, cough, wheezing. GI: negative for nausea, abdominal pain, diarrhea. 10 point review of systems is otherwise negative with the exception of the elements mentioned above in the HPI. Allergies   Allergen Reactions    Latex Rash    Adhesive Tape-Silicones Rash     Bad red rash.  Ativan [Lorazepam] Other (comments)     Pt states \"gets hallucinations and confused\"    Bactrim [Sulfamethoprim Ds] Hives     Swelling and itching      Lexapro [Escitalopram Oxalate] Other (comments)    Macrobid [Nitrofurantoin Monohyd/M-Cryst] Other (comments)     Felt  \"out of it\"    Pcn [Penicillins] Hives     Swelling and itching also. Pt has tolerated cephalosporins in the past.    Pyridium [Phenazopyridine] Hives     Also itching and swelling. OBJECTIVE:  Patient Vitals for the past 8 hrs:   BP Temp Pulse Resp SpO2   18 1134 113/65 97.6 °F (36.4 °C) (!) 55 16 99 %   18 0727 113/62 98.2 °F (36.8 °C) (!) 57 18 98 %     Temp (24hrs), Av.8 °F (36.6 °C), Min:97.5 °F (36.4 °C), Max:98.2 °F (36.8 °C)     0701 -  1900  In: 120 [P.O.:120]  Out: 750 [Urine:750]    Physical Exam:  Constitutional: Well developed, well nourished female in no acute distress, lying comfortably in the hospital bed. HEENT: Normocephalic and atraumatic. Oropharynx is clear, mucous membranes are moist. Extraocular muscles are intact. Sclerae anicteric. Skin Warm and dry. No bruising and no rash noted. No erythema. No pallor. Respiratory Lungs are clear to auscultation bilaterally without wheezes, rales or rhonchi, normal air exchange without accessory muscle use. CVS Normal rate, regular rhythm and normal S1 and S2. No murmurs, gallops, or rubs. Abdomen Soft, nontender and nondistended, normoactive bowel sounds. No palpable mass. No hepatosplenomegaly. Neuro Grossly nonfocal with no obvious sensory or motor deficits. MSK Normal range of motion in general. Right knee pain. Psych Appropriate mood and affect. Labs:      Recent Labs      08/12/18   0353  08/11/18   0247  08/10/18   0254   WBC  5.3  5.4  6.3   RBC  2.42*  2.45*  2.56*   HGB  7.8*  8.1*  8.3*   HCT  24.5*  24.5*  25.5*   MCV  101.2*  100.0*  99.6*   MCH  32.2  33.1*  32.4   MCHC  31.8  33.1  32.5   RDW  15.2  15.8  15.9   PLT  247  225  268   GRANS  74  77  75   LYMPH  15  14  15   MONOS  7  7  7   EOS  0*  0*  0*   BASOS  0  0  0   IG  4  2  3   DF  AUTOMATED  AUTOMATED  AUTOMATED   ANEU  3.9  4.2  4.7   ABL  0.8  0.7  1.0   ABM  0.4  0.4  0.4   RICARDO  0.0  0.0  0.0   ABB  0.0  0.0  0.0   AIG  0.2  0.1  0.2        Recent Labs      08/12/18   0353  08/11/18   0247  08/10/18   0254   NA  141  141  140   K  4.0  3.8  3.8   CL  104  102  101   CO2  28  28  28   AGAP  9  11  11   GLU  157*  112*  146*   BUN  10  13  13   CREA  0.59*  0.69  0.35*   GFRAA  >60  >60  >60   GFRNA  >60  >60  >60   CA  8.6  7.9*  8.7   SGOT  32  33  29   AP  154*  154*  155*   TP  6.2*  5.8*  6.0*   ALB  2.1*  2.0*  2.0*   GLOB  4.1*  3.8*  4.0*   AGRAT  0.5*  0.5*  0.5*   MG  1.7*  1.8  1.9         Imaging:      ASSESSMENT:    Problem List  Date Reviewed: 7/7/2018          Codes Class Noted    [facility] ICD-10-CM: M86.9  ICD-9-CM: 730.20  8/6/2018        Non-intractable cyclical vomiting with nausea ICD-10-CM: G43. 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  ICD-9-CM: 619.1  1/29/2018        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 (MSSA) ICD-10-CM: I33.0, B95.61  ICD-9-CM: 421.0, 041.11  59/41/2737        Systolic congestive heart failure (HCC) ICD-10-CM: I50.20  ICD-9-CM: 428.20, 428.0  11/6/2017        Severe [facility] ([facility] ICD-10-CM: A41.9, R65.20  ICD-9-CM: 038.9, 995.92  11/1/2017        Colon cancer metastasized to brain [facility]) ICD-10-CM: C18.9, C79.31  ICD-9-CM: 153.9, 198.3  8/30/2017        Malignant neoplasm of descending colon ([facility] ICD-10-CM: C18.6  ICD-9-CM: 153.2  8/30/2017        Pulmonary metastases ([facility] ICD-10-CM: C78.00  ICD-9-CM: 197.0  8/17/2017        Secondary malignant neoplasm of brain [facility]) ICD-10-CM: C79.31  ICD-9-CM: 198.3  8/17/2017        Mass of colon ICD-10-CM: K63.9  ICD-9-CM: 569.89  8/17/2017        Breast cancer screening ICD-10-CM: Z12.31  ICD-9-CM: V76.10  8/17/2017                PLAN:  Osteomyelitis- continue ancef for 6 weeks per ID.     Metastatic colon cancer with mets to brain-last treatment FOLFOX and bevacizumab 1/24/2018.   8/8 Resume PTA keppra. On decadron 2 mg q 12 hours  8/10 MRI brain: Mixed response to therapy with enlargement of several previously indexed metastases and reduction in size of other  previously seen lesions. There is no evidence of new intracranial metastatic disease. Of note, lesions within the brachium pontis on the left  and within the periaqueductal region have enlarged. Surrounding vasogenic edema has substantially worsened.  The combination of these findings results in partial effacement of the fourth ventricle, worsened in comparison to the prior exam. There is very subtle enlargement of the lateral and third ventricles in comparison to the prior exam. This raises the possibility of the very early manifestations of obstructive hydrocephalus. Ordered CT CAP for restaging. CEA and CA 19.9. Planning for dose reduced FOLFOX without avastin tomorrow  8/11 FOLFOX today. Avastin if available. Increased dex to 4 q 8 hours    Headache  8/8 if continues consider brain scan  8/9 Resolved. Left eye droop  8/12 consult neuro     PT/OT consulted     MAKSIM Farley [facility]  [address] [id number] [facility]  Office : [phone]  Fax : [phone]       Attending Addendum:  I personally evaluated the patient with Paresh Langford N.PSirisha,  and agree with the assessment, findings and plan as documented. Appears stable, heart regular without murmur, lungs clear, abdomen benign. Tolerating chemotherapy. Diplopia w lazy eye reportedly better on higher dose steroids. Rad onc tomorrow.                Catalina Garcia MD  [employer] [facility]  [address] [id number] [facility]  Office : [phone]  Fax : [phone]

## 2018-08-12 NOTE — PROGRESS NOTES
Problem: Falls - Risk of  Goal: *Absence of Falls  Document Anant Fall Risk and appropriate interventions in the flowsheet. Outcome: Progressing Towards Goal  Fall Risk Interventions:  Mobility Interventions: OT consult for ADLs         Medication Interventions: Evaluate medications/consider consulting pharmacy    Elimination Interventions: Call light in reach    History of Falls Interventions: Door open when patient unattended, Room close to nurse's station        Problem: Pressure Injury - Risk of  Goal: *Prevention of pressure injury  Document Dominguez Scale and appropriate interventions in the flowsheet.    Outcome: Progressing Towards Goal  Pressure Injury Interventions:       Moisture Interventions: Absorbent underpads    Activity Interventions: Increase time out of bed    Mobility Interventions: Pressure redistribution bed/mattress (bed type)    Nutrition Interventions: Offer support with meals,snacks and hydration    Friction and Shear Interventions: Apply protective barrier, creams and emollients

## 2018-08-12 NOTE — PROGRESS NOTES
END OF SHIFT NOTE:    Intake/Output      Voiding: YES  Catheter: NO  Drain:   Colostomy 03/13/18 Mid Abdomen (Active)   Drainage Color Brown 8/12/2018  2:15 AM   Site Assessment Clean, dry, intact 8/12/2018  2:15 AM   Treatment Bag change 8/11/2018  8:11 PM               Stool:  0 occurrences. Emesis:  0 occurrences. VITAL SIGNS  Patient Vitals for the past 12 hrs:   Temp Pulse Resp BP SpO2   08/12/18 1511 97.9 °F (36.6 °C) (!) 59 18 118/58 98 %   08/12/18 1134 97.6 °F (36.4 °C) (!) 55 16 113/65 99 %   08/12/18 0727 98.2 °F (36.8 °C) (!) 57 18 113/62 98 %       Pain Assessment  Pain 1  Pain Scale 1: Numeric (0 - 10) (08/12/18 0812)  Pain Intensity 1: 7 (08/12/18 0812)  Patient Stated Pain Goal: 0 (08/12/18 0629)  Pain Reassessment 1: Patient sleeping (08/12/18 0629)  Pain Onset 1: PTA (08/10/18 0714)  Pain Location 1: Back;Knee (08/12/18 5816)  Pain Orientation 1: Lower;Right (08/12/18 2702)  Pain Description 1: Aching (08/12/18 0831)  Pain Intervention(s) 1: Medication (see MAR) (08/12/18 5892)    Ambulating  No    Additional Information:     Shift report given to oncoming nurse at the bedside.     Colten Torrez RN

## 2018-08-12 NOTE — PROGRESS NOTES
Patient's pain not controled with the norco and dilaudid so called oncology and got her dilaudid changed to 1mg q4 hours as needed. Will continue to monitor.

## 2018-08-13 NOTE — CONSULTS
Consult    Patient: Karissa Espinoza MRN: [id number]     YOB: 1960  Age: 62 y.o. Sex: female      Subjective:      Karissa Espinoza is a 62 y.o. female who is being seen for ocular misalignment. 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 Since last MRI, the patient has enlargement in some of her previous lesions secondary to edema. I personally reviewed the patient's brain MRI and agree with radiology. Please see report below. The patient states that she has had double vision over the last few days. Onset of symptoms was subacute and have been worsening. Otherwise, the patient reports some lack of sensation in all 4 extremities, worse distally. This has been worsening since the patient has started chemotherapy.     Past Medical History:   Diagnosis Date    Arthritis     RA    Colon cancer ([address]     Metastatic to Lungs and Brain- chemo and radiation    Gastrointestinal disorder     Reflux    GERD (gastroesophageal reflux disease)     controlled  with med    Non-intractable cyclical vomiting with nausea 4/30/2018    Other ill-defined conditions(799.89)     Herniated disk  lumbar and cervical    Staph aureus infection 12/29/2017    site: port- was adm to [facility] Sept 2017 for sepsis-port was dc'd Oct 2017     Past Surgical History:   Procedure Laterality Date    HX ADENOIDECTOMY      HX GYN      Hysterectomy-partial    HX KNEE ARTHROSCOPY Left 2014    HX LAP CHOLECYSTECTOMY      HX TONSILLECTOMY      HX VASCULAR ACCESS      port inserted Sept 2017- out 90/3/99 ([facility])/then had PICC in to give IV antibiotics then dc'd 12/15/17    LYN BIOPSY BREAST STEREOTACTIC Left 10-8-2014      Family History   Problem Relation Age of Onset    Stroke Mother     Hypertension Mother     Diabetes Mother     Heart Disease Father     Hypertension Father     Cancer Brother     Breast Cancer Neg Hx      Social History   Substance Use Topics    Smoking status: Current Some Day Smoker     Packs/day: 0.25     Years: 10.00     Last attempt to quit: 8/13/2017    Smokeless tobacco: Never Used    Alcohol use No      Current Facility-Administered Medications   Medication Dose Route Frequency Provider Last Rate Last Dose    dexamethasone (DECADRON) tablet 4 mg  4 mg Oral Q8H Melissa Funk NP   4 mg at 08/13/18 0522    fluorouracil (ADRUCIL) 1,536 mg in 0.9% sodium chloride 250 mL chemo infusion  960 mg/m2 IntraVENous Q24H Clara Lyn MD   1,536 mg at 08/12/18 1900    HYDROmorphone (PF) (DILAUDID) injection 1 mg  1 mg IntraVENous Q4H PRN Dereje Rincon NP   1 mg at 08/13/18 1027    levETIRAcetam (KEPPRA) tablet 1,000 mg  1,000 mg Oral BID Dereje Rincon NP   1,000 mg at 08/13/18 0814    enoxaparin (LOVENOX) injection 40 mg  40 mg SubCUTAneous Q24H Jesusita Brown.  Alen Breen MD   40 mg at 08/13/18 0814    ceFAZolin (ANCEF) 2 g/20 mL in sterile water IV syringe  2 g IntraVENous [facility], NP   2 g at 08/13/18 1027    phenol throat spray (CHLORASEPTIC) 1 Spray  1 Spray Oral PRN Clara Lyn MD        acetaminophen (TYLENOL) tablet 500 mg  500 mg Oral Q8H PRN Clara Lyn MD   500 mg at 08/07/18 1126    sodium chloride (NS) flush 10 mL  10 mL Bridgett Gupta MD   10 mL at 08/13/18 0525    heparin (porcine) pf 300 Units  300 Units InterCATHeter Q8H Luke Vyas MD   300 Units at 08/13/18 0522    sodium chloride (NS) flush 10 mL  10 mL InterCATHeter PRN Luke Vyas MD   10 mL at 08/10/18 0253    heparin (porcine) pf 300 Units  300 Units InterCATHeter PRN Luke Vyas MD   300 Units at 08/09/18 1928    albuterol (PROVENTIL VENTOLIN) nebulizer solution 2.5 mg  2.5 mg Nebulization Q6H PRN Keya Fatima MD        ALPRAZolam Carmen Jessica) tablet 1 mg  1 mg Oral BID PRN Keya Fatima MD   1 mg at 08/13/18 1051    pantoprazole (PROTONIX) tablet 40 mg  40 mg Oral ACB Keya Fatima MD   40 mg at 08/13/18 0723    FLUoxetine (PROzac) capsule 10 mg  10 mg Oral DAILY Aiden Kessler MD   Stopped at 08/10/18 0900    lidocaine-prilocaine (EMLA) 2.5-2.5 % cream   Topical PRN Aiden Kessler MD        ondansetron [facility] ODT) tablet 8 mg  8 mg Oral Q8H PRN Aiden Kessler MD   8 mg at 08/12/18 0810    promethazine (PHENERGAN) tablet 25 mg  25 mg Oral Q6H PRN Aiden Kessler MD        HYDROcodone-acetaminophen [facility])  mg tablet 1 Tab  1 Tab Oral Q4H PRN Aiden Kessler MD   1 Tab at 08/13/18 5965        Allergies   Allergen Reactions    Latex Rash    Adhesive Tape-Silicones Rash     Bad red rash.  Ativan [Lorazepam] Other (comments)     Pt states \"gets hallucinations and confused\"    Bactrim [Sulfamethoprim Ds] Hives     Swelling and itching      Lexapro [Escitalopram Oxalate] Other (comments)    Macrobid [Nitrofurantoin Monohyd/M-Cryst] Other (comments)     Felt  \"out of it\"    Pcn [Penicillins] Hives     Swelling and itching also. Pt has tolerated cephalosporins in the past.    Pyridium [Phenazopyridine] Hives     Also itching and swelling. Review of Systems:  CONSTITUTIONAL: No weight loss, fever, chills, weakness or fatigue. HEENT: Eyes: No visual loss, positive blurred vision, positive double vision. Ears, Nose, Throat: No hearing loss, sneezing, congestion, runny nose or sore throat. SKIN: No rash or itching. CARDIOVASCULAR: No chest pain, chest pressure or chest discomfort. No palpitations or edema. RESPIRATORY: No shortness of breath, cough or sputum. GASTROINTESTINAL: No anorexia, nausea, vomiting or diarrhea. No abdominal pain or blood. GENITOURINARY: no burning with urination. NEUROLOGICAL: No headache, dizziness, syncope, paralysis, positive ataxia, positive numbness or tingling in the extremities. No change in bowel or bladder control. MUSCULOSKELETAL: No muscle, back pain, joint pain or stiffness. HEMATOLOGIC: No anemia, bleeding or bruising. LYMPHATICS: No enlarged nodes.  No history of splenectomy. PSYCHIATRIC: denies current depression or anxiety. ENDOCRINOLOGIC: No reports of sweating, cold or heat intolerance. No polyuria or polydipsia. ALLERGIES: No history of asthma, hives, eczema or rhinitis. Objective:     Vitals:    08/13/18 0358 08/13/18 0359 08/13/18 0745 08/13/18 1207   BP: 138/77  153/88 119/63   Pulse: (!) 56  (!) 56 62   Resp: 18 18 18   Temp: 98.6 °F (37 °C)  98.1 °F (36.7 °C) 97.6 °F (36.4 °C)   SpO2: 96%  100% 99%   Weight:  132 lb 11.2 oz (60.2 kg)          Physical Exam:  General - Well developed, well nourished, in no apparent distress. Pleasant and conversent. HEENT - Normocephalic, atraumatic. Conjunctiva, tympanic membranes, and oropharynx are clear. Neck - Supple without masses, no bruits   Cardiovascular - Regular rate and rhythm. Normal S1, S2 without murmurs, rubs, or gallops. Lungs - Clear to auscultation. Abdomen - Soft, nontender with normal bowel sounds. Extremities - Peripheral pulses intact. No edema and no rashes. Neurological examination - Comprehension, attention , memory and reasoning are intact. Language and speech are normal. On cranial nerve examination pupils are equal round and reactive to light. Fundoscopic examination is normal. Visual acuity is adequate. Visual fields are full to finger confrontation. Extraocular motility is abnormal.  She has restricted medial gaze of the right eye with nystagmus of the left thigh, when trying to look to the left. She has mild bilateral ptosis. She has mild deficit in lateral abduction of the right eye. . Face is symmetric and sensation is intact to light touch. Hearing is intact to finger rustle bilaterally. Motor examination - There is normal muscle tone and bulk. Power is full throughout. Muscle stretch reflexes are 1+ throughout. . Sensation is decreased to light touch, pinprick, vibration and proprioception in all extremities, most noticeable distally.  Cerebellar examination shows appendicular ataxia with finger to nose testing and heel to shin testing on the left. . Gait and stance are normal.          CT Results (most recent):     Results from Hospital Encounter encounter on 08/06/18   CT CHEST ABD PELV W CONT   Narrative CT CHEST, ABDOMEN, PELVIS WITH CONTRAST:      CLINICAL HISTORY:   Restaging colon cancer with brain metastases. TECHNIQUE:  Oral and nonionic intravenous contrast was administered, and the  torso was scanned with spiral technique. Radiation dose reduction was achieved  using one or all of the following techniques: automated exposure control,  weight-based dosing, iterative reconstruction. COMPARISON:  April 26, 2018. CT CHEST: There has been interval increase in number and size of pulmonary  parenchymal metastases and of mediastinal and hilar lymphadenopathy, including a  new 3.1 cm lateral aortic adelina metastasis. The thyroid appears normal as  imaged. CT ABDOMEN:  There are now innumerable hepatic metastases, increased from  approximately 3 on the prior study. The spleen, pancreas, and kidneys appear  unremarkable. Bilobed left adrenal metastasis now has aggregate diameter of  approximately 6.0 cm, and right adrenal metastasis is slightly larger as well. No pathologically enlarged lymph nodes are evident. CT PELVIS: The appendix appears normal.  Metastasis contiguous with the vaginal  cuff measures approximate 4.4 x 3.3 cm today compared with 2.7 x 2.1 cm in  April. Partial left colectomy is again noted with unremarkable appearance of  the colostomy. Bone windows demonstrate no aggressive process, accounting for  degenerative changes. Impression IMPRESSION: Marked interval progression of soft tissue metastatic disease in the  torso, as detailed above.         Results for orders placed or performed during the hospital encounter of 11/01/17   EKG, 12 LEAD, INITIAL   Result Value Ref Range    Ventricular Rate 117 BPM    Atrial Rate 117 BPM P-R Interval 118 ms    QRS Duration 72 ms    Q-T Interval 288 ms    QTC Calculation (Bezet) 401 ms    Calculated P Axis 47 degrees    Calculated R Axis 58 degrees    Calculated T Axis 41 degrees    Diagnosis       Sinus tachycardia  Low voltage QRS  Borderline ECG  When compared with ECG of 15-AUG-2017 16:53,  No significant change was found  Confirmed by Robyn Yusuf MD (), [place] [facility] ([id number]) on 11/2/2017 9:50:53 AM          MRI Results (most recent): Personally Reviewed    Results from [place] encounter on 08/06/18   MRI BRAIN W WO CONT   Narrative EXAMINATION: BRAIN MRI 8/10/2018 10:43 AM    ACCESSION NUMBER: [id number]    INDICATION: assess CNS metastatic disease    Additional clinical history from the electronic medical record: CyberKnife  therapy 9/12/2017 2 bilateral parietal lobe lesions, CyberKnife therapy to left  cerebellar hemisphere lesion 1/18/2018    COMPARISON: Brain MRI 7/10/2018    TECHNIQUE: Multiplanar multisequence MRI of the brain without and with  intravenous administration of 12 cc Dotarem contrast agent. FINDINGS:    Previously index metastatic lesions are as follows:  -Enlarging 2.0 cm periaqueductal metastasis, previously 1.7 cm (axial  postcontrast image 131). -Minimally enlarged 1.9 cm left brachium pontis lesion, previously 1.8 cm (axial  postcontrast image 114). -1.0 cm left frontal lobe metastasis, previously 0.6 cm (axial postcontrast  image 246). -0.4 cm right precentral gyrus lesion, previously 0.7 cm (axial postcontrast  image 269). -Stable 2.1 x 2.0 cm left postcentral gyrus lesion (axial postcontrast image  247). -0.7 cm left frontal lobe lesion, previously 0.4 cm (axial postcontrast image  278). -Stable 0.6 cm anterior left occipital lobe lesion (axial postcontrast image  140). -0.4 cm more posterior left occipital lobe lesion, previously 0.6 cm (axial  postcontrast image 151).   -0.4 cm anterior right temporal lobe lesion, previously 0.5 cm (axial  postcontrast image 129). -0.5 cm right cerebellar hemisphere lesion, not significantly changed (axial  postcontrast image 75). -Stable 0.7 cm anterior right parietal lesion (axial postcontrast image 261). There is no evidence of new enhancing intracranial metastases. Vasogenic edema throughout the midbrain and sean has worsened in comparison to  the prior exam, along with worsening vasogenic edema involving the middle  cerebellar peduncles and superior aspect of the left cerebellar hemisphere. This  corresponds to the enlargement of the lesions within the left brachium pontis  and periaqueductal region. This results in worsening mass effect upon the fourth ventricle. There is subtly increased caliber of the third ventricle and lateral ventricles  in comparison to the previous exam.    Vasogenic edema surrounding the anterior left frontal lobe lesion has worsened. Multifocal supratentorial vasogenic edema is otherwise unchanged. The expected large vascular flow voids are preserved. Diffusion imaging shows no evidence of acute infarction. Impression There are no suspicious osseous lesions. IMPRESSION:    Mixed response to therapy with enlargement of several previously indexed  metastases and reduction in size of other previously seen lesions. There is no evidence of new intracranial metastatic disease. Of note, lesions within the brachium pontis on the left and within the  periaqueductal region have enlarged. Surrounding vasogenic edema has  substantially worsened. The combination of these findings results in partial  effacement of the fourth ventricle, worsened in comparison to the prior exam.  There is very subtle enlargement of the lateral and third ventricles in  comparison to the prior exam. This raises the possibility of the very early  manifestations of obstructive hydrocephalus.      DC4  The significant findings in this report have been referred to the Imaging  Navigator in order to communicate to the referring provider or his/her designee  as outlined in Section II.C.2.a.ii-iii of the ACR Practice Guideline for  Communication of Diagnostic Imaging Findings. VOICE DICTATED BY: Dr. Tiffany El: This is a 71-year-old woman who unfortunately has metastatic colon cancer with metastasis to the brain. Neurology has been consulted because of ocular abnormalities. 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 These abnormalities are from her brainstem metastasis. Plan:     Dexamethasone 10 mg IV, now, if not already given. Maintenance of 4 mg every 6 hours. I will let the primary team place this order as I am unsure if the patient already received a \"loading dose\". Occasionally, higher doses of dexamethasone can be used including 10 mg every 6 hours. Considering where the patient's edema is I will have a very low threshold to go to higher doses. Radiation oncology has been consulted. Given enlargement of the lesions in the brainstem, it is very important to have them involved. The patient is at high risk of developing hydrocephalus given the location of her mass and her associated edema. She should be watched very closely. Consider neurosurgical consultation, especially if the patient worsens. Localization-related epilepsy: Continue antiepileptic drugs as you are doing. Signed By: Francisca Morris DO     August 13, 2018      I spent 75 minutes involved in this patient's care, over half that time was discussing the patient's neurological condition face-to-face with the patient.

## 2018-08-13 NOTE — PROGRESS NOTES
Problem: Self Care Deficits Care Plan (Adult)  Goal: *Acute Goals and Plan of Care (Insert Text)  1. Patient will complete grooming with setup. 2. Patient will complete toileting with minimal assistance. 3. Patient will tolerate 20 minutes of OT treatment with as needed rest breaks to increase activity tolerance for ADLs. 4. Patient will complete functional transfers with supervision and adaptive equipment as needed. 5. Pt will demonstrate independence with HEP to promote > BUE strength and overall endurance for ADLs and mobility with ADLs    Timeframe: 7 visits       OCCUPATIONAL THERAPY: Daily Note, Treatment Day: 2nd and PM 8/13/2018  INPATIENT: Hospital Day: 8  Payor: José Manuel Parsons / Plan: [employer] / Product Type: PPO /      NAME/AGE/GENDER: Cecy Browne is a 62 y.o. female   PRIMARY DIAGNOSIS:  osteomyelitis  Osteomyelitis ([facility] [address] <principal problem not specified> <principal problem not specified>        ICD-10: Treatment Diagnosis:    · Pain in Right Knee (M25.561)  · Stiffness of Right Knee, Not elsewhere classified (M25.661)  · Repeated Falls (R29.6)   Precautions/Allergies:    WBAT RLE   Latex; Adhesive tape-silicones; Ativan [lorazepam]; Bactrim [sulfamethoprim ds]; Lexapro [escitalopram oxalate]; Macrobid [nitrofurantoin monohyd/m-cryst]; Pcn [penicillins]; and Pyridium [phenazopyridine]      ASSESSMENT:     Ms. Ramakrishna Metzger is a 62year old female admitted from [facility] where she underwent debridement of femur/ knee on 7/31 by Dr. Azael Cabrales for osteomyelitis and septic arthritis. Patient has history of metastatic cancer to brain and lung. Per Dr. Dorothy Gonzalez office, patient is WBAT In RLE. Patient lives with spouse who helps her with all ADLs at home. She is very sedentary and is only able to walk short distances. 8/13/2018 pt found supine in bed upon arrival, asleep, but agreeable to OT tx session with support from spouse.  She declined opportunity for mobility but agrees to exercises in supine. Pt participated in the exercises shown below aimed at improving strength and activity tolerance for ADLs. No goals met. Will continue with POC. This section established at most recent assessment   PROBLEM LIST (Impairments causing functional limitations):  1. Decreased Strength  2. Decreased ADL/Functional Activities  3. Decreased Transfer Abilities  4. Decreased Ambulation Ability/Technique  5. Decreased Balance  6. Increased Pain  7. Decreased Flexibility/Joint Mobility  8. Edema/Girth   INTERVENTIONS PLANNED: (Benefits and precautions of occupational therapy have been discussed with the patient.)  1. Activities of daily living training  2. Adaptive equipment training  3. Group therapy  4. Therapeutic activity  5. Therapeutic exercise     TREATMENT PLAN: Frequency/Duration: Follow patient 3x/ week to address above goals. Rehabilitation Potential For Stated Goals: Good     RECOMMENDED REHABILITATION/EQUIPMENT: (at time of discharge pending progress): Due to the probability of continued deficits (see above) this patient will likely need continued skilled occupational therapy after discharge. Equipment:    None at this time              OCCUPATIONAL PROFILE AND HISTORY:   History of Present Injury/Illness (Reason for Referral):  See H&P   Past Medical History/Comorbidities:   Ms. Levi Figueroa  has a past medical history of Arthritis; Colon cancer ([facility] [address] Gastrointestinal disorder; GERD (gastroesophageal reflux disease); Non-intractable cyclical vomiting with nausea (4/30/2018); Other ill-defined conditions(799.89); and Staph aureus infection (12/29/2017). She also has no past medical history of Adverse effect of anesthesia; Difficult intubation; Malignant hyperthermia due to anesthesia; or Pseudocholinesterase deficiency.   Ms. Levi Figueroa  has a past surgical history that includes hx lap cholecystectomy; hx gyn; hx tonsillectomy; hx adenoidectomy; reggie biopsy breast stereotactic (Left, 10-8-2014); hx knee arthroscopy (Left, 2014); and hx vascular access. Social History/Living Environment:   Home Environment: Private residence  # Steps to Enter: 2  Rails to Enter: Yes  Hand Rails : Right  Wheelchair Ramp: No  One/Two Story Residence: One story  Living Alone: No  Support Systems: Spouse/Significant Other/Partner  Patient Expects to be Discharged to[de-identified] Private residence  Current DME Used/Available at Home: Walker, rollator, Wheelchair  Prior Level of Function/Work/Activity:  Lives with spouse. He helps with bathing, dressing, and toileting. Can feed herself. Sedentary lifestyle. Was having [facility] PT to work on walking with walker. Otherwise uses the wheelchair. Number of Personal Factors/Comorbidities that affect the Plan of Care: Expanded review of therapy/medical records (1-2):  MODERATE COMPLEXITY   ASSESSMENT OF OCCUPATIONAL PERFORMANCE[de-identified]   Activities of Daily Living:   Basic ADLs (From Assessment) Complex ADLs (From Assessment)   Feeding: Setup  Oral Facial Hygiene/Grooming: Setup  Bathing: Maximum assistance  Upper Body Dressing: Minimum assistance  Lower Body Dressing: Maximum assistance  Toileting: Maximum assistance     Grooming/Bathing/Dressing Activities of Daily Living     Cognitive Retraining  Safety/Judgement: Fall prevention                               Most Recent Physical Functioning:   Gross Assessment:                  Posture:     Balance:    Bed Mobility:     Wheelchair Mobility:     Transfers:               Patient Vitals for the past 6 hrs:   BP BP Patient Position SpO2 Pulse   08/13/18 1207 119/63 At rest 99 % 62       Mental Status  Neurologic State: Alert  Orientation Level: Oriented to person, Oriented to place, Oriented to situation  Cognition: Follows commands, Decreased attention/concentration  Perception: Appears intact  Perseveration: No perseveration noted  Safety/Judgement: Fall prevention                          Physical Skills Involved:  1.  Range of Motion  2. Balance  3. Strength  4. Activity Tolerance  5. Pain (acute) Cognitive Skills Affected (resulting in the inability to perform in a timely and safe manner):  1. None Psychosocial Skills Affected:  1. Habits/Routines   Number of elements that affect the Plan of Care: 5+:  HIGH COMPLEXITY   CLINICAL DECISION MAKIN[address] [place] [address] AM-PAC 6 Clicks   Daily Activity Inpatient Short Form  How much help from another person does the patient currently need. .. Total A Lot A Little None   1. Putting on and taking off regular lower body clothing? [] 1   [x] 2   [] 3   [] 4   2. Bathing (including washing, rinsing, drying)? [] 1   [x] 2   [] 3   [] 4   3. Toileting, which includes using toilet, bedpan or urinal?   [] 1   [x] 2   [] 3   [] 4   4. Putting on and taking off regular upper body clothing? [] 1   [] 2   [x] 3   [] 4   5. Taking care of personal grooming such as brushing teeth? [] 1   [] 2   [x] 3   [] 4   6. Eating meals? [] 1   [] 2   [x] 3   [] 4   © , Trustees of [address] [place] [address], under license to [employer]. All rights reserved      Score:  Initial: 15 Most Recent: X (Date: -- )    Interpretation of Tool:  Represents activities that are increasingly more difficult (i.e. Bed mobility, Transfers, Gait). Score 24 23 22-20 19-15 14-10 9-7 6     Modifier CH CI CJ CK CL CM CN      ? Self Care:     - CURRENT STATUS: CK - 40%-59% impaired, limited or restricted    - GOAL STATUS: CJ - 20%-39% impaired, limited or restricted    - D/C STATUS:  ---------------To be determined---------------  Payor: BLUE CROSS / Plan: [place] [address] / Product Type: PPO /      Medical Necessity:     · Patient demonstrates good rehab potential due to higher previous functional level. Reason for Services/Other Comments:  · Patient continues to require present interventions due to patient's inability to care for self at baseline level of function.    Use of outcome tool(s) and clinical judgement create a POC that gives a: MODERATE COMPLEXITY         TREATMENT:   (In addition to Assessment/Re-Assessment sessions the following treatments were rendered)     Pre-treatment Symptoms/Complaints:    Pain: Initial:   Pain Intensity 1: 0  Post Session:  same     Therapeutic Exercise: (  15 minutes):  Exercises per grid below to improve strength and activity tolerance. Required minimal visual cues to promote proper body alignment. Progressed resistance and range as indicated. Date:  8/13/18 Date:   Date:     Activity/Exercise Parameters yellow theraband Parameters Parameters   Horizontal abduction 20 reps     Bicep curls  10 reps B     Tricep extension 10 reps B                                   Braces/Orthotics/Lines/Etc:   · IV  Treatment/Session Assessment:    · Response to Treatment:  Tolerated well   · Interdisciplinary Collaboration:   o Occupational Therapist  o Registered Nurse  · After treatment position/precautions:   o Supine in bed  o Bed/Chair-wheels locked  o Bed in low position  o Call light within reach  o RN notified  o Nurse at bedside   · Compliance with Program/Exercises: Will assess as treatment progresses. · Recommendations/Intent for next treatment session: \"Next visit will focus on advancements to more challenging activities and reduction in assistance provided\".   Total Treatment Duration:  OT Patient Time In/Time Out  Time In: 1340  Time Out: 101 [employer]

## 2018-08-13 NOTE — PROGRESS NOTES
Infectious Disease Progress Note    Today's Date: 2018   Admit Date: 2018    Impression:   · MSSA R femur/knee OM and MSSA/staph lugdunensis (O-suscept) bacteremia s/p debridement (), PORT removal (). TTE NG. Repeat BCs NG as of   · Hx of MSSA AVIE, source PORT infection (2017) s/p treatment. · Metastatic adenocarcinoma  (brain, lung, L adrenal)-no chemotherapy  · Bactrim and PCN allergies     Plan:   ·  Continue Ancef 2g IV q 8h. Duration 6 weeks, EOT 18  · she is receiving FOLFOX/Avastin. Discharge is anticipated at the end of the week  ·   ID appt on  140PM with Dr Benjamin Hollis. Anti-infectives:   · Ancef (-  -Ancef 6g IV q 24h (stopped  due to nausea), Daptomycin (-12/15/17)    Subjective:   She has mild headache. Her right knee is much improved in regard to pain and swelling. She is participating with PT. FOLFOX/Avastin was restarted yesterday. Allergies   Allergen Reactions    Latex Rash    Adhesive Tape-Silicones Rash     Bad red rash.  Ativan [Lorazepam] Other (comments)     Pt states \"gets hallucinations and confused\"    Bactrim [Sulfamethoprim Ds] Hives     Swelling and itching      Lexapro [Escitalopram Oxalate] Other (comments)    Macrobid [Nitrofurantoin Monohyd/M-Cryst] Other (comments)     Felt  \"out of it\"    Pcn [Penicillins] Hives     Swelling and itching also. Pt has tolerated cephalosporins in the past.    Pyridium [Phenazopyridine] Hives     Also itching and swelling. Review of Systems:  A comprehensive review of systems was negative except for that written in the History of Present Illness.     Objective:     Visit Vitals    /63 (BP 1 Location: Left arm, BP Patient Position: At rest)    Pulse 62    Temp 97.6 °F (36.4 °C)    Resp 18    Wt 60.2 kg (132 lb 11.2 oz)    SpO2 99%    BMI 23.51 kg/m2     Temp (24hrs), Av.9 °F (36.6 °C), Min:97.5 °F (36.4 °C), Max:98.6 °F (37 °C)       Lines: PICC          Physical Exam:    General:  Alert, cooperative, appears chronically ill   Eyes:  Sclera anicteric. Pupils equally round and reactive to light. Mouth/Throat: Mucous membranes normal, oral pharynx clear. Charles facies    Neck: Supple   Lungs:   Clear to auscultation bilaterally, good effort   CV:  Regular rate and rhythm,no murmur, click, rub or gallop   Abdomen:   Soft, non-tender. bowel sounds normal. non-distended, colostomy    Extremities: No cyanosis or edema   Skin: No rash, L chest PORT site healing. Skin bruising with tears.     Lymph nodes: Cervical and supraclavicular normal   Musculoskeletal: R knee is moderately swollen but is not warm; no erythroderma, 80% rom, and no tenderness   Lines/Devices:  Intact, no erythema, drainage or tenderness   Psych: Alert and oriented, normal mood affect given the setting       Data Review:     CBC:  Recent Labs      08/13/18 0232 08/12/18   0353  08/11/18   0247   WBC  6.3  5.3  5.4   GRANS  83*  74  77   MONOS  4  7  7   EOS  0*  0*  0*   ANEU  5.2  3.9  4.2   ABL  0.7  0.8  0.7   HGB  8.2*  7.8*  8.1*   HCT  25.9*  24.5*  24.5*   PLT  273  247  225       BMP:  Recent Labs      08/13/18 0232 08/12/18   0353  08/11/18   0247   CREA  0.36*  0.59*  0.69   BUN  14  10  13   NA  141  141  141   K  4.1  4.0  3.8   CL  102  104  102   CO2  28  28  28   AGAP  11  9  11   GLU  141*  157*  112*       LFTS:  Recent Labs      08/13/18 0232  08/12/18   0353  08/11/18   0247   TBILI  0.4  0.2  0.2   ALT  27  16  17   SGOT  51*  32  33   AP  154*  154*  154*   TP  5.9*  6.2*  5.8*   ALB  2.1*  2.1*  2.0*       Microbiology:     All Micro Results     Procedure Component Value Units Date/Time    CULTURE, BLOOD [[id number]] Collected:  08/06/18 2138    Order Status:  Completed Specimen:  Blood from Blood Updated:  08/11/18 0737     Special Requests: --        RIGHT  HAND       Culture result: NO GROWTH 5 DAYS       CULTURE, BLOOD [[id number]]     Order Status:  Canceled Specimen:  Blood from Blood     CULTURE, BLOOD [[id number]]     Order Status:  Canceled Specimen:  Blood from Blood           Imaging:   CT chest/abd/pelvis (8/10/18)  IMPRESSION: Marked interval progression of soft tissue metastatic disease in the torso, as detailed above. MRI brain (8/10/18)  IMPRESSION  There are no suspicious osseous lesions. IMPRESSION:  Mixed response to therapy with enlargement of several previously indexed metastases and reduction in size of other previously seen lesions. There is no evidence of new intracranial metastatic disease. Of note, lesions within the brachium pontis on the left and within the periaqueductal region have enlarged. Surrounding vasogenic edema has substantially worsened. The combination of these findings results in partial effacement of the fourth ventricle, worsened in comparison to the prior exam. There is very subtle enlargement of the lateral and third ventricles in comparison to the prior exam. This raises the possibility of the very early manifestations of obstructive hydrocephalus.      Signed By: Mary Jane Dennis MD     August 13, 2018

## 2018-08-13 NOTE — PROGRESS NOTES
PT note:    Checked on pt this AM.  She was asleep with covers over her head and did not move when knocked on door. Will check back on pt later today as schedule allows.     Romi Maya, PTA

## 2018-08-13 NOTE — PROGRESS NOTES
END OF SHIFT NOTE:    Intake/Output  08/13 0701 - 08/13 1900  In: 091 [P.O.:476]  Out: 800 [Urine:800]   Voiding: YES  Catheter: NO  Drain:   Colostomy 03/13/18 Mid Abdomen (Active)   Drainage Color Senia Ng 8/13/2018  7:30 AM   Site Assessment Clean, dry, intact 8/13/2018  7:30 AM   Treatment Other (Comment) 8/13/2018  7:30 AM               Stool:  0 occurrences. Emesis:  0 occurrences. VITAL SIGNS  Patient Vitals for the past 12 hrs:   Temp Pulse Resp BP SpO2   08/13/18 1533 97.6 °F (36.4 °C) 66 18 112/64 97 %   08/13/18 1207 97.6 °F (36.4 °C) 62 18 119/63 99 %   08/13/18 0745 98.1 °F (36.7 °C) (!) 56 18 153/88 100 %       Pain Assessment  Pain 1  Pain Scale 1: Numeric (0 - 10) (08/13/18 1801)  Pain Intensity 1: 7 (08/13/18 1801)  Patient Stated Pain Goal: 0 (08/12/18 0629)  Pain Reassessment 1: Yes (08/13/18 1801)  Pain Onset 1: pta (08/13/18 1702)  Pain Location 1: Knee (08/13/18 1702)  Pain Orientation 1: Right (08/13/18 1702)  Pain Description 1: Aching (08/13/18 1702)  Pain Intervention(s) 1: Medication (see MAR) (08/13/18 1801)    Ambulating  No    Additional Information:     Shift report given to oncoming nurse at the bedside.     Magalie Barnard RN

## 2018-08-13 NOTE — PROGRESS NOTES
Per Damien Babin NP / patient will still need rehab at discharge and will probably be ready for discharge at the end of the week. Per Valentin rutledgeison) / CM will update her tomorrow or Wednesday to confirm possible discharge date so she can start another auth. Per Clarita Boogie, if it looks like patient will not discharge until weekend or Monday, they will not start another auth until Thursday.

## 2018-08-13 NOTE — PROGRESS NOTES
[facility]        Inpatient Hematology / Oncology Progress Note      Admission Date: 2018  7:49 PM  Reason for Admission/Hospital Course: osteomyelitis  Osteomyelitis ([facility] [address]      24 Hour Events:  FOLFOX   Neuro consult for left eye droop pending  Appears to be slightly improved  Consult Dr. Abbie Dietz on Monday pending      ROS:  Constitutional: negative for fever, chills, +weakness, +malaise,+ fatigue. CV: negative for chest pain, palpitations, edema. Respiratory: negative for dyspnea, cough, wheezing. GI: negative for nausea, abdominal pain, diarrhea. 10 point review of systems is otherwise negative with the exception of the elements mentioned above in the HPI. Allergies   Allergen Reactions    Latex Rash    Adhesive Tape-Silicones Rash     Bad red rash.  Ativan [Lorazepam] Other (comments)     Pt states \"gets hallucinations and confused\"    Bactrim [Sulfamethoprim Ds] Hives     Swelling and itching      Lexapro [Escitalopram Oxalate] Other (comments)    Macrobid [Nitrofurantoin Monohyd/M-Cryst] Other (comments)     Felt  \"out of it\"    Pcn [Penicillins] Hives     Swelling and itching also. Pt has tolerated cephalosporins in the past.    Pyridium [Phenazopyridine] Hives     Also itching and swelling. OBJECTIVE:  Patient Vitals for the past 8 hrs:   BP Temp Pulse Resp SpO2 Weight   18 0745 153/88 98.1 °F (36.7 °C) (!) 56 18 100 % -   18 0359 - - - - - 132 lb 11.2 oz (60.2 kg)   18 0358 138/77 98.6 °F (37 °C) (!) 56 18 96 % -     Temp (24hrs), Av.9 °F (36.6 °C), Min:97.5 °F (36.4 °C), Max:98.6 °F (37 °C)         Physical Exam:  Constitutional: Well developed, well nourished female in no acute distress, lying comfortably in the hospital bed. HEENT: Normocephalic and atraumatic. Oropharynx is clear, mucous membranes are moist. Extraocular muscles are intact. Sclerae anicteric. Skin Warm and dry. No bruising and no rash noted.   No erythema. No pallor. Respiratory Lungs are clear to auscultation bilaterally without wheezes, rales or rhonchi, normal air exchange without accessory muscle use. CVS Normal rate, regular rhythm and normal S1 and S2. No murmurs, gallops, or rubs. Abdomen Soft, nontender and nondistended, normoactive bowel sounds. No palpable mass. No hepatosplenomegaly. Neuro Grossly nonfocal with no obvious sensory or motor deficits. MSK Normal range of motion in general. Right knee pain. Psych Appropriate mood and affect. Labs:      Recent Labs      08/13/18 0232  08/12/18   0353  08/11/18   0247   WBC  6.3  5.3  5.4   RBC  2.55*  2.42*  2.45*   HGB  8.2*  7.8*  8.1*   HCT  25.9*  24.5*  24.5*   MCV  101.6*  101.2*  100.0*   MCH  32.2  32.2  33.1*   MCHC  31.7  31.8  33.1   RDW  15.6  15.2  15.8   PLT  273  247  225   GRANS  83*  74  77   LYMPH  11*  15  14   MONOS  4  7  7   EOS  0*  0*  0*   BASOS  0  0  0   IG  2  4  2   DF  AUTOMATED  AUTOMATED  AUTOMATED   ANEU  5.2  3.9  4.2   ABL  0.7  0.8  0.7   ABM  0.3  0.4  0.4   RICARDO  0.0  0.0  0.0   ABB  0.0  0.0  0.0   AIG  0.1  0.2  0.1        Recent Labs      08/13/18 0232  08/12/18   0353  08/11/18   0247   NA  141  141  141   K  4.1  4.0  3.8   CL  102  104  102   CO2  28  28  28   AGAP  11  9  11   GLU  141*  157*  112*   BUN  14  10  13   CREA  0.36*  0.59*  0.69   GFRAA  >60  >60  >60   GFRNA  >60  >60  >60   CA  8.4  8.6  7.9*   SGOT  51*  32  33   AP  154*  154*  154*   TP  5.9*  6.2*  5.8*   ALB  2.1*  2.1*  2.0*   GLOB  3.8*  4.1*  3.8*   AGRAT  0.6*  0.5*  0.5*   MG  1.8  1.7*  1.8         Imaging:      ASSESSMENT:    Problem List  Date Reviewed: 7/7/2018          Codes Class Noted    [facility] ICD-10-CM: M86.9  ICD-9-CM: 730.20  8/6/2018        Non-intractable cyclical vomiting with nausea ICD-10-CM: G43. 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  ICD-9-CM: 619.1  1/29/2018        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 (MSSA) ICD-10-CM: I33.0, B95.61  ICD-9-CM: 421.0, 041.11  10/01/9682        Systolic congestive heart failure (HCC) ICD-10-CM: I50.20  ICD-9-CM: 428.20, 428.0  11/6/2017        Severe sepsis ([facility] ICD-10-CM: A41.9, R65.20  ICD-9-CM: 038.9, 995.92  11/1/2017        Colon cancer metastasized to brain [facility]) ICD-10-CM: C18.9, C79.31  ICD-9-CM: 153.9, 198.3  8/30/2017        Malignant neoplasm of descending colon ([facility] ICD-10-CM: C18.6  ICD-9-CM: 153.2  8/30/2017        Pulmonary metastases ([facility] ICD-10-CM: C78.00  ICD-9-CM: 197.0  8/17/2017        Secondary malignant neoplasm of brain [facility]) ICD-10-CM: C79.31  ICD-9-CM: 198.3  8/17/2017        Mass of colon ICD-10-CM: K63.9  ICD-9-CM: 569.89  8/17/2017        Breast cancer screening ICD-10-CM: Z12.31  ICD-9-CM: V76.10  8/17/2017                PLAN:  Osteomyelitis- continue ancef for 6 weeks per ID.     Metastatic colon cancer with mets to brain-last treatment FOLFOX and bevacizumab 1/24/2018.   8/8 Resume PTA keppra. On decadron 2 mg q 12 hours  8/10 MRI brain: Mixed response to therapy with enlargement of several previously indexed metastases and reduction in size of other  previously seen lesions. There is no evidence of new intracranial metastatic disease. Of note, lesions within the brachium pontis on the left  and within the periaqueductal region have enlarged. Surrounding vasogenic edema has substantially worsened.  The combination of these findings results in partial effacement of the fourth ventricle, worsened in comparison to the prior exam. There is very subtle enlargement of the lateral and third ventricles in comparison to the prior exam. This raises the possibility of the very early manifestations of obstructive hydrocephalus. Ordered CT CAP for restaging. CEA and CA 19.9. Planning for dose reduced FOLFOX without avastin tomorrow  8/11 FOLFOX today. Avastin if available. Increased dex to 4 q 8 hours  8/13 Dr. Mari Scales consult pending    Headache  8/8 if continues consider brain scan  8/9 Resolved. Left eye droop  8/12 consult neuro     PT/OT consulted     [facility]  [id number] [place] [address]  Office : [phone]  Fax : [phone]       Attending Addendum:  I have personally performed a face to face diagnostic evaluation on this patient. I have reviewed and agree with the care plan as documented by Kang Corral N.P. My findings are as follows: She has metastatic colon cancer with brain mets, she is afebrile on IV ABX, her left eye movement has improved, abdomen non-tender, bowel sounds positive, we will continue Keppra and Decadron and arrange for her to be evaluated by Dr. Mari Scales to see if she may benefit from further RT.                 Zeynep Valdez MD  [address] [place] [address]  [id number] [facility]  Office : [phone]  Fax : [phone]

## 2018-08-13 NOTE — PROGRESS NOTES
Problem: Nutrition Deficit  Goal: *Optimize nutritional status  Nutrition  Reason for assessment: LOS day 7, consult received for general nutrition management and supplementation Heri Ho NP)  Assessment:   Diet order(s): regular  Food/Nutrition Patient History:  Pt with h/o metastatic colon cancer (brain, lung, L adrenal gland, lymph node). She had a lot of weight loss earlier this year (had a colovaginal fistula and sigmoid colon/rectum mass which required surgical resection with end colostomy creation in March (3/1/18)) in which pt states that she got down to a weight of ~108 pounds. Per pt, her appetite has improved with steroids. She is eating ~50% of meals but states that she would eat more throughout the day if she had snacks available. She denies any barriers to intake (n/v/d, dysgeusia, etc). Admitted for tx of osteomyelitis of knee. Pt is receiving 4 mg of decadron q8 hours. AM BMP glucose 141 mg/d. She was re-started on FOLFOX on 8/11. Anthropometrics:    Vitals 7/6/2018   Height 5' 3\"   ,  Weight: 60.2 kg (132 lb 11.2 oz), Weight Source: Bed, Body mass index is 23.51 kg/(m^2). BMI class of normal weight for age. WT / BMI 8/13/2018 7/6/2018 7/6/2018 6/6/2018   WEIGHT 132 lb 11.2 oz 118 lb 6.4 oz 118 lb 6.4 oz 119 lb     WT / BMI 5/2/2018 4/30/2018 3/27/2018 3/26/2018 3/15/2018   WEIGHT 113 lb 3.2 oz 107 lb 11.2 oz 124 lb 14.4 oz 130 lb 130 lb     WT / BMI 12/28/2017 12/22/2017 12/18/2017   WEIGHT 155 lb 3.2 oz 153 lb 154 lb   Per weights listed in EMR, patient had a significant weight loss of ~48 pounds from December of last year to April of this year. Pt has had a 25 pound weight gain within ~4 months. Macronutrient needs:  EER:  8444-2097 kcal /day (25-30 kcal/kg listed BW)  EPR:  52-68 grams protein/day (1-1.3 grams/kg IBW)  Intake/Comparative Standards: Per RD meal rounds: 50% of breakfast. Average intake for past 7 day(s)/11 recorded meal(s): 29%.  This potentially meets ~44% of kcal and ~54% of protein needs    Nutrition Diagnosis: Inadequate oral intake r/t decreased ability to consume sufficient oral intake as evidenced by pt averaging intake of <30% of meals, meeting <75% of needs. Intervention:  Meals and snacks: Continue current diet. Add no cold items to diet order. Nutrition Supplement Therapy: no supplements-pt refuses and is unable to have cold items. Provided pt with snacks to keep at bedside-Ladarius PB crackers, honey nut cheerios, nutri-grain bars   Discharge nutrition plan: Too soon to determine.     Hemanth Wolff [address], [address] [place] [address], [phone]

## 2018-08-14 NOTE — PROGRESS NOTES
END OF SHIFT NOTE:    Intake/Output  08/13 1901 - 08/14 0700  In: -   Out: 1175 [NPB:0265]   Voiding: YES  Catheter: NO  Drain:   Colostomy 03/13/18 Mid Abdomen (Active)   Drainage Color Brown 8/13/2018  7:20 PM   Site Assessment Clean, dry, intact 8/13/2018  7:20 PM   Treatment Bag change 8/13/2018  7:20 PM               Stool:  Has colonostomy  Stool Assessment  Stool Appearance: Soft (08/13/18 1920)    Emesis:  0 occurrences. VITAL SIGNS  Patient Vitals for the past 12 hrs:   Temp Pulse Resp BP SpO2   08/14/18 0345 98.3 °F (36.8 °C) (!) 58 18 148/86 98 %   08/13/18 2333 98.3 °F (36.8 °C) (!) 59 18 143/61 98 %   08/13/18 1951 97.9 °F (36.6 °C) 67 18 128/79 97 %       Pain Assessment  Pain 1  Pain Scale 1: Numeric (0 - 10) (08/14/18 0507)  Pain Intensity 1: 7 (08/14/18 0507)  Patient Stated Pain Goal: 0 (08/14/18 0507)  Pain Reassessment 1: Patient sleeping (08/13/18 1847)  Pain Onset 1: pta (08/14/18 0507)  Pain Location 1: Knee (08/14/18 0507)  Pain Orientation 1: Right (08/14/18 0507)  Pain Description 1: Aching (08/14/18 0507)  Pain Intervention(s) 1: Medication (see MAR) (08/14/18 0507)    Ambulating  No    Additional Information:     Shift report given to oncoming nurse at the bedside.     Maren Schmitt

## 2018-08-14 NOTE — PROGRESS NOTES
END OF SHIFT NOTE:    Intake/Output      Voiding: YES  Catheter: NO  Drain:   Colostomy 03/13/18 Mid Abdomen (Active)   Drainage Color Brown 8/14/2018  2:30 PM   Site Assessment Clean, dry, intact 8/14/2018  2:30 PM   Treatment Other (Comment) 8/14/2018  7:45 AM               Stool:  0 occurrences. Stool Assessment  Stool Appearance: Soft (08/13/18 1920)    Emesis:  0 occurrences. VITAL SIGNS  Patient Vitals for the past 12 hrs:   Temp Pulse Resp BP SpO2   08/14/18 1557 97.9 °F (36.6 °C) 63 18 130/76 99 %   08/14/18 1127 98.1 °F (36.7 °C) (!) 54 18 145/71 96 %   08/14/18 0725 98.5 °F (36.9 °C) (!) 54 18 160/69 98 %       Pain Assessment  Pain 1  Pain Scale 1: Numeric (0 - 10) (08/14/18 1840)  Pain Intensity 1: 7 (08/14/18 1840)  Patient Stated Pain Goal: 0 (08/14/18 0507)  Pain Reassessment 1: Yes (08/14/18 1840)  Pain Onset 1: pta (08/14/18 0507)  Pain Location 1: Knee (08/14/18 1430)  Pain Orientation 1: Right (08/14/18 0507)  Pain Description 1: Aching (08/14/18 1430)  Pain Intervention(s) 1: Medication (see MAR) (08/14/18 1430)    Ambulating  NO  Additional Information:     Shift report given to oncoming nurse at the bedside.     Cesar Lorenzo RN

## 2018-08-14 NOTE — PROGRESS NOTES
[facility] Hematology & Oncology        Inpatient Hematology / Oncology Progress Note      Admission Date: 2018  7:49 PM  Reason for Admission/Hospital Course: osteomyelitis  Osteomyelitis ([address]      24 Hour Events:  FOLFOX   Pseudo progression in brain  No need for XRT      ROS:  Constitutional: negative for fever, chills, +weakness, +malaise,+ fatigue. CV: negative for chest pain, palpitations, edema. Respiratory: negative for dyspnea, cough, wheezing. GI: negative for nausea, abdominal pain, diarrhea. 10 point review of systems is otherwise negative with the exception of the elements mentioned above in the HPI. Allergies   Allergen Reactions    Latex Rash    Adhesive Tape-Silicones Rash     Bad red rash.  Ativan [Lorazepam] Other (comments)     Pt states \"gets hallucinations and confused\"    Bactrim [Sulfamethoprim Ds] Hives     Swelling and itching      Lexapro [Escitalopram Oxalate] Other (comments)    Macrobid [Nitrofurantoin Monohyd/M-Cryst] Other (comments)     Felt  \"out of it\"    Pcn [Penicillins] Hives     Swelling and itching also. Pt has tolerated cephalosporins in the past.    Pyridium [Phenazopyridine] Hives     Also itching and swelling. OBJECTIVE:  Patient Vitals for the past 8 hrs:   BP Temp Pulse Resp SpO2   18 1127 145/71 98.1 °F (36.7 °C) (!) 54 18 96 %   18 0725 160/69 98.5 °F (36.9 °C) (!) 54 18 98 %     Temp (24hrs), Av.1 °F (36.7 °C), Min:97.6 °F (36.4 °C), Max:98.5 °F (36.9 °C)     0701 -  1900  In: 240 [P.O.:240]  Out: -     Physical Exam:  Constitutional: Well developed, well nourished female in no acute distress, lying comfortably in the hospital bed. HEENT: Normocephalic and atraumatic. Oropharynx is clear, mucous membranes are moist. Extraocular muscles are intact. Sclerae anicteric. Skin Warm and dry. No bruising and no rash noted. No erythema. No pallor.     Respiratory Lungs are clear to auscultation bilaterally without wheezes, rales or rhonchi, normal air exchange without accessory muscle use. CVS Normal rate, regular rhythm and normal S1 and S2. No murmurs, gallops, or rubs. Abdomen Soft, nontender and nondistended, normoactive bowel sounds. No palpable mass. No hepatosplenomegaly. Neuro Grossly nonfocal with no obvious sensory or motor deficits. MSK Normal range of motion in general. Right knee pain. Psych Appropriate mood and affect. Labs:      Recent Labs      08/14/18 0257 08/13/18 0232 08/12/18   0353   WBC  5.3  6.3  5.3   RBC  2.53*  2.55*  2.42*   HGB  8.2*  8.2*  7.8*   HCT  25.5*  25.9*  24.5*   MCV  100.8*  101.6*  101.2*   MCH  32.4  32.2  32.2   MCHC  32.2  31.7  31.8   RDW  15.2  15.6  15.2   PLT  252  273  247   GRANS  85*  83*  74   LYMPH  12*  11*  15   MONOS  2*  4  7   EOS  0*  0*  0*   BASOS  0  0  0   IG  1  2  4   DF  AUTOMATED  AUTOMATED  AUTOMATED   ANEU  4.5  5.2  3.9   ABL  0.7  0.7  0.8   ABM  0.1  0.3  0.4   RICARDO  0.0  0.0  0.0   ABB  0.0  0.0  0.0   AIG  0.0  0.1  0.2        Recent Labs      08/14/18 0257 08/13/18 0232  08/12/18   0353   NA  136  141  141   K  4.2  4.1  4.0   CL  99  102  104   CO2  28  28  28   AGAP  9  11  9   GLU  137*  141*  157*   BUN  15  14  10   CREA  0.33*  0.36*  0.59*   GFRAA  >60  >60  >60   GFRNA  >60  >60  >60   CA  8.2*  8.4  8.6   SGOT  48*  51*  32   AP  153*  154*  154*   TP  5.8*  5.9*  6.2*   ALB  2.2*  2.1*  2.1*   GLOB  3.6*  3.8*  4.1*   AGRAT  0.6*  0.6*  0.5*   MG  2.0  1.8  1.7*         Imaging:      ASSESSMENT:    Problem List  Date Reviewed: 7/7/2018          Codes Class Noted    Osteomyelitis ([facility] ICD-10-CM: M86.9  ICD-9-CM: 730.20  8/6/2018        Non-intractable cyclical vomiting with nausea ICD-10-CM: G43. A0  ICD-9-CM: 536.2  4/30/2018        Nonintractable headache ICD-10-CM: R51  ICD-9-CM: 784.0  4/30/2018        Hypokalemia ICD-10-CM: E87.6  ICD-9-CM: 276.8  4/30/2018        Status post colostomy [facility]) ICD-10-CM: Z93.3  ICD-9-CM: V44.3  3/15/2018        Confusion ICD-10-CM: R41.0  ICD-9-CM: 298.9  3/6/2018        Colovaginal fistula ICD-10-CM: N82.4  ICD-9-CM: 619.1  1/29/2018        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 (MSSA) ICD-10-CM: I33.0, B95.61  ICD-9-CM: 421.0, 041.11  84/15/2166        Systolic congestive heart failure (HCC) ICD-10-CM: I50.20  ICD-9-CM: 428.20, 428.0  11/6/2017        Severe sepsis ([facility] ICD-10-CM: A41.9, R65.20  ICD-9-CM: 038.9, 995.92  11/1/2017        Colon cancer metastasized to brain [facility]) ICD-10-CM: C18.9, C79.31  ICD-9-CM: 153.9, 198.3  8/30/2017        Malignant neoplasm of descending colon (HCC) ICD-10-CM: C18.6  ICD-9-CM: 153.2  8/30/2017        Pulmonary metastases ([facility] ICD-10-CM: C78.00  ICD-9-CM: 197.0  8/17/2017        Secondary malignant neoplasm of brain [facility]) ICD-10-CM: C79.31  ICD-9-CM: 198.3  8/17/2017        Mass of colon ICD-10-CM: K63.9  ICD-9-CM: 569.89  8/17/2017        Breast cancer screening ICD-10-CM: Z12.31  ICD-9-CM: V76.10  8/17/2017                PLAN:  Osteomyelitis- continue ancef for 6 weeks per ID.  8/14 Stitches placed 7/31/2018 with debridement. Okay to remove 2 weeks per Dr. Lorraine Fraga. Please remove today.      Metastatic colon cancer with mets to brain-last treatment FOLFOX and bevacizumab 1/24/2018.   8/8 Resume PTA keppra. On decadron 2 mg q 12 hours  8/10 MRI brain: Mixed response to therapy with enlargement of several previously indexed metastases and reduction in size of other  previously seen lesions. There is no evidence of new intracranial metastatic disease. Of note, lesions within the brachium pontis on the left  and within the periaqueductal region have enlarged. Surrounding vasogenic edema has substantially worsened.  The combination of these findings results in partial effacement of the fourth ventricle, worsened in comparison to the prior exam. There is very subtle enlargement of the lateral and third ventricles in comparison to the prior exam. This raises the possibility of the very early manifestations of obstructive hydrocephalus. Ordered CT CAP for restaging. CEA and CA 19.9. Planning for dose reduced FOLFOX without avastin tomorrow  8/11 FOLFOX today. Avastin if available. Increased dex to 4 q 8 hours  8/13 Dr. Chanell Gardiner consult pending  8/14 per Dr. Chanell Gardiner the progression noted on MRI brain is actually pseudo progression. He recommended repeat imaging in 2 months and schedule follow up with him after scan. Headache  8/8 if continues consider brain scan  8/9 Resolved. Left eye droop  8/12 consult neuro  8/14 agree with steroids     PT/OT consulted     Stephanie SOPs  Discharge once placement approved    Per Dr. Chanell Gardiner she needs repeat imaging brain in 2 months and schedule follow up with Dr. Chanell Gardiner after scan. [facility]  [address] [place] [address]  Office : [phone]  Fax : [phone]              Attending Addendum:  I have personally performed a face to face diagnostic evaluation on this patient. I have reviewed and agree with the care plan as documented by  Alvaro Posey N.P.  My findings are as follows: She has metastatic colon cancer with brain mets, appears stable and alert, heart rate regular without murmurs, abdomen is non-tender, bowel sounds are positive, we will continue Decadron and arrange for her to go to a SNF.               MD [facility]os[facility]  [id number] [place] [address]  Office : [phone]  Fax : [phone]

## 2018-08-14 NOTE — PROGRESS NOTES
Confirmed with NP that the plan for now is still to have patient d/c to rehab at the end of the week / no CA tx but will have Ancef abx as before / called Faustino Angelucci RN [facility]-MAIN liaison) and made her aware / she will talk with [facility] about starting [place] again.

## 2018-08-14 NOTE — PROGRESS NOTES
Infectious Disease Progress Note    Today's Date: 2018   Admit Date: 2018    Impression:   · MSSA R femur/knee osteomyelitis and MSSA/staph lugdunensis (Ox-suscept) bacteremia s/p debridement (), PORT removal (). TTE NG. Repeat BCs NG as of   · Metastatic adenocarcinoma  (brain, lung, L adrenal)-restarted on FOLFOX , may need Avastin  · Hx of MSSA AVIE, source PORT infection (2017) s/p treatment. · Bactrim and PCN allergies     Plan:   · Continue Ancef 2g IV q 8h. Duration 6 weeks, EOT 18  · ID appt on 18 1:40PM with Dr Jenny Hall. Anti-infectives:   · Ancef (-      Subjective:   Reports nausea post chemo, no fever/chills/diarrhea. Tolerating antbx    Allergies   Allergen Reactions    Latex Rash    Adhesive Tape-Silicones Rash     Bad red rash.  Ativan [Lorazepam] Other (comments)     Pt states \"gets hallucinations and confused\"    Bactrim [Sulfamethoprim Ds] Hives     Swelling and itching      Lexapro [Escitalopram Oxalate] Other (comments)    Macrobid [Nitrofurantoin Monohyd/M-Cryst] Other (comments)     Felt  \"out of it\"    Pcn [Penicillins] Hives     Swelling and itching also. Pt has tolerated cephalosporins in the past.    Pyridium [Phenazopyridine] Hives     Also itching and swelling. Review of Systems:  A comprehensive review of systems was negative except for that written in the History of Present Illness. Objective:     Visit Vitals    /69 (BP 1 Location: Left arm, BP Patient Position: At rest)    Pulse (!) 54    Temp 98.5 °F (36.9 °C)    Resp 18    Wt 60.1 kg (132 lb 9.6 oz)    SpO2 98%    BMI 23.49 kg/m2     Temp (24hrs), Av °F (36.7 °C), Min:97.6 °F (36.4 °C), Max:98.5 °F (36.9 °C)       Lines: PICC RUE intact          Physical Exam:    General:  Alert, cooperative, appears chronically ill   Eyes:  Sclera anicteric. Pupils equally round and reactive to light. Mouth/Throat: Mucous membranes normal, oral pharynx clear. Moon facies    Neck: Supple   Lungs:   Clear to auscultation bilaterally, good effort   CV:  Regular rate and rhythm,no murmur, click, rub or gallop   Abdomen:   Soft, non-tender. bowel sounds normal. non-distended, colostomy    Extremities: No cyanosis or edema   Skin: No rash, L chest PORT site healing. Skin bruising with tears.     Musculoskeletal: R knee with edema, no effusion, no erythroderma, limited ROM   Lines/Devices:  Intact, no erythema, drainage or tenderness   Psych: Alert and oriented, normal mood affect given the setting       Data Review:     CBC:  Recent Labs      08/14/18 0257 08/13/18   0232  08/12/18   0353   WBC  5.3  6.3  5.3   GRANS  85*  83*  74   MONOS  2*  4  7   EOS  0*  0*  0*   ANEU  4.5  5.2  3.9   ABL  0.7  0.7  0.8   HGB  8.2*  8.2*  7.8*   HCT  25.5*  25.9*  24.5*   PLT  252  273  247       BMP:  Recent Labs      08/14/18 0257 08/13/18 0232  08/12/18   0353   CREA  0.33*  0.36*  0.59*   BUN  15  14  10   NA  136  141  141   K  4.2  4.1  4.0   CL  99  102  104   CO2  28  28  28   AGAP  9  11  9   GLU  137*  141*  157*       LFTS:  Recent Labs      08/14/18 0257 08/13/18   0232  08/12/18   0353   TBILI  0.4  0.4  0.2   ALT  30  27  16   SGOT  48*  51*  32   AP  153*  154*  154*   TP  5.8*  5.9*  6.2*   ALB  2.2*  2.1*  2.1*       Microbiology:     All Micro Results     Procedure Component Value Units Date/Time    CULTURE, BLOOD [[id number]] Collected:  08/06/18 2138    Order Status:  Completed Specimen:  Blood from Blood Updated:  08/11/18 0737     Special Requests: --        RIGHT  HAND       Culture result: NO GROWTH 5 DAYS       CULTURE, BLOOD [[id number]]     Order Status:  Canceled Specimen:  Blood from Blood     CULTURE, BLOOD [[id number]]     Order Status:  Canceled Specimen:  Blood from Blood           Imaging:   No new    Signed By: Davonte Bradshaw NP     August 14, 2018

## 2018-08-14 NOTE — PROGRESS NOTES
Progress Note    Patient: Rajiv Rust MRN: [id number]  SSN: [id number]    YOB: 1960  Age: 62 y.o. Sex: female      Admit Date: 8/6/2018    LOS: 8 days     Subjective:     She is doing about the same today. She continues to have her ocular abnormalities although they are very mildly improved from yesterday. She says she is feeling pretty \"crummy\" after chemotherapy. Objective:     Vitals:    08/13/18 2333 08/14/18 0345 08/14/18 0725 08/14/18 1127   BP: 143/61 148/86 160/69 145/71   Pulse: (!) 59 (!) 58 (!) 54 (!) 54   Resp: 18 18 18 18   Temp: 98.3 °F (36.8 °C) 98.3 °F (36.8 °C) 98.5 °F (36.9 °C) 98.1 °F (36.7 °C)   SpO2: 98% 98% 98% 96%   Weight:              Physical Exam:   Neurological examination - Comprehension, attention , memory and reasoning are intact. Language and speech are normal. On cranial nerve examination pupils are equal round and reactive to light. Fundoscopic examination is normal. Visual acuity is adequate. Visual fields are full to finger confrontation. Extraocular motility is abnormal.  She has restricted medial gaze of the right eye with nystagmus of the left thigh, when trying to look to the left. She has mild bilateral ptosis. She has mild deficit in lateral abduction of the right eye. . Face is symmetric and sensation is intact to light touch. Hearing is intact to finger rustle bilaterally. Motor examination - There is normal muscle tone and bulk. Power is full throughout. Muscle stretch reflexes are 1+ throughout. . Sensation is decreased to light touch, pinprick, vibration and proprioception in all extremities, most noticeable distally. Cerebellar examination shows appendicular ataxia with finger to nose testing and heel to shin testing on the left. .      Assessment:      This is a 59-year-old woman who unfortunately has metastatic colon cancer with metastasis to the brain. Neurology has been consulted because of ocular abnormalities.   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 These abnormalities are from her brainstem metastasis.     Plan:      Dexamethasone maintenance of 4 mg every 6 hours. Occasionally, higher doses of dexamethasone can be used including 10 mg every 6 hours. Considering where the patient's edema is I will have a very low threshold to go to higher doses.     Radiation oncology has been consulted. Given enlargement of the lesions in the brainstem, it is very important to have them involved.     The patient is at high risk of developing hydrocephalus given the location of her mass and her associated edema. She should be watched very closely. Consider neurosurgical consultation, especially if the patient worsens.       Localization-related epilepsy: Continue antiepileptic drugs as you are doing.       Signed By: Ballard Blizzard, DO     August 14, 2018

## 2018-08-14 NOTE — PROGRESS NOTES
Problem: Mobility Impaired (Adult and Pediatric)  Goal: *Acute Goals and Plan of Care (Insert Text)  STG:  (1.)Ms. Umesh Omalley will move from supine to sit and sit to supine , scoot up and down and roll side to side with CONTACT GUARD ASSIST within 3 treatment day(s). (2.)Ms. Umesh Omalley will transfer from bed to chair and chair to bed with CONTACT GUARD ASSIST using the least restrictive device within 3 treatment day(s). (3.)Ms. Umesh Omalley will ambulate with CONTACT GUARD ASSIST for 30 feet with the least restrictive device within 3 treatment day(s). LTG:  (1.)Ms. Umesh Omalley will move from supine to sit and sit to supine , scoot up and down and roll side to side in bed with MODIFIED INDEPENDENCE within 7 treatment day(s). (2.)Ms. Umesh Omalley will transfer from bed to chair and chair to bed with MODIFIED INDEPENDENCE using the least restrictive device within 7 treatment day(s). (3.)Ms. Umesh Omalley will ambulate with MODIFIED INDEPENDENCE for 150+ feet with the least restrictive device within 7 treatment day(s). (4.)Ms. Umesh Omalley will ascend/descend 2 steps with one hand rail with STAND BY ASSIST within 7 treatment days. ________________________________________________________________________________________________      PHYSICAL THERAPY: Daily Note, Treatment Day: 3rd, AM 8/14/2018  INPATIENT: Hospital Day: 9  Payor: [employer] / [place] [address] / Product Type: PPO /      NAME/AGE/GENDER: Matteo Montoya is a 62 y.o. female   PRIMARY DIAGNOSIS: osteomyelitis  Osteomyelitis ([address] <principal problem not specified> <principal problem not specified>        ICD-10: Treatment Diagnosis:    · Generalized Muscle Weakness (M62.81)  · Difficulty in walking, Not elsewhere classified (R26.2)  · History of falling (Z91.81)   Precaution/Allergies:  Latex; Adhesive tape-silicones; Ativan [lorazepam]; Bactrim [sulfamethoprim ds]; Lexapro [escitalopram oxalate]; Macrobid [nitrofurantoin monohyd/m-cryst];  Pcn [penicillins]; and Pyridium [phenazopyridine]     R LE WBAT   ASSESSMENT:     Ms. Ramakrishna Metzger was asleep but easily aroused. She tells me about her grueling 3 days of chemo that ended at 9 pm last night. She is fatigued but did agree to sit edge of bed and do some exercises. She required more assist for edge of bed today and her balance was poor. She required hands on assist at all times. She tolerated minimal activity on the edge with total treatment time of 10 minutes. However considering her  tells me she is normally completely wiped for 48 hours after chemo today is considered a success. He had brought her taco salad and she was hungry. That is a good sign too. This section established at most recent assessment   PROBLEM LIST (Impairments causing functional limitations):  1. Decreased Strength  2. Decreased ADL/Functional Activities  3. Decreased Transfer Abilities  4. Decreased Ambulation Ability/Technique  5. Decreased Balance  6. Increased Pain  7. Decreased Activity Tolerance  8. Decreased Pacing Skills  9. Decreased Work Simplification/Energy Conservation Techniques  10. Decreased Knowledge of Precautions  11. Decreased Christian with Home Exercise Program   INTERVENTIONS PLANNED: (Benefits and precautions of physical therapy have been discussed with the patient.)  1. Balance Exercise  2. Bed Mobility  3. Family Education  4. Gait Training  5. Home Exercise Program (HEP)  6. Manual Therapy  7. Neuromuscular Re-education/Strengthening  8. Range of Motion (ROM)  9. Therapeutic Activites  10. Therapeutic Exercise/Strengthening  11. Transfer Training  12.  Group Therapy     TREATMENT PLAN: Frequency/Duration: 3 times a week for duration of hospital stay  Rehabilitation Potential For Stated Goals: Good     RECOMMENDED REHABILITATION/EQUIPMENT: (at time of discharge pending progress): Due to the probability of continued deficits (see above) this patient will likely need continued skilled physical therapy after discharge. Equipment:    None at this time              HISTORY:   History of Present Injury/Illness (Reason for Referral):  Patient is a 62 y.o. female admitted on 8/6 to transfer care to oncology after admission at [place] for R knee OM/septic arthritis. She states that she fell ~11 weeks ago and presented to ortho for R knee cortisone injection ~ 5 weeks ago. Pain continued to worsened and MRI performed showing AVN with ? Infection vs metastatic disease. She was admitted to [place] from 7/31-8/6 for open bx and surgical intervention. Bx showing OM/septic arthritis. Debridement was performed on femur and knee (7/31). OP cx + MSSA. Infectious disease at [place] placed patient on Ancef 2g q8h. Also, BCs  On 7/31 + MSSA and staph lugdunensis. PORT removed and repeat BCs NG (8/2). TTE NG. She has remained afebrile, BCx1 NGTD. 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 MSSA bacteremia/AVIE (11/2017) related to R chest PORT s/p removal. She was treated to Ancef---then Daptomycin (changed due to poor GI tolerance-nausea). Last seen in ID office in 12/2017 by me. Past Medical History/Comorbidities:   Ms. Susan Reid  has a past medical history of Arthritis; Colon cancer ([address] Gastrointestinal disorder; GERD (gastroesophageal reflux disease); Non-intractable cyclical vomiting with nausea (4/30/2018); Other ill-defined conditions(799.89); and Staph aureus infection (12/29/2017). She also has no past medical history of Adverse effect of anesthesia; Difficult intubation; Malignant hyperthermia due to anesthesia; or Pseudocholinesterase deficiency. Ms. Susan Reid  has a past surgical history that includes hx lap cholecystectomy; hx gyn; hx tonsillectomy; hx adenoidectomy; reggie biopsy breast stereotactic (Left, 10-8-2014); hx knee arthroscopy (Left, 2014); and hx vascular access.   Social History/Living Environment:   Home Environment: Private residence  # Steps to Enter: [address] to Enter: Yes  Hand Rails : Right  Wheelchair Ramp: No  One/Two Story Residence: One story  Living Alone: No  Support Systems: Spouse/Significant Other/Partner  Patient Expects to be Discharged to[de-identified] Private residence  Current DME Used/Available at Home: [employer], rollator, Wheelchair  Prior Level of Function/Work/Activity:  Uses WC for mobility, lives with  who helps with all transfers and ADLs, doesn't drive, used rollator for limited household ambulation prior to fall, one fall ~2 months ago     Number of Personal Factors/Comorbidities that affect the Plan of Care: 3+: HIGH COMPLEXITY   EXAMINATION:   Most Recent Physical Functioning:   Gross Assessment:                  Posture:     Balance:  Sitting: Impaired  Sitting - Static: Poor (constant support)  Sitting - Dynamic: Poor (constant support) Bed Mobility:  Rolling: Minimum assistance  Supine to Sit: Moderate assistance  Sit to Supine: Minimum assistance  Wheelchair Mobility:     Transfers:     Gait:            Body Structures Involved:  1. Heart  2. Lungs  3. Bones  4. Joints  5. Muscles  6. Ligaments Body Functions Affected:  1. Sensory/Pain  2. Cardio  3. Respiratory  4. Neuromusculoskeletal  5. Movement Related Activities and Participation Affected:  1. Mobility  2. Self Care  3. Domestic Life  4. Interpersonal Interactions and Relationships   Number of elements that affect the Plan of Care: 4+: HIGH COMPLEXITY   CLINICAL PRESENTATION:   Presentation: Evolving clinical presentation with changing clinical characteristics: MODERATE COMPLEXITY   CLINICAL DECISION MAKIN [facility] Mobility Inpatient Short Form  How much difficulty does the patient currently have. .. Unable A Lot A Little None   1. Turning over in bed (including adjusting bedclothes, sheets and blankets)? [] 1   [] 2   [x] 3   [] 4   2.   Sitting down on and standing up from a chair with arms ( e.g., wheelchair, bedside commode, etc.)   [] 1   [x] 2 [] 3   [] 4   3. Moving from lying on back to sitting on the side of the bed? [] 1   [x] 2   [] 3   [] 4   How much help from another person does the patient currently need. .. Total A Lot A Little None   4. Moving to and from a bed to a chair (including a wheelchair)? [x] 1   [] 2   [] 3   [] 4   5. Need to walk in hospital room? [x] 1   [] 2   [] 3   [] 4   6. Climbing 3-5 steps with a railing? [x] 1   [] 2   [] 3   [] 4   © 2007, Trustees of [address] [place] [address] [address] [id number], under license to [employer]. All rights reserved      Score:  Initial: 10 Most Recent: X (Date: -- )    Interpretation of Tool:  Represents activities that are increasingly more difficult (i.e. Bed mobility, Transfers, Gait). Score 24 23 22-20 19-15 14-10 9-7 6     Modifier CH CI CJ CK CL CM CN      ? Mobility - Walking and Moving Around:     - CURRENT STATUS: CL - 60%-79% impaired, limited or restricted    - GOAL STATUS: CK - 40%-59% impaired, limited or restricted    - D/C STATUS:  ---------------To be determined---------------  Payor[place] BLUE CR[place] / [place] [facility] / Product Type: PPO /      Medical Necessity:     · Patient is expected to demonstrate progress in strength, range of motion, balance, coordination and functional technique to decrease assistance required with transfers and functional mobility. Reason for Services/Other Comments:  · Patient continues to require skilled intervention due to impaired functional mobility and activity tolerance.    Use of outcome tool(s) and clinical judgement create a POC that gives a: Questionable prediction of patient's progress: MODERATE COMPLEXITY            TREATMENT:   (In addition to Assessment/Re-Assessment sessions the following treatments were rendered)   Pre-treatment Symptoms/Complaints:  tired  Pain: Initial:   Pain Intensity 1: 0  Post Session: tired         Therapeutic Exercise: ( 10  min):  Exercises per grid below to improve mobility, strength and balance. Required mod visual and verbal cues to promote proper body alignment, promote proper body posture and promote proper body mechanics. Braces/Orthotics/Lines/Etc:   · none  ·    Treatment/Session Assessment:    · Response to Treatment:  See above  · Interdisciplinary Collaboration:   o Registered Nurse  · After treatment position/precautions:   o Bed/Chair-wheels locked  o Bed in low position   · Compliance with Program/Exercises: Will assess as treatment progresses. · Recommendations/Intent for next treatment session: \"Next visit will focus on reduction in assistance provided\".   Total Treatment Duration:  PT Patient Time In/Time Out  Time In: 1150  Time Out: 1200    Gilda Hoang, PT

## 2018-08-14 NOTE — INTERDISCIPLINARY ROUNDS
Interdisciplinary rounds with charge nurse, provider, and .     Presenting Problem: osteomyelitis  Daily Goal : Pain control  Expected Discharge Date: 8/15  Expected Discharge Needs: Waiting on approval for placement for [facility] Concerns addressed

## 2018-08-14 NOTE — DISCHARGE SUMMARY
[facility]: Inpatient Hematology / Oncology Discharge Summary Note    Patient ID:  Jewel Wayne  [id number]  81 y.o.  1960    Admit Date: 8/6/2018    Discharge Date: 8/17/2018    Admission Diagnoses: osteomyelitis  Osteomyelitis [facility])    Discharge Diagnoses:  Principal Diagnosis: <principal problem not specified>  Active Problems:    Osteomyelitis ([address] (8/6/2018)        Hospital Course:   Ms. Jigna Vergara is a 62 y.o. 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 Frank Chirinos, last treatment FOLFOX and bevacizumab 1/24/2018. She came to us from [facility] as a direct admit for osteomyelitis right knee requiring IV antibiotics and for evaluation for rehab placement. At [facility] she underwent open biopsy right femur consistent with acute inflammation and necrosis. Cultures and intraoperative findings were consistent with osteomyelitis and septic arthritis. Full irrigation and debridement was done of her distal femur as well as her right knee joint. Antibiotic laden cement was left within the distal femur. Blood cultures ultimately were positive as well and MSSA was isolated as well as staph lugdunensis. MSSA was also isolated from the knee fluid and distal femoral bone. Repeat blood cultures were negative. Dr. Esteban Juarez was consulted with infectious diseases and the patient has been on Ancef 2 g IV every 8 hours. Per care everywhere it was recommended 6 weeks of IV antibiotic. ID consulted here and agree with plan of Ancef 2g IV q 8h (EOT 9/13/2018). Repeat BC here also negative. MRI brain reviewed and thought to have progressive disease however, Dr. Tucker Rounds reviewed scans and felt to be pseudo-progression and recommended 2 month rescan and then follow up with him after imaging. Decision was made to give FOLFOX and avastin on 8/11/2018.  Neurology was consulted for noticeable left eye droop and ocular misalignment felt to be related to her brain mets so recommended continuation of steroids. She has been approved for [facility]. She is feeling stronger and is ready for discharge. She will follow up with Dr. Tamia Camarillo on 8/21. She has follow up appointment scheduled with Dr. Michael Blanco 9/12/2018. Advised to call with fever, chills, uncontrollable symptoms, or with any other concerns. Pt verbalizes understanding. Scripts given for alprazolam and Norco.  I have reviewed the patient's controlled substance prescription history, as maintained in the [address] [place] [address] prescription monitoring program, so that the prescriptions(s) for a controlled substance can be given. Consults:  IP CONSULT TO INFECTIOUS DISEASES  IP CONSULT TO RADIATION ONCOLOGY  IP CONSULT TO NEUROLOGY    Pertinent Diagnostic Studies:   Labs:    Recent Labs      08/14/18   0257  08/13/18   0232  08/12/18   0353   WBC  5.3  6.3  5.3   HGB  8.2*  8.2*  7.8*   PLT  252  273  247   ANEU  4.5  5.2  3.9    Recent Labs      08/14/18   0257  08/13/18   0232  08/12/18   0353   NA  136  141  141   K  4.2  4.1  4.0   CL  99  102  104   CO2  28  28  28   GLU  137*  141*  157*   BUN  15  14  10   CREA  0.33*  0.36*  0.59*   CA  8.2*  8.4  8.6   SGOT  48*  51*  32   AP  153*  154*  154*   TP  5.8*  5.9*  6.2*   ALB  2.2*  2.1*  2.1*   MG  2.0  1.8  1.7*       Imaging:  CT CHEST ABD PELV W CONT [[id number]] Collected: 08/10/18 1756      Order Status: Completed Updated: 08/10/18 1819     Narrative:       CT CHEST, ABDOMEN, PELVIS WITH CONTRAST:      CLINICAL HISTORY:   Restaging colon cancer with brain metastases. TECHNIQUE:  Oral and nonionic intravenous contrast was administered, and the  torso was scanned with spiral technique.  Radiation dose reduction was achieved  using one or all of the following techniques: automated exposure control,  weight-based dosing, iterative reconstruction. COMPARISON:  April 26, 2018.     CT CHEST: There has been interval increase in number and size of pulmonary  parenchymal metastases and of mediastinal and hilar lymphadenopathy, including a  new 3.1 cm lateral aortic adelina metastasis.  The thyroid appears normal as  imaged.      CT ABDOMEN:  There are now innumerable hepatic metastases, increased from  approximately 3 on the prior study.  The spleen, pancreas, and kidneys appear  unremarkable.  Bilobed left adrenal metastasis now has aggregate diameter of  approximately 6.0 cm, and right adrenal metastasis is slightly larger as well. No pathologically enlarged lymph nodes are evident.      CT PELVIS: The appendix appears normal.  Metastasis contiguous with the vaginal  cuff measures approximate 4.4 x 3.3 cm today compared with 2.7 x 2.1 cm in  April.  Partial left colectomy is again noted with unremarkable appearance of  the colostomy.  Bone windows demonstrate no aggressive process, accounting for  degenerative changes.         Impression:       IMPRESSION: Marked interval progression of soft tissue metastatic disease in the  torso, as detailed above.     MRI BRAIN W WO CONT [[id number]] Collected: 08/10/18 1158     Order Status: Completed Updated: 08/10/18 1241     Narrative:       EXAMINATION: BRAIN MRI 8/10/2018 10:43 AM    ACCESSION NUMBER: [id number]    INDICATION: assess CNS metastatic disease    Additional clinical history from the electronic medical record: CyberKnife  therapy 9/12/2017 2 bilateral parietal lobe lesions, CyberKnife therapy to left  cerebellar hemisphere lesion 1/18/2018    COMPARISON: Brain MRI 7/10/2018    TECHNIQUE: Multiplanar multisequence MRI of the brain without and with  intravenous administration of 12 cc Dotarem contrast agent. FINDINGS:    Previously index metastatic lesions are as follows:  -Enlarging 2.0 cm periaqueductal metastasis, previously 1.7 cm (axial  postcontrast image 131).   -Minimally enlarged 1.9 cm left brachium pontis lesion, previously 1.8 cm (axial  postcontrast image 114). -1.0 cm left frontal lobe metastasis, previously 0.6 cm (axial postcontrast  image 246). -0.4 cm right precentral gyrus lesion, previously 0.7 cm (axial postcontrast  image 269). -Stable 2.1 x 2.0 cm left postcentral gyrus lesion (axial postcontrast image  247). -0.7 cm left frontal lobe lesion, previously 0.4 cm (axial postcontrast image  278). -Stable 0.6 cm anterior left occipital lobe lesion (axial postcontrast image  140). -0.4 cm more posterior left occipital lobe lesion, previously 0.6 cm (axial  postcontrast image 151). -0.4 cm anterior right temporal lobe lesion, previously 0.5 cm (axial  postcontrast image 129). -0.5 cm right cerebellar hemisphere lesion, not significantly changed (axial  postcontrast image 75). -Stable 0.7 cm anterior right parietal lesion (axial postcontrast image 261). There is no evidence of new enhancing intracranial metastases. Vasogenic edema throughout the midbrain and sean has worsened in comparison to  the prior exam, along with worsening vasogenic edema involving the middle  cerebellar peduncles and superior aspect of the left cerebellar hemisphere. This  corresponds to the enlargement of the lesions within the left brachium pontis  and periaqueductal region. This results in worsening mass effect upon the fourth ventricle. There is subtly increased caliber of the third ventricle and lateral ventricles  in comparison to the previous exam.    Vasogenic edema surrounding the anterior left frontal lobe lesion has worsened. Multifocal supratentorial vasogenic edema is otherwise unchanged. The expected large vascular flow voids are preserved. Diffusion imaging shows no evidence of acute infarction.       Impression:       There are no suspicious osseous lesions. IMPRESSION:    Mixed response to therapy with enlargement of several previously indexed  metastases and reduction in size of other previously seen lesions.     There is no evidence of new intracranial metastatic disease. Of note, lesions within the brachium pontis on the left and within the  periaqueductal region have enlarged. Surrounding vasogenic edema has  substantially worsened. The combination of these findings results in partial  effacement of the fourth ventricle, worsened in comparison to the prior exam.  There is very subtle enlargement of the lateral and third ventricles in  comparison to the prior exam. This raises the possibility of the very early  manifestations of obstructive hydrocephalus. DC4  The significant findings in this report have been referred to the Imaging  Navigator in order to communicate to the referring provider or his/her designee  as outlined in Section II.C.2.a.ii-iii of the ACR Practice Guideline for  Communication of Diagnostic Imaging Findings. Current Discharge Medication List      START taking these medications    Details   ALPRAZolam (XANAX) 1 mg tablet Take 1 Tab by mouth two (2) times daily as needed for Anxiety. Max Daily Amount: 2 mg. Qty: 60 Tab, Refills: 0    Associated Diagnoses: Anxiety      cefazolin sodium/water (CEFAZOLIN, ANCEF, IN STERILE WATER) 2 gram/20 mL IV syringe 20 mL by IntraVENous route every eight (8) hours for 29 days. Qty: 1740 mL, Refills: 0         CONTINUE these medications which have CHANGED    Details   !! HYDROcodone-acetaminophen (NORCO)  mg tablet Take 1 Tab by mouth every four (4) hours as needed. Max Daily Amount: 6 Tabs. Qty: 90 Tab, Refills: 0    Associated Diagnoses: Colon cancer metastasized to brain [facility]); Osteomyelitis, unspecified site, unspecified type [facility])       ! ! - Potential duplicate medications found. Please discuss with provider. CONTINUE these medications which have NOT CHANGED    Details   dexamethasone (DECADRON) 4 mg tablet Take 1 Tab by mouth three (3) times daily.  Indications: appetite stimulant  Qty: 90 Tab, Refills: 2    Associated Diagnoses: Colon cancer metastasized to [facility]); Anorexia      ALPRAZolam (XANAX XR) 1 mg XR tablet 1 to 2 po bid, prn    Comments: for anxiety      esomeprazole (NEXIUM) 40 mg capsule Take 40 mg by mouth daily. levETIRAcetam 1,000 mg tablet TAKE 1 TABLET BY MOUTH 2 TIMES A DAY. Qty: 60 Tab, Refills: 0    Associated Diagnoses: Mass of colon      !! HYDROcodone-acetaminophen (NORCO)  mg tablet Take 1 Tab by mouth every four (4) hours as needed for Pain. FLUoxetine (PROZAC) 10 mg tablet Take 1 Tab by mouth daily. Indications: ANXIETY WITH DEPRESSION  Qty: 30 Tab, Refills: 2    Associated Diagnoses: Colon cancer metastasized to [facility]); Depression with anxiety      ondansetron (ZOFRAN ODT) 8 mg disintegrating tablet Take 1 Tab by mouth every eight (8) hours as needed for Nausea. Indications: PREVENTION OF POST-OPERATIVE NAUSEA AND VOMITING  Qty: 90 Tab, Refills: 2    Associated Diagnoses: Colon cancer metastasized to brain (HCC)      lidocaine-prilocaine (EMLA) topical cream Apply  to affected area as needed for Pain. Qty: 30 g, Refills: 3      promethazine (PHENERGAN) 25 mg tablet Take 25 mg by mouth every six (6) hours as needed for Nausea. albuterol (PROVENTIL HFA, VENTOLIN HFA, PROAIR HFA) 90 mcg/actuation inhaler Take 2 Puffs by inhalation every six (6) hours as needed for Wheezing. Qty: 1 Inhaler, Refills: 1       !! - Potential duplicate medications found. Please discuss with provider.       STOP taking these medications       potassium chloride (KLOR-CON) 10 mEq tablet Comments:   Reason for Stopping:                   OBJECTIVE:  Patient Vitals for the past 8 hrs:   BP Temp Pulse Resp SpO2   18 1127 145/71 98.1 °F (36.7 °C) (!) 54 18 96 %   18 0725 160/69 98.5 °F (36.9 °C) (!) 54 18 98 %     Temp (24hrs), Av.1 °F (36.7 °C), Min:97.6 °F (36.4 °C), Max:98.5 °F (36.9 °C)     0701 -  1900  In: 240 [P.O.:240]  Out: 700 [Urine:700]    Physical Exam:  Constitutional: Well developed, well nourished female in no acute distress, lying comfortably in the hospital bed. HEENT: Normocephalic and atraumatic. Oropharynx is clear, mucous membranes are moist. Extraocular muscles are intact. Sclerae anicteric. Skin Warm and dry. No bruising and no rash noted. No erythema. No pallor. Respiratory Lungs are clear to auscultation bilaterally without wheezes, rales or rhonchi, normal air exchange without accessory muscle use. CVS Mildly bradycardic rate, regular rhythm and normal S1 and S2. No murmurs, gallops, or rubs. Abdomen Soft, nontender and nondistended, normoactive bowel sounds. No palpable mass. No hepatosplenomegaly. Neuro Grossly nonfocal with no obvious sensory or motor deficits. MSK Normal range of motion in general. Right knee pain. Psych Appropriate mood and affect.           ASSESSMENT:    Active Problems:    Osteomyelitis ([address] (8/6/2018)        DISPOSITION:  Follow-up Appointments   Procedures    FOLLOW UP VISIT Appointment in: Other ([facility]) Follow-up scheduled for 8/21 at 7:30am     Follow-up scheduled for 8/21 at 7:30am     Standing Status:   Standing     Number of Occurrences:   1     Order Specific Question:   Appointment in     Answer: Other (Specify)           Over 30 minutes was spent in discharge planning and coordination of care. Elana Sanchez NP    [facility]  [address] [place] [address]  Office : [phone]  Fax : [phone]         Attending Addendum:  I have personally performed a face to face diagnostic evaluation on this patient. I have reviewed and agree with the care plan as documented by Elana Sanchez, N.P.  My findings are as follows:   She has metastatic colon cancer with brain mets and was admitted with Pseudoprogression, appears stable and afebrile, heart rate regular without murmurs, abdomen is non-tender, bowel sounds are positive, we will discharge her to a SNF on Keppra and Decadron and arrange for her to follow-up in our clinic next week.               MD Rafiq [facility]  [address] [address]  Office : [phone]  Fax : [phone]

## 2018-08-15 NOTE — PROGRESS NOTES
[facility]        Inpatient Hematology / Oncology Progress Note      Admission Date: 2018  7:49 PM  Reason for Admission/Hospital Course: osteomyelitis  Osteomyelitis ([address]      24 Hour Events:  Afebrile, VSS  S/p FOLFOX   Awaiting auth for [facility]  Multiple family members at bedside    ROS:  Constitutional: negative for fever, chills, +weakness, +malaise,+ fatigue. CV: negative for chest pain, palpitations, edema. Respiratory: negative for dyspnea, cough, wheezing. GI: negative for nausea, abdominal pain, diarrhea. 10 point review of systems is otherwise negative with the exception of the elements mentioned above in the HPI. Allergies   Allergen Reactions    Latex Rash    Adhesive Tape-Silicones Rash     Bad red rash.  Ativan [Lorazepam] Other (comments)     Pt states \"gets hallucinations and confused\"    Bactrim [Sulfamethoprim Ds] Hives     Swelling and itching      Lexapro [Escitalopram Oxalate] Other (comments)    Macrobid [Nitrofurantoin Monohyd/M-Cryst] Other (comments)     Felt  \"out of it\"    Pcn [Penicillins] Hives     Swelling and itching also. Pt has tolerated cephalosporins in the past.    Pyridium [Phenazopyridine] Hives     Also itching and swelling. OBJECTIVE:  Patient Vitals for the past 8 hrs:   BP Temp Pulse Resp SpO2   08/15/18 1104 127/70 98.6 °F (37 °C) 61 18 96 %   08/15/18 0737 135/83 97.8 °F (36.6 °C) (!) 58 18 98 %     Temp (24hrs), Av.1 °F (36.7 °C), Min:97.8 °F (36.6 °C), Max:98.6 °F (37 °C)    08/15 0701 - 08/15 1900  In: 240 [P.O.:240]  Out: 800 [Urine:800]    Physical Exam:  Constitutional: Well developed, well nourished female in no acute distress, lying comfortably in the hospital bed. HEENT: Normocephalic and atraumatic. Oropharynx is clear, mucous membranes are moist. Extraocular muscles are intact. Sclerae anicteric. Skin Warm and dry. No rash noted. No erythema. No pallor.  Steri-strips intact to R knee.   Respiratory Lungs are clear to auscultation bilaterally without wheezes, rales or rhonchi, normal air exchange without accessory muscle use. CVS Normal rate, regular rhythm and normal S1 and S2. No murmurs, gallops, or rubs. Abdomen Soft, nontender and nondistended, normoactive bowel sounds. No palpable mass. No hepatosplenomegaly. Neuro Grossly nonfocal with no obvious sensory or motor deficits. MSK Normal range of motion in general. Right knee pain. Psych Appropriate mood and affect. Labs:      Recent Labs      08/15/18   1111  08/14/18   0257  08/13/18   0232   WBC  6.1  5.3  6.3   RBC  2.84*  2.53*  2.55*   HGB  9.3*  8.2*  8.2*   HCT  28.0*  25.5*  25.9*   MCV  98.6*  100.8*  101.6*   MCH  32.7  32.4  32.2   MCHC  33.2  32.2  31.7   RDW  14.9  15.2  15.6   PLT  289  252  273   GRANS  88*  85*  83*   LYMPH  10*  12*  11*   MONOS  1*  2*  4   EOS  0*  0*  0*   BASOS  0  0  0   IG  1  1  2   DF  AUTOMATED  AUTOMATED  AUTOMATED   ANEU  5.4  4.5  5.2   ABL  0.6  0.7  0.7   ABM  0.1  0.1  0.3   RICARDO  0.0  0.0  0.0   ABB  0.0  0.0  0.0   AIG  0.1  0.0  0.1        Recent Labs      08/15/18   0416  08/14/18   0257  08/13/18   0232   NA  136  136  141   K  4.3  4.2  4.1   CL  100  99  102   CO2  28  28  28   AGAP  8  9  11   GLU  145*  137*  141*   BUN  16  15  14   CREA  0.37*  0.33*  0.36*   GFRAA  >60  >60  >60   GFRNA  >60  >60  >60   CA  8.3  8.2*  8.4   SGOT  31  48*  51*   AP  158*  153*  154*   TP  6.3  5.8*  5.9*   ALB  2.6*  2.2*  2.1*   GLOB  3.7*  3.6*  3.8*   AGRAT  0.7*  0.6*  0.6*   MG  2.3  2.0  1.8         Imaging:  CT CHEST ABD PELV W CONT [[id number]] Collected: 08/10/18 1756      Order Status: Completed Updated: 08/10/18 1819     Narrative:       CT CHEST, ABDOMEN, PELVIS WITH CONTRAST:      CLINICAL HISTORY:   Restaging colon cancer with brain metastases.     TECHNIQUE:  Oral and nonionic intravenous contrast was administered, and the  torso was scanned with spiral technique.  Radiation dose reduction was achieved  using one or all of the following techniques: automated exposure control,  weight-based dosing, iterative reconstruction. COMPARISON:  April 26, 2018. CT CHEST: There has been interval increase in number and size of pulmonary  parenchymal metastases and of mediastinal and hilar lymphadenopathy, including a  new 3.1 cm lateral aortic adelina metastasis.  The thyroid appears normal as  imaged.      CT ABDOMEN:  There are now innumerable hepatic metastases, increased from  approximately 3 on the prior study.  The spleen, pancreas, and kidneys appear  unremarkable.  Bilobed left adrenal metastasis now has aggregate diameter of  approximately 6.0 cm, and right adrenal metastasis is slightly larger as well. No pathologically enlarged lymph nodes are evident.      CT PELVIS: The appendix appears normal.  Metastasis contiguous with the vaginal  cuff measures approximate 4.4 x 3.3 cm today compared with 2.7 x 2.1 cm in  April.  Partial left colectomy is again noted with unremarkable appearance of  the colostomy.  Bone windows demonstrate no aggressive process, accounting for  degenerative changes.         Impression:       IMPRESSION: Marked interval progression of soft tissue metastatic disease in the  torso, as detailed above.     MRI BRAIN W WO CONT [[id number]] Collected: 08/10/18 1158     Order Status: Completed Updated: 08/10/18 1241     Narrative:       EXAMINATION: BRAIN MRI 8/10/2018 10:43 AM    ACCESSION NUMBER: [id number]    INDICATION: assess CNS metastatic disease    Additional clinical history from the electronic medical record: CyberKnife  therapy 9/12/2017 2 bilateral parietal lobe lesions, CyberKnife therapy to left  cerebellar hemisphere lesion 1/18/2018    COMPARISON: Brain MRI 7/10/2018    TECHNIQUE: Multiplanar multisequence MRI of the brain without and with  intravenous administration of 12 cc Dotarem contrast agent.     FINDINGS:    Previously index metastatic lesions are as follows:  -Enlarging 2.0 cm periaqueductal metastasis, previously 1.7 cm (axial  postcontrast image 131). -Minimally enlarged 1.9 cm left brachium pontis lesion, previously 1.8 cm (axial  postcontrast image 114). -1.0 cm left frontal lobe metastasis, previously 0.6 cm (axial postcontrast  image 246). -0.4 cm right precentral gyrus lesion, previously 0.7 cm (axial postcontrast  image 269). -Stable 2.1 x 2.0 cm left postcentral gyrus lesion (axial postcontrast image  247). -0.7 cm left frontal lobe lesion, previously 0.4 cm (axial postcontrast image  278). -Stable 0.6 cm anterior left occipital lobe lesion (axial postcontrast image  140). -0.4 cm more posterior left occipital lobe lesion, previously 0.6 cm (axial  postcontrast image 151). -0.4 cm anterior right temporal lobe lesion, previously 0.5 cm (axial  postcontrast image 129). -0.5 cm right cerebellar hemisphere lesion, not significantly changed (axial  postcontrast image 75). -Stable 0.7 cm anterior right parietal lesion (axial postcontrast image 261). There is no evidence of new enhancing intracranial metastases. Vasogenic edema throughout the midbrain and sean has worsened in comparison to  the prior exam, along with worsening vasogenic edema involving the middle  cerebellar peduncles and superior aspect of the left cerebellar hemisphere. This  corresponds to the enlargement of the lesions within the left brachium pontis  and periaqueductal region. This results in worsening mass effect upon the fourth ventricle. There is subtly increased caliber of the third ventricle and lateral ventricles  in comparison to the previous exam.    Vasogenic edema surrounding the anterior left frontal lobe lesion has worsened. Multifocal supratentorial vasogenic edema is otherwise unchanged. The expected large vascular flow voids are preserved.      Diffusion imaging shows no evidence of acute infarction.       Impression:     There are no suspicious osseous lesions. IMPRESSION:    Mixed response to therapy with enlargement of several previously indexed  metastases and reduction in size of other previously seen lesions. There is no evidence of new intracranial metastatic disease. Of note, lesions within the brachium pontis on the left and within the  periaqueductal region have enlarged. Surrounding vasogenic edema has  substantially worsened. The combination of these findings results in partial  effacement of the fourth ventricle, worsened in comparison to the prior exam.  There is very subtle enlargement of the lateral and third ventricles in  comparison to the prior exam. This raises the possibility of the very early  manifestations of obstructive hydrocephalus. ASSESSMENT:    Problem List  Date Reviewed: 7/7/2018          Codes Class Noted    Osteomyelitis ([facility] ICD-10-CM: M86.9  ICD-9-CM: 730.20  8/6/2018        Non-intractable cyclical vomiting with nausea ICD-10-CM: G43. 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  ICD-9-CM: 619.1  1/29/2018        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 (MSSA) ICD-10-CM: I33.0, B95.61  ICD-9-CM: 421.0, 041.11  91/73/2846        Systolic congestive heart failure (HCC) ICD-10-CM: I50.20  ICD-9-CM: 428.20, 428.0  11/6/2017        Severe sepsis [facility]) ICD-10-CM: A41.9, R65.20  ICD-9-CM: 038.9, 995.92  11/1/2017        Colon cancer metastasized to brain [facility]) ICD-10-CM: C18.9, C79.31  ICD-9-CM: 153.9, 198.3  8/30/2017        Malignant neoplasm of descending colon ([facility] ICD-10-CM: C18.6  ICD-9-CM: 153.2  8/30/2017        Pulmonary metastases ([facility] ICD-10-CM: C78.00  ICD-9-CM: 197.0  8/17/2017        Secondary malignant neoplasm of brain [facility]) ICD-10-CM: C79.31  ICD-9-CM: 198.3  8/17/2017        Mass of colon ICD-10-CM: K63.9  ICD-9-CM: 569.89  8/17/2017        Breast cancer screening ICD-10-CM: Z12.31  ICD-9-CM: V76.10  8/17/2017                PLAN:  Osteomyelitis- continue ancef for 6 weeks per ID.  8/14 Stitches placed 7/31/2018 with debridement. Okay to remove 2 weeks per Dr. Nikolas Lane. Please remove today.      Metastatic colon cancer with mets to brain-last treatment FOLFOX and bevacizumab 1/24/2018.   8/8 Resume PTA keppra. On decadron 2 mg q 12 hours  8/10 MRI brain: Mixed response to therapy with enlargement of several previously indexed metastases and reduction in size of other  previously seen lesions. There is no evidence of new intracranial metastatic disease. Of note, lesions within the brachium pontis on the left  and within the periaqueductal region have enlarged. Surrounding vasogenic edema has substantially worsened. The combination of these findings results in partial effacement of the fourth ventricle, worsened in comparison to the prior exam. There is very subtle enlargement of the lateral and third ventricles in comparison to the prior exam. This raises the possibility of the very early manifestations of obstructive hydrocephalus. Ordered CT CAP for restaging. CEA and CA 19.9. Planning for dose reduced FOLFOX without avastin tomorrow  8/11 FOLFOX today. Avastin if available. Increased dex to 4 q 8 hours  8/13 Dr. Luciana Stark consult pending  8/14 per Dr. Luciana Stark the progression noted on MRI brain is actually pseudo progression. He recommended repeat imaging in 2 months and schedule follow up with him after scan. Headache  8/8 if continues consider brain scan  8/9 Resolved.      Left eye droop  8/12 consult neuro  8/14 agree with steroids     PT/OT consulted    Continue home meds   Stephanie SOPs  Lovenox for DVT prophylaxis  Discharge once placement approved    Disposition:  Awaiting auth for [facility]. Can discharge once approved. Ventura Álvarez NP   [facility]  [address] [place] [address]  Office : [phone]  Fax : [phone]         Attending Addendum:  I have personally performed a face to face diagnostic evaluation on this patient. I have reviewed and agree with the care plan as documented by Ventura Álvarez N.P.  My findings are as follows: She has metastatic colon cancer with brain mets and pseudo-progression, appears stable and afebrile, heart rate regular without murmurs, abdomen is non-tender, bowel sounds are positive, we will continue IV ABX for her Osteomyelitis and Decadron for her brain mets, we will also try to place her in a SNF.               Terra Adams MD      [facility]  [address] [address]  Office : [phone]  Fax : [phone]

## 2018-08-15 NOTE — PROGRESS NOTES
END OF SHIFT NOTE:    Intake/Output  08/15 0701 - 08/15 1900  In: -   Out: 400 [Urine:400]   Voiding: YES  Catheter: NO  Drain:   Colostomy 03/13/18 Mid Abdomen (Active)   Drainage Color Brown 8/14/2018  2:30 PM   Site Assessment Clean, dry, intact 8/14/2018  2:30 PM   Treatment Other (Comment) 8/14/2018  7:45 AM               Stool:  0 occurrences. Stool Assessment  Stool Appearance: Soft (08/14/18 2030)    Emesis:  0 occurrences. VITAL SIGNS  Patient Vitals for the past 12 hrs:   Temp Pulse Resp BP SpO2   08/15/18 0737 97.8 °F (36.6 °C) (!) 58 18 135/83 98 %   08/15/18 0433 98 °F (36.7 °C) 60 18 126/81 98 %   08/14/18 2301 97.8 °F (36.6 °C) 62 18 135/74 99 %       Pain Assessment  Pain 1  Pain Scale 1: Numeric (0 - 10) (08/15/18 0822)  Pain Intensity 1: 7 (08/15/18 0822)  Patient Stated Pain Goal: 0 (08/15/18 0723)  Pain Reassessment 1: Patient sleeping (08/15/18 0415)  Pain Onset 1: pta (08/14/18 0507)  Pain Location 1: Knee (08/15/18 0723)  Pain Orientation 1: Right (08/14/18 2317)  Pain Description 1: Aching (08/15/18 3340)  Pain Intervention(s) 1: Medication (see MAR) (08/15/18 5616)    Ambulating  Yes    Additional Information: Pt rested well, pain medication required 2x. No needs voiced at this time. Shift report given to oncoming nurse, Kelley Gloria RN, at the bedside.     Christy Farley

## 2018-08-15 NOTE — PROGRESS NOTES
0617-Bedside report received from Jessica Rios RN. Resting in bed. No needs voiced. No s/s of acute distress. 1810-END OF SHIFT NOTE:  Pt's VSS and is in no acute distress. Pt waiting on [facility] for rehab. Intake/Output  08/15 0701 - 08/15 1900  In: 240 [P.O.:240]  Out: 1100 [Urine:1100]   Voiding: YES  Catheter: NO  Drain:   Colostomy 03/13/18 Mid Abdomen (Active)   Drainage Color Harlo Nissen 8/14/2018  2:30 PM   Site Assessment Clean, dry, intact 8/14/2018  2:30 PM   Treatment Other (Comment) 8/14/2018  7:45 AM     Stool:  0 occurrences. Stool Assessment  Stool Appearance: Soft (08/14/18 2030)    Emesis:  0 occurrences. VITAL SIGNS  Patient Vitals for the past 12 hrs:   Temp Pulse Resp BP SpO2   08/15/18 1542 98.5 °F (36.9 °C) 67 18 126/80 98 %   08/15/18 1104 98.6 °F (37 °C) 61 18 127/70 96 %   08/15/18 0737 97.8 °F (36.6 °C) (!) 58 18 135/83 98 %       Pain Assessment  Pain 1  Pain Scale 1: Numeric (0 - 10) (08/15/18 1539)  Pain Intensity 1: 2 (08/15/18 1539)  Patient Stated Pain Goal: 0 (08/15/18 0723)  Pain Reassessment 1: Yes (08/15/18 1539)  Pain Onset 1: pta (08/14/18 0507)  Pain Location 1: Generalized (08/15/18 1503)  Pain Orientation 1: Right (08/15/18 1112)  Pain Description 1: Aching (08/15/18 1503)  Pain Intervention(s) 1: Medication (see MAR) (08/15/18 1503)    Ambulating  No    Virginia Moseley    1850-Bedside shift change report given to Wen Christopher (oncoming nurse) by Aime Cardona RN (offgoing nurse). Report included the following information SBAR, Kardex, Intake/Output, MAR and Recent Results.

## 2018-08-15 NOTE — PROGRESS NOTES
Problem: Mobility Impaired (Adult and Pediatric)  Goal: *Acute Goals and Plan of Care (Insert Text)  STG:  (1.)Ms. Levi Figueroa will move from supine to sit and sit to supine , scoot up and down and roll side to side with CONTACT GUARD ASSIST within 3 treatment day(s). (2.)Ms. Levi Figueroa will transfer from bed to chair and chair to bed with CONTACT GUARD ASSIST using the least restrictive device within 3 treatment day(s). (3.)Ms. Levi Figueroa will ambulate with CONTACT GUARD ASSIST for 30 feet with the least restrictive device within 3 treatment day(s). LTG:  (1.)Ms. Levi Figueroa will move from supine to sit and sit to supine , scoot up and down and roll side to side in bed with MODIFIED INDEPENDENCE within 7 treatment day(s). (2.)Ms. Levi Figueroa will transfer from bed to chair and chair to bed with MODIFIED INDEPENDENCE using the least restrictive device within 7 treatment day(s). (3.)Ms. Levi Figueroa will ambulate with MODIFIED INDEPENDENCE for 150+ feet with the least restrictive device within 7 treatment day(s). (4.)Ms. Levi Figueroa will ascend/descend 2 steps with one hand rail with STAND BY ASSIST within 7 treatment days. ________________________________________________________________________________________________      PHYSICAL THERAPY: Daily Note, Treatment Day: 4th, PM 8/15/2018  INPATIENT: Hospital Day: 10  Payor: Mia Henley / Plan: [facility] / Product Type: PPO /      NAME/AGE/GENDER: Lilian Chávez is a 62 y.o. female   PRIMARY DIAGNOSIS: osteomyelitis  Osteomyelitis ([facility] [address] <principal problem not specified> <principal problem not specified>        ICD-10: Treatment Diagnosis:    · Generalized Muscle Weakness (M62.81)  · Difficulty in walking, Not elsewhere classified (R26.2)  · History of falling (Z91.81)   Precaution/Allergies:  Latex; Adhesive tape-silicones; Ativan [lorazepam]; Bactrim [sulfamethoprim ds]; Lexapro [escitalopram oxalate]; Macrobid [nitrofurantoin monohyd/m-cryst];  Pcn [penicillins]; and Pyridium [phenazopyridine]     R LE WBAT   ASSESSMENT:     Ms. Barbara Betts requested a later time on first attempt. In PM she agreed with encouragement. She sat to EOB. Performed sit to stand and took side steps toward [facility] then down toward foot of bed. She sat and rested and then did it again. She sat back at EOB and after resting she performed bilateral LE ex at EOB. Returned supine and performed ex in bed. All ex listed below. No goals met. Was able to do more this treatment than last d/t chemo. This section established at most recent assessment   PROBLEM LIST (Impairments causing functional limitations):  1. Decreased Strength  2. Decreased ADL/Functional Activities  3. Decreased Transfer Abilities  4. Decreased Ambulation Ability/Technique  5. Decreased Balance  6. Increased Pain  7. Decreased Activity Tolerance  8. Decreased Pacing Skills  9. Decreased Work Simplification/Energy Conservation Techniques  10. Decreased Knowledge of Precautions  11. Decreased Jessamine with Home Exercise Program   INTERVENTIONS PLANNED: (Benefits and precautions of physical therapy have been discussed with the patient.)  1. Balance Exercise  2. Bed Mobility  3. Family Education  4. Gait Training  5. Home Exercise Program (HEP)  6. Manual Therapy  7. Neuromuscular Re-education/Strengthening  8. Range of Motion (ROM)  9. Therapeutic Activites  10. Therapeutic Exercise/Strengthening  11. Transfer Training  12. Group Therapy     TREATMENT PLAN: Frequency/Duration: 3 times a week for duration of hospital stay  Rehabilitation Potential For Stated Goals: Good     RECOMMENDED REHABILITATION/EQUIPMENT: (at time of discharge pending progress): Due to the probability of continued deficits (see above) this patient will likely need continued skilled physical therapy after discharge.   Equipment:    None at this time              HISTORY:   History of Present Injury/Illness (Reason for Referral):  Patient is a 62 y.o. female admitted on 8/6 to transfer care to oncology after admission at [facility] for R knee OM/septic arthritis. She states that she fell ~11 weeks ago and presented to ortho for R knee cortisone injection ~ 5 weeks ago. Pain continued to worsened and MRI performed showing AVN with ? Infection vs metastatic disease. She was admitted to [facility] from 7/31-8/6 for open bx and surgical intervention. Bx showing OM/septic arthritis. Debridement was performed on femur and knee (7/31). OP cx + MSSA. Infectious disease at [facility] placed patient on Ancef 2g q8h. Also, BCs  On 7/31 + MSSA and staph lugdunensis. PORT removed and repeat BCs NG (8/2). TTE NG. She has remained afebrile, BCx1 NGTD. 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 MSSA bacteremia/AVIE (11/2017) related to R chest PORT s/p removal. She was treated to Ancef---then Daptomycin (changed due to poor GI tolerance-nausea). Last seen in ID office in 12/2017 by me. Past Medical History/Comorbidities:   Ms. Amita Gonzáles  has a past medical history of Arthritis; Colon cancer ([facility] [address] Gastrointestinal disorder; GERD (gastroesophageal reflux disease); Non-intractable cyclical vomiting with nausea (4/30/2018); Other ill-defined conditions(799.89); and Staph aureus infection (12/29/2017). She also has no past medical history of Adverse effect of anesthesia; Difficult intubation; Malignant hyperthermia due to anesthesia; or Pseudocholinesterase deficiency. Ms. Amita Gonzáles  has a past surgical history that includes hx lap cholecystectomy; hx gyn; hx tonsillectomy; hx adenoidectomy; reggie biopsy breast stereotactic (Left, 10-8-2014); hx knee arthroscopy (Left, 2014); and hx vascular access.   Social History/Living Environment:   Home Environment: Private residence  # Steps to Enter: 2  Rails to Enter: Yes  Hand Rails : Right  Wheelchair Ramp: No  One/Two Story Residence: One story  Living Alone: No  Support Systems: Spouse/Significant Other/Partner  Patient Expects to be Discharged to[de-identified] Private residence  Current DME Used/Available at Home: Caesar Guerrero, rollator, Wheelchair  Prior Level of Function/Work/Activity:  Uses WC for mobility, lives with  who helps with all transfers and ADLs, doesn't drive, used rollator for limited household ambulation prior to fall, one fall ~2 months ago     Number of Personal Factors/Comorbidities that affect the Plan of Care: 3+: HIGH COMPLEXITY   EXAMINATION:   Most Recent Physical Functioning:   Gross Assessment:                  Posture:     Balance:  Sitting: Impaired  Sitting - Static: Fair (occasional)  Sitting - Dynamic: Fair (occasional)  Standing: Impaired  Standing - Static: Poor  Standing - Dynamic : Poor Bed Mobility:  Rolling: Minimum assistance  Supine to Sit: Minimum assistance  Sit to Supine: Minimum assistance  Wheelchair Mobility:     Transfers:  Sit to Stand: Moderate assistance;Assist x2  Stand to Sit: Moderate assistance;Assist x2  Gait:  Right Side Weight Bearing: As tolerated  Base of Support: Center of gravity altered;Narrowed  Speed/Kay: Shuffled; Slow  Gait Abnormalities: Antalgic;Decreased step clearance  Distance (ft): 3 Feet (ft) (x 2)  Assistive Device:  (none-HHA)  Ambulation - Level of Assistance: Moderate assistance;Assist x2      Body Structures Involved:  1. Heart  2. Lungs  3. Bones  4. Joints  5. Muscles  6. Ligaments Body Functions Affected:  1. Sensory/Pain  2. Cardio  3. Respiratory  4. Neuromusculoskeletal  5. Movement Related Activities and Participation Affected:  1. Mobility  2. Self Care  3. Domestic Life  4.  Interpersonal Interactions and Relationships   Number of elements that affect the Plan of Care: 4+: HIGH COMPLEXITY   CLINICAL PRESENTATION:   Presentation: Evolving clinical presentation with changing clinical characteristics: MODERATE COMPLEXITY   CLINICAL DECISION MAKIN [facility] Mobility Inpatient Short Form  How much difficulty does the patient currently have. .. Unable A Lot A Little None   1. Turning over in bed (including adjusting bedclothes, sheets and blankets)? [] 1   [] 2   [x] 3   [] 4   2. Sitting down on and standing up from a chair with arms ( e.g., wheelchair, bedside commode, etc.)   [] 1   [x] 2   [] 3   [] 4   3. Moving from lying on back to sitting on the side of the bed? [] 1   [x] 2   [] 3   [] 4   How much help from another person does the patient currently need. .. Total A Lot A Little None   4. Moving to and from a bed to a chair (including a wheelchair)? [x] 1   [] 2   [] 3   [] 4   5. Need to walk in hospital room? [x] 1   [] 2   [] 3   [] 4   6. Climbing 3-5 steps with a railing? [x] 1   [] 2   [] 3   [] 4   © 2007, Trustees of [address] [place] [address], under license to [employer]. All rights reserved      Score:  Initial: 10 Most Recent: X (Date: -- )    Interpretation of Tool:  Represents activities that are increasingly more difficult (i.e. Bed mobility, Transfers, Gait). Score 24 23 22-20 19-15 14-10 9-7 6     Modifier CH CI CJ CK CL CM CN      ? Mobility - Walking and Moving Around:     - CURRENT STATUS: CL - 60%-79% impaired, limited or restricted    - GOAL STATUS: CK - 40%-59% impaired, limited or restricted    - D/C STATUS:  ---------------To be determined---------------  Payor: BLUE CROSS / Plan: [place] [facility] / Product Type: PPO /      Medical Necessity:     · Patient is expected to demonstrate progress in strength, range of motion, balance, coordination and functional technique to decrease assistance required with transfers and functional mobility. Reason for Services/Other Comments:  · Patient continues to require skilled intervention due to impaired functional mobility and activity tolerance.    Use of outcome tool(s) and clinical judgement create a POC that gives a: Questionable prediction of patient's progress: MODERATE COMPLEXITY            TREATMENT:   (In addition to Assessment/Re-Assessment sessions the following treatments were rendered)   Pre-treatment Symptoms/Complaints: \"Thank you for coming. I know I'm not being very nice. \"  Pain: Initial: did not rate- knee pain     Post Session: tired     Therapeutic Activity: (    14 min ):  Therapeutic activities including to improve mobility, strength and balance. Required max x 2   to promote dynamic balance in standing. Therapeutic Exercise: ( 10  min):  Exercises per grid below to improve mobility, strength and balance. Required mod visual and verbal cues to promote proper body alignment, promote proper body posture and promote proper body mechanics. Date:  8/15/18 Date:   Date:     Activity/Exercise Seated Parameters Parameters Parameters   Heel raises 2 x 10 B     Toe raises 2 x 10 B     LAQ's X 10 B     Hip Flex X 10 B     Hip ABD X 10 B                                      Supine      Heel slides X 10 B     SAQ's X 10 B     bridges X 5              Braces/Orthotics/Lines/Etc:   · none  ·    Treatment/Session Assessment:    · Response to Treatment:  See above  · Interdisciplinary Collaboration:   o Registered Nurse  · After treatment position/precautions:   o Bed/Chair-wheels locked  o Bed in low position   · Compliance with Program/Exercises: Will assess as treatment progresses. · Recommendations/Intent for next treatment session: \"Next visit will focus on reduction in assistance provided\".   Total Treatment Duration:  PT Patient Time In/Time Out  Time In: 6309  Time Out: Nurys Shelton PTA

## 2018-08-16 NOTE — PROGRESS NOTES
END OF SHIFT NOTE:  Patient medicated with pain medication as direct . lowest pain level acquire 8/10  Intake/Output  08/16 0701 - 08/16 1900  In: 700 [P.O.:700]  Out: 700 [Urine:700]   Voiding: yes   Catheter: no    Drain:   Colostomy 03/13/18 Mid Abdomen (Active)   Drainage Color Harlo Nissen 8/16/2018  3:51 PM   Site Assessment Intact; Moist 8/16/2018  3:51 PM   Treatment Other (Comment) 8/14/2018  7:45 AM               Stool:  No  occurrences. Stool Assessment  Stool Appearance: Soft (08/15/18 2025)    Emesis:  No occurrences. VITAL SIGNS  Patient Vitals for the past 12 hrs:   Temp Pulse Resp BP SpO2   08/16/18 1512 98.3 °F (36.8 °C) 90 18 117/87 100 %   08/16/18 1133 98.1 °F (36.7 °C) 63 17 145/79 99 %   08/16/18 0735 98.2 °F (36.8 °C) 68 17 148/78 99 %       Pain Assessment  Pain 1  Pain Scale 1: Numeric (0 - 10) (08/16/18 1829)  Pain Intensity 1: 8 (08/16/18 1829)  Patient Stated Pain Goal: 0 (08/16/18 0330)  Pain Reassessment 1: Patient sleeping (08/16/18 1425)  Pain Onset 1: PTA (08/16/18 0734)  Pain Location 1: Knee (08/16/18 1725)  Pain Orientation 1: Right (08/16/18 1725)  Pain Description 1: Aching (08/16/18 1725)  Pain Intervention(s) 1: Medication (see MAR) (08/16/18 1725)    Ambulating  Ye sin room     Additional Information:  See above     Shift report given to oncoming nurse Bryson Jimenez RN at the bedside.     Stephanie Bah RN

## 2018-08-16 NOTE — PROGRESS NOTES
[facility]        Inpatient Hematology / Oncology Progress Note      Admission Date: 2018  7:49 PM  Reason for Admission/Hospital Course: osteomyelitis  Osteomyelitis ([address]      24 Hour Events:  Afebrile, VSS  S/p FOLFOX   Awaiting auth for Trg Marj 13   at bedside    ROS:  Constitutional: negative for fever, chills, +weakness, +malaise,+ fatigue. CV: negative for chest pain, palpitations, edema. Respiratory: negative for dyspnea, cough, wheezing. GI: negative for nausea, abdominal pain, diarrhea. MSK: +R knee pain    10 point review of systems is otherwise negative with the exception of the elements mentioned above in the HPI. Allergies   Allergen Reactions    Latex Rash    Adhesive Tape-Silicones Rash     Bad red rash.  Ativan [Lorazepam] Other (comments)     Pt states \"gets hallucinations and confused\"    Bactrim [Sulfamethoprim Ds] Hives     Swelling and itching      Lexapro [Escitalopram Oxalate] Other (comments)    Macrobid [Nitrofurantoin Monohyd/M-Cryst] Other (comments)     Felt  \"out of it\"    Pcn [Penicillins] Hives     Swelling and itching also. Pt has tolerated cephalosporins in the past.    Pyridium [Phenazopyridine] Hives     Also itching and swelling. OBJECTIVE:  Patient Vitals for the past 8 hrs:   BP Temp Pulse Resp SpO2   18 0735 148/78 98.2 °F (36.8 °C) 68 17 99 %   18 0417 157/81 97.4 °F (36.3 °C) (!) 55 16 98 %     Temp (24hrs), Av.8 °F (36.6 °C), Min:97.2 °F (36.2 °C), Max:98.5 °F (36.9 °C)     0701 -  1900  In: 220 [P.O.:220]  Out: -     Physical Exam:  Constitutional: Well developed, well nourished female in no acute distress, lying comfortably in the hospital bed. HEENT: Normocephalic and atraumatic. Oropharynx is clear, mucous membranes are moist. Extraocular muscles are intact. Sclerae anicteric. Skin Warm and dry. No rash noted. No erythema. No pallor. Steri-strips intact to R knee. Respiratory Lungs are clear to auscultation bilaterally without wheezes, rales or rhonchi, normal air exchange without accessory muscle use. CVS Normal rate, regular rhythm and normal S1 and S2. No murmurs, gallops, or rubs. Abdomen Soft, nontender and nondistended, normoactive bowel sounds. No palpable mass. No hepatosplenomegaly. Neuro Grossly nonfocal with no obvious sensory or motor deficits. MSK Normal range of motion in general. Right knee pain. Psych Appropriate mood and affect.         Labs:      Recent Labs      08/16/18   0815  08/15/18   1111  08/14/18   0257   WBC  5.6  6.1  5.3   RBC  2.65*  2.84*  2.53*   HGB  8.7*  9.3*  8.2*   HCT  26.1*  28.0*  25.5*   MCV  98.5*  98.6*  100.8*   MCH  32.8  32.7  32.4   MCHC  33.3  33.2  32.2   RDW  14.7  14.9  15.2   PLT  300  289  252   GRANS  82*  88*  85*   LYMPH  15  10*  12*   MONOS  2*  1*  2*   EOS  0*  0*  0*   BASOS  0  0  0   IG  2  1  1   DF  AUTOMATED  AUTOMATED  AUTOMATED   ANEU  4.6  5.4  4.5   ABL  0.8  0.6  0.7   ABM  0.1  0.1  0.1   RICARDO  0.0  0.0  0.0   ABB  0.0  0.0  0.0   AIG  0.1  0.1  0.0        Recent Labs      08/16/18   0815  08/16/18   0346  08/15/18   0416  08/14/18   0257   NA  136   --   136  136   K  4.1   --   4.3  4.2   CL  100   --   100  99   CO2  29   --   28  28   AGAP  7   --   8  9   GLU  128*   --   145*  137*   BUN  15   --   16  15   CREA  0.34*   --   0.37*  0.33*   GFRAA  >60   --   >60  >60   GFRNA  >60   --   >60  >60   CA  7.8*   --   8.3  8.2*   SGOT  27   --   31  48*   AP  148*   --   158*  153*   TP  5.8*   --   6.3  5.8*   ALB  2.5*   --   2.6*  2.2*   GLOB  3.3   --   3.7*  3.6*   AGRAT  0.8*   --   0.7*  0.6*   MG   --   2.0  2.3  2.0         Imaging:  CT CHEST ABD PELV W CONT [[id number]] Collected: 08/10/18 1756      Order Status: Completed Updated: 08/10/18 1819     Narrative:       CT CHEST, ABDOMEN, PELVIS WITH CONTRAST:      CLINICAL HISTORY:   Restaging colon cancer with brain metastases. TECHNIQUE:  Oral and nonionic intravenous contrast was administered, and the  torso was scanned with spiral technique.  Radiation dose reduction was achieved  using one or all of the following techniques: automated exposure control,  weight-based dosing, iterative reconstruction. COMPARISON:  April 26, 2018. CT CHEST: There has been interval increase in number and size of pulmonary  parenchymal metastases and of mediastinal and hilar lymphadenopathy, including a  new 3.1 cm lateral aortic adelina metastasis.  The thyroid appears normal as  imaged.      CT ABDOMEN:  There are now innumerable hepatic metastases, increased from  approximately 3 on the prior study.  The spleen, pancreas, and kidneys appear  unremarkable.  Bilobed left adrenal metastasis now has aggregate diameter of  approximately 6.0 cm, and right adrenal metastasis is slightly larger as well.    No pathologically enlarged lymph nodes are evident.      CT PELVIS: The appendix appears normal.  Metastasis contiguous with the vaginal  cuff measures approximate 4.4 x 3.3 cm today compared with 2.7 x 2.1 cm in  April.  Partial left colectomy is again noted with unremarkable appearance of  the colostomy.  Bone windows demonstrate no aggressive process, accounting for  degenerative changes.         Impression:       IMPRESSION: Marked interval progression of soft tissue metastatic disease in the  torso, as detailed above.     MRI BRAIN W WO CONT [[id number]] Collected: 08/10/18 1158     Order Status: Completed Updated: 08/10/18 1241     Narrative:       EXAMINATION: BRAIN MRI 8/10/2018 10:43 AM    ACCESSION NUMBER: [id number]    INDICATION: assess CNS metastatic disease    Additional clinical history from the electronic medical record: CyberKnife  therapy 9/12/2017 2 bilateral parietal lobe lesions, CyberKnife therapy to left  cerebellar hemisphere lesion 1/18/2018    COMPARISON: Brain MRI 7/10/2018    TECHNIQUE: Multiplanar multisequence MRI of the brain without and with  intravenous administration of 12 cc Dotarem contrast agent. FINDINGS:    Previously index metastatic lesions are as follows:  -Enlarging 2.0 cm periaqueductal metastasis, previously 1.7 cm (axial  postcontrast image 131). -Minimally enlarged 1.9 cm left brachium pontis lesion, previously 1.8 cm (axial  postcontrast image 114). -1.0 cm left frontal lobe metastasis, previously 0.6 cm (axial postcontrast  image 246). -0.4 cm right precentral gyrus lesion, previously 0.7 cm (axial postcontrast  image 269). -Stable 2.1 x 2.0 cm left postcentral gyrus lesion (axial postcontrast image  247). -0.7 cm left frontal lobe lesion, previously 0.4 cm (axial postcontrast image  278). -Stable 0.6 cm anterior left occipital lobe lesion (axial postcontrast image  140). -0.4 cm more posterior left occipital lobe lesion, previously 0.6 cm (axial  postcontrast image 151). -0.4 cm anterior right temporal lobe lesion, previously 0.5 cm (axial  postcontrast image 129). -0.5 cm right cerebellar hemisphere lesion, not significantly changed (axial  postcontrast image 75). -Stable 0.7 cm anterior right parietal lesion (axial postcontrast image 261). There is no evidence of new enhancing intracranial metastases. Vasogenic edema throughout the midbrain and sean has worsened in comparison to  the prior exam, along with worsening vasogenic edema involving the middle  cerebellar peduncles and superior aspect of the left cerebellar hemisphere. This  corresponds to the enlargement of the lesions within the left brachium pontis  and periaqueductal region. This results in worsening mass effect upon the fourth ventricle. There is subtly increased caliber of the third ventricle and lateral ventricles  in comparison to the previous exam.    Vasogenic edema surrounding the anterior left frontal lobe lesion has worsened. Multifocal supratentorial vasogenic edema is otherwise unchanged.     The expected large vascular flow voids are preserved. Diffusion imaging shows no evidence of acute infarction.       Impression:       There are no suspicious osseous lesions. IMPRESSION:    Mixed response to therapy with enlargement of several previously indexed  metastases and reduction in size of other previously seen lesions. There is no evidence of new intracranial metastatic disease. Of note, lesions within the brachium pontis on the left and within the  periaqueductal region have enlarged. Surrounding vasogenic edema has  substantially worsened. The combination of these findings results in partial  effacement of the fourth ventricle, worsened in comparison to the prior exam.  There is very subtle enlargement of the lateral and third ventricles in  comparison to the prior exam. This raises the possibility of the very early  manifestations of obstructive hydrocephalus. ASSESSMENT:    Problem List  Date Reviewed: 7/7/2018          Codes Class Noted    Osteomyelitis ([facility] ICD-10-CM: M86.9  ICD-9-CM: 730.20  8/6/2018        Non-intractable cyclical vomiting with nausea ICD-10-CM: G43. 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  ICD-9-CM: 619.1  1/29/2018        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 (MSSA) ICD-10-CM: I33.0, B95.61  ICD-9-CM: 421.0, 041.11  38/54/2368        Systolic congestive heart failure (HCC) ICD-10-CM: I50.20  ICD-9-CM: 428.20, 428.0  11/6/2017        Severe sepsis [facility]) ICD-10-CM: A41.9, R65.20  ICD-9-CM: 038.9, 995.92  11/1/2017        Colon cancer metastasized to brain [facility]) ICD-10-CM: C18.9, C79.31  ICD-9-CM: 153.9, 198.3  8/30/2017 Malignant neoplasm of descending colon ([facility] ICD-10-CM: C18.6  ICD-9-CM: 153.2  8/30/2017        Pulmonary metastases ([facility] ICD-10-CM: C78.00  ICD-9-CM: 197.0  8/17/2017        Secondary malignant neoplasm of brain [facility]) ICD-10-CM: C79.31  ICD-9-CM: 198.3  8/17/2017        Mass of colon ICD-10-CM: K63.9  ICD-9-CM: 569.89  8/17/2017        Breast cancer screening ICD-10-CM: Z12.31  ICD-9-CM: V76.10  8/17/2017                PLAN:  Osteomyelitis- continue ancef for 6 weeks per ID.  8/14 Stitches placed 7/31/2018 with debridement. Okay to remove 2 weeks per Dr. Jessa Alberts. Please remove today.      Metastatic colon cancer with mets to brain-last treatment FOLFOX and bevacizumab 1/24/2018.   8/8 Resume PTA keppra. On decadron 2 mg q 12 hours  8/10 MRI brain: Mixed response to therapy with enlargement of several previously indexed metastases and reduction in size of other  previously seen lesions. There is no evidence of new intracranial metastatic disease. Of note, lesions within the brachium pontis on the left  and within the periaqueductal region have enlarged. Surrounding vasogenic edema has substantially worsened. The combination of these findings results in partial effacement of the fourth ventricle, worsened in comparison to the prior exam. There is very subtle enlargement of the lateral and third ventricles in comparison to the prior exam. This raises the possibility of the very early manifestations of obstructive hydrocephalus. Ordered CT CAP for restaging. CEA and CA 19.9. Planning for dose reduced FOLFOX without avastin tomorrow  8/11 FOLFOX today. Avastin if available. Increased dex to 4 q 8 hours  8/13 Dr. Quevedo Gist consult pending  8/14 per Dr. Quevedo Gist the progression noted on MRI brain is actually pseudo progression. He recommended repeat imaging in 2 months and schedule follow up with him after scan. 8/16 Awaiting placement    Headache  8/8 if continues consider brain scan  8/9 Resolved.      Left eye droop  8/12 consult neuro  8/14 agree with steroids  8/16 Neuro recommends aggressive treatment d/t edema causing symptoms and risk of compression to 4th ventricle. Con't antiepileptic drugs.     PT/OT consulted  Continue home meds   Stephanie SOPs  Lovenox for DVT prophylaxis  Discharge once placement approved    Disposition:  Awaiting auth for Trg Marj 13. Can discharge once approved. [facility]  [address] [address]  Office : [phone]  Fax : [phone]         Attending Addendum:  I have personally performed a face to face diagnostic evaluation on this patient. I have reviewed and agree with the care plan as documented by Ana Paula Dietrich N.P.  My findings are as follows: She has metastatic colon cancer with brain mets and pseudo-progression, appears stable and afebrile, heart rate regular without murmurs, abdomen is non-tender, bowel sounds are positive, we will continue Keppra and Decadron, she may be able to go to a SNF if her insurance company approves it.               MD [facility]  [address] [id number] [facility]  Office : [phone]  Fax : [phone]

## 2018-08-16 NOTE — PROGRESS NOTES
END OF SHIFT NOTE:    Intake/Output  08/15 1901 - 08/16 0700  In: 75 [P.O.:75]  Out: -    Voiding: YES  Catheter: NO  Drain:   Colostomy 03/13/18 Mid Abdomen (Active)   Drainage Color Leandrew Fly 8/14/2018  2:30 PM   Site Assessment Clean, dry, intact 8/14/2018  2:30 PM   Treatment Other (Comment) 8/14/2018  7:45 AM               Stool:  0 occurrences. Stool Assessment  Stool Appearance: Soft (08/15/18 2025)    Emesis:  0 occurrences. VITAL SIGNS  Patient Vitals for the past 12 hrs:   Temp Pulse Resp BP SpO2   08/16/18 0417 97.4 °F (36.3 °C) (!) 55 16 157/81 98 %   08/15/18 2258 97.2 °F (36.2 °C) 71 - 110/67 98 %   08/15/18 2040 97.6 °F (36.4 °C) 70 18 129/76 98 %       Pain Assessment  Pain 1  Pain Scale 1: Visual (08/16/18 0330)  Pain Intensity 1: 0 (08/16/18 0330)  Patient Stated Pain Goal: 0 (08/16/18 0330)  Pain Reassessment 1: Patient sleeping (08/16/18 0330)  Pain Onset 1: pta (08/14/18 0507)  Pain Location 1: Knee (08/16/18 0247)  Pain Orientation 1: Right (08/16/18 0247)  Pain Description 1: Aching (08/15/18 2025)  Pain Intervention(s) 1: Medication (see MAR) (08/16/18 0247)    Ambulating  No    Additional Information:     Pt rested well through night. Complained of pain in the R knee, Dilaudid 1mg was given x2 during shift. Xanax 1mg tablet was given at bedtime. Pt slept well. There are no further needs expressed at this time. Shift report given to oncoming nurse Christa Sandoval RN at the bedside.     Bg Wall RN

## 2018-08-16 NOTE — PROGRESS NOTES
Problem: Self Care Deficits Care Plan (Adult)  Goal: *Acute Goals and Plan of Care (Insert Text)  1. Patient will complete grooming with setup. 2. Patient will complete toileting with minimal assistance. 3. Patient will tolerate 20 minutes of OT treatment with as needed rest breaks to increase activity tolerance for ADLs. 4. Patient will complete functional transfers with supervision and adaptive equipment as needed. 5. Pt will demonstrate independence with HEP to promote > BUE strength and overall endurance for ADLs and mobility with ADLs    Timeframe: 7 visits       OCCUPATIONAL THERAPY: Daily Note, Treatment Day: 3rd and PM 8/16/2018  INPATIENT: Hospital Day: 11  Payor: Prakash López / Plan: [place] "[employer] [address] / Product Type: PPO /      NAME/AGE/GENDER: Sam Bright is a 62 y.o. female   PRIMARY DIAGNOSIS:  osteomyelitis  Osteomyelitis ([facility] [address] <principal problem not specified> <principal problem not specified>        ICD-10: Treatment Diagnosis:    · Pain in Right Knee (M25.561)  · Stiffness of Right Knee, Not elsewhere classified (M25.661)  · Repeated Falls (R29.6)   Precautions/Allergies:    WBAT RLE   Latex; Adhesive tape-silicones; Ativan [lorazepam]; Bactrim [sulfamethoprim ds]; Lexapro [escitalopram oxalate]; Macrobid [nitrofurantoin monohyd/m-cryst]; Pcn [penicillins]; and Pyridium [phenazopyridine]      ASSESSMENT:     Ms. Amita Gonzáles is a 62year old female admitted from [facility] where she underwent debridement of femur/ knee on 7/31 by Dr. Mary Sandy for osteomyelitis and septic arthritis. Patient has history of metastatic cancer to brain and lung. Per Dr. Tito Pacheco office, patient is WBAT In RLE. Patient lives with spouse who helps her with all ADLs at home. She is very sedentary and is only able to walk short distances. 8/16/2018 Pt was supine in bed upon arrival with spouse at bedside. Spouse was good to encourage patient to participate in OT this afternoon. Pt is very weak.  Pt sat to the edge of the bed with minimal assistance. Pt's balance was poor and falls to the L at times in sitting. Pt attempted to stand x 2 to the walker with poor ability to extend LEs. Pt did better standing with therapist and taking a few steps to the chair. Pt participated in UE exercises sitting in chair with moderate cues for accuracy. Pt tearful during session because she is discouraged she is so weak. Pt left up in chair at end of session and nurse notified. This section established at most recent assessment   PROBLEM LIST (Impairments causing functional limitations):  1. Decreased Strength  2. Decreased ADL/Functional Activities  3. Decreased Transfer Abilities  4. Decreased Ambulation Ability/Technique  5. Decreased Balance  6. Increased Pain  7. Decreased Flexibility/Joint Mobility  8. Edema/Girth   INTERVENTIONS PLANNED: (Benefits and precautions of occupational therapy have been discussed with the patient.)  1. Activities of daily living training  2. Adaptive equipment training  3. Group therapy  4. Therapeutic activity  5. Therapeutic exercise     TREATMENT PLAN: Frequency/Duration: Follow patient 3x/ week to address above goals. Rehabilitation Potential For Stated Goals: Good     RECOMMENDED REHABILITATION/EQUIPMENT: (at time of discharge pending progress): Due to the probability of continued deficits (see above) this patient will likely need continued skilled occupational therapy after discharge. Equipment:    None at this time              OCCUPATIONAL PROFILE AND HISTORY:   History of Present Injury/Illness (Reason for Referral):  See H&P   Past Medical History/Comorbidities:   Ms. Dariela Small  has a past medical history of Arthritis; Colon cancer ([facility] [place] [address] Gastrointestinal disorder; GERD (gastroesophageal reflux disease); Non-intractable cyclical vomiting with nausea (4/30/2018); Other ill-defined conditions(799.89); and Staph aureus infection (12/29/2017).  She also has no past medical history of Adverse effect of anesthesia; Difficult intubation; Malignant hyperthermia due to anesthesia; or Pseudocholinesterase deficiency. Ms. Zaragoza Sep  has a past surgical history that includes hx lap cholecystectomy; hx gyn; hx tonsillectomy; hx adenoidectomy; reggie biopsy breast stereotactic (Left, 10-8-2014); hx knee arthroscopy (Left, 2014); and hx vascular access. Social History/Living Environment:   Home Environment: Private residence  # Steps to Enter: 2  Rails to Enter: Yes  Hand Rails : Right  Wheelchair Ramp: No  One/Two Story Residence: One story  Living Alone: No  Support Systems: Spouse/Significant Other/Partner  Patient Expects to be Discharged to[de-identified] Private residence  Current DME Used/Available at Home: Walker, rollator, Wheelchair  Prior Level of Function/Work/Activity:  Lives with spouse. He helps with bathing, dressing, and toileting. Can feed herself. Sedentary lifestyle. Was having [facility] PT to work on walking with walker. Otherwise uses the wheelchair. Number of Personal Factors/Comorbidities that affect the Plan of Care: Expanded review of therapy/medical records (1-2):  MODERATE COMPLEXITY   ASSESSMENT OF OCCUPATIONAL PERFORMANCE[de-identified]   Activities of Daily Living:   Basic ADLs (From Assessment) Complex ADLs (From Assessment)   Feeding: Setup  Oral Facial Hygiene/Grooming: Setup  Bathing: Maximum assistance  Upper Body Dressing: Minimum assistance  Lower Body Dressing: Maximum assistance  Toileting: Maximum assistance     Grooming/Bathing/Dressing Activities of Daily Living   Grooming  Brushing/Combing Hair: Minimum assistance       Feeding  Drink to Mouth: Supervision/set-up               Lower Body Dressing Assistance  Socks: Total assistance (dependent) Bed/Mat Mobility  Rolling: Minimum assistance  Supine to Sit: Minimum assistance  Sit to Supine: Minimum assistance  Sit to Stand:  Moderate assistance;Assist x2  Bed to Chair: Moderate assistance (Stand step transfers)       Most Recent Physical Functioning:   Gross Assessment:                  Posture:     Balance:  Sitting: Impaired  Sitting - Static: Fair (occasional)  Sitting - Dynamic: Poor (constant support)  Standing: Impaired  Standing - Static: Poor  Standing - Dynamic : Poor Bed Mobility:  Rolling: Minimum assistance  Supine to Sit: Minimum assistance  Sit to Supine: Minimum assistance  Wheelchair Mobility:     Transfers:  Sit to Stand: Moderate assistance;Assist x2  Bed to Chair: Moderate assistance (Stand step transfers)            Patient Vitals for the past 6 hrs:   BP BP Patient Position SpO2 Pulse   08/16/18 1133 145/79 At rest 99 % 63   08/16/18 1512 117/87 At rest 100 % 90       Mental Status  Neurologic State: Alert, Appropriate for age  Orientation Level: Oriented X4  Cognition: Appropriate decision making, Appropriate for age attention/concentration, Appropriate safety awareness, Follows commands  Perception: Appears intact  Perseveration: No perseveration noted  Safety/Judgement: Fall prevention                          Physical Skills Involved:  1. Range of Motion  2. Balance  3. Strength  4. Activity Tolerance  5. Pain (acute) Cognitive Skills Affected (resulting in the inability to perform in a timely and safe manner):  1. None Psychosocial Skills Affected:  1. Habits/Routines   Number of elements that affect the Plan of Care: 5+:  HIGH COMPLEXITY   CLINICAL DECISION MAKING:   [employer] AM-PAC 6 Clicks   Daily Activity Inpatient Short Form  How much help from another person does the patient currently need. .. Total A Lot A Little None   1. Putting on and taking off regular lower body clothing? [] 1   [x] 2   [] 3   [] 4   2. Bathing (including washing, rinsing, drying)? [] 1   [x] 2   [] 3   [] 4   3. Toileting, which includes using toilet, bedpan or urinal?   [] 1   [x] 2   [] 3   [] 4   4. Putting on and taking off regular upper body clothing? [] 1   [] 2   [x] 3   [] 4   5.   Taking care of personal grooming such as brushing teeth? [] 1   [] 2   [x] 3   [] 4   6. Eating meals? [] 1   [] 2   [x] 3   [] 4   © 2007, Trustees of [facility] [employer], under license to [employer]. All rights reserved      Score:  Initial: 15 Most Recent: X (Date: -- )    Interpretation of Tool:  Represents activities that are increasingly more difficult (i.e. Bed mobility, Transfers, Gait). Score 24 23 22-20 19-15 14-10 9-7 6     Modifier CH CI CJ CK CL CM CN      ? Self Care:     - CURRENT STATUS: CK - 40%-59% impaired, limited or restricted    - GOAL STATUS: CJ - 20%-39% impaired, limited or restricted    - D/C STATUS:  ---------------To be determined---------------  Payor: BLUE CROSS / Plan: [place] [employer] OF [address] [place] [address] Rd / Product Type: PPO /      Medical Necessity:     · Patient demonstrates good rehab potential due to higher previous functional level. Reason for Services/Other Comments:  · Patient continues to require present interventions due to patient's inability to care for self at baseline level of function. Use of outcome tool(s) and clinical judgement create a POC that gives a: MODERATE COMPLEXITY         TREATMENT:   (In addition to Assessment/Re-Assessment sessions the following treatments were rendered)     Pre-treatment Symptoms/Complaints:    Pain: Initial:   Pain Intensity 1: 8  Pain Location 1: Knee  Pain Orientation 1: Right  Pain Intervention(s) 1: Repositioned  Post Session:  same   Therapeutic Activity: (    14): Therapeutic activities including Bed transfers, Chair transfers, Ambulation on level ground and sitting balance edge of bed to improve mobility, strength, balance and coordination. Required moderate   to promote static and dynamic balance in standing.     n/a    Therapeutic Exercise: (  15 minutes):  Exercises per grid below to improve strength and activity tolerance. Required minimal visual cues to promote proper body alignment. Progressed resistance and range as indicated. Date:  8/13/18 Date:  8/16/18 Date:     Activity/Exercise Parameters yellow theraband Parameters  Red theraband Parameters   Horizontal abduction 20 reps 10 reps    Bicep curls  10 reps B 15 reps each UE    Tricep extension 10 reps B     Shoulder flexion   10 reps each UE    Shoulder PNF  D1 flexion/extension 10 reps each UE                      Braces/Orthotics/Lines/Etc:   · IV  Treatment/Session Assessment:    · Response to Treatment:  Tolerated well   · Interdisciplinary Collaboration:   o Occupational Therapist  o Registered Nurse  · After treatment position/precautions:   o Up in chair  o Bed/Chair-wheels locked  o Call light within reach  o RN notified   · Compliance with Program/Exercises: Will assess as treatment progresses. · Recommendations/Intent for next treatment session: \"Next visit will focus on advancements to more challenging activities and reduction in assistance provided\".   Total Treatment Duration:  OT Patient Time In/Time Out  Time In: 1612  Time Out: [id number] [facility] [address], OT

## 2018-08-16 NOTE — PROGRESS NOTES
Progress Note    Patient: Robbi Montoya MRN: [id number]  SSN: [id number]    YOB: 1960  Age: 62 y.o. Sex: female      Admit Date: 8/6/2018    LOS: 9 days     Subjective:     Patient sleeping most of the interview today. She continues to have some ocular abnormalities and there are no worse than yesterday. No new events. Objective:     Vitals:    08/15/18 0737 08/15/18 1104 08/15/18 1542 08/15/18 2040   BP: 135/83 127/70 126/80 129/76   Pulse: (!) 58 61 67 70   Resp: 18 18 18 18   Temp: 97.8 °F (36.6 °C) 98.6 °F (37 °C) 98.5 °F (36.9 °C) 97.6 °F (36.4 °C)   SpO2: 98% 96% 98% 98%   Weight:    126 lb 14.4 oz (57.6 kg)          Physical Exam:   Neurological examination     Patient was sleeping through much of the encounter today after working with physical therapy for much of the day. Exam was therefore limited. Face was symmetric. She moves all 4 extremities cavity. Muscle tone is normal.     Assessment:      This is a 66-year-old woman who unfortunately has metastatic colon cancer with metastasis to the brain. Neurology has been consulted because of ocular abnormalities. 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 These abnormalities are from her brainstem metastasis.     Plan:      Dexamethasone      Radiation oncology states of progression on MRI is pseudo-progression. That being said,  the patient's edema is significant and resulted in new clinical symptoms and therefore needs to be treated aggressively as edema can contribute to compression of the 4th ventricle.        Localization-related epilepsy: Continue antiepileptic drugs as you are doing.       Signed By: Anne Marie Muñoz DO     August 15, 2018

## 2018-08-17 NOTE — PROGRESS NOTES
Problem: Falls - Risk of  Goal: *Absence of Falls  Document Anant Fall Risk and appropriate interventions in the flowsheet. Outcome: Progressing Towards Goal  Fall Risk Interventions:  Mobility Interventions: Communicate number of staff needed for ambulation/transfer         Medication Interventions: Evaluate medications/consider consulting pharmacy    Elimination Interventions: Call light in reach    History of Falls Interventions: Investigate reason for fall        Problem: Pressure Injury - Risk of  Goal: *Prevention of pressure injury  Document Dominguez Scale and appropriate interventions in the flowsheet.    Outcome: Progressing Towards Goal  Pressure Injury Interventions:       Moisture Interventions: Apply protective barrier, creams and emollients    Activity Interventions: Pressure redistribution bed/mattress(bed type)    Mobility Interventions: Pressure redistribution bed/mattress (bed type)    Nutrition Interventions: Document food/fluid/supplement intake    Friction and Shear Interventions: Apply protective barrier, creams and emollients

## 2018-08-17 NOTE — PROGRESS NOTES
Problem: Mobility Impaired (Adult and Pediatric)  Goal: *Acute Goals and Plan of Care (Insert Text)  STG:  (1.)Ms. Belén Olivas will move from supine to sit and sit to supine , scoot up and down and roll side to side with CONTACT GUARD ASSIST within 3 treatment day(s). (2.)Ms. Belén Olivas will transfer from bed to chair and chair to bed with CONTACT GUARD ASSIST using the least restrictive device within 3 treatment day(s). (3.)Ms. Belén Olivas will ambulate with CONTACT GUARD ASSIST for 30 feet with the least restrictive device within 3 treatment day(s). LTG:  (1.)Ms. Belén Olivas will move from supine to sit and sit to supine , scoot up and down and roll side to side in bed with MODIFIED INDEPENDENCE within 7 treatment day(s). (2.)Ms. Belén Olivas will transfer from bed to chair and chair to bed with MODIFIED INDEPENDENCE using the least restrictive device within 7 treatment day(s). (3.)Ms. Belén Olivas will ambulate with MODIFIED INDEPENDENCE for 150+ feet with the least restrictive device within 7 treatment day(s). (4.)Ms. Belén Olivas will ascend/descend 2 steps with one hand rail with STAND BY ASSIST within 7 treatment days. ________________________________________________________________________________________________      PHYSICAL THERAPY: Daily Note, Treatment Day: 5th, AM 8/17/2018  INPATIENT: Hospital Day: 12  Payor: Lo Miles / Plan: [facility] / Product Type: PPO /      NAME/AGE/GENDER: Albino Sanchez is a 62 y.o. female   PRIMARY DIAGNOSIS: osteomyelitis  Osteomyelitis ([facility] [address] <principal problem not specified> <principal problem not specified>        ICD-10: Treatment Diagnosis:    · Generalized Muscle Weakness (M62.81)  · Difficulty in walking, Not elsewhere classified (R26.2)  · History of falling (Z91.81)   Precaution/Allergies:  Latex; Adhesive tape-silicones; Ativan [lorazepam]; Bactrim [sulfamethoprim ds]; Lexapro [escitalopram oxalate]; Macrobid [nitrofurantoin monohyd/m-cryst];  Pcn [penicillins]; and Pyridium [phenazopyridine]     R LE WBAT   ASSESSMENT:     Ms. Deepak Swartz was supine on contact. She agreed with encouragement to work with PT. She sat to EOB with mod a. Performed sit to stand and took several steps to the [facility]. She brushed her hair while on BSC. She voided, cleaned herself and stood again and was able to take steps to the chair. Mod assist x 2 to [facility] but max x 2 BSC to chair. She wanted to get back in bed but spouse encouraged her to stay in chair which may have contributed to requiring more assist BSC to chair. No ex done this AM d/t pt's fatigue with these activities. Able to ambulate away from the bed today which is an improvement but  no goals met. This section established at most recent assessment   PROBLEM LIST (Impairments causing functional limitations):  1. Decreased Strength  2. Decreased ADL/Functional Activities  3. Decreased Transfer Abilities  4. Decreased Ambulation Ability/Technique  5. Decreased Balance  6. Increased Pain  7. Decreased Activity Tolerance  8. Decreased Pacing Skills  9. Decreased Work Simplification/Energy Conservation Techniques  10. Decreased Knowledge of Precautions  11. Decreased [place] with Home Exercise Program   INTERVENTIONS PLANNED: (Benefits and precautions of physical therapy have been discussed with the patient.)  1. Balance Exercise  2. Bed Mobility  3. Family Education  4. Gait Training  5. Home Exercise Program (HEP)  6. Manual Therapy  7. Neuromuscular Re-education/Strengthening  8. Range of Motion (ROM)  9. Therapeutic Activites  10. Therapeutic Exercise/Strengthening  11. Transfer Training  12.  Group Therapy     TREATMENT PLAN: Frequency/Duration: 3 times a week for duration of hospital stay  Rehabilitation Potential For Stated Goals: Good     RECOMMENDED REHABILITATION/EQUIPMENT: (at time of discharge pending progress): Due to the probability of continued deficits (see above) this patient will likely need continued skilled physical therapy after discharge. Equipment:    None at this time              HISTORY:   History of Present Injury/Illness (Reason for Referral):  Patient is a 62 y.o. female admitted on 8/6 to transfer care to oncology after admission at [facility] for R knee OM/septic arthritis. She states that she fell ~11 weeks ago and presented to ortho for R knee cortisone injection ~ 5 weeks ago. Pain continued to worsened and MRI performed showing AVN with ? Infection vs metastatic disease. She was admitted to [facility] from 7/31-8/6 for open bx and surgical intervention. Bx showing OM/septic arthritis. Debridement was performed on femur and knee (7/31). OP cx + MSSA. Infectious disease at [facility] placed patient on Ancef 2g q8h. Also, BCs  On 7/31 + MSSA and staph lugdunensis. PORT removed and repeat BCs NG (8/2). TTE NG. She has remained afebrile, BCx1 NGTD. 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 MSSA bacteremia/AVIE (11/2017) related to R chest PORT s/p removal. She was treated to Ancef---then Daptomycin (changed due to poor GI tolerance-nausea). Last seen in ID office in 12/2017 by me. Past Medical History/Comorbidities:   Ms. Edgar Choudhury  has a past medical history of Arthritis; Colon cancer ([facility] [address] Gastrointestinal disorder; GERD (gastroesophageal reflux disease); Non-intractable cyclical vomiting with nausea (4/30/2018); Other ill-defined conditions(799.89); and Staph aureus infection (12/29/2017). She also has no past medical history of Adverse effect of anesthesia; Difficult intubation; Malignant hyperthermia due to anesthesia; or Pseudocholinesterase deficiency. Ms. Edgar Choudhury  has a past surgical history that includes hx lap cholecystectomy; hx gyn; hx tonsillectomy; hx adenoidectomy; reggie biopsy breast stereotactic (Left, 10-8-2014); hx knee arthroscopy (Left, 2014); and hx vascular access.   Social History/Living Environment:   Home Environment: Private residence  # Steps to Enter: 2  Rails to Enter: Yes  Hand Rails : Right  Wheelchair Ramp: No  One/Two Story Residence: One story  Living Alone: No  Support Systems: Spouse/Significant Other/Partner  Patient Expects to be Discharged to[de-identified] Private residence  Current DME Used/Available at Home: Eduardo Sicard, rollator, Wheelchair  Prior Level of Function/Work/Activity:  Uses WC for mobility, lives with  who helps with all transfers and ADLs, doesn't drive, used rollator for limited household ambulation prior to fall, one fall ~2 months ago     Number of Personal Factors/Comorbidities that affect the Plan of Care: 3+: HIGH COMPLEXITY   EXAMINATION:   Most Recent Physical Functioning:   Gross Assessment:                  Posture:     Balance:  Sitting: Impaired  Sitting - Static: Fair (occasional)  Sitting - Dynamic: Fair (occasional)  Standing: Impaired  Standing - Static: Poor  Standing - Dynamic : Poor Bed Mobility:  Supine to Sit: Moderate assistance  Wheelchair Mobility:     Transfers:  Sit to Stand: Moderate assistance;Assist x2  Gait:  Right Side Weight Bearing: As tolerated  Base of Support: Center of gravity altered  Speed/Kay: Shuffled; Slow  Step Length: Left shortened;Right shortened  Gait Abnormalities: Antalgic;Decreased step clearance;Shuffling gait (difficulty advancing left LE)  Distance (ft): 3 Feet (ft) (x 2)  Assistive Device:  (HHA)  Ambulation - Level of Assistance: Moderate assistance;Maximum assistance;Assist x2      Body Structures Involved:  1. Heart  2. Lungs  3. Bones  4. Joints  5. Muscles  6. Ligaments Body Functions Affected:  1. Sensory/Pain  2. Cardio  3. Respiratory  4. Neuromusculoskeletal  5. Movement Related Activities and Participation Affected:  1. Mobility  2. Self Care  3. Domestic Life  4.  Interpersonal Interactions and Relationships   Number of elements that affect the Plan of Care: 4+: HIGH COMPLEXITY   CLINICAL PRESENTATION:   Presentation: Evolving clinical presentation with changing clinical characteristics: MODERATE COMPLEXITY   CLINICAL DECISION MAKING:   [facility] AM-PAC 6 Clicks   Basic Mobility Inpatient Short Form  How much difficulty does the patient currently have. .. Unable A Lot A Little None   1. Turning over in bed (including adjusting bedclothes, sheets and blankets)? [] 1   [] 2   [x] 3   [] 4   2. Sitting down on and standing up from a chair with arms ( e.g., wheelchair, bedside commode, etc.)   [] 1   [x] 2   [] 3   [] 4   3. Moving from lying on back to sitting on the side of the bed? [] 1   [x] 2   [] 3   [] 4   How much help from another person does the patient currently need. .. Total A Lot A Little None   4. Moving to and from a bed to a chair (including a wheelchair)? [x] 1   [] 2   [] 3   [] 4   5. Need to walk in hospital room? [x] 1   [] 2   [] 3   [] 4   6. Climbing 3-5 steps with a railing? [x] 1   [] 2   [] 3   [] 4   © 2007, Trustees of [facility] [employer], under license to [employer] All rights reserved      Score:  Initial: 10 Most Recent: X (Date: -- )    Interpretation of Tool:  Represents activities that are increasingly more difficult (i.e. Bed mobility, Transfers, Gait). Score 24 23 22-20 19-15 14-10 9-7 6     Modifier CH CI CJ CK CL CM CN      ? Mobility - Walking and Moving Around:     - CURRENT STATUS: CL - 60%-79% impaired, limited or restricted    - GOAL STATUS: CK - 40%-59% impaired, limited or restricted    - D/C STATUS:  ---------------To be determined---------------  Payor: BLUE CROSS / Plan: [place] [employer] [facility] / Product Type: PPO /      Medical Necessity:     · Patient is expected to demonstrate progress in strength, range of motion, balance, coordination and functional technique to decrease assistance required with transfers and functional mobility. Reason for Services/Other Comments:  · Patient continues to require skilled intervention due to impaired functional mobility and activity tolerance.    Use of outcome tool(s) and clinical judgement create a POC that gives a: Questionable prediction of patient's progress: MODERATE COMPLEXITY            TREATMENT:   (In addition to Assessment/Re-Assessment sessions the following treatments were rendered)   Pre-treatment Symptoms/Complaints:   Pain: Initial: 8/10  knee pain     Post Session: tired     Therapeutic Activity: (    14 min ):  Therapeutic activities including to improve mobility, strength and balance. Required mod/max x 2   to promote dynamic balance in standing. Therapeutic Exercise: (   min):  Exercises per grid below to improve mobility, strength and balance. Required mod visual and verbal cues to promote proper body alignment, promote proper body posture and promote proper body mechanics. Date:  8/15/18 Date:   Date:     Activity/Exercise Seated Parameters Parameters Parameters   Heel raises 2 x 10 B     Toe raises 2 x 10 B     LAQ's X 10 B     Hip Flex X 10 B     Hip ABD X 10 B                                      Supine      Heel slides X 10 B     SAQ's X 10 B     bridges X 5              Braces/Orthotics/Lines/Etc:   · none  ·    Treatment/Session Assessment:    · Response to Treatment:  See above  · Interdisciplinary Collaboration:   o Registered Nurse  · After treatment position/precautions:   o Bed/Chair-wheels locked  o Bed in low position   · Compliance with Program/Exercises: Will assess as treatment progresses. · Recommendations/Intent for next treatment session: \"Next visit will focus on reduction in assistance provided\".   Total Treatment Duration:  PT Patient Time In/Time Out  Time In: 0927  Time Out: Amilcar [address] Cat, DELISA

## 2018-08-17 NOTE — INTERDISCIPLINARY ROUNDS
Interdisciplinary rounds with charge nurse, provider, and .     Presenting Problem: weakness  Daily Goal PT and discharge to rehab  Expected Discharge Date: 08/17/18  Expected Discharge Needs:none at present  Patient/Family Concerns addressed: discussed with social work to coordinate with

## 2018-08-17 NOTE — PROGRESS NOTES
Per Barak Soriano RN [facility]) they are still waiting on auth but she will let me know if they get auth today.

## 2018-08-17 NOTE — PROGRESS NOTES
[facility]        Inpatient Hematology / Oncology Progress Note      Admission Date: 2018  7:49 PM  Reason for Admission/Hospital Course: osteomyelitis  Osteomyelitis ([address]      24 Hour Events:  Afebrile, VSS  S/p FOLFOX   Awaiting auth for Trg Marj 13    ROS:  Constitutional: negative for fever, chills, +weakness, +malaise,+ fatigue. CV: negative for chest pain, palpitations, edema. Respiratory: negative for dyspnea, cough, wheezing. GI: negative for nausea, abdominal pain, diarrhea. MSK: +R knee pain    10 point review of systems is otherwise negative with the exception of the elements mentioned above in the HPI. Allergies   Allergen Reactions    Latex Rash    Adhesive Tape-Silicones Rash     Bad red rash.  Ativan [Lorazepam] Other (comments)     Pt states \"gets hallucinations and confused\"    Bactrim [Sulfamethoprim Ds] Hives     Swelling and itching      Lexapro [Escitalopram Oxalate] Other (comments)    Macrobid [Nitrofurantoin Monohyd/M-Cryst] Other (comments)     Felt  \"out of it\"    Pcn [Penicillins] Hives     Swelling and itching also. Pt has tolerated cephalosporins in the past.    Pyridium [Phenazopyridine] Hives     Also itching and swelling. OBJECTIVE:  Patient Vitals for the past 8 hrs:   BP Temp Pulse Resp SpO2   18 1125 (!) 150/92 98.6 °F (37 °C) 75 18 99 %   18 0735 134/72 98.3 °F (36.8 °C) 67 18 99 %     Temp (24hrs), Av.3 °F (36.8 °C), Min:97.7 °F (36.5 °C), Max:98.7 °F (37.1 °C)     0701 -  1900  In: 0 [P.O.:358]  Out: 200 [Urine:200]    Physical Exam:  Constitutional: Well developed, well nourished female in no acute distress, lying comfortably in the hospital bed. HEENT: Normocephalic and atraumatic. Oropharynx is clear, mucous membranes are moist. Extraocular muscles are intact. Sclerae anicteric. Skin Warm and dry. No rash noted. No erythema. No pallor. Steri-strips intact to R knee. Respiratory Lungs are clear to auscultation bilaterally without wheezes, rales or rhonchi, normal air exchange without accessory muscle use. CVS Normal rate, regular rhythm and normal S1 and S2. No murmurs, gallops, or rubs. Abdomen Soft, nontender and nondistended, normoactive bowel sounds. No palpable mass. No hepatosplenomegaly. Neuro Grossly nonfocal with no obvious sensory or motor deficits. MSK Normal range of motion in general. Right knee pain. Psych Appropriate mood and affect.         Labs:      Recent Labs      08/17/18   0350  08/16/18   0815  08/15/18   1111   WBC  5.4  5.6  6.1   RBC  2.74*  2.65*  2.84*   HGB  9.0*  8.7*  9.3*   HCT  26.5*  26.1*  28.0*   MCV  96.7  98.5*  98.6*   MCH  32.8  32.8  32.7   MCHC  34.0  33.3  33.2   RDW  14.6  14.7  14.9   PLT  284  300  289   GRANS  75  82*  88*   LYMPH  17  15  10*   MONOS  3  2*  1*   EOS   --   0*  0*   BASOS   --   0  0   IG   --   2  1   DF  MANUAL  AUTOMATED  AUTOMATED   ANEU  4.3  4.6  5.4   ABL  0.9  0.8  0.6   ABM  0.2  0.1  0.1   RICARDO   --   0.0  0.0   ABB   --   0.0  0.0   AIG   --   0.1  0.1        Recent Labs      08/17/18   0350  08/16/18   0815  08/16/18   0346  08/15/18   0416   NA  136  136   --   136   K  4.2  4.1   --   4.3   CL  99  100   --   100   CO2  27  29   --   28   AGAP  10  7   --   8   GLU  159*  128*   --   145*   BUN  15  15   --   16   CREA  0.36*  0.34*   --   0.37*   GFRAA  >60  >60   --   >60   GFRNA  >60  >60   --   >60   CA  7.9*  7.8*   --   8.3   SGOT  22  27   --   31   AP  144*  148*   --   158*   TP  5.8*  5.8*   --   6.3   ALB  2.5*  2.5*   --   2.6*   GLOB  3.3  3.3   --   3.7*   AGRAT  0.8*  0.8*   --   0.7*   MG  1.9   --   2.0  2.3         Imaging:  CT CHEST ABD PELV W CONT [[id number]] Collected: 08/10/18 1756      Order Status: Completed Updated: 08/10/18 1819     Narrative:       CT CHEST, ABDOMEN, PELVIS WITH CONTRAST:      CLINICAL HISTORY:   Restaging colon cancer with brain metastases. TECHNIQUE:  Oral and nonionic intravenous contrast was administered, and the  torso was scanned with spiral technique.  Radiation dose reduction was achieved  using one or all of the following techniques: automated exposure control,  weight-based dosing, iterative reconstruction. COMPARISON:  April 26, 2018. CT CHEST: There has been interval increase in number and size of pulmonary  parenchymal metastases and of mediastinal and hilar lymphadenopathy, including a  new 3.1 cm lateral aortic adelina metastasis.  The thyroid appears normal as  imaged.      CT ABDOMEN:  There are now innumerable hepatic metastases, increased from  approximately 3 on the prior study.  The spleen, pancreas, and kidneys appear  unremarkable.  Bilobed left adrenal metastasis now has aggregate diameter of  approximately 6.0 cm, and right adrenal metastasis is slightly larger as well.    No pathologically enlarged lymph nodes are evident.      CT PELVIS: The appendix appears normal.  Metastasis contiguous with the vaginal  cuff measures approximate 4.4 x 3.3 cm today compared with 2.7 x 2.1 cm in  April.  Partial left colectomy is again noted with unremarkable appearance of  the colostomy.  Bone windows demonstrate no aggressive process, accounting for  degenerative changes.         Impression:       IMPRESSION: Marked interval progression of soft tissue metastatic disease in the  torso, as detailed above.     MRI BRAIN W WO CONT [[id number]] Collected: 08/10/18 1158     Order Status: Completed Updated: 08/10/18 1241     Narrative:       EXAMINATION: BRAIN MRI 8/10/2018 10:43 AM    ACCESSION NUMBER: [id number]    INDICATION: assess CNS metastatic disease    Additional clinical history from the electronic medical record: CyberKnife  therapy 9/12/2017 2 bilateral parietal lobe lesions, CyberKnife therapy to left  cerebellar hemisphere lesion 1/18/2018    COMPARISON: Brain MRI 7/10/2018    TECHNIQUE: Multiplanar multisequence MRI of the brain without and with  intravenous administration of 12 cc Dotarem contrast agent. FINDINGS:    Previously index metastatic lesions are as follows:  -Enlarging 2.0 cm periaqueductal metastasis, previously 1.7 cm (axial  postcontrast image 131). -Minimally enlarged 1.9 cm left brachium pontis lesion, previously 1.8 cm (axial  postcontrast image 114). -1.0 cm left frontal lobe metastasis, previously 0.6 cm (axial postcontrast  image 246). -0.4 cm right precentral gyrus lesion, previously 0.7 cm (axial postcontrast  image 269). -Stable 2.1 x 2.0 cm left postcentral gyrus lesion (axial postcontrast image  247). -0.7 cm left frontal lobe lesion, previously 0.4 cm (axial postcontrast image  278). -Stable 0.6 cm anterior left occipital lobe lesion (axial postcontrast image  140). -0.4 cm more posterior left occipital lobe lesion, previously 0.6 cm (axial  postcontrast image 151). -0.4 cm anterior right temporal lobe lesion, previously 0.5 cm (axial  postcontrast image 129). -0.5 cm right cerebellar hemisphere lesion, not significantly changed (axial  postcontrast image 75). -Stable 0.7 cm anterior right parietal lesion (axial postcontrast image 261). There is no evidence of new enhancing intracranial metastases. Vasogenic edema throughout the midbrain and sean has worsened in comparison to  the prior exam, along with worsening vasogenic edema involving the middle  cerebellar peduncles and superior aspect of the left cerebellar hemisphere. This  corresponds to the enlargement of the lesions within the left brachium pontis  and periaqueductal region. This results in worsening mass effect upon the fourth ventricle. There is subtly increased caliber of the third ventricle and lateral ventricles  in comparison to the previous exam.    Vasogenic edema surrounding the anterior left frontal lobe lesion has worsened. Multifocal supratentorial vasogenic edema is otherwise unchanged.     The expected large vascular flow voids are preserved. Diffusion imaging shows no evidence of acute infarction.       Impression:       There are no suspicious osseous lesions. IMPRESSION:    Mixed response to therapy with enlargement of several previously indexed  metastases and reduction in size of other previously seen lesions. There is no evidence of new intracranial metastatic disease. Of note, lesions within the brachium pontis on the left and within the  periaqueductal region have enlarged. Surrounding vasogenic edema has  substantially worsened. The combination of these findings results in partial  effacement of the fourth ventricle, worsened in comparison to the prior exam.  There is very subtle enlargement of the lateral and third ventricles in  comparison to the prior exam. This raises the possibility of the very early  manifestations of obstructive hydrocephalus. ASSESSMENT:    Problem List  Date Reviewed: 7/7/2018          Codes Class Noted    Osteomyelitis ([facility] ICD-10-CM: M86.9  ICD-9-CM: 730.20  8/6/2018        Non-intractable cyclical vomiting with nausea ICD-10-CM: G43. A0  ICD-9-CM: 536.2  4/30/2018        Nonintractable headache ICD-10-CM: R51  ICD-9-CM: 784.0  4/30/2018        Hypokalemia ICD-10-CM: E87.6  ICD-9-CM: 276.8  4/30/2018        Status post colostomy ([facility] ICD-10-CM: Z93.3  ICD-9-CM: V44.3  3/15/2018        Confusion ICD-10-CM: R41.0  ICD-9-CM: 298.9  3/6/2018        Colovaginal fistula ICD-10-CM: N82.4  ICD-9-CM: 619.1  1/29/2018        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 (MSSA) ICD-10-CM: I33.0, B95.61  ICD-9-CM: 421.0, 041.11  55/35/0518        Systolic congestive heart failure (HCC) ICD-10-CM: I50.20  ICD-9-CM: 428.20, 428.0  11/6/2017        Severe sepsis [facility]) ICD-10-CM: A41.9, R65.20  ICD-9-CM: 038.9, 995.92  11/1/2017        Colon cancer metastasized to brain [facility]) ICD-10-CM: C18.9, C79.31  ICD-9-CM: 153.9, 198.3  8/30/2017 Malignant neoplasm of descending colon ([facility] ICD-10-CM: C18.6  ICD-9-CM: 153.2  8/30/2017        Pulmonary metastases ([facility] ICD-10-CM: C78.00  ICD-9-CM: 197.0  8/17/2017        Secondary malignant neoplasm of brain [facility]) ICD-10-CM: C79.31  ICD-9-CM: 198.3  8/17/2017        Mass of colon ICD-10-CM: K63.9  ICD-9-CM: 569.89  8/17/2017        Breast cancer screening ICD-10-CM: Z12.31  ICD-9-CM: V76.10  8/17/2017                PLAN:  Osteomyelitis- continue ancef for 6 weeks per ID.  8/14 Stitches placed 7/31/2018 with debridement. Okay to remove 2 weeks per Dr. Madison Menchaca. Please remove today.      Metastatic colon cancer with mets to brain-last treatment FOLFOX and bevacizumab 1/24/2018.   8/8 Resume PTA keppra. On decadron 2 mg q 12 hours  8/10 MRI brain: Mixed response to therapy with enlargement of several previously indexed metastases and reduction in size of other  previously seen lesions. There is no evidence of new intracranial metastatic disease. Of note, lesions within the brachium pontis on the left  and within the periaqueductal region have enlarged. Surrounding vasogenic edema has substantially worsened. The combination of these findings results in partial effacement of the fourth ventricle, worsened in comparison to the prior exam. There is very subtle enlargement of the lateral and third ventricles in comparison to the prior exam. This raises the possibility of the very early manifestations of obstructive hydrocephalus. Ordered CT CAP for restaging. CEA and CA 19.9. Planning for dose reduced FOLFOX without avastin tomorrow  8/11 FOLFOX today. Avastin if available. Increased dex to 4 q 8 hours  8/13 Dr. Teresa Mora consult pending  8/14 per Dr. Teresa Mora the progression noted on MRI brain is actually pseudo progression. He recommended repeat imaging in 2 months and schedule follow up with him after scan. 8/16 Awaiting placement    Headache  8/8 if continues consider brain scan  8/9 Resolved.      Left eye droop  8/12 consult neuro  8/14 agree with steroids  8/16 Neuro recommends aggressive treatment d/t edema causing symptoms and risk of compression to 4th ventricle. Con't antiepileptic drugs.     PT/OT consulted  Continue home meds   Stephanie SOPs  Lovenox for DVT prophylaxis  Discharge once placement approved    Disposition:  Awaiting auth for Ronang Marj 13. Can discharge once approved.             Farhat Neumann, NP   [facility]  [address] [place] [address]  Office : [phone]  Fax : [phone]

## 2018-08-17 NOTE — PROGRESS NOTES
CM attempted to contact d/c planner at [facility]) at [phone] ext [phone] to find out status of rehab at [facility] and why it is still in pending status (Per [address]) / no answer / left a VM and waiting on call back.

## 2018-08-17 NOTE — PROGRESS NOTES
TRANSFER - OUT REPORT:    Verbal report given Mima Beckwith RN on Rajiv Ra  being transferred to  [facility] ) for routine progression of care- Rehab        Report consisted of patients Situation, Background, Assessment and   Recommendations(SBAR). Information from the following report(s) SBAR and Kardex was reviewed with the receiving nurse. Lines:   PICC Single Lumen 31/64/81 Right;Basilic (Active)   Central Line Being Utilized Yes 8/17/2018  7:30 AM   Criteria for Appropriate Use Irritant/vesicant medication 8/17/2018  7:30 AM   Site Assessment Clean, dry, & intact 8/17/2018  7:30 AM   Phlebitis Assessment 0 8/17/2018  7:30 AM   Infiltration Assessment 0 8/17/2018  7:30 AM   Arm Circumference (cm) 26 cm 8/14/2018 10:47 AM   Date of Last Dressing Change 08/14/18 8/17/2018  7:30 AM   Dressing Status Clean, dry, & intact 8/17/2018  7:30 AM   External Catheter Length (cm) 3 centimeters 8/14/2018 10:47 AM   Dressing Type Disk with Chlorhexadine gluconate (CHG); Transparent 8/17/2018  3:20 AM   Hub Color/Line Status Purple;Flushed;Patent 8/17/2018  7:30 AM   Action Taken Blood drawn 8/17/2018  3:20 AM   Positive Blood Return (Site #1) Yes 8/17/2018  7:30 AM   Alcohol Cap Used No 8/17/2018  3:20 AM        Opportunity for questions and clarification was provided.       Patient transported with:   [facility]

## 2018-08-17 NOTE — PROGRESS NOTES
's follow-up visit. Mr. Jayant Garcia was in the hallway upon my arrival and I visited with him prior to my visit with Mrs. Sesay. I empathically listened as Mr. Jayant Garcia shared about a recent family loss, his wife's illness, and their foundation in God. I offered spiritual interventions to Mr. Jayant Garcia and continued my care with Mrs. Sesay at bedside. Mrs. Jayant Garcia spoke openly about her vulnerable condition and her kirsten. I offered care, including affirmation of emotions & kirsten, exploration of coping skills, and prayer as requested. Chaplains remain available for support.       David Ibrahim 68  Board Certified

## 2018-08-17 NOTE — PROGRESS NOTES
END OF SHIFT NOTE:    Intake/Output  08/16 1901 - 08/17 0700  In: 0   Out: 200 [Urine:200]   Voiding: YES  Catheter: NO  Drain:   Colostomy 03/13/18 Mid Abdomen (Active)   Drainage Color Oval Bach 8/16/2018  7:45 PM   Site Assessment Intact; Moist 8/16/2018  7:45 PM   Treatment Other (Comment) 8/16/2018  7:45 PM               Stool:  0 occurrences. Stool Assessment  Stool Appearance: Soft (08/16/18 1945)    Emesis:  0 occurrences. VITAL SIGNS  Patient Vitals for the past 12 hrs:   Temp Pulse Resp BP SpO2   08/17/18 0320 98.7 °F (37.1 °C) 60 18 149/87 99 %   08/16/18 2215 97.7 °F (36.5 °C) 64 18 142/75 98 %   08/16/18 1922 98 °F (36.7 °C) 83 16 124/82 99 %       Pain Assessment  Pain 1  Pain Scale 1: Numeric (0 - 10) (08/17/18 0541)  Pain Intensity 1: 8 (08/17/18 0541)  Patient Stated Pain Goal: 0 (08/17/18 0541)  Pain Reassessment 1: Patient sleeping (08/17/18 0205)  Pain Onset 1: pts (08/16/18 2143)  Pain Location 1: Knee (08/17/18 0541)  Pain Orientation 1: Right (08/17/18 0541)  Pain Description 1: Aching (08/17/18 0541)  Pain Intervention(s) 1: Medication (see MAR) (08/17/18 0541)    Ambulating  No    Additional Information:     Pt had an uneventful night and rested well. Complained of pain in the R knee, Dilaudid 1mg IV was given x3, Norco 10mg tablet was given x1. There are no further needs expressed at this time. Shift report given to oncoming nurse, John Shore at the bedside.     Shawn Duvall RN

## 2018-08-17 NOTE — PROGRESS NOTES
Progress Note    Patient: Amina Kennedy MRN: [id number]  SSN: [id number]    YOB: 1960  Age: 62 y.o. Sex: female      Admit Date: 8/6/2018    LOS: 11 days     Subjective:     She is doing well today. She has less sleepy. She had some difficulty working with physical therapy because of leg weakness. Otherwise, the patient is doing well. She still has a little bit of blurry vision but overall is doing much better. Objective:     Vitals:    08/16/18 2215 08/17/18 0320 08/17/18 0735 08/17/18 1125   BP: 142/75 149/87 134/72 (!) 150/92   Pulse: 64 60 67 75   Resp: 18 18 18 18   Temp: 97.7 °F (36.5 °C) 98.7 °F (37.1 °C) 98.3 °F (36.8 °C) 98.6 °F (37 °C)   SpO2: 98% 99% 99% 99%   Weight:              Physical Exam:   Neurological examination     Patient was sleeping through much of the encounter today after working with physical therapy for much of the day. Exam was therefore limited. Face was symmetric. She moves all 4 extremities cavity. Muscle tone is normal.     Assessment:      This is a 60-year-old woman who unfortunately has metastatic colon cancer with metastasis to the brain. Neurology has been consulted because of ocular abnormalities. 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 . These abnormalities are from her brainstem metastasis.     Plan:      Dexamethasone      Radiation oncology states of progression on MRI is pseudo-progression.   That being said,  the patient's edema is significant and resulted in new clinical symptoms and therefore needs to be treated aggressively as edema can contribute to compression of the 4th ventricle.        Localization-related epilepsy: Continue antiepileptic drugs as you are doing. I'll be glad to see the patient in my office. I will check and see if the patient is following with my partner, Lana Maher, who saw the patient during her last hospitalization for localization related epilepsy.     Signed By: Chriss Diop DO     August 17, 2018

## 2018-08-17 NOTE — PROGRESS NOTES
Per Debbie Hanley RN [facility]-MAIN liaison) / Bronson Slaughter received for patient to go to rehab today / NP and spouse aware.

## 2018-08-28 PROBLEM — G40.909 SEIZURE DISORDER (HCC): Status: ACTIVE | Noted: 2018-01-01

## 2018-08-28 PROBLEM — R78.81 STAPHYLOCOCCUS AUREUS BACTEREMIA: Status: ACTIVE | Noted: 2018-01-01

## 2018-08-28 PROBLEM — M00.061 STAPHYLOCOCCAL ARTHRITIS OF RIGHT KNEE (HCC): Status: ACTIVE | Noted: 2018-01-01

## 2018-08-28 PROBLEM — F32.A DEPRESSION: Status: ACTIVE | Noted: 2018-01-01

## 2018-08-28 PROBLEM — C18.9 METASTATIC COLON CANCER IN FEMALE (HCC): Status: ACTIVE | Noted: 2017-08-30

## 2018-08-28 PROBLEM — K21.9 GASTROESOPHAGEAL REFLUX DISEASE: Status: ACTIVE | Noted: 2018-01-01

## 2018-08-28 PROBLEM — B95.61 STAPHYLOCOCCUS AUREUS BACTEREMIA: Status: ACTIVE | Noted: 2018-01-01

## 2018-08-28 NOTE — PROGRESS NOTES
Arrived to the [facility]. Dilaudid 1mg given IV. Patient tolerated well. Any issues or concerns during appointment: patient going to [facility] at completion of tratment No future appointments in \[facility] [place] [facility]\"" @ this time Discharged via wheelchair accompanied by

## 2019-09-25 PROBLEM — Z12.39 BREAST CANCER SCREENING: Status: RESOLVED | Noted: 2017-08-17 | Resolved: 2019-09-25

## 2020-12-15 NOTE — ADT AUTH CERT NOTES
Patient Demographics        Patient Name [address] Sex  Address Phone       Edwin Son [phone] Female 1960 [address] [facility] [place] [phone] (Home) *Preferred*  [phone] (Mobile)           CSN:       [id number]           Admit Date: Admit Time Room Bed       Aug 6, 2018  7:49 PM 23 [id number] [[id number] [[id number]]           Attending Providers        Provider Pager From To       Dimitris Cha MD  18            Emergency Contact(s)        Name Relation Home Work [address] [place] [address] Spouse [phone]         Utilization Review           Chemotherapy - Care Day 8 (2018) by Esau Juarez RN        Review Entered Review Status       2018 Completed       Details              Care Day: 8 Care Date: 2018 Level of Care: Inpatient Floor       Guideline Day 2        Level Of Care       (X) Oncology unit or floor       2018 2:22 PM EDT by Francis Post         ONCO                     Clinical Status       (X) * Hemodynamic stability       2018 2:22 PM EDT by Francis Post         HR 56, /88, % RA, PAIN 7/10,              ( ) * No evidence of dehydration       ( ) * Infection absent or under adequate treatment              Activity       ( ) Activity as tolerated       2018 2:22 PM EDT by Francis Post         WALKER/W/C                     Routes       ( ) IV fluids, medications       2018 2:22 PM EDT by Francis Post         DECADRON PO, NORCO, DILAUDID IV, KEPPRA PO, PROTONIX PO,              (X) Diet as tolerated       2018 2:22 PM EDT by Francis Post         REG                     Interventions       (X) CBC and chemistries       2018 2:22 PM EDT by Francis Post         WBC: 6.3   RBC: 2.55 (L)   HGB: 8.2 (L)   HCT: 25.9 (L)   PLATELET: 661   NEUTROPHILS: 83 (H)   Glucose: 141 (H)   Protein, total: 5.9 (L)   Albumin: 2.1 (L)   ALT (SGPT): 27   AST: 51 (H)   Alk.  phosphatase: 154 (H)                     Medications       (X) Possible antibiotics       8/13/2018 2:22 PM EDT by Danette Leavitt         ANCEF 2 GMS IV Q 8              (X) Possible anticoagulation       8/13/2018 2:22 PM EDT by Danette Leavitt         LOVENOX                     8/13/2018 2:22 PM EDT by Danette Leavitt       Subject: Additional Clinical Information       NEURO-ocular misalignment.   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   Since last MRI, the patient has enlargement in some of her previous lesions secondary to edema.   I personally reviewed the patient's brain MRI and agree with radiology.   Please see report below.   The patient states that she has had double vision over the last few days.   Onset of symptoms was subacute and have been worsening. reports some lack of sensation in all 4 extremities, worse distally. This has been worsening since the patient has started chemotherapy. Assessment:  This is a 80-year-old woman who unfortunately has metastatic colon cancer with metastasis to the brain.   Neurology has been consulted because of ocular abnormalities.   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   These abnormalities are from her brainstem metastasis.  Plan:  Dexamethasone 10 mg IV, now, if not already given. Maintenance of 4 mg every 6 hours. I will let the primary team place this order as I am unsure if the patient already received a \"loading dose\".   Occasionally, higher doses of dexamethasone can be used including 10 mg every 6 hours.   Considering where the patient's edema is I will have a very low threshold to go to higher doses.  Radiation oncology has been consulted.  Given enlargement of the lesions in the brainstem, it is very important to have them involved.  The patient is at high risk of developing hydrocephalus given the location of her mass and her associated edema.   She should be watched very closely.   Consider neurosurgical consultation, especially if the patient worsens.     Localization-related epilepsy: Continue antiepileptic drugs as you are doing.                                     * Milestone                  Chemotherapy - Care Day 7 (8/12/2018) by Moreno Oates RN        Review Entered Review Status       8/13/2018 Completed       Details              Care Day: 7 Care Date: 8/12/2018 Level of Care: Inpatient Floor       Guideline Day 1        Level Of Care       (X) Oncology unit or floor [B]       8/13/2018 2:12 PM EDT by Bryant Mcknight         ONCO                     Clinical Status       (X) * Clinical Indications met [C]       8/13/2018 2:12 PM EDT by Bryant Mcknight         CHEMO                     Activity       (X) Activity as tolerated       8/13/2018 2:12 PM EDT by Bryant Mcknight         WALKER                     Routes       ( ) IV fluids, medications       8/13/2018 2:12 PM EDT by Bryant Mcknight         DECADRON PO Q 8, NORCO, DILAUDID IV X 5, KEPPRA 1 GM PO BID, PROTONIX PO,              (X) Diet as tolerated       8/13/2018 2:12 PM EDT by Bryant Mcknight         REG                     Interventions       (X) CBC and chemistries       8/13/2018 2:12 PM EDT by Bryant Mcknight         WBC: 5.3   RBC: 2.42 (L)   HGB: 7.8 (L)   HCT: 24.5 (L)   Glucose: 157 (H)   Magnesium: 1.7 (L)   Protein, total: 6.2 (L)   Albumin: 2.1 (L)   Alk.  phosphatase: 154 (H)                     Medications       (X) Chemotherapy as indicated       8/13/2018 2:12 PM EDT by Bryant Mcknight         ADRUCIL 1536 MG IV QD,              (X) Possible antiemetics       8/13/2018 2:12 PM EDT by Bryant Mcknight         ZOFRAN PO,              (X) Possible antibiotics       8/13/2018 2:12 PM EDT by Bryant Mcknight         ANCEF 2 GMS IV Q 8,              (X) Possible anticoagulation       8/13/2018 2:12 PM EDT by Bryant Mcknight         LOVENOX SQ QD                     8/13/2018 2:12 PM EDT by Griselda Grant       Subject: Additional Clinical Information        24 Hour Events:FOLFOX yesterday-tolerated wellNeuro consult for left eye droopConsult Dr. Boby Egan on [facility] [address]. eye droop8/12 consult neuro    PT/OT consulted    Stephanie SOPsTolerating chemotherapy.   Diplopia w lazy eye reportedly better on higher dose steroids.  Rad onc tomorrow.    HR 55, /65, SAT 99%                                   * Milestone                  Osteomyelitis - Care Day 6 (8/11/2018) by Alejandra Muñoz RN        Review Entered Review Status       8/13/2018 Completed       Details              Care Day: 6 Care Date: 8/11/2018 Level of Care: Inpatient Floor       Guideline Day 3        Level Of Care       (X) Floor       8/13/2018 2:02 PM EDT by Griselda Grant         ONCO                     Clinical Status       (X) * Hemodynamic stability       8/13/2018 2:02 PM EDT by Griselda Grant         SAIRA 140/93, HR 71, SAT 97%              (X) * WBC, ESR, or C-reactive protein stable or improved       8/13/2018 2:02 PM EDT by Griselda Grant         WBC: 5.4              (X) * Usual diet tolerated       8/13/2018 2:02 PM EDT by Griselda Grant         REG                     Activity       (X) As tolerated       8/13/2018 2:02 PM EDT by Griselda Grant         WALKER                     Routes       (X) Parenteral medications       8/13/2018 2:02 PM EDT by Griselda Grant         DECADRON IV AND PO, LOVENOX, ADRUCIL 1536 MG IV QD, NORCO , DILAUDID IV, KEPPRA 1 GM PO BID, PROTONIX, AVASTIN 287 MG IV , BENADRYL IV, ADRUCIL 480 MG IV, WELLCOVORIN IV, ZOFRAN IV, ELOXATIN 96 MG IV, NS BOLUS,              (X) Usual diet       8/13/2018 2:02 PM EDT by Griselda Grant         REG                     Interventions       (X) Possible physical therapy       8/13/2018 2:02 PM EDT by Griselda Grant         PT QD              (X) WBC, ESR, C-reactive protein       8/13/2018 2:02 PM EDT by Griselda Grant         WBC: 5.4  RBC: 2.45 (L)  HGB: 8.1 (L)  HCT: 24.5 (L)                     Medications       (X) Parenteral antibiotics       8/13/2018 2:02 PM EDT by Kathie Matthew         ANCEF 2 GMS IV Q 8,                     8/13/2018 2:03 PM EDT by Kathie Matthew       Subject: Additional Clinical Information       MRI BRAIN FROM 8/10/18-IMPRESSION:  Mixed response to therapy with enlargement of several previously indexedmetastases and reduction in size of other previously seen lesions. Ronnie Paterson is no evidence of new intracranial metastatic disease.  Of note, lesions within the brachium pontis on the left and within theperiaqueductal region have enlarged. Surrounding vasogenic edema hassubstantially worsened. The combination of these findings results in partialeffacement of the fourth ventricle, worsened in comparison to the prior exam.There is very subtle enlargement of the lateral and third ventricles incomparison to the prior exam. This raises the possibility of the very earlymanifestations of obstructive hydrocephalus.                 8/13/2018 2:02 PM EDT by Kathie Matthew       Subject: Additional Clinical Information       Osteomyelitis ([address]    24 Hour Events:FOLFOX today-[facility] Renee Berman on MondayPLAN:Osteomyelitis- continue ancef for 6 weeks per ID. Metastatic colon cancer with mets to brain-last treatment FOLFOX and bevacizumab 1/24/2018. 8/8 Resume PTA keppra. On decadron 2 mg q 12 hours8/10 MRI brain: Mixed response to therapy with enlargement of several previously indexed metastases and reduction in size of otherpreviously seen lesions. There is no evidence of new intracranial metastatic disease. Of note, lesions within the brachium pontis on the leftand within the periaqueductal region have enlarged. Surrounding vasogenic edema has substantially worsened.  The combination of these findings results in partial effacement of the fourth ventricle, worsened in comparison to the prior exam. There is very subtle enlargement of the lateral and third ventricles in comparison to the prior exam. This raises the possibility of the very early manifestations of obstructive hydrocephalus. Ordered CT CAP for restaging. CEA and CA 19.9. Planning for dose reduced FOLFOX without avastin tomorrow8/11 FOLFOX today. Avastin if available. Increased dex to 4 q 8 hours  Headache8/8 if continues consider brain scan8/9 Resolved.     PT/OT consulted    Stephanie SOPs   Glucose: 112 (H)   Calcium: 7.9 (L)   Protein, total: 5.8 (L)   Albumin: 2.0 (L)   Alk.  phosphatase: 154 (H)                                          * Milestone negative...

## 2022-05-20 NOTE — PROGRESS NOTES
Arrived to infusion  No concerns  Adrucil pump complete  Disconnected  Next appt 10/20
Masseter Units: 0
Consent: Written consent obtained. Risks include but not limited to lid/brow ptosis, bruising, swelling, diplopia, temporary effect, incomplete chemical denervation.
Show Additional Area 6: Yes
Show Right And Left Periorbital Units: No
Lot #: [id number]
Expiration Date (Month Year): 04/2024
Detail Level: Zone
Post-Care Instructions: Patient instructed to not lie down for 4 hours and limit physical activity for 24 hours. Patient instructed not to travel by airplane for 48 hours.
Price (Use Numbers Only, No Special Characters Or $): 13.00
Additional Area 2 Location: neck
Forehead Units: 20
Dilution (U/0.1 Cc): 2.5
Additional Area 1 Location: chin

## (undated) DEVICE — REM POLYHESIVE ADULT PATIENT RETURN ELECTRODE: Brand: VALLEYLAB

## (undated) DEVICE — SOL IRR SOD CL 0.9% 1000ML BTL --

## (undated) DEVICE — METER URIN 400ML URET ADPT W/ EZ LOK SAMP PRT

## (undated) DEVICE — SHEET, T, LAPAROTOMY, STERILE: Brand: MEDLINE

## (undated) DEVICE — TRAY PREP DRY W/ PREM GLV 2 APPL 6 SPNG 2 UNDPD 1 OVERWRAP

## (undated) DEVICE — COLOSTOMY/ILEOSTOMY KIT,FLEXWEAR: Brand: NEW IMAGE

## (undated) DEVICE — (D)PREP SKN CHLRAPRP APPL 26ML -- CONVERT TO ITEM 371833

## (undated) DEVICE — LEGGINGS, PAIR, 31X48, STERILE: Brand: MEDLINE

## (undated) DEVICE — JELLY LUBRICATING 10GM PREFIL SYR LUBE

## (undated) DEVICE — DRAPE TWL SURG 16X26IN BLU ORB04] ALLCARE INC]

## (undated) DEVICE — 2000CC GUARDIAN II: Brand: GUARDIAN

## (undated) DEVICE — DRAIN SURG L49IN DIA1/4IN 10IN H SIL W/O TRCR END PERF

## (undated) DEVICE — DRAPE,UNDERBUTTOCKS,PCH,STERILE: Brand: MEDLINE

## (undated) DEVICE — CYSTO: Brand: MEDLINE INDUSTRIES, INC.

## (undated) DEVICE — SOLUTION IRRIG 3000ML H2O STRL BAG

## (undated) DEVICE — SUTURE VCRL SZ 3-0 L27IN ABSRB UD L26MM SH 1/2 CIR J416H

## (undated) DEVICE — INTENDED FOR TISSUE SEPARATION, AND OTHER PROCEDURES THAT REQUIRE A SHARP SURGICAL BLADE TO PUNCTURE OR CUT.: Brand: BARD-PARKER SAFETY BLADES SIZE 10, STERILE

## (undated) DEVICE — RELOAD STPL L75MM OPN STPL H4.5MM CLS STPL H2MM WIRE

## (undated) DEVICE — SUTURE PDS II SZ 1 L96IN ABSRB VLT TP-1 L65MM 1/2 CIR Z880G

## (undated) DEVICE — SLIM BODY SKIN STAPLER: Brand: APPOSE ULC

## (undated) DEVICE — CYSTO/BLADDER IRRIGATION SET, REGULATING CLAMP

## (undated) DEVICE — SURGICAL PROCEDURE PACK BASIC ST FRANCIS

## (undated) DEVICE — SUTURE PERMAHAND SZ 2-0 L12X18IN NONABSORBABLE BLK SILK A185H

## (undated) DEVICE — BLADE ELECTRODE: Brand: EDGE

## (undated) DEVICE — INTENDED FOR TISSUE SEPARATION, AND OTHER PROCEDURES THAT REQUIRE A SHARP SURGICAL BLADE TO PUNCTURE OR CUT.: Brand: BARD-PARKER SAFETY BLADES SIZE 15, STERILE

## (undated) DEVICE — SUTURE PERMAHAND SZ 3-0 L18IN NONABSORBABLE BLK L26MM SH C013D

## (undated) DEVICE — CURVED, LARGE JAW, OPEN SEALER/DIVIDER NANO-COATED: Brand: LIGASURE IMPACT

## (undated) DEVICE — SUTURE PERMAHAND SZ 0 L30IN NONABSORBABLE BLK SILK BRAID A306H

## (undated) DEVICE — SOLUTION IV 1000ML 0.9% SOD CHL

## (undated) DEVICE — STAPLER INT L75MM CUT LN L73MM STPL LN L77MM BLU B FRM 8

## (undated) DEVICE — Device

## (undated) DEVICE — KENDALL SCD EXPRESS SLEEVES, KNEE LENGTH, MEDIUM: Brand: KENDALL SCD

## (undated) DEVICE — CATH URET 5FRX70CM W/OPN END -- BX/20

## (undated) DEVICE — SUTURE VCRL SZ 3-0 L18IN ABSRB UD L26MM SH 1/2 CIR J864D

## (undated) DEVICE — Z DUPLICATE USE 2716240 STAPLER INT CUT LN L40MM STPL L51MM GRN CRV HD B FRM

## (undated) DEVICE — GDWIRE 3CM FLX-TIP 0.038X150CM -- BX/5 SENSOR

## (undated) DEVICE — TRAY CATH OD16FR SIL URIN M STATLOK STBL DEV SURSTP

## (undated) DEVICE — SPONGE LAP 18X18IN STRL -- 5/PK